# Patient Record
Sex: FEMALE | Race: WHITE | NOT HISPANIC OR LATINO | Employment: OTHER | URBAN - NONMETROPOLITAN AREA
[De-identification: names, ages, dates, MRNs, and addresses within clinical notes are randomized per-mention and may not be internally consistent; named-entity substitution may affect disease eponyms.]

---

## 2017-03-14 ENCOUNTER — TRANSFERRED RECORDS (OUTPATIENT)
Dept: HEALTH INFORMATION MANAGEMENT | Facility: HOSPITAL | Age: 82
End: 2017-03-14

## 2017-09-28 ENCOUNTER — TELEPHONE (OUTPATIENT)
Dept: FAMILY MEDICINE | Facility: OTHER | Age: 82
End: 2017-09-28

## 2017-09-28 ENCOUNTER — HOSPITAL ENCOUNTER (EMERGENCY)
Facility: HOSPITAL | Age: 82
Discharge: HOME | End: 2017-09-28
Attending: PHYSICIAN ASSISTANT | Admitting: PHYSICIAN ASSISTANT
Payer: MEDICARE

## 2017-09-28 VITALS
SYSTOLIC BLOOD PRESSURE: 118 MMHG | OXYGEN SATURATION: 98 % | HEART RATE: 80 BPM | DIASTOLIC BLOOD PRESSURE: 68 MMHG | TEMPERATURE: 98.6 F | RESPIRATION RATE: 16 BRPM

## 2017-09-28 DIAGNOSIS — S22.000A COMPRESSION FRACTURE OF THORACIC VERTEBRA, CLOSED, INITIAL ENCOUNTER (H): ICD-10-CM

## 2017-09-28 PROCEDURE — 99213 OFFICE O/P EST LOW 20 MIN: CPT

## 2017-09-28 PROCEDURE — 99213 OFFICE O/P EST LOW 20 MIN: CPT | Performed by: PHYSICIAN ASSISTANT

## 2017-09-28 PROCEDURE — 72100 X-RAY EXAM L-S SPINE 2/3 VWS: CPT | Mod: TC

## 2017-09-28 ASSESSMENT — ENCOUNTER SYMPTOMS
ARTHRALGIAS: 1
FEVER: 0
PSYCHIATRIC NEGATIVE: 1
FATIGUE: 0
BACK PAIN: 1
NEUROLOGICAL NEGATIVE: 1
CARDIOVASCULAR NEGATIVE: 1
CONSTITUTIONAL NEGATIVE: 1

## 2017-09-28 NOTE — ED NOTES
"C/o lower back pain resulting from carrying a 5 gallon water jug down the stairs today, states that she \"twisted\" wrong.   "

## 2017-09-28 NOTE — ED AVS SNAPSHOT
[facility]    [address] [address]    Phone:  [phone]                                       Bianka Whitmore   MRN: [id number]    Department:  [facility]   Date of Visit:  9/28/2017           After Visit Summary Signature Page     I have received my discharge instructions, and my questions have been answered. I have discussed any challenges I see with this plan with the nurse or doctor.    ..........................................................................................................................................  Patient/Patient Representative Signature      ..........................................................................................................................................  Patient Representative Print Name and Relationship to Patient    ..................................................               ................................................  Date                                            Time    ..........................................................................................................................................  Reviewed by Signature/Title    ...................................................              ..............................................  Date                                                            Time

## 2017-09-28 NOTE — ED PROVIDER NOTES
History     Chief Complaint   Patient presents with     Back Pain     was moving a 5 gallon water jug today around 1230 and twisted wrong, now having right lower back pain     The history is provided by the patient. No  was used.     Bianka Whitmore is a 82 year old female who has LBP since earlier today. States it started after she tried to carry a 5 gallon bucket of water, down some stairs. Denies any direct trauma or fall. No loss of leg strength or b/b control. Has h/o L3 compression fracture.     I have reviewed the Medications, Allergies, Past Medical and Surgical History, and Social History in the Epic system.    Allergies:   Allergies   Allergen Reactions     Amoxicillin      Intolerant           No current facility-administered medications on file prior to encounter.   Current Outpatient Prescriptions on File Prior to Encounter:  order for DME Equipment being ordered: ace   NONFORMULARY 1 tablespoon of chopped fresh garlic   NONFORMULARY Extra virgin olive oil 2 tablespoons  Daily   MAGNESIUM OXIDE PO Take by mouth daily    B Complex-Biotin-FA (VITAMIN B50 COMPLEX PO) Take 1 tablet by mouth daily   Ascorbic Acid (VITAMIN C PO) Take 500 mg by mouth 2 times daily   VITAMIN E COMPLEX PO Take 400 Units by mouth daily   Omega-3 Fatty Acids (FISH OIL) 1200 MG CAPS Take 1 capsule by mouth 2 times daily   Flaxseed, Linseed, (FLAX SEED OIL) 1000 MG capsule Take 1 capsule by mouth daily   Flaxseed, Linseed, (GROUND FLAX SEEDS PO) Take by mouth daily       Patient Active Problem List   Diagnosis     Diverticulitis of colon     Osteoarthritis     Osteoporosis/osteopenia increased risk     ACP (advance care planning)     Malignant basal cell neoplasm of skin     Compression fracture of thoracic vertebra (H)     Geographic tongue     Varicose vein of leg     Compression fx, lumbar spine, with routine healing, subsequent encounter     Typical atrial flutter (H)       Past Surgical History:  "  Procedure Laterality Date     APPENDECTOMY       ENDOSCOPIC STRIPPING VEIN(S)       HYSTERECTOMY       SALPINGO OOPHORECTOMY,R/L/BRIAN         Social History   Substance Use Topics     Smoking status: Former Smoker     Types: Cigarettes     Quit date: 3/26/1953     Smokeless tobacco: Never Used      Comment: Quit in 1950; no passive smoke exposure     Alcohol use No       Most Recent Immunizations   Administered Date(s) Administered     Influenza (IIV3) 10/14/2008     Pneumococcal 23 valent 10/21/2004     TD (ADULT, 7+) 05/21/1999     Zoster vaccine, live 11/19/2012       BMI: Estimated body mass index is 20.25 kg/(m^2) as calculated from the following:    Height as of 11/17/16: 1.626 m (5' 4\").    Weight as of 11/23/16: 53.5 kg (118 lb).      Review of Systems   Constitutional: Negative.  Negative for fatigue and fever.   HENT: Negative.    Cardiovascular: Negative.    Musculoskeletal: Positive for arthralgias and back pain.   Neurological: Negative.    Psychiatric/Behavioral: Negative.        Physical Exam   BP: 118/68  Pulse: 80  Temp: 98.6  F (37  C)  Resp: 16  SpO2: 98 %  Physical Exam   Constitutional: She is oriented to person, place, and time. She appears well-developed and well-nourished. No distress.   Cardiovascular: Normal rate.    Pulmonary/Chest: Effort normal.   Musculoskeletal:   Back: no e/e/e/e. Moderate TTP to left lower lumbar area. M/n/v grossly intact  BLE: +AFROM, m/n/v grossly intact   Neurological: She is alert and oriented to person, place, and time.   Skin: She is not diaphoretic.   Psychiatric: She has a normal mood and affect.   Nursing note and vitals reviewed.      ED Course     ED Course     Procedures         Preliminary result by Enrique Ortiz (09/28/17 14:57:36)     Narrative:         LUMBAR SPINE SERIES    REPORT:  There is compression fracture of T12, L3 and L4.  The L4  fracture is new as compared to July of 2016. Sacrum and sacroiliac  joints appear normal.  Lumbar disks " are normal in  height.    IMPRESSION:  NEW L4 COMPRESSION FRACTURE AS COMPARED TO JULY OF 2016.  Exam Date: Sep 28, 2017 02:32:00 PM  Author: MEMO DIAZ  This report is          Assessments & Plan (with Medical Decision Making)     I have reviewed the nursing notes.    I have reviewed the findings, diagnosis, plan and need for follow up with the patient.    Final diagnoses:   Compression fracture of thoracic vertebra, closed, initial encounter (H) - New compression fracture at L-4         Pt does not wish to have any rx for pain medication.  Patient verbally educated and given appropriate education sheets for the diagnoses and has no questions.  Take current medications as directed.   Follow up with your Primary Care provider if symptoms increase or if concerns develop, return to the ER  Magalie Cancino Certified  Physician Assistant  9/28/2017  3:20 PM  URGENT CARE CLINIC      9/28/2017   [facility]     Magalie Cacnino PA  09/28/17 1521

## 2017-09-28 NOTE — TELEPHONE ENCOUNTER
1:25 PM    Reason for Call: OVERBOOK    Patient is having the following symptoms: moving something and hurt back same place where pt had issue before for 1 hours.    The patient is requesting an appointment for today with Yael.    Was an appointment offered for this call? No  If yes : Appointment type              Date    Preferred method for responding to this message: Telephone Call  What is your phone number ? [phone]    If we cannot reach you directly, may we leave a detailed response at the number you provided? Yes    Can this message wait until your PCP/provider returns, if unavailable today?     Danielle Piedra

## 2017-09-28 NOTE — ED AVS SNAPSHOT
[facility]    [address]    [facility] [place] [address]    Phone:  [phone]                                       Bianka Whitmore   MRN: [id number]    Department:  [facility]   Date of Visit:  9/28/2017           Patient Information     Date Of Birth          1935        Your diagnoses for this visit were:     Compression fracture of thoracic vertebra, closed, initial encounter (H) New compression fracture at L-4       You were seen by Magalie Cancino PA.      Follow-up Information     Follow up with Yael Mckeon PA.    Specialty:  Family Practice    Why:  If symptoms worsen    Contact information:    [facility]  [address]  [place] [address]  [phone]          Follow up with [facility].    Specialty:  EMERGENCY MEDICINE    Why:  if further concerns develop    Contact information:    [address] [address] [phone]    Additional information:    From [facility]: Take [address]. Turn left at [address]. Turn left at the first stoplight on [address]. At the first stop sign, take a right onto [address]. Take a left into the parking lot and continue through until you reach the North enterance of the building.       From [place]: Take [address]. Take the [address] ramp towards [place]. Turn left onto [address]. Take a slight right onto [address]. Turn left at the first stoplight on [address]. At the first stop sign, take a right onto [address]. Take a left into the parking lot and continue through until you reach the North enterance of the building.       From [place]: Take [address]. Take a right at [address]. At the first stop sign, take a right onto [address]. Take a left into the parking lot and continue through until you reach the North enterance of the building.       Discharge References/Attachments      FRACTURE, VERTEBRAL COMPRESSION (ENGLISH)         Review of your medicines      Our records show that you are taking the medicines listed below. If these are incorrect, please call your family doctor or clinic.        Dose / Directions Last dose taken    Fish Oil 1200 MG Caps   Dose:  1 capsule        Take 1 capsule by mouth 2 times daily   Refills:  0        * flax seed oil 1000 MG capsule   Dose:  1 capsule        Take 1 capsule by mouth daily   Refills:  0        * GROUND FLAX SEEDS PO        Take by mouth daily   Refills:  0        MAGNESIUM OXIDE PO        Take by mouth daily   Refills:  0        * NONFORMULARY        1 tablespoon of chopped fresh garlic   Refills:  0        * NONFORMULARY        Extra virgin olive oil 2 tablespoons  Daily   Refills:  0        order for DME   Quantity:  1 Device        Equipment being ordered: ace   Refills:  1        VITAMIN B50 COMPLEX PO   Dose:  1 tablet        Take 1 tablet by mouth daily   Refills:  0        VITAMIN C PO   Dose:  500 mg        Take 500 mg by mouth 2 times daily   Refills:  0        VITAMIN E COMPLEX PO   Dose:  400 Units        Take 400 Units by mouth daily   Refills:  0        * Notice:  This list has 4 medication(s) that are the same as other medications prescribed for you. Read the directions carefully, and ask your doctor or other care provider to review them with you.            Procedures and tests performed during your visit     Lumbar spine XR, 2-3 views      Orders Needing Specimen Collection     None      Pending Results     Date and Time Order Name Status Description    9/28/2017 1410 Lumbar spine XR, 2-3 views In process             Pending Culture Results     No orders found from 9/26/2017 to 9/29/2017.            Thank you for choosing [facility]       Thank you for choosing [facility] for your care. Our goal is always to provide you with excellent care. Hearing back from our patients is one way we can continue to improve our services. Please  "take a few minutes to complete the written survey that you may receive in the mail after you visit with us. Thank you!        [employer] Information     [employer] lets you send messages to your doctor, view your test results, renew your prescriptions, schedule appointments and more. To sign up, go to [email or web address] . Click on \"Log in\" on the left side of the screen, which will take you to the Welcome page. Then click on \"Sign up Now\" on the right side of the page.     You will be asked to enter the access code listed below, as well as some personal information. Please follow the directions to create your username and password.     Your access code is: [email or web address]  Expires: 2017  2:43 PM     Your access code will  in 90 days. If you need help or a new code, please call your [place] clinic or [phone].        Care EveryWhere ID     This is your Care EveryWhere ID. This could be used by other organizations to access your [place] medical records  [id number]        Equal Access to Services     [facility]: Hadii layla Lloyd, waannamariada dalia, qaybkala mabryaljohn lay, tesha ortega . So [facility] [phone].    ATENCIÓN: Si habla español, tiene a marquez disposición servicios gratuitos de asistencia lingüística. Llame al [phone].    We comply with applicable federal civil rights laws and [place] laws. We do not discriminate on the basis of race, color, national origin, age, disability sex, sexual orientation or gender identity.            After Visit Summary       This is your record. Keep this with you and show to your community pharmacist(s) and doctor(s) at your next visit.                  "

## 2017-10-02 NOTE — PROGRESS NOTES
Lumbar Spine Series XR - IMPRESSION:  NEW L4 COMPRESSION FRACTURE AS COMPARED TO JULY OF 2016.  Advised to follow up with PCP, if symptoms worsen.  Report routed to PCP, ORTEGA Fermin.

## 2017-10-11 ENCOUNTER — OFFICE VISIT (OUTPATIENT)
Dept: FAMILY MEDICINE | Facility: OTHER | Age: 82
End: 2017-10-11
Attending: NURSE PRACTITIONER
Payer: MEDICARE

## 2017-10-11 VITALS
BODY MASS INDEX: 20.49 KG/M2 | SYSTOLIC BLOOD PRESSURE: 112 MMHG | RESPIRATION RATE: 16 BRPM | HEIGHT: 64 IN | OXYGEN SATURATION: 96 % | WEIGHT: 120 LBS | HEART RATE: 87 BPM | TEMPERATURE: 97.7 F | DIASTOLIC BLOOD PRESSURE: 72 MMHG

## 2017-10-11 DIAGNOSIS — S32.040D CLOSED COMPRESSION FRACTURE OF L4 LUMBAR VERTEBRA WITH ROUTINE HEALING, SUBSEQUENT ENCOUNTER: Primary | ICD-10-CM

## 2017-10-11 PROCEDURE — 99213 OFFICE O/P EST LOW 20 MIN: CPT | Performed by: NURSE PRACTITIONER

## 2017-10-11 PROCEDURE — 99212 OFFICE O/P EST SF 10 MIN: CPT

## 2017-10-11 ASSESSMENT — PAIN SCALES - GENERAL: PAINLEVEL: MODERATE PAIN (4)

## 2017-10-11 NOTE — NURSING NOTE
"Chief Complaint   Patient presents with     Hospital F/U       Initial /72 (BP Location: Left arm, Patient Position: Sitting, Cuff Size: Adult Regular)  Pulse 87  Temp 97.7  F (36.5  C) (Tympanic)  Resp 16  Ht 5' 4\" (1.626 m)  Wt 120 lb (54.4 kg)  SpO2 96%  BMI 20.6 kg/m2 Estimated body mass index is 20.6 kg/(m^2) as calculated from the following:    Height as of this encounter: 5' 4\" (1.626 m).    Weight as of this encounter: 120 lb (54.4 kg).  Medication Reconciliation: complete   Gianna Rhodes      "

## 2017-10-11 NOTE — PROGRESS NOTES
SUBJECTIVE:   Bianka Whitmore is a 82 year old female who presents to clinic today for the following health issues:      ED/UC Followup:    Facility:  [facility]  Date of visit: 9-  Reason for visit: Low back pain after moving water cooler water jug  Current Status: better sometimes still sore and painful.  Taking IBU and tylenol.             Problem list and histories reviewed & adjusted, as indicated.  Additional history: as documented    Patient Active Problem List   Diagnosis     Diverticulitis of colon     Osteoarthritis     Osteoporosis/osteopenia increased risk     ACP (advance care planning)     Malignant basal cell neoplasm of skin     Compression fracture of thoracic vertebra (H)     Geographic tongue     Varicose vein of leg     Compression fx, lumbar spine, with routine healing, subsequent encounter     Typical atrial flutter (H)     Past Surgical History:   Procedure Laterality Date     APPENDECTOMY       ENDOSCOPIC STRIPPING VEIN(S)       HYSTERECTOMY       SALPINGO OOPHORECTOMY,R/L/BRIAN         Social History   Substance Use Topics     Smoking status: Former Smoker     Types: Cigarettes     Quit date: 3/26/1953     Smokeless tobacco: Never Used      Comment: Quit in 1950; no passive smoke exposure     Alcohol use No     Family History   Problem Relation Age of Onset     Other - See Comments Mother 85     Old age, cause of death     CANCER Father 84     Rectal cancer, cause of death/Stomach cancer, cause of death     C.A.D. Maternal Aunt      C.A.D. Maternal Uncle      CANCER Son      thymic carcinoma         Current Outpatient Prescriptions   Medication Sig Dispense Refill     order for DME Equipment being ordered: ace 1 Device 1     NONFORMULARY 1 tablespoon of chopped fresh garlic       NONFORMULARY Extra virgin olive oil 2 tablespoons  Daily       MAGNESIUM OXIDE PO Take by mouth daily        B Complex-Biotin-FA (VITAMIN B50 COMPLEX PO) Take 1 tablet by mouth daily       Ascorbic Acid (VITAMIN C  "PO) Take 500 mg by mouth 2 times daily       VITAMIN E COMPLEX PO Take 400 Units by mouth daily       Omega-3 Fatty Acids (FISH OIL) 1200 MG CAPS Take 1 capsule by mouth 2 times daily       Flaxseed, Linseed, (FLAX SEED OIL) 1000 MG capsule Take 1 capsule by mouth daily       Flaxseed, Linseed, (GROUND FLAX SEEDS PO) Take by mouth daily       Allergies   Allergen Reactions     Amoxicillin      Intolerant           Reviewed and updated as needed this visit by clinical staffAllergies  Meds       Reviewed and updated as needed this visit by Provider         ROS:  C: NEGATIVE for fever, chills, change in weight  INTEGUMENTARY/SKIN: NEGATIVE for worrisome rashes, moles or lesions  R: NEGATIVE for significant cough or SOB  CV: NEGATIVE for chest pain, palpitations or peripheral edema  MUSCULOSKELETAL: recent L4 compression fracture     OBJECTIVE:     /72 (BP Location: Left arm, Patient Position: Sitting, Cuff Size: Adult Regular)  Pulse 87  Temp 97.7  F (36.5  C) (Tympanic)  Resp 16  Ht 5' 4\" (1.626 m)  Wt 120 lb (54.4 kg)  SpO2 96%  BMI 20.6 kg/m2  Body mass index is 20.6 kg/(m^2).   GENERAL: healthy, alert and no distress  RESP: lungs clear to auscultation - no rales, rhonchi or wheezes  CV: regular rate and rhythm, normal S1 S2, no S3 or S4, no murmur, click or rub, no peripheral edema and peripheral pulses strong  MS: no edema, peripheral pulses normal and tenderness to palpation bilateral lower back - Negative for changes in bowel and bladder  SKIN: no suspicious lesions or rashes  PSYCH: mentation appears normal, affect normal/bright    Diagnostic Test Results:  Results for orders placed or performed during the hospital encounter of 09/28/17   Lumbar spine XR, 2-3 views    Narrative    LUMBAR SPINE SERIES    REPORT:  There is compression fracture of T12, L3 and L4.  The L4  fracture is new as compared to July of 2016. Sacrum and sacroiliac  joints appear normal.  Lumbar disks are normal in  " height.    IMPRESSION:  NEW L4 COMPRESSION FRACTURE AS COMPARED TO JULY OF 2016.  Exam Date: Sep 28, 2017 02:32:00 PM  Author: MEMO DIAZ  This report is final and signed         ASSESSMENT:       PLAN:   1. Closed compression fracture of L4 lumbar vertebra with routine healing, subsequent encounter  Encouraged alternating tylenol and ibuprofen. No heavy lifting. No back x-ray done today. Discussed slow healing process and follow as needed or any concerns          See Patient Instructions    LAUREN Chawla [facility]

## 2017-10-11 NOTE — PATIENT INSTRUCTIONS
Back Fracture (Compression Fracture)  Your spine stretches from the base of your skull to your tailbone. It's composed of 33 bones (vertebrae) stacked on top of one another. These bones are strong enough to support the weight of your upper body. Certain injuries, however, can damage one or more of the vertebrae and cause them to collapse. A collapsed bone in your spine is known as a compression fracture.    Causes of compression fracture  Many compression fractures result from osteoporosis. This disease thins your bones so they can't withstand normal pressure. Trauma from a car accident or hard fall can fracture even healthy vertebrae. In rare cases, vertebrae may fracture for unknown reasons.  When to go to the Emergency Room (ER)  Call 911 if you've been in an accident or had a fall and have neck or back pain, especially when pain occurs with any of these symptoms:    Loss of control over your bowels or bladder    Numbness or weakness    High fever    Unexplained back pain in a person with cancer  What to expect in the ER  A healthcare provider will ask about your medical history and examine you. In some cases, you may have X-rays. You also may have other tests, such as computed tomography (CT) or magnetic resonance imaging (MRI). These tests can provide detailed images of your bones and spinal cord.  Treatment  Treatment will depend on the type and cause of the fracture. You will be given medicine for pain. Severe fractures or those that cause nerve problems may need surgery. Many compression fractures mend on their own.  Follow-up  As you improve, you may be given exercises to strengthen your bones. If you have osteoporosis, your healthcare provider may prescribe a medicine to treat it. Sometimes you may have pain even after the bone has healed. In that case, your healthcare provider will discuss your choices with you.  Date Last Reviewed: 9/30/2015 2000-2017 [employer]. [address] [place] [address]  Road, [place] [address]. All rights reserved. This information is not intended as a substitute for professional medical care. Always follow your healthcare professional's instructions.      Try alternating tylenol and ibuprofen throughout the day  Try some ice or heat for comfort  Do not lift heavy objects  Continue to stay active    Follow up with any questions or concerns

## 2017-10-11 NOTE — MR AVS SNAPSHOT
After Visit Summary   10/11/2017    Bianka Whitmore    MRN: [id number]           Patient Information     Date Of Birth          1935        Visit Information        Provider Department      10/11/2017 1:40 PM Theresa Sheridan APRN [facility]        Today's Diagnoses     Closed compression fracture of L4 lumbar vertebra with routine healing, subsequent encounter    -  1      Care Instructions      Back Fracture (Compression Fracture)  Your spine stretches from the base of your skull to your tailbone. It's composed of 33 bones (vertebrae) stacked on top of one another. These bones are strong enough to support the weight of your upper body. Certain injuries, however, can damage one or more of the vertebrae and cause them to collapse. A collapsed bone in your spine is known as a compression fracture.    Causes of compression fracture  Many compression fractures result from osteoporosis. This disease thins your bones so they can't withstand normal pressure. Trauma from a car accident or hard fall can fracture even healthy vertebrae. In rare cases, vertebrae may fracture for unknown reasons.  When to go to the Emergency Room (ER)  Call 911 if you've been in an accident or had a fall and have neck or back pain, especially when pain occurs with any of these symptoms:    Loss of control over your bowels or bladder    Numbness or weakness    High fever    Unexplained back pain in a person with cancer  What to expect in the ER  A healthcare provider will ask about your medical history and examine you. In some cases, you may have X-rays. You also may have other tests, such as computed tomography (CT) or magnetic resonance imaging (MRI). These tests can provide detailed images of your bones and spinal cord.  Treatment  Treatment will depend on the type and cause of the fracture. You will be given medicine for pain. Severe fractures or those that cause nerve problems may need surgery.  "Many compression fractures mend on their own.  Follow-up  As you improve, you may be given exercises to strengthen your bones. If you have osteoporosis, your healthcare provider may prescribe a medicine to treat it. Sometimes you may have pain even after the bone has healed. In that case, your healthcare provider will discuss your choices with you.  Date Last Reviewed: 9/30/2015 2000-2017 [employer]. [address] [place] [address], [place] [address]. All rights reserved. This information is not intended as a substitute for professional medical care. Always follow your healthcare professional's instructions.      Try alternating tylenol and ibuprofen throughout the day  Try some ice or heat for comfort  Do not lift heavy objects  Continue to stay active    Follow up with any questions or concerns          Follow-ups after your visit        Follow-up notes from your care team     Return if symptoms worsen or fail to improve.      Who to contact     If you have questions or need follow up information about today's clinic visit or your schedule please contact [facility] directly at [phone].  Normal or non-critical lab and imaging results will be communicated to you by [employer], letter or phone within 4 business days after the clinic has received the results. If you do not hear from us within 7 days, please contact the clinic through [employer] or phone. If you have a critical or abnormal lab result, we will notify you by phone as soon as possible.  Submit refill requests through [employer] or call your pharmacy and they will forward the refill request to us. Please allow 3 business days for your refill to be completed.          Additional Information About Your Visit        [employer] Information     [employer] lets you send messages to your doctor, view your test results, renew your prescriptions, schedule appointments and more. To sign up, go to [email or web address] . Click on \"Log in\" on the left " "side of the screen, which will take you to the Welcome page. Then click on \"Sign up Now\" on the right side of the page.     You will be asked to enter the access code listed below, as well as some personal information. Please follow the directions to create your username and password.     Your access code is: [email or web address]  Expires: 2017  2:43 PM     Your access code will  in 90 days. If you need help or a new code, please call your [place] clinic or [phone].        Care EveryWhere ID     This is your Care EveryWhere ID. This could be used by other organizations to access your [place] medical records  [id number]        Your Vitals Were     Pulse Temperature Respirations Height Pulse Oximetry BMI (Body Mass Index)    87 97.7  F (36.5  C) (Tympanic) 16 5' 4\" (1.626 m) 96% 20.6 kg/m2       Blood Pressure from Last 3 Encounters:   10/11/17 112/72   17 118/68   16 100/70    Weight from Last 3 Encounters:   10/11/17 120 lb (54.4 kg)   16 118 lb (53.5 kg)   16 120 lb (54.4 kg)              Today, you had the following     No orders found for display       Primary Care Provider Office Phone # Fax #    ORTEGA Rivas [phone]       [facility] [address] [place] [address]        Equal Access to Services     [facility] AH: Hadii aad ku hadasho Soomaali, waaxda luqadaha, qaybta kaalmada adeegyada, waxmaria esther ortega . So [facility] [phone].    ATENCIÓN: Si radhala en, tiene a marquez disposición servicios gratuitos de asistencia lingüística. Llame al [phone].    We comply with applicable federal civil rights laws and [place] laws. We do not discriminate on the basis of race, color, national origin, age, disability, sex, sexual orientation, or gender identity.            Thank you!     Thank you for choosing [facility]  for your care. Our goal is always to provide you with excellent care. Hearing " back from our patients is one way we can continue to improve our services. Please take a few minutes to complete the written survey that you may receive in the mail after your visit with us. Thank you!             Your Updated Medication List - Protect others around you: Learn how to safely use, store and throw away your medicines at www.disposemymeds.org.          This list is accurate as of: 10/11/17  2:22 PM.  Always use your most recent med list.                   Brand Name Dispense Instructions for use Diagnosis    fish Oil 1200 MG capsule      Take 1 capsule by mouth 2 times daily        * flax seed oil 1000 MG capsule      Take 1 capsule by mouth daily        * GROUND FLAX SEEDS PO      Take by mouth daily        MAGNESIUM OXIDE PO      Take by mouth daily        * NONFORMULARY      1 tablespoon of chopped fresh garlic        * NONFORMULARY      Extra virgin olive oil 2 tablespoons  Daily        order for DME     1 Device    Equipment being ordered: ace    Cellulitis and abscess of leg       VITAMIN B50 COMPLEX PO      Take 1 tablet by mouth daily        VITAMIN C PO      Take 500 mg by mouth 2 times daily        VITAMIN E COMPLEX PO      Take 400 Units by mouth daily        * Notice:  This list has 4 medication(s) that are the same as other medications prescribed for you. Read the directions carefully, and ask your doctor or other care provider to review them with you.

## 2017-10-27 ENCOUNTER — OFFICE VISIT (OUTPATIENT)
Dept: FAMILY MEDICINE | Facility: OTHER | Age: 82
End: 2017-10-27
Attending: PHYSICIAN ASSISTANT
Payer: MEDICARE

## 2017-10-27 ENCOUNTER — RADIANT APPOINTMENT (OUTPATIENT)
Dept: GENERAL RADIOLOGY | Facility: OTHER | Age: 82
End: 2017-10-27
Attending: PHYSICIAN ASSISTANT
Payer: MEDICARE

## 2017-10-27 VITALS
OXYGEN SATURATION: 97 % | TEMPERATURE: 98.3 F | WEIGHT: 120 LBS | DIASTOLIC BLOOD PRESSURE: 76 MMHG | HEART RATE: 64 BPM | BODY MASS INDEX: 20.6 KG/M2 | SYSTOLIC BLOOD PRESSURE: 110 MMHG

## 2017-10-27 DIAGNOSIS — Z12.11 SPECIAL SCREENING FOR MALIGNANT NEOPLASMS, COLON: ICD-10-CM

## 2017-10-27 DIAGNOSIS — M48.50XD PATHOLOGICAL COMPRESSION FRACTURE OF VERTEBRA WITH ROUTINE HEALING, SUBSEQUENT ENCOUNTER: ICD-10-CM

## 2017-10-27 DIAGNOSIS — M80.00XD AGE-RELATED OSTEOPOROSIS WITH CURRENT PATHOLOGICAL FRACTURE WITH ROUTINE HEALING, SUBSEQUENT ENCOUNTER: Primary | ICD-10-CM

## 2017-10-27 DIAGNOSIS — R25.2 CRAMP AND SPASM: ICD-10-CM

## 2017-10-27 LAB
ALBUMIN SERPL-MCNC: 4 G/DL (ref 3.4–5)
ALP SERPL-CCNC: 112 U/L (ref 40–150)
ALT SERPL W P-5'-P-CCNC: 36 U/L (ref 0–50)
ANION GAP SERPL CALCULATED.3IONS-SCNC: 7 MMOL/L (ref 3–14)
AST SERPL W P-5'-P-CCNC: 42 U/L (ref 0–45)
BASOPHILS # BLD AUTO: 0 10E9/L (ref 0–0.2)
BASOPHILS NFR BLD AUTO: 0.3 %
BILIRUB SERPL-MCNC: 0.9 MG/DL (ref 0.2–1.3)
BUN SERPL-MCNC: 21 MG/DL (ref 7–30)
CALCIUM SERPL-MCNC: 9.3 MG/DL (ref 8.5–10.1)
CHLORIDE SERPL-SCNC: 102 MMOL/L (ref 94–109)
CO2 SERPL-SCNC: 26 MMOL/L (ref 20–32)
CREAT SERPL-MCNC: 0.69 MG/DL (ref 0.52–1.04)
DIFFERENTIAL METHOD BLD: ABNORMAL
EOSINOPHIL # BLD AUTO: 0.1 10E9/L (ref 0–0.7)
EOSINOPHIL NFR BLD AUTO: 0.6 %
ERYTHROCYTE [DISTWIDTH] IN BLOOD BY AUTOMATED COUNT: 13.7 % (ref 10–15)
GFR SERPL CREATININE-BSD FRML MDRD: 82 ML/MIN/1.7M2
GLUCOSE SERPL-MCNC: 83 MG/DL (ref 70–99)
HCT VFR BLD AUTO: 46.2 % (ref 35–47)
HGB BLD-MCNC: 16.3 G/DL (ref 11.7–15.7)
IMM GRANULOCYTES # BLD: 0 10E9/L (ref 0–0.4)
IMM GRANULOCYTES NFR BLD: 0.3 %
LYMPHOCYTES # BLD AUTO: 2 10E9/L (ref 0.8–5.3)
LYMPHOCYTES NFR BLD AUTO: 24.5 %
MCH RBC QN AUTO: 33.7 PG (ref 26.5–33)
MCHC RBC AUTO-ENTMCNC: 35.3 G/DL (ref 31.5–36.5)
MCV RBC AUTO: 96 FL (ref 78–100)
MONOCYTES # BLD AUTO: 0.6 10E9/L (ref 0–1.3)
MONOCYTES NFR BLD AUTO: 7.7 %
NEUTROPHILS # BLD AUTO: 5.3 10E9/L (ref 1.6–8.3)
NEUTROPHILS NFR BLD AUTO: 66.6 %
NRBC # BLD AUTO: 0 10*3/UL
NRBC BLD AUTO-RTO: 0 /100
PLATELET # BLD AUTO: 177 10E9/L (ref 150–450)
POTASSIUM SERPL-SCNC: 4.3 MMOL/L (ref 3.4–5.3)
PROT SERPL-MCNC: 7.7 G/DL (ref 6.8–8.8)
RBC # BLD AUTO: 4.83 10E12/L (ref 3.8–5.2)
SODIUM SERPL-SCNC: 135 MMOL/L (ref 133–144)
TSH SERPL DL<=0.005 MIU/L-ACNC: 2.81 MU/L (ref 0.4–4)
WBC # BLD AUTO: 8 10E9/L (ref 4–11)

## 2017-10-27 PROCEDURE — 99212 OFFICE O/P EST SF 10 MIN: CPT

## 2017-10-27 PROCEDURE — 85025 COMPLETE CBC W/AUTO DIFF WBC: CPT | Mod: ZL | Performed by: PHYSICIAN ASSISTANT

## 2017-10-27 PROCEDURE — 72100 X-RAY EXAM L-S SPINE 2/3 VWS: CPT | Mod: TC

## 2017-10-27 PROCEDURE — 99214 OFFICE O/P EST MOD 30 MIN: CPT | Performed by: PHYSICIAN ASSISTANT

## 2017-10-27 PROCEDURE — 80053 COMPREHEN METABOLIC PANEL: CPT | Mod: ZL | Performed by: PHYSICIAN ASSISTANT

## 2017-10-27 PROCEDURE — 36415 COLL VENOUS BLD VENIPUNCTURE: CPT | Mod: ZL | Performed by: PHYSICIAN ASSISTANT

## 2017-10-27 PROCEDURE — 84443 ASSAY THYROID STIM HORMONE: CPT | Mod: ZL | Performed by: PHYSICIAN ASSISTANT

## 2017-10-27 ASSESSMENT — ANXIETY QUESTIONNAIRES
6. BECOMING EASILY ANNOYED OR IRRITABLE: NOT AT ALL
3. WORRYING TOO MUCH ABOUT DIFFERENT THINGS: NOT AT ALL
IF YOU CHECKED OFF ANY PROBLEMS ON THIS QUESTIONNAIRE, HOW DIFFICULT HAVE THESE PROBLEMS MADE IT FOR YOU TO DO YOUR WORK, TAKE CARE OF THINGS AT HOME, OR GET ALONG WITH OTHER PEOPLE: NOT DIFFICULT AT ALL
2. NOT BEING ABLE TO STOP OR CONTROL WORRYING: NOT AT ALL
4. TROUBLE RELAXING: NOT AT ALL
5. BEING SO RESTLESS THAT IT IS HARD TO SIT STILL: NOT AT ALL
GAD7 TOTAL SCORE: 0
7. FEELING AFRAID AS IF SOMETHING AWFUL MIGHT HAPPEN: NOT AT ALL
1. FEELING NERVOUS, ANXIOUS, OR ON EDGE: NOT AT ALL

## 2017-10-27 ASSESSMENT — PAIN SCALES - GENERAL: PAINLEVEL: NO PAIN (0)

## 2017-10-27 ASSESSMENT — PATIENT HEALTH QUESTIONNAIRE - PHQ9: SUM OF ALL RESPONSES TO PHQ QUESTIONS 1-9: 0

## 2017-10-27 NOTE — NURSING NOTE
"Chief Complaint   Patient presents with     Fracture     vertrbrae fracture followup        Initial /76 (BP Location: Left arm, Patient Position: Chair, Cuff Size: Adult Regular)  Pulse 64  Temp 98.3  F (36.8  C) (Tympanic)  Wt 120 lb (54.4 kg)  SpO2 97%  Breastfeeding? No  BMI 20.6 kg/m2 Estimated body mass index is 20.6 kg/(m^2) as calculated from the following:    Height as of 10/11/17: 5' 4\" (1.626 m).    Weight as of this encounter: 120 lb (54.4 kg).  Medication Reconciliation: complete   Amanda Figueroa CMA(AAMA)     "

## 2017-10-27 NOTE — PATIENT INSTRUCTIONS
Thank you for choosing [facility].   I have office hours 8:00 am to 4:30 pm on Monday's, Wednesday's, Thursday's and Friday's. My nurse and I are out of the office every Tuesday.    Following your visit, when your labs and diagnostic testing have returned, I will review then and you will be contacted by my nurse.  If you are on My Chart, you can also view results there.    For refills, notify your pharmacy regarding what you need and the pharmacy will generate a refill request. Do not call my nurse as she is unable to process refill request. Please plan ahead and allow 3-5 days for refill requests.    You will generally receive a reminder call the day prior to your appointment.  If you cannot attend your appointment, please cancel your appointment with as much notice as possible.  If there is a pattern of failure to present for your appointments, I cannot provide consistent, meaningful, ongoing care for you. It is very important to me that you come in for your care, so we can best assist you with your health care needs.    IMPORTANT:  Please note that it is my standard of practice to NOT participate in prescribing ongoing requested Narcotic Analgesic therapy, and/or participate in the prescribing of other controlled substances.  My nurse and I am happy to assist you with the process of referral for alternative pain management as needed, and other treatment modalities including but not limited to:  Physical Therapy, Physical Medicine and Rehab, Counseling, Chiropractic Care, Orthopedic Care, and non-narcotic medication management.     In the event that you need to be seen for emergent concerns and I am out of office,  please see one of my colleagues for acute concerns.  You may also present to  or ER.  I appreciate the opportunity to serve you and look forward to supporting your healthcare needs in the future. Please contact me with any questions or concerns that you may  have.    Sincerely,      Yael Mckeon RN, PA-C

## 2017-10-27 NOTE — MR AVS SNAPSHOT
After Visit Summary   10/27/2017    Bianka Whitmore    MRN: [id number]           Patient Information     Date Of Birth          1935        Visit Information        Provider Department      10/27/2017 9:00 AM Yael Mckeon PA [facility]        Today's Diagnoses     Age-related osteoporosis with current pathological fracture with routine healing, subsequent encounter    -  1    Pathological compression fracture of vertebra with routine healing, subsequent encounter        Cramp and spasm         Special screening for malignant neoplasms, colon          Care Instructions      Thank you for choosing [facility].   I have office hours 8:00 am to 4:30 pm on Monday's, Wednesday's, Thursday's and Friday's. My nurse and I are out of the office every Tuesday.    Following your visit, when your labs and diagnostic testing have returned, I will review then and you will be contacted by my nurse.  If you are on My Chart, you can also view results there.    For refills, notify your pharmacy regarding what you need and the pharmacy will generate a refill request. Do not call my nurse as she is unable to process refill request. Please plan ahead and allow 3-5 days for refill requests.    You will generally receive a reminder call the day prior to your appointment.  If you cannot attend your appointment, please cancel your appointment with as much notice as possible.  If there is a pattern of failure to present for your appointments, I cannot provide consistent, meaningful, ongoing care for you. It is very important to me that you come in for your care, so we can best assist you with your health care needs.    IMPORTANT:  Please note that it is my standard of practice to NOT participate in prescribing ongoing requested Narcotic Analgesic therapy, and/or participate in the prescribing of other controlled substances.  My nurse and I am happy to assist you with the process of referral  "for alternative pain management as needed, and other treatment modalities including but not limited to:  Physical Therapy, Physical Medicine and Rehab, Counseling, Chiropractic Care, Orthopedic Care, and non-narcotic medication management.     In the event that you need to be seen for emergent concerns and I am out of office,  please see one of my colleagues for acute concerns.  You may also present to UC or ER.  I appreciate the opportunity to serve you and look forward to supporting your healthcare needs in the future. Please contact me with any questions or concerns that you may have.    Sincerely,      Yael Mckeon RN, PA-C               Follow-ups after your visit        Future tests that were ordered for you today     Open Future Orders        Priority Expected Expires Ordered    Immunos occult blood Routine  10/27/2018 10/27/2017            Who to contact     If you have questions or need follow up information about today's clinic visit or your schedule please contact [facility] directly at [phone].  Normal or non-critical lab and imaging results will be communicated to you by [employer], letter or phone within 4 business days after the clinic has received the results. If you do not hear from us within 7 days, please contact the clinic through [employer] or phone. If you have a critical or abnormal lab result, we will notify you by phone as soon as possible.  Submit refill requests through [employer] or call your pharmacy and they will forward the refill request to us. Please allow 3 business days for your refill to be completed.          Additional Information About Your Visit        [employer] Information     [employer] lets you send messages to your doctor, view your test results, renew your prescriptions, schedule appointments and more. To sign up, go to [email or web address] . Click on \"Log in\" on the left side of the screen, which will take you to the Welcome page. Then click on \"Sign up Now\" " on the right side of the page.     You will be asked to enter the access code listed below, as well as some personal information. Please follow the directions to create your username and password.     Your access code is: [email or web address]  Expires: 2017  2:43 PM     Your access code will  in 90 days. If you need help or a new code, please call your [place] clinic or [phone].        Care EveryWhere ID     This is your Care EveryWhere ID. This could be used by other organizations to access your [place] medical records  [id number]        Your Vitals Were     Pulse Temperature Pulse Oximetry Breastfeeding? BMI (Body Mass Index)       64 98.3  F (36.8  C) (Tympanic) 97% No 20.6 kg/m2        Blood Pressure from Last 3 Encounters:   10/27/17 110/76   10/11/17 112/72   17 118/68    Weight from Last 3 Encounters:   10/27/17 120 lb (54.4 kg)   10/11/17 120 lb (54.4 kg)   16 118 lb (53.5 kg)              We Performed the Following     CBC with platelets and differential     Comprehensive metabolic panel     TSH        Primary Care Provider Office Phone # Fax #    ORTEGA Rivas [phone]       [facility] [address]  [facility] [address]        Equal Access to Services     SANDRA BRASHER : Hadii aad ku hadasho Soomaali, waaxda luqadaha, qaybta kaalmada adeegyada, tesha ortega . So [facility] [phone].    ATENCIÓN: Si habla español, tiene a marquez disposición servicios gratuitos de asistencia lingüística. Llcrow al [phone].    We comply with applicable federal civil rights laws and [place] laws. We do not discriminate on the basis of race, color, national origin, age, disability, sex, sexual orientation, or gender identity.            Thank you!     Thank you for choosing [facility]  for your care. Our goal is always to provide you with excellent care. Hearing back from our patients is one way we can continue to  improve our services. Please take a few minutes to complete the written survey that you may receive in the mail after your visit with us. Thank you!             Your Updated Medication List - Protect others around you: Learn how to safely use, store and throw away your medicines at www.disposemymeds.org.          This list is accurate as of: 10/27/17 10:25 AM.  Always use your most recent med list.                   Brand Name Dispense Instructions for use Diagnosis    fish Oil 1200 MG capsule      Take 1,200 mg by mouth 2 times daily        * flax seed oil 1000 MG capsule      Take 1 capsule by mouth daily        * GROUND FLAX SEEDS PO      Take by mouth daily        MAGNESIUM OXIDE PO      Take by mouth daily        * NONFORMULARY      1 tablespoon of chopped fresh garlic        * NONFORMULARY      Extra virgin olive oil 2 tablespoons  Daily        VITAMIN B50 COMPLEX PO      Take 1 tablet by mouth daily        VITAMIN C PO      Take 500 mg by mouth 2 times daily        VITAMIN E COMPLEX PO      Take 400 Units by mouth daily        * Notice:  This list has 4 medication(s) that are the same as other medications prescribed for you. Read the directions carefully, and ask your doctor or other care provider to review them with you.

## 2017-10-27 NOTE — PROGRESS NOTES
SUBJECTIVE:   Bianka Whitmore is a 82 year old female who presents to clinic today for the following health issues:        Fracture follow up       Duration: 9/28/17    Description (location/character/radiation): patient was seen on 9/28/17 at [facility] for a vertebra fracture.     Intensity:  Patient states no pain today     Accompanying signs and symptoms: muscle cramps in legs no weakness.   Gait is shuffled.     History (similar episodes/previous evaluation): pervious fractures     Precipitating or alleviating factors: X-rays     Therapies tried and outcome: Ibuprofen              Problem list and histories reviewed & adjusted, as indicated.  Additional history: as documented    Patient Active Problem List   Diagnosis     Diverticulitis of colon     Osteoarthritis     Osteoporosis/osteopenia increased risk     ACP (advance care planning)     Malignant basal cell neoplasm of skin     Compression fracture of thoracic vertebra (H)     Geographic tongue     Varicose vein of leg     Compression fx, lumbar spine, with routine healing, subsequent encounter     Typical atrial flutter (H)     Past Surgical History:   Procedure Laterality Date     APPENDECTOMY       ENDOSCOPIC STRIPPING VEIN(S)       HYSTERECTOMY       SALPINGO OOPHORECTOMY,R/L/BRIAN         Social History   Substance Use Topics     Smoking status: Former Smoker     Types: Cigarettes     Quit date: 3/26/1953     Smokeless tobacco: Never Used      Comment: Quit in 1950; no passive smoke exposure     Alcohol use No     Family History   Problem Relation Age of Onset     Other - See Comments Mother 85     Old age, cause of death     CANCER Father 84     Rectal cancer, cause of death/Stomach cancer, cause of death     C.A.D. Maternal Aunt      C.A.D. Maternal Uncle      CANCER Son      thymic carcinoma         Current Outpatient Prescriptions   Medication Sig Dispense Refill     NONFORMULARY 1 tablespoon of chopped fresh garlic       NONFORMULARY Extra virgin olive  oil 2 tablespoons  Daily       MAGNESIUM OXIDE PO Take by mouth daily        B Complex-Biotin-FA (VITAMIN B50 COMPLEX PO) Take 1 tablet by mouth daily       Ascorbic Acid (VITAMIN C PO) Take 500 mg by mouth 2 times daily       VITAMIN E COMPLEX PO Take 400 Units by mouth daily       Omega-3 Fatty Acids (FISH OIL) 1200 MG CAPS Take 1,200 mg by mouth 2 times daily        Flaxseed, Linseed, (FLAX SEED OIL) 1000 MG capsule Take 1 capsule by mouth daily       Flaxseed, Linseed, (GROUND FLAX SEEDS PO) Take by mouth daily       Allergies   Allergen Reactions     Amoxicillin      Intolerant       BP Readings from Last 3 Encounters:   10/27/17 110/76   10/11/17 112/72   09/28/17 118/68    Wt Readings from Last 3 Encounters:   10/27/17 120 lb (54.4 kg)   10/11/17 120 lb (54.4 kg)   11/23/16 118 lb (53.5 kg)                        Reviewed and updated as needed this visit by clinical staffTobacco  Allergies  Meds       Reviewed and updated as needed this visit by Provider         ROS:  Constitutional, neuro, ENT, endocrine, pulmonary, cardiac, gastrointestinal, genitourinary, musculoskeletal, integument and psychiatric systems are negative, except as otherwise noted.      OBJECTIVE:                                                    /76 (BP Location: Left arm, Patient Position: Chair, Cuff Size: Adult Regular)  Pulse 64  Temp 98.3  F (36.8  C) (Tympanic)  Wt 120 lb (54.4 kg)  SpO2 97%  Breastfeeding? No  BMI 20.6 kg/m2  Body mass index is 20.6 kg/(m^2).  GENERAL APPEARANCE: healthy, alert and no distress  EYES: Eyes grossly normal to inspection, PERRL and conjunctivae and sclerae normal  HENT: ear canals and TM's normal and nose and mouth without ulcers or lesions  NECK: no adenopathy, no asymmetry, masses, or scars and thyroid normal to palpation  RESP: lungs clear to auscultation - no rales, rhonchi or wheezes  CV: regular rates and rhythm, normal S1 S2, no S3 or S4 and no murmur, click or rub  LYMPHATICS:  normal ant/post cervical and supraclavicular nodes  MS: extremities normal- no gross deformities noted  SKIN: no suspicious lesions or rashes  NEURO: Normal strength and tone, mentation intact and speech normal  PSYCH: mentation appears normal and affect normal/bright    Diagnostic test results:  Diagnostic Test Results:  Results for orders placed or performed in visit on 10/27/17 (from the past 24 hour(s))   CBC with platelets and differential   Result Value Ref Range    WBC 8.0 4.0 - 11.0 10e9/L    RBC Count 4.83 3.8 - 5.2 10e12/L    Hemoglobin 16.3 (H) 11.7 - 15.7 g/dL    Hematocrit 46.2 35.0 - 47.0 %    MCV 96 78 - 100 fl    MCH 33.7 (H) 26.5 - 33.0 pg    MCHC 35.3 31.5 - 36.5 g/dL    RDW 13.7 10.0 - 15.0 %    Platelet Count 177 150 - 450 10e9/L    Diff Method Automated Method     % Neutrophils 66.6 %    % Lymphocytes 24.5 %    % Monocytes 7.7 %    % Eosinophils 0.6 %    % Basophils 0.3 %    % Immature Granulocytes 0.3 %    Nucleated RBCs 0 0 /100    Absolute Neutrophil 5.3 1.6 - 8.3 10e9/L    Absolute Lymphocytes 2.0 0.8 - 5.3 10e9/L    Absolute Monocytes 0.6 0.0 - 1.3 10e9/L    Absolute Eosinophils 0.1 0.0 - 0.7 10e9/L    Absolute Basophils 0.0 0.0 - 0.2 10e9/L    Abs Immature Granulocytes 0.0 0 - 0.4 10e9/L    Absolute Nucleated RBC 0.0           ASSESSMENT/PLAN:                                                    1. Age-related osteoporosis with current pathological fracture with routine healing, subsequent encounter  Fracture now is really bothering her.  Check labs due to lower back pain.   - Comprehensive metabolic panel    2. Pathological compression fracture of vertebra with routine healing, subsequent encounter  Checking MRI.  She had change in xray with further loss of height.  Pain increased.  Good calcium intake. Will get together again once this is completed.   - XR LUMBAR SPINE 2/3 VIEWS (Clinic Performed); Future  - CBC with platelets and differential  - Comprehensive metabolic panel    3. Cramp  and spasm   At night. Recommended. Magnesium or mustard.   - CBC with platelets and differential  - TSH    4. Special screening for malignant neoplasms, colon  Due for this. not wanting colonoscopy every again!    - Immunos occult blood; Future      See Patient Instructions    ORTEGA Caruso  [facility]

## 2017-10-27 NOTE — LETTER
October 27, 2017      Bianka Whitmore  [address]  [place] [address]        Dear ,    We are writing to inform you of your test results.    Your test results fall within the expected range(s) or remain unchanged from previous results.  Please continue with current treatment plan.    Resulted Orders   CBC with platelets and differential   Result Value Ref Range    WBC 8.0 4.0 - 11.0 10e9/L    RBC Count 4.83 3.8 - 5.2 10e12/L    Hemoglobin 16.3 (H) 11.7 - 15.7 g/dL    Hematocrit 46.2 35.0 - 47.0 %    MCV 96 78 - 100 fl    MCH 33.7 (H) 26.5 - 33.0 pg    MCHC 35.3 31.5 - 36.5 g/dL    RDW 13.7 10.0 - 15.0 %    Platelet Count 177 150 - 450 10e9/L    Diff Method Automated Method     % Neutrophils 66.6 %    % Lymphocytes 24.5 %    % Monocytes 7.7 %    % Eosinophils 0.6 %    % Basophils 0.3 %    % Immature Granulocytes 0.3 %    Nucleated RBCs 0 0 /100    Absolute Neutrophil 5.3 1.6 - 8.3 10e9/L    Absolute Lymphocytes 2.0 0.8 - 5.3 10e9/L    Absolute Monocytes 0.6 0.0 - 1.3 10e9/L    Absolute Eosinophils 0.1 0.0 - 0.7 10e9/L    Absolute Basophils 0.0 0.0 - 0.2 10e9/L    Abs Immature Granulocytes 0.0 0 - 0.4 10e9/L    Absolute Nucleated RBC 0.0    TSH   Result Value Ref Range    TSH 2.81 0.40 - 4.00 mU/L   Comprehensive metabolic panel   Result Value Ref Range    Sodium 135 133 - 144 mmol/L    Potassium 4.3 3.4 - 5.3 mmol/L    Chloride 102 94 - 109 mmol/L    Carbon Dioxide 26 20 - 32 mmol/L    Anion Gap 7 3 - 14 mmol/L    Glucose 83 70 - 99 mg/dL      Comment:      Non Fasting    Urea Nitrogen 21 7 - 30 mg/dL    Creatinine 0.69 0.52 - 1.04 mg/dL    GFR Estimate 82 >60 mL/min/1.7m2      Comment:      Non  GFR Calc    GFR Estimate If Black >90 >60 mL/min/1.7m2      Comment:       GFR Calc    Calcium 9.3 8.5 - 10.1 mg/dL    Bilirubin Total 0.9 0.2 - 1.3 mg/dL    Albumin 4.0 3.4 - 5.0 g/dL    Protein Total 7.7 6.8 - 8.8 g/dL    Alkaline Phosphatase 112 40 - 150 U/L    ALT 36 0 -  50 U/L    AST 42 0 - 45 U/L       If you have any questions or concerns, please call the clinic at the number listed above.       Sincerely,        ORTEGA Caruso

## 2017-10-28 ASSESSMENT — ANXIETY QUESTIONNAIRES: GAD7 TOTAL SCORE: 0

## 2017-10-29 DIAGNOSIS — Z12.11 SPECIAL SCREENING FOR MALIGNANT NEOPLASMS, COLON: ICD-10-CM

## 2017-10-29 PROCEDURE — 82274 ASSAY TEST FOR BLOOD FECAL: CPT | Mod: ZL | Performed by: PHYSICIAN ASSISTANT

## 2017-10-30 ENCOUNTER — TELEPHONE (OUTPATIENT)
Dept: FAMILY MEDICINE | Facility: OTHER | Age: 82
End: 2017-10-30

## 2017-10-30 DIAGNOSIS — S32.000D COMPRESSION FX, LUMBAR SPINE, WITH ROUTINE HEALING, SUBSEQUENT ENCOUNTER: ICD-10-CM

## 2017-10-30 DIAGNOSIS — R19.5 POSITIVE OCCULT STOOL BLOOD TEST: Primary | ICD-10-CM

## 2017-10-30 DIAGNOSIS — S32.040A COMPRESSION FRACTURE OF L4 LUMBAR VERTEBRA: ICD-10-CM

## 2017-10-30 LAB — HEMOCCULT SP1 STL QL: POSITIVE

## 2017-10-30 NOTE — TELEPHONE ENCOUNTER
Patient notified of results and need for surgery consult and MRI. Patient okay with this plan.  Munira Rodgers LPN

## 2017-11-06 ENCOUNTER — HOSPITAL ENCOUNTER (OUTPATIENT)
Dept: MRI IMAGING | Facility: HOSPITAL | Age: 82
Discharge: HOME | End: 2017-11-06
Attending: PHYSICIAN ASSISTANT | Admitting: PHYSICIAN ASSISTANT
Payer: MEDICARE

## 2017-11-06 ENCOUNTER — TELEPHONE (OUTPATIENT)
Dept: FAMILY MEDICINE | Facility: OTHER | Age: 82
End: 2017-11-06

## 2017-11-06 DIAGNOSIS — S32.000D COMPRESSION FX, LUMBAR SPINE, WITH ROUTINE HEALING, SUBSEQUENT ENCOUNTER: ICD-10-CM

## 2017-11-06 PROCEDURE — 72148 MRI LUMBAR SPINE W/O DYE: CPT | Mod: TC

## 2017-11-06 NOTE — TELEPHONE ENCOUNTER
Patient is given MRI report results. She is wanting to know why she is still so sore. Could it be from muscle issue not the FX?  Also wants to know if it is healing or not. I didn't see mention of that in report. She would like it looked at again to know if it is.  Patient is wanting a copy of report. This will be mailed to her.   Please advise as patient wants this taken care of.  Munira Rodgers LPN

## 2017-11-07 ENCOUNTER — OFFICE VISIT (OUTPATIENT)
Dept: SURGERY | Facility: OTHER | Age: 82
End: 2017-11-07
Attending: PHYSICIAN ASSISTANT
Payer: MEDICARE

## 2017-11-07 VITALS
BODY MASS INDEX: 20.49 KG/M2 | RESPIRATION RATE: 18 BRPM | HEIGHT: 64 IN | HEART RATE: 76 BPM | OXYGEN SATURATION: 96 % | WEIGHT: 120 LBS | DIASTOLIC BLOOD PRESSURE: 65 MMHG | TEMPERATURE: 96.8 F | SYSTOLIC BLOOD PRESSURE: 111 MMHG

## 2017-11-07 DIAGNOSIS — Z01.818 ENCOUNTER FOR PREOPERATIVE EXAMINATION FOR GENERAL SURGICAL PROCEDURE: Primary | ICD-10-CM

## 2017-11-07 DIAGNOSIS — R19.5 POSITIVE OCCULT STOOL BLOOD TEST: ICD-10-CM

## 2017-11-07 PROCEDURE — 99204 OFFICE O/P NEW MOD 45 MIN: CPT | Performed by: SURGERY

## 2017-11-07 PROCEDURE — 93010 ELECTROCARDIOGRAM REPORT: CPT | Performed by: INTERNAL MEDICINE

## 2017-11-07 PROCEDURE — 99212 OFFICE O/P EST SF 10 MIN: CPT | Mod: 25

## 2017-11-07 PROCEDURE — 93005 ELECTROCARDIOGRAM TRACING: CPT

## 2017-11-07 RX ORDER — SODIUM, POTASSIUM,MAG SULFATES 17.5-3.13G
2 SOLUTION, RECONSTITUTED, ORAL ORAL SEE ADMIN INSTRUCTIONS
Qty: 2 BOTTLE | Refills: 0 | Status: ON HOLD | OUTPATIENT
Start: 2017-11-07 | End: 2017-12-04

## 2017-11-07 ASSESSMENT — PAIN SCALES - GENERAL: PAINLEVEL: NO PAIN (1)

## 2017-11-07 NOTE — MR AVS SNAPSHOT
After Visit Summary   11/7/2017    Bianka Whitmore    MRN: [id number]           Patient Information     Date Of Birth          1935        Visit Information        Provider Department      11/7/2017 9:00 AM Matt Gonzalez MD [facility]        Today's Diagnoses     Positive occult stool blood test          Care Instructions    Thank you for allowing Dr. Gonzalez and our surgical team to participate in your care.  If you have a scheduling or an appointment question please contact [facility] Unit Coordinator at her direct line [phone].   ALL nursing questions or concerns can be directed to Lynne [phone]    You are scheduled for a: colonoscopy  Your procedure date is: 11/27/17    Suprep Bowel Prep-    Clear liquid diet only on Sunday, 11/26/17 the day before the examination.    Lacey prep - one bottle at 6pm Sunday, 11/26/17 the evening prior to the examination and follow with Two 16 ounce glasses water.    Lacey prep - one bottle at 0400 on Monday, 11/27/17 the day of the exam.  Follow with Two 16 ounce glasses of water.    Nothing by mouth after finishing the second 16 ounce glass of water the morning of the procedure.     You need a friend or family member available to drive you home AND stay with you for 24 hours after you leave the hospital. You will not be allowed to drive yourself. IF you need to take a taxi or the bus you MUST have a responsible person to ride with you. YOUR PROCEDURE WILL BE CANCELLED IF YOU DO NOT HAVE A RESPONSIBLE ADULT TO DRIVE YOU HOME.       You CANNOT have anything to eat or drink after midnight the night before your surgery, ncluding water and coffee. Your stomach needs to be completely empty. Do NOT chew gum, suck on hard candy, or smoke. You can brush your teeth the morning of surgery.       You need to call our Surgery Education Nurses 1-2 weeks prior to your surgery date at  [phone] or toll free [phone]. Please have you  medication and allergy lists ready.      Stop your aspirin or other NSAIDs(Ibuprofen, Motrin, Aleve, Celebrex, Naproxen, etc...) 7 days before your surgery. Hold fish oil and multivitamin 5 days prior to colonoscopy. Hold all medications the morning of procedure.       Hospital admitting will call you the day before your surgery with your arrival time. If you are scheduled on a Monday admitting will call you the Friday before.      Please call your primary care physician if you should become ill within 24 hours of scheduled surgery. (ex.vomiting, diarrhea, fever)            Follow-ups after your visit        Your next 10 appointments already scheduled     Nov 30, 2017  8:15 AM CST   (Arrive by 8:00 AM)   Office Visit with ORTEGA Rivas   [facility] ([facility] )    [address] [place] [address]   [phone]              Future tests that were ordered for you today     Open Future Orders        Priority Expected Expires Ordered    MR Lumbar Spine w/o Contrast Routine  10/30/2018 10/30/2017            Who to contact     If you have questions or need follow up information about today's clinic visit or your schedule please contact [facility] directly at [phone].  Normal or non-critical lab and imaging results will be communicated to you by MyChart, letter or phone within 4 business days after the clinic has received the results. If you do not hear from us within 7 days, please contact the clinic through [employer] or phone. If you have a critical or abnormal lab result, we will notify you by phone as soon as possible.  Submit refill requests through [employer] or call your pharmacy and they will forward the refill request to us. Please allow 3 business days for your refill to be completed.          Additional Information About Your Visit        [employer] Information     [employer] lets you send messages to your doctor, view your test results, renew your  "prescriptions, schedule appointments and more. To sign up, go to [email or web address] . Click on \"Log in\" on the left side of the screen, which will take you to the Welcome page. Then click on \"Sign up Now\" on the right side of the page.     You will be asked to enter the access code listed below, as well as some personal information. Please follow the directions to create your username and password.     Your access code is: [email or web address]  Expires: 2017  1:43 PM     Your access code will  in 90 days. If you need help or a new code, please call your [place] clinic or [phone].        Care EveryWhere ID     This is your Care EveryWhere ID. This could be used by other organizations to access your [place] medical records  [id number]        Your Vitals Were     Pulse Temperature Respirations Height Pulse Oximetry BMI (Body Mass Index)    76 96.8  F (36  C) (Tympanic) 18 5' 4\" (1.626 m) 96% 20.6 kg/m2       Blood Pressure from Last 3 Encounters:   17 111/65   10/27/17 110/76   10/11/17 112/72    Weight from Last 3 Encounters:   17 120 lb (54.4 kg)   10/27/17 120 lb (54.4 kg)   10/11/17 120 lb (54.4 kg)              Today, you had the following     No orders found for display         Today's Medication Changes          These changes are accurate as of: 17  9:20 AM.  If you have any questions, ask your nurse or doctor.               These medicines have changed or have updated prescriptions.        Dose/Directions    flax seed oil 1000 MG capsule   This may have changed:  Another medication with the same name was removed. Continue taking this medication, and follow the directions you see here.   Changed by:  Yael Mckeon PA        Dose:  1 capsule   Take 1 capsule by mouth daily   Refills:  0                Primary Care Provider Office Phone # Fax #    ORTEGA Rivas [phone]       [facility] [address]  [place] [address]      "   Equal Access to Services     [facility] Center[facility]NTHIA : Hadii aad ku hadgianlucaheraclio Johannytejas, waannamariada luqvitorha, qamitul clotildeabrahamtesha chavez. So [facility] [phone].    ATENCIÓN: Si habla español, tiene a marquez disposición servicios gratuitos de asistencia lingüística. Llame al [phone].    We comply with applicable federal civil rights laws and [place] laws. We do not discriminate on the basis of race, color, national origin, age, disability, sex, sexual orientation, or gender identity.            Thank you!     Thank you for choosing [facility]  for your care. Our goal is always to provide you with excellent care. Hearing back from our patients is one way we can continue to improve our services. Please take a few minutes to complete the written survey that you may receive in the mail after your visit with us. Thank you!             Your Updated Medication List - Protect others around you: Learn how to safely use, store and throw away your medicines at www.disposemymeds.org.          This list is accurate as of: 11/7/17  9:20 AM.  Always use your most recent med list.                   Brand Name Dispense Instructions for use Diagnosis    fish Oil 1200 MG capsule      Take 1,200 mg by mouth 2 times daily        flax seed oil 1000 MG capsule      Take 1 capsule by mouth daily        MAGNESIUM OXIDE PO      Take by mouth daily        * NONFORMULARY      1 tablespoon of chopped fresh garlic        * NONFORMULARY      Extra virgin olive oil 2 tablespoons  Daily        VITAMIN B50 COMPLEX PO      Take 1 tablet by mouth daily        VITAMIN C PO      Take 500 mg by mouth 2 times daily        VITAMIN E COMPLEX PO      Take 400 Units by mouth daily        * Notice:  This list has 2 medication(s) that are the same as other medications prescribed for you. Read the directions carefully, and ask your doctor or other care provider to review them with you.

## 2017-11-07 NOTE — PATIENT INSTRUCTIONS
Thank you for allowing Dr. Gonzalez and our surgical team to participate in your care.  If you have a scheduling or an appointment question please contact Tanya our Health Unit Coordinator at her direct line [phone].   ALL nursing questions or concerns can be directed to Lynne [phone]    You are scheduled for a: colonoscopy  Your procedure date is: 11/27/17    Suprep Bowel Prep-    Clear liquid diet only on Sunday, 11/26/17 the day before the examination.    Lacey prep - one bottle at 6pm Sunday, 11/26/17 the evening prior to the examination and follow with Two 16 ounce glasses water.    Lacey prep - one bottle at 0400 on Monday, 11/27/17 the day of the exam.  Follow with Two 16 ounce glasses of water.    Nothing by mouth after finishing the second 16 ounce glass of water the morning of the procedure.     You need a friend or family member available to drive you home AND stay with you for 24 hours after you leave the hospital. You will not be allowed to drive yourself. IF you need to take a taxi or the bus you MUST have a responsible person to ride with you. YOUR PROCEDURE WILL BE CANCELLED IF YOU DO NOT HAVE A RESPONSIBLE ADULT TO DRIVE YOU HOME.       You CANNOT have anything to eat or drink after midnight the night before your surgery, ncluding water and coffee. Your stomach needs to be completely empty. Do NOT chew gum, suck on hard candy, or smoke. You can brush your teeth the morning of surgery.       You need to call our Surgery Education Nurses 1-2 weeks prior to your surgery date at  [phone] or toll free [phone]. Please have you medication and allergy lists ready.      Stop your aspirin or other NSAIDs(Ibuprofen, Motrin, Aleve, Celebrex, Naproxen, etc...) 7 days before your surgery. Hold fish oil and multivitamin 5 days prior to colonoscopy. Hold all medications the morning of procedure.       Hospital admitting will call you the day before your surgery with your arrival time. If you are  scheduled on a Monday admitting will call you the Friday before.      Please call your primary care physician if you should become ill within 24 hours of scheduled surgery. (ex.vomiting, diarrhea, fever)

## 2017-11-07 NOTE — NURSING NOTE
"Chief Complaint   Patient presents with     Consult     positive occult stool/ mo family history of colon cancer/ no changes in bowels        Initial /65 (BP Location: Left arm, Patient Position: Chair, Cuff Size: Adult Regular)  Pulse 76  Temp 96.8  F (36  C) (Tympanic)  Resp 18  Ht 5' 4\" (1.626 m)  Wt 120 lb (54.4 kg)  SpO2 96%  BMI 20.6 kg/m2 Estimated body mass index is 20.6 kg/(m^2) as calculated from the following:    Height as of this encounter: 5' 4\" (1.626 m).    Weight as of this encounter: 120 lb (54.4 kg).  Medication Reconciliation: complete   Lynne Johnson    "

## 2017-11-07 NOTE — PROGRESS NOTES
[facility] Surgery Consultation    CC:  Positive iFOBT    HPI:  This 82 year old year old female is seen at the request of Yael Mckeon for evaluation of a positive iFOBT.  The history is obtained from the patient, and reviewing the medical record.  She is good medical historian. She states that she has not had any change in her bowel habits including constipation or diarrhea. She has not had any melena, hematochezia and no change in the caliber of her stools. She has no family history of colon cancer. She has no history of ulcerative colitis or Crohn's disease.     Past Medical History:   Diagnosis Date     Basal cell carcinoma      Cardiomegaly 01/29/2007     Compression fracture of thoracic vertebra (H) 10/30/2014     Compression fx, lumbar spine, with routine healing, subsequent encounter 6/22/2016     Hyperlipidemia      Osteoporosis      Typical atrial flutter (H) 7/25/2016     Varicose vein of leg 6/22/2016       Past Surgical History:   Procedure Laterality Date     APPENDECTOMY       ENDOSCOPIC STRIPPING VEIN(S)       HYSTERECTOMY       SALPINGO OOPHORECTOMY,R/L/BRIAN         Pt denied problems with bleeding or anesthesia    Current Outpatient Prescriptions   Medication Sig Dispense Refill     NONFORMULARY 1 tablespoon of chopped fresh garlic       NONFORMULARY Extra virgin olive oil 2 tablespoons  Daily       MAGNESIUM OXIDE PO Take by mouth daily        B Complex-Biotin-FA (VITAMIN B50 COMPLEX PO) Take 1 tablet by mouth daily       Ascorbic Acid (VITAMIN C PO) Take 500 mg by mouth 2 times daily       VITAMIN E COMPLEX PO Take 400 Units by mouth daily       Omega-3 Fatty Acids (FISH OIL) 1200 MG CAPS Take 1,200 mg by mouth 2 times daily        Flaxseed, Linseed, (FLAX SEED OIL) 1000 MG capsule Take 1 capsule by mouth daily            Allergies   Allergen Reactions     Amoxicillin      Intolerant           HABITS:    Social History   Substance Use Topics     Smoking status: Former Smoker     Types:  "Cigarettes     Quit date: 3/26/1953     Smokeless tobacco: Never Used      Comment: Quit in 1950; no passive smoke exposure     Alcohol use No     No mood altering drug use.    Family History   Problem Relation Age of Onset     Other - See Comments Mother 85     Old age, cause of death     CANCER Father 84     Rectal cancer, cause of death/Stomach cancer, cause of death     C.A.D. Maternal Aunt      C.A.D. Maternal Uncle      CANCER Son      thymic carcinoma       REVIEW OF SYSTEMS:  Ten point review of systems negative except those mentioned in the HPI.     The patient denies sleep apnea, latex allergies or MRSA    OBJECTIVE:    /65 (BP Location: Left arm, Patient Position: Chair, Cuff Size: Adult Regular)  Pulse 76  Temp 96.8  F (36  C) (Tympanic)  Resp 18  Ht 5' 4\" (1.626 m)  Wt 120 lb (54.4 kg)  SpO2 96%  BMI 20.6 kg/m2    GENERAL: Generally appears well, in no distress with appropriate affect.  HEENT:   Sclerae anicteric - No cervical, supra/infraclavicular lymphadenopathy, no thyroid masses  Respiratory:  Lungs clear to ausculation bilaterally with good air excursion  Cardiovascular:  Regular Rate and Rhythm with no murmurs gallops or rubs, normal   Abdomen: soft, non-tender, non-distended  :  deferred  Extremities:  Extremities normal. No deformities, edema, or skin discoloration.  Skin:  no suspicious lesions or rashes  Neurological: grossly intact    Psych:  Alert, oriented, affect appropriate with normal decision making ability.      IMPRESSION:  82 year old female with a positive iFOBT now requiring diagnostic colonoscopy.    PLAN:  A detailed description of the [place] Preventive Task Force development of colorectal cancer screening was had. I described the pathology of the adenoma to carcinoma progression and its genetic changes that occur. I discussed how with colorectal cancer screening by endoscopic surveillance we are able to identify potential malignancies and remove them " before they progress along the adenoma to carcinoma pathway. The risks for colon cancer progression were discussed including first degree relatives with colon cancer, inflammatory bowel disease, smoking, obesity, and diet. I then discussed how there are certain attributes which can decrease the risk of colon cancer such as a healthy diet and physical activity. The patient understood the adenoma to carcinoma sequence, the reasoning for screening at specific intervals, and risk factor modification. I then described the technical portion of the procedure.    The indications, risks, benefits and technical aspects of whole colon colonoscopy were outlined with risks including, but not limited to, perforation, bleeding and inability to visualize entire colon.  Management of each was reviewed.  The need of mechanical preparation of the colon was reviewed along with the use of monitored anesthetic care.  The patient's questions were asked and answered.  Scheduled first available date.        Matt Gonzalez MD    11/7/2017  8:51 AM    cc:  Yael Mckeon

## 2017-11-13 NOTE — H&P (VIEW-ONLY)
[facility] Surgery Consultation    CC:  Positive iFOBT    HPI:  This 82 year old year old female is seen at the request of Yael Mckeon for evaluation of a positive iFOBT.  The history is obtained from the patient, and reviewing the medical record.  She is good medical historian. She states that she has not had any change in her bowel habits including constipation or diarrhea. She has not had any melena, hematochezia and no change in the caliber of her stools. She has no family history of colon cancer. She has no history of ulcerative colitis or Crohn's disease.     Past Medical History:   Diagnosis Date     Basal cell carcinoma      Cardiomegaly 01/29/2007     Compression fracture of thoracic vertebra (H) 10/30/2014     Compression fx, lumbar spine, with routine healing, subsequent encounter 6/22/2016     Hyperlipidemia      Osteoporosis      Typical atrial flutter (H) 7/25/2016     Varicose vein of leg 6/22/2016       Past Surgical History:   Procedure Laterality Date     APPENDECTOMY       ENDOSCOPIC STRIPPING VEIN(S)       HYSTERECTOMY       SALPINGO OOPHORECTOMY,R/L/BRIAN         Pt denied problems with bleeding or anesthesia    Current Outpatient Prescriptions   Medication Sig Dispense Refill     NONFORMULARY 1 tablespoon of chopped fresh garlic       NONFORMULARY Extra virgin olive oil 2 tablespoons  Daily       MAGNESIUM OXIDE PO Take by mouth daily        B Complex-Biotin-FA (VITAMIN B50 COMPLEX PO) Take 1 tablet by mouth daily       Ascorbic Acid (VITAMIN C PO) Take 500 mg by mouth 2 times daily       VITAMIN E COMPLEX PO Take 400 Units by mouth daily       Omega-3 Fatty Acids (FISH OIL) 1200 MG CAPS Take 1,200 mg by mouth 2 times daily        Flaxseed, Linseed, (FLAX SEED OIL) 1000 MG capsule Take 1 capsule by mouth daily            Allergies   Allergen Reactions     Amoxicillin      Intolerant           HABITS:    Social History   Substance Use Topics     Smoking status: Former Smoker     Types:  "Cigarettes     Quit date: 3/26/1953     Smokeless tobacco: Never Used      Comment: Quit in 1950; no passive smoke exposure     Alcohol use No     No mood altering drug use.    Family History   Problem Relation Age of Onset     Other - See Comments Mother 85     Old age, cause of death     CANCER Father 84     Rectal cancer, cause of death/Stomach cancer, cause of death     C.A.D. Maternal Aunt      C.A.D. Maternal Uncle      CANCER Son      thymic carcinoma       REVIEW OF SYSTEMS:  Ten point review of systems negative except those mentioned in the HPI.     The patient denies sleep apnea, latex allergies or MRSA    OBJECTIVE:    /65 (BP Location: Left arm, Patient Position: Chair, Cuff Size: Adult Regular)  Pulse 76  Temp 96.8  F (36  C) (Tympanic)  Resp 18  Ht 5' 4\" (1.626 m)  Wt 120 lb (54.4 kg)  SpO2 96%  BMI 20.6 kg/m2    GENERAL: Generally appears well, in no distress with appropriate affect.  HEENT:   Sclerae anicteric - No cervical, supra/infraclavicular lymphadenopathy, no thyroid masses  Respiratory:  Lungs clear to ausculation bilaterally with good air excursion  Cardiovascular:  Regular Rate and Rhythm with no murmurs gallops or rubs, normal   Abdomen: soft, non-tender, non-distended  :  deferred  Extremities:  Extremities normal. No deformities, edema, or skin discoloration.  Skin:  no suspicious lesions or rashes  Neurological: grossly intact    Psych:  Alert, oriented, affect appropriate with normal decision making ability.      IMPRESSION:  82 year old female with a positive iFOBT now requiring diagnostic colonoscopy.    PLAN:  A detailed description of the [place] Preventive Task Force development of colorectal cancer screening was had. I described the pathology of the adenoma to carcinoma progression and its genetic changes that occur. I discussed how with colorectal cancer screening by endoscopic surveillance we are able to identify potential malignancies and remove them " before they progress along the adenoma to carcinoma pathway. The risks for colon cancer progression were discussed including first degree relatives with colon cancer, inflammatory bowel disease, smoking, obesity, and diet. I then discussed how there are certain attributes which can decrease the risk of colon cancer such as a healthy diet and physical activity. The patient understood the adenoma to carcinoma sequence, the reasoning for screening at specific intervals, and risk factor modification. I then described the technical portion of the procedure.    The indications, risks, benefits and technical aspects of whole colon colonoscopy were outlined with risks including, but not limited to, perforation, bleeding and inability to visualize entire colon.  Management of each was reviewed.  The need of mechanical preparation of the colon was reviewed along with the use of monitored anesthetic care.  The patient's questions were asked and answered.  Scheduled first available date.        Matt Gonzalez MD    11/7/2017  8:51 AM    cc:  Yael Mckeon

## 2017-11-15 ENCOUNTER — TELEPHONE (OUTPATIENT)
Dept: SURGERY | Facility: OTHER | Age: 82
End: 2017-11-15

## 2017-11-15 NOTE — TELEPHONE ENCOUNTER
Called patient to reschedule colonoscopy from 11/27/17 to 12/4/17 due to scheduling issues in the OR. Patient is agreeable to this date.

## 2017-11-24 DIAGNOSIS — Z12.11 SCREENING FOR COLORECTAL CANCER: Primary | ICD-10-CM

## 2017-11-24 DIAGNOSIS — Z12.12 SCREENING FOR COLORECTAL CANCER: Primary | ICD-10-CM

## 2017-11-25 RX ORDER — POLYETHYLENE GLYCOL 3350 17 G/17G
POWDER, FOR SOLUTION ORAL
Qty: 1 BOTTLE | Refills: 0 | Status: ON HOLD | OUTPATIENT
Start: 2017-11-25 | End: 2017-12-04

## 2017-11-25 RX ORDER — BISACODYL 5 MG/1
5 TABLET, DELAYED RELEASE ORAL SEE ADMIN INSTRUCTIONS
Qty: 8 TABLET | Refills: 0 | Status: ON HOLD | OUTPATIENT
Start: 2017-11-25 | End: 2017-12-04

## 2017-11-30 ENCOUNTER — OFFICE VISIT (OUTPATIENT)
Dept: FAMILY MEDICINE | Facility: OTHER | Age: 82
End: 2017-11-30
Attending: PHYSICIAN ASSISTANT
Payer: MEDICARE

## 2017-11-30 ENCOUNTER — RADIANT APPOINTMENT (OUTPATIENT)
Dept: GENERAL RADIOLOGY | Facility: OTHER | Age: 82
End: 2017-11-30
Attending: PHYSICIAN ASSISTANT
Payer: MEDICARE

## 2017-11-30 VITALS
WEIGHT: 121 LBS | TEMPERATURE: 96.5 F | DIASTOLIC BLOOD PRESSURE: 64 MMHG | SYSTOLIC BLOOD PRESSURE: 114 MMHG | OXYGEN SATURATION: 97 % | HEART RATE: 78 BPM | BODY MASS INDEX: 20.77 KG/M2

## 2017-11-30 DIAGNOSIS — S32.010G CLOSED COMPRESSION FRACTURE OF L1 LUMBAR VERTEBRA WITH DELAYED HEALING, SUBSEQUENT ENCOUNTER: ICD-10-CM

## 2017-11-30 DIAGNOSIS — S32.010G CLOSED COMPRESSION FRACTURE OF L1 LUMBAR VERTEBRA WITH DELAYED HEALING, SUBSEQUENT ENCOUNTER: Primary | ICD-10-CM

## 2017-11-30 DIAGNOSIS — I48.3 TYPICAL ATRIAL FLUTTER (H): ICD-10-CM

## 2017-11-30 PROCEDURE — 99213 OFFICE O/P EST LOW 20 MIN: CPT | Performed by: PHYSICIAN ASSISTANT

## 2017-11-30 PROCEDURE — 72100 X-RAY EXAM L-S SPINE 2/3 VWS: CPT | Mod: TC

## 2017-11-30 PROCEDURE — 99212 OFFICE O/P EST SF 10 MIN: CPT

## 2017-11-30 ASSESSMENT — ANXIETY QUESTIONNAIRES
7. FEELING AFRAID AS IF SOMETHING AWFUL MIGHT HAPPEN: NOT AT ALL
3. WORRYING TOO MUCH ABOUT DIFFERENT THINGS: NOT AT ALL
6. BECOMING EASILY ANNOYED OR IRRITABLE: NOT AT ALL
2. NOT BEING ABLE TO STOP OR CONTROL WORRYING: NOT AT ALL
5. BEING SO RESTLESS THAT IT IS HARD TO SIT STILL: NOT AT ALL
1. FEELING NERVOUS, ANXIOUS, OR ON EDGE: NOT AT ALL
4. TROUBLE RELAXING: NOT AT ALL
GAD7 TOTAL SCORE: 0
IF YOU CHECKED OFF ANY PROBLEMS ON THIS QUESTIONNAIRE, HOW DIFFICULT HAVE THESE PROBLEMS MADE IT FOR YOU TO DO YOUR WORK, TAKE CARE OF THINGS AT HOME, OR GET ALONG WITH OTHER PEOPLE: NOT DIFFICULT AT ALL

## 2017-11-30 ASSESSMENT — PATIENT HEALTH QUESTIONNAIRE - PHQ9: SUM OF ALL RESPONSES TO PHQ QUESTIONS 1-9: 0

## 2017-11-30 ASSESSMENT — PAIN SCALES - GENERAL: PAINLEVEL: MILD PAIN (2)

## 2017-11-30 NOTE — NURSING NOTE
"Chief Complaint   Patient presents with     Musculoskeletal Problem     back pain follow up        Initial /64 (BP Location: Left arm, Patient Position: Chair, Cuff Size: Adult Regular)  Pulse 78  Temp 96.5  F (35.8  C) (Tympanic)  Wt 121 lb (54.9 kg)  SpO2 97%  Breastfeeding? No  BMI 20.77 kg/m2 Estimated body mass index is 20.77 kg/(m^2) as calculated from the following:    Height as of 11/7/17: 5' 4\" (1.626 m).    Weight as of this encounter: 121 lb (54.9 kg).  Medication Reconciliation: complete   Amanda Figueroa CMA(AAMA)   "

## 2017-11-30 NOTE — PROGRESS NOTES
SUBJECTIVE:   Bianka Whitmore is a 82 year old female who presents to clinic today for the following health issues:        Musculoskeletal problem/pain      Duration: 1 month follow up back pain/lumbar fracture     Description  Location: lower back     Intensity:  2/10    Accompanying signs and symptoms: tenderness     History  Previous similar problem: YES    Previous evaluation:  x-ray,mri     Precipitating or alleviating factors:  Trauma or overuse: YES  Aggravating factors include: sitting, lifting, exercise and overuse    Therapies tried and outcome: rest/inactivity and acetaminophen            Problem list and histories reviewed & adjusted, as indicated.  Additional history: as documented    Patient Active Problem List   Diagnosis     Diverticulitis of colon     Osteoarthritis     Osteoporosis/osteopenia increased risk     ACP (advance care planning)     Malignant basal cell neoplasm of skin     Compression fracture of thoracic vertebra (H)     Geographic tongue     Varicose vein of leg     Compression fx, lumbar spine, with routine healing, subsequent encounter     Typical atrial flutter (H)     Past Surgical History:   Procedure Laterality Date     APPENDECTOMY       ENDOSCOPIC STRIPPING VEIN(S)       HYSTERECTOMY       SALPINGO OOPHORECTOMY,R/L/BRIAN         Social History   Substance Use Topics     Smoking status: Former Smoker     Types: Cigarettes     Quit date: 3/26/1953     Smokeless tobacco: Never Used      Comment: Quit in 1950; no passive smoke exposure     Alcohol use No     Family History   Problem Relation Age of Onset     Other - See Comments Mother 85     Old age, cause of death     CANCER Father 84     Rectal cancer, cause of death/Stomach cancer, cause of death     C.A.D. Maternal Aunt      C.A.D. Maternal Uncle      CANCER Son      thymic carcinoma         Current Outpatient Prescriptions   Medication Sig Dispense Refill     bisacodyl (DULCOLAX) 5 MG EC tablet Take 1 tablet (5 mg) by mouth  See Admin Instructions Take 4 tablets at 3pm and 4 tablets at 9pm 12/3/17 8 tablet 0     MIRALAX powder Mix 1 bottle with 64 ounces of Gatorade at 6pm 1 Bottle 0     Na Sulfate-K Sulfate-Mg Sulf (SUPREP BOWEL PREP) solution Take 354 mLs (2 Bottles) by mouth See Admin Instructions 2 Bottle 0     NONFORMULARY 1 tablespoon of chopped fresh garlic       NONFORMULARY Extra virgin olive oil 2 tablespoons  Daily       MAGNESIUM OXIDE PO Take by mouth daily        B Complex-Biotin-FA (VITAMIN B50 COMPLEX PO) Take 1 tablet by mouth daily       Ascorbic Acid (VITAMIN C PO) Take 500 mg by mouth 2 times daily       VITAMIN E COMPLEX PO Take 400 Units by mouth daily       Omega-3 Fatty Acids (FISH OIL) 1200 MG CAPS Take 1,200 mg by mouth 2 times daily        Flaxseed, Linseed, (FLAX SEED OIL) 1000 MG capsule Take 1 capsule by mouth daily       Allergies   Allergen Reactions     Amoxicillin      Intolerant       BP Readings from Last 3 Encounters:   11/30/17 114/64   11/07/17 111/65   10/27/17 110/76    Wt Readings from Last 3 Encounters:   11/30/17 121 lb (54.9 kg)   11/07/17 120 lb (54.4 kg)   10/27/17 120 lb (54.4 kg)                        Reviewed and updated as needed this visit by clinical staffAllergies       Reviewed and updated as needed this visit by Provider         ROS:  Constitutional, neuro, ENT, endocrine, pulmonary, cardiac, gastrointestinal, genitourinary, musculoskeletal, integument and psychiatric systems are negative, except as otherwise noted.      OBJECTIVE:                                                    /64 (BP Location: Left arm, Patient Position: Chair, Cuff Size: Adult Regular)  Pulse 78  Temp 96.5  F (35.8  C) (Tympanic)  Wt 121 lb (54.9 kg)  SpO2 97%  Breastfeeding? No  BMI 20.77 kg/m2  Body mass index is 20.77 kg/(m^2).  GENERAL APPEARANCE: healthy, alert and no distress  EYES: Eyes grossly normal to inspection, PERRL and conjunctivae and sclerae normal  HENT: ear canals and TM's  normal and nose and mouth without ulcers or lesions  NECK: no adenopathy, no asymmetry, masses, or scars and thyroid normal to palpation  RESP: lungs clear to auscultation - no rales, rhonchi or wheezes  CV: irregular rates and rhythm, normal S1 S2, no S3 or S4 and Note  2/6  murmur,   MS: has pain in mid lower back. More on left than right.   SKIN:left lower leg has stasis dermatitis.  Some varicose veins.   NEURO: Normal strength and tone, mentation intact and speech normal  PSYCH: mentation appears normal and affect normal/bright    Diagnostic test results:  Diagnostic Test Results:  No results found for this or any previous visit (from the past 24 hour(s)).       ASSESSMENT/PLAN:                                                    1. Closed compression fracture of L1 lumbar vertebra with delayed healing, subsequent encounter  Falling worsened her compression. We are checking for stability.    - XR LUMBAR SPINE 2/3 VIEWS (Clinic Performed); Future      2. Typical atrial flutter (H)  She will not take anything in way of beta blocker or anticoagulation for her A flutter.  She is liking more natural things like garlic. She had + hemoccult. Will be having colon screening, concern always there. Holding all her supplements for now.     See Patient Instructions    Yael Mckeon PA-C  [facility]

## 2017-11-30 NOTE — MR AVS SNAPSHOT
After Visit Summary   11/30/2017    Bianka Whitmore    MRN: [id number]           Patient Information     Date Of Birth          1935        Visit Information        Provider Department      11/30/2017 8:15 AM Yael Mckeon PA [facility]        Today's Diagnoses     Closed compression fracture of L1 lumbar vertebra with delayed healing, subsequent encounter    -  1    Typical atrial flutter (H)          Care Instructions      Thank you for choosing [facility].   I have office hours 8:00 am to 4:30 pm on Monday's, Wednesday's, Thursday's and Friday's. My nurse and I are out of the office every Tuesday.    Following your visit, when your labs and diagnostic testing have returned, I will review then and you will be contacted by my nurse.  If you are on My Chart, you can also view results there.    For refills, notify your pharmacy regarding what you need and the pharmacy will generate a refill request. Do not call my nurse as she is unable to process refill request. Please plan ahead and allow 3-5 days for refill requests.    You will generally receive a reminder call the day prior to your appointment.  If you cannot attend your appointment, please cancel your appointment with as much notice as possible.  If there is a pattern of failure to present for your appointments, I cannot provide consistent, meaningful, ongoing care for you. It is very important to me that you come in for your care, so we can best assist you with your health care needs.    IMPORTANT:  Please note that it is my standard of practice to NOT participate in prescribing ongoing requested Narcotic Analgesic therapy, and/or participate in the prescribing of other controlled substances.  My nurse and I am happy to assist you with the process of referral for alternative pain management as needed, and other treatment modalities including but not limited to:  Physical Therapy, Physical Medicine and Rehab,  "Counseling, Chiropractic Care, Orthopedic Care, and non-narcotic medication management.     In the event that you need to be seen for emergent concerns and I am out of office,  please see one of my colleagues for acute concerns.  You may also present to UC or ER.  I appreciate the opportunity to serve you and look forward to supporting your healthcare needs in the future. Please contact me with any questions or concerns that you may have.    Sincerely,      Yael Mckeon RN, PA-C               Follow-ups after your visit        Your next 10 appointments already scheduled     Dec 04, 2017   Procedure with Matt Gonzalez MD   [facility] ([facility] )    [address] [place] [id number] [phone]              Future tests that were ordered for you today     Open Future Orders        Priority Expected Expires Ordered    XR LUMBAR SPINE 2/3 VIEWS (Clinic Performed) Routine 11/30/2017 11/30/2018 11/30/2017            Who to contact     If you have questions or need follow up information about today's clinic visit or your schedule please contact [facility] directly at [phone].  Normal or non-critical lab and imaging results will be communicated to you by [facility] Bidamonhart, letter or phone within 4 business days after the clinic has received the results. If you do not hear from us within 7 days, please contact the clinic through [facility] or phone. If you have a critical or abnormal lab result, we will notify you by phone as soon as possible.  Submit refill requests through [facility] or call your pharmacy and they will forward the refill request to us. Please allow 3 business days for your refill to be completed.          Additional Information About Your Visit        [facility] Information     [facility] lets you send messages to your doctor, view your test results, renew your prescriptions, schedule appointments and more. To sign up, go to [email or web address] . Click on \"Log " "in\" on the left side of the screen, which will take you to the Welcome page. Then click on \"Sign up Now\" on the right side of the page.     You will be asked to enter the access code listed below, as well as some personal information. Please follow the directions to create your username and password.     Your access code is: [email or web address]  Expires: 2017  1:43 PM     Your access code will  in 90 days. If you need help or a new code, please call your [place] clinic or [phone].        Care EveryWhere ID     This is your Care EveryWhere ID. This could be used by other organizations to access your [place] medical records  [id number]        Your Vitals Were     Pulse Temperature Pulse Oximetry Breastfeeding? BMI (Body Mass Index)       78 96.5  F (35.8  C) (Tympanic) 97% No 20.77 kg/m2        Blood Pressure from Last 3 Encounters:   17 114/64   17 111/65   10/27/17 110/76    Weight from Last 3 Encounters:   17 121 lb (54.9 kg)   17 120 lb (54.4 kg)   10/27/17 120 lb (54.4 kg)               Primary Care Provider Office Phone # Fax #    ORTEGA Rivas [phone]       [facility] [address] [facility] [address]  [facility] [address]        Equal Access to Services     [facility] AH: Hadii aad ku hadasho Soomaali, waaxda luqadaha, qaybta kaalmada adeegyada, waxay viviana ortega . So [facility] [phone].    ATENCIÓN: Si habla español, tiene a marquez disposición servicios gratuitos de asistencia lingüística. Erikaame al [phone].    We comply with applicable federal civil rights laws and [place] laws. We do not discriminate on the basis of race, color, national origin, age, disability, sex, sexual orientation, or gender identity.            Thank you!     [facility]. Our goal is always to provide you with excellent care. Hearing back from our patients is one way we can continue to improve our " services. Please take a few minutes to complete the written survey that you may receive in the mail after your visit with us. Thank you!             Your Updated Medication List - Protect others around you: Learn how to safely use, store and throw away your medicines at www.disposemymeds.org.          This list is accurate as of: 11/30/17  8:58 AM.  Always use your most recent med list.                   Brand Name Dispense Instructions for use Diagnosis    bisacodyl 5 MG EC tablet    DULCOLAX    8 tablet    Take 1 tablet (5 mg) by mouth See Admin Instructions Take 4 tablets at 3pm and 4 tablets at 9pm 12/3/17    Screening for colorectal cancer       fish Oil 1200 MG capsule      Take 1,200 mg by mouth 2 times daily        flax seed oil 1000 MG capsule      Take 1 capsule by mouth daily        MAGNESIUM OXIDE PO      Take by mouth daily        MIRALAX powder   Generic drug:  polyethylene glycol     1 Bottle    Mix 1 bottle with 64 ounces of Gatorade at 6pm    Screening for colorectal cancer       Na Sulfate-K Sulfate-Mg Sulf solution    SUPREP BOWEL PREP    2 Bottle    Take 354 mLs (2 Bottles) by mouth See Admin Instructions    Positive occult stool blood test       * NONFORMULARY      1 tablespoon of chopped fresh garlic        * NONFORMULARY      Extra virgin olive oil 2 tablespoons  Daily        VITAMIN B50 COMPLEX PO      Take 1 tablet by mouth daily        VITAMIN C PO      Take 500 mg by mouth 2 times daily        VITAMIN E COMPLEX PO      Take 400 Units by mouth daily        * Notice:  This list has 2 medication(s) that are the same as other medications prescribed for you. Read the directions carefully, and ask your doctor or other care provider to review them with you.

## 2017-12-01 ASSESSMENT — ANXIETY QUESTIONNAIRES: GAD7 TOTAL SCORE: 0

## 2017-12-04 ENCOUNTER — ANESTHESIA EVENT (OUTPATIENT)
Dept: SURGERY | Facility: HOSPITAL | Age: 82
End: 2017-12-04
Payer: MEDICARE

## 2017-12-04 ENCOUNTER — SURGERY (OUTPATIENT)
Age: 82
End: 2017-12-04

## 2017-12-04 ENCOUNTER — HOSPITAL ENCOUNTER (OUTPATIENT)
Facility: HOSPITAL | Age: 82
Discharge: HOME | End: 2017-12-04
Attending: SURGERY | Admitting: SURGERY
Payer: MEDICARE

## 2017-12-04 ENCOUNTER — ANESTHESIA (OUTPATIENT)
Dept: SURGERY | Facility: HOSPITAL | Age: 82
End: 2017-12-04
Payer: MEDICARE

## 2017-12-04 VITALS
RESPIRATION RATE: 18 BRPM | WEIGHT: 131 LBS | HEART RATE: 80 BPM | BODY MASS INDEX: 21.83 KG/M2 | DIASTOLIC BLOOD PRESSURE: 90 MMHG | SYSTOLIC BLOOD PRESSURE: 134 MMHG | HEIGHT: 65 IN | TEMPERATURE: 96.7 F | OXYGEN SATURATION: 98 %

## 2017-12-04 PROCEDURE — 37000008 ZZH ANESTHESIA TECHNICAL FEE, 1ST 30 MIN: Performed by: SURGERY

## 2017-12-04 PROCEDURE — 25000128 H RX IP 250 OP 636: Performed by: ANESTHESIOLOGY

## 2017-12-04 PROCEDURE — 45385 COLONOSCOPY W/LESION REMOVAL: CPT | Performed by: ANESTHESIOLOGY

## 2017-12-04 PROCEDURE — 88305 TISSUE EXAM BY PATHOLOGIST: CPT | Mod: TC | Performed by: SURGERY

## 2017-12-04 PROCEDURE — 45381 COLONOSCOPY SUBMUCOUS NJX: CPT | Performed by: SURGERY

## 2017-12-04 PROCEDURE — 36000050 ZZH SURGERY LEVEL 2 1ST 30 MIN: Performed by: SURGERY

## 2017-12-04 PROCEDURE — 71000027 ZZH RECOVERY PHASE 2 EACH 15 MINS: Performed by: SURGERY

## 2017-12-04 PROCEDURE — 40000306 ZZH STATISTIC PRE PROC ASSESS II: Performed by: SURGERY

## 2017-12-04 PROCEDURE — 27210794 ZZH OR GENERAL SUPPLY STERILE: Performed by: SURGERY

## 2017-12-04 PROCEDURE — 36000052 ZZH SURGERY LEVEL 2 EA 15 ADDTL MIN: Performed by: SURGERY

## 2017-12-04 PROCEDURE — 25000125 ZZHC RX 250: Performed by: SURGERY

## 2017-12-04 PROCEDURE — 45385 COLONOSCOPY W/LESION REMOVAL: CPT | Performed by: NURSE ANESTHETIST, CERTIFIED REGISTERED

## 2017-12-04 PROCEDURE — 25000128 H RX IP 250 OP 636: Performed by: NURSE ANESTHETIST, CERTIFIED REGISTERED

## 2017-12-04 PROCEDURE — 45385 COLONOSCOPY W/LESION REMOVAL: CPT | Performed by: SURGERY

## 2017-12-04 PROCEDURE — 99100 ANES PT EXTEME AGE<1 YR&>70: CPT | Performed by: NURSE ANESTHETIST, CERTIFIED REGISTERED

## 2017-12-04 PROCEDURE — 37000009 ZZH ANESTHESIA TECHNICAL FEE, EACH ADDTL 15 MIN: Performed by: SURGERY

## 2017-12-04 RX ORDER — LIDOCAINE 40 MG/G
CREAM TOPICAL
Status: DISCONTINUED | OUTPATIENT
Start: 2017-12-04 | End: 2017-12-04 | Stop reason: HOSPADM

## 2017-12-04 RX ORDER — SODIUM CHLORIDE, SODIUM LACTATE, POTASSIUM CHLORIDE, CALCIUM CHLORIDE 600; 310; 30; 20 MG/100ML; MG/100ML; MG/100ML; MG/100ML
INJECTION, SOLUTION INTRAVENOUS CONTINUOUS
Status: DISCONTINUED | OUTPATIENT
Start: 2017-12-04 | End: 2017-12-04 | Stop reason: HOSPADM

## 2017-12-04 RX ORDER — HYDRALAZINE HYDROCHLORIDE 20 MG/ML
2.5-5 INJECTION INTRAMUSCULAR; INTRAVENOUS EVERY 10 MIN PRN
Status: DISCONTINUED | OUTPATIENT
Start: 2017-12-04 | End: 2017-12-04 | Stop reason: HOSPADM

## 2017-12-04 RX ORDER — NALOXONE HYDROCHLORIDE 0.4 MG/ML
.1-.4 INJECTION, SOLUTION INTRAMUSCULAR; INTRAVENOUS; SUBCUTANEOUS
Status: DISCONTINUED | OUTPATIENT
Start: 2017-12-04 | End: 2017-12-04 | Stop reason: HOSPADM

## 2017-12-04 RX ORDER — ALBUTEROL SULFATE 0.83 MG/ML
2.5 SOLUTION RESPIRATORY (INHALATION) EVERY 4 HOURS PRN
Status: DISCONTINUED | OUTPATIENT
Start: 2017-12-04 | End: 2017-12-04 | Stop reason: HOSPADM

## 2017-12-04 RX ORDER — PROMETHAZINE HYDROCHLORIDE 25 MG/ML
12.5 INJECTION, SOLUTION INTRAMUSCULAR; INTRAVENOUS
Status: DISCONTINUED | OUTPATIENT
Start: 2017-12-04 | End: 2017-12-04 | Stop reason: HOSPADM

## 2017-12-04 RX ORDER — MEPERIDINE HYDROCHLORIDE 25 MG/ML
12.5 INJECTION INTRAMUSCULAR; INTRAVENOUS; SUBCUTANEOUS
Status: DISCONTINUED | OUTPATIENT
Start: 2017-12-04 | End: 2017-12-04 | Stop reason: HOSPADM

## 2017-12-04 RX ORDER — FENTANYL CITRATE 50 UG/ML
25-50 INJECTION, SOLUTION INTRAMUSCULAR; INTRAVENOUS
Status: DISCONTINUED | OUTPATIENT
Start: 2017-12-04 | End: 2017-12-04 | Stop reason: HOSPADM

## 2017-12-04 RX ORDER — PROPOFOL 10 MG/ML
INJECTION, EMULSION INTRAVENOUS PRN
Status: DISCONTINUED | OUTPATIENT
Start: 2017-12-04 | End: 2017-12-04

## 2017-12-04 RX ORDER — DEXAMETHASONE SODIUM PHOSPHATE 4 MG/ML
4 INJECTION, SOLUTION INTRA-ARTICULAR; INTRALESIONAL; INTRAMUSCULAR; INTRAVENOUS; SOFT TISSUE EVERY 10 MIN PRN
Status: DISCONTINUED | OUTPATIENT
Start: 2017-12-04 | End: 2017-12-04 | Stop reason: HOSPADM

## 2017-12-04 RX ORDER — ONDANSETRON 4 MG/1
4 TABLET, ORALLY DISINTEGRATING ORAL EVERY 30 MIN PRN
Status: DISCONTINUED | OUTPATIENT
Start: 2017-12-04 | End: 2017-12-04 | Stop reason: HOSPADM

## 2017-12-04 RX ORDER — ONDANSETRON 2 MG/ML
4 INJECTION INTRAMUSCULAR; INTRAVENOUS EVERY 30 MIN PRN
Status: DISCONTINUED | OUTPATIENT
Start: 2017-12-04 | End: 2017-12-04 | Stop reason: HOSPADM

## 2017-12-04 RX ADMIN — PROPOFOL 10 MG: 10 INJECTION, EMULSION INTRAVENOUS at 08:53

## 2017-12-04 RX ADMIN — PROPOFOL 10 MG: 10 INJECTION, EMULSION INTRAVENOUS at 08:55

## 2017-12-04 RX ADMIN — PROPOFOL 10 MG: 10 INJECTION, EMULSION INTRAVENOUS at 08:47

## 2017-12-04 RX ADMIN — SODIUM CHLORIDE, POTASSIUM CHLORIDE, SODIUM LACTATE AND CALCIUM CHLORIDE: 600; 310; 30; 20 INJECTION, SOLUTION INTRAVENOUS at 07:15

## 2017-12-04 RX ADMIN — PROPOFOL 10 MG: 10 INJECTION, EMULSION INTRAVENOUS at 09:00

## 2017-12-04 RX ADMIN — PROPOFOL 10 MG: 10 INJECTION, EMULSION INTRAVENOUS at 08:33

## 2017-12-04 RX ADMIN — PROPOFOL 10 MG: 10 INJECTION, EMULSION INTRAVENOUS at 08:49

## 2017-12-04 RX ADMIN — Medication 3 ML: at 09:02

## 2017-12-04 RX ADMIN — PROPOFOL 20 MG: 10 INJECTION, EMULSION INTRAVENOUS at 08:27

## 2017-12-04 RX ADMIN — PROPOFOL 10 MG: 10 INJECTION, EMULSION INTRAVENOUS at 08:39

## 2017-12-04 RX ADMIN — PROPOFOL 10 MG: 10 INJECTION, EMULSION INTRAVENOUS at 08:41

## 2017-12-04 RX ADMIN — PROPOFOL 10 MG: 10 INJECTION, EMULSION INTRAVENOUS at 08:37

## 2017-12-04 RX ADMIN — PROPOFOL 10 MG: 10 INJECTION, EMULSION INTRAVENOUS at 08:51

## 2017-12-04 RX ADMIN — PROPOFOL 10 MG: 10 INJECTION, EMULSION INTRAVENOUS at 08:31

## 2017-12-04 RX ADMIN — PROPOFOL 10 MG: 10 INJECTION, EMULSION INTRAVENOUS at 08:35

## 2017-12-04 RX ADMIN — PROPOFOL 20 MG: 10 INJECTION, EMULSION INTRAVENOUS at 08:29

## 2017-12-04 RX ADMIN — PROPOFOL 10 MG: 10 INJECTION, EMULSION INTRAVENOUS at 08:45

## 2017-12-04 RX ADMIN — PROPOFOL 10 MG: 10 INJECTION, EMULSION INTRAVENOUS at 08:43

## 2017-12-04 ASSESSMENT — LIFESTYLE VARIABLES: TOBACCO_USE: 1

## 2017-12-04 ASSESSMENT — ENCOUNTER SYMPTOMS: DYSRHYTHMIAS: 1

## 2017-12-04 NOTE — ANESTHESIA PREPROCEDURE EVALUATION
Anesthesia Evaluation     . Pt has had prior anesthetic.     No history of anesthetic complications          ROS/MED HX    ENT/Pulmonary:     (+)tobacco use, Past use quit 1953 packs/day  , . .    Neurologic:  - neg neurologic ROS     Cardiovascular:     (+) Dyslipidemia, hypertension-range: Refuses Treatment, ---. : . . . :. dysrhythmias a-flutter, Irregular Heartbeat/Palpitations, . Previous cardiac testing Echodate:7/25/2016results:1.  Normal left ventricular size and systolic function. Ejection fraction at 70%.  2.  Slight bi-atrial enlargement.  3.  Normal right ventricle size and function.  4.  Slight mitral regurgitation.  5.  Aortic sclerosis without stenosis, but slight aortic  insufficiency.  6.  Slight tricuspid regurgitation.  Peak systolic pressure of the right ventricle is estimated at 21 plus the right atrium.  7.  Dilated hepatic veins noted.  8.  The patient appears to be in atrial fibrillation/atrial flutter.date: results:ECG reviewed date:11/7/2017 results:Atrial Flutter w/ Variable AV Block, Rightward Axis, Low Voltage QRS, ?Anterior Infarct Age Undetermined date: results:         (-) taking anticoagulants/antiplatelets (Refuses Anticoagulation)   METS/Exercise Tolerance:     Hematologic:         Musculoskeletal:   (+) arthritis, , , other musculoskeletal- Thoracic and Lumbar Compression Fxs      GI/Hepatic:     (+) bowel prep, Other GI/Hepatic Diverticulosis      Renal/Genitourinary:  - ROS Renal section negative       Endo:  - neg endo ROS       Psychiatric:  - neg psychiatric ROS       Infectious Disease:  - neg infectious disease ROS       Malignancy:   (+) Malignancy History of Skin          Other:    - neg other ROS                 Physical Exam      Airway   Mallampati: II  TM distance: >3 FB  Neck ROM: full    Dental   Comment: bridgework    Cardiovascular   Rhythm and rate: irregular and normal      Pulmonary    breath sounds clear to auscultation                    Anesthesia  Plan      History & Physical Review  History and physical reviewed and following examination; no interval change.    ASA Status:  3 .    NPO Status:  > 8 hours    Plan for MAC with Intravenous and Propofol induction. Maintenance will be TIVA.  Reason for MAC:  Chronic cardiopulmonary disease (G9) and Other - see comments  PONV prophylaxis:  Ondansetron (or other 5HT-3)  Surgeon requests deep sedation. Patient is an ASA 3 and has advanced age >70. Will provide MAC.      Postoperative Care  Postoperative pain management:  IV analgesics.      Consents  Anesthetic plan, risks, benefits and alternatives discussed with:  Patient..                          .

## 2017-12-04 NOTE — OP NOTE
Bianka Whitmore MRN# [id number]   YOB: 1935 Age: 82 year old      Date of Admission:  12/4/2017    Primary care provider: Yael Mckeon    PREOPERATIVE DIAGNOSIS:  Screening colonoscopy.         POSTOPERATIVE DIAGNOSIS:  Sessile 1 cm polyp at the hepatic flexure       PROCEDURE:  Whole colon colonoscopy.         INDICATIONS:  This 82 year old female presents for diagnostic colonoscopy for positive iFOBT.      OPERATIVE FINDINGS:  There was a 1 cm sessile polyp at the hepatic flexure with no evidence of recent or current bleeding. This was removed with hot snare biopsy and then the area was tattooed.       DESCRIPTION OF PROCEDURE:  With the patient in the supine position on the transport cart, IV sedation was administered by the nurse anesthetist.  Her correct identity and planned procedure were confirmed during the requisite timeout pause and he was rolled to the left lateral position.  The anus was digitally dilated and the fiberoptic colonoscope was introduced and negotiated through the length of the colon to the cecal base.  The cecum was intubated and its landmarks clearly identified.  A circumferential examination of the mucosa on introduction of the colonoscope and on its slow withdrawal identified a sessile 1 cm polyp located near the hepatic flexure. Cold forcep biopsy was performed and then the polyp was removed in totality with hot snare. There was no bleeding form the polypectomy site after removal of the polyp. The area was then tattooed with 3 cc of ink. The scop was then withdrawn the rest of the length of the colon with the absence of further polyps, neoplasia, inflammation and/or stricture.  There were moderate diverticuli noted.  Air was aspirated and the colonoscope was withdrawn; the patient was returned to day surgery in good condition, without suggestion of complication.   The post surgical debriefing was held and acknowledged at completion.          Matt Gonzalez MD       12/4/2017 9:11 AM

## 2017-12-04 NOTE — OR NURSING
DIscharge instructions discussed with Pt and . Eating, drinking, and tolerating well. Pt has many questions. Answered questions, and sent home printed instructions with Pt. Dressed independently. Escorted to hospital exit via ambulatory.

## 2017-12-04 NOTE — DISCHARGE INSTRUCTIONS
INSTRUCTIONS AFTER COLONOSCOPY    WHEN YOU ARE BACK HOME:    Plan to rest for an hour or two after you get home.    You may have some cramping or pressure until you pass gas.    You may resume your regular medications.    Eat a small, light meal at first, and then gradually return to normal meal sizes.  If you had a polyp removed:    Slight bleeding may occur.  You may have a slight blood stain on the toilet paper after a bowel movement.    To lessen the chance of bleeding, avoid heavy exercise for ONE WEEK.  This includes heavy lifting, vigorous sport activities, and heavy physical labor.  You may resume your normal sexual activity.      Avoid aspirin or aspirin products if instructed by your doctor.    WHAT TO WATCH FOR:  Problems rarely occur after the exam; however, it is important for you to watch for early signs of possible problems.  If you have     Unusual pain in your abdomen    Nausea and vomiting that persists    Excessive bleeding    Black or bloody bowel movements    Fever or temperature above 100.6 F  Please call your doctor ([facility] [phone]) or go to the nearest hospital emergency room.    Post-Anesthesia Patient Instructions    IMMEDIATELY FOLLOWING SURGERY:  Do not drive or operate machinery for the first twenty four hours after surgery.  Do not make any important decisions for twenty four hours after surgery or while taking narcotic pain medications or sedatives.  If you develop intractable nausea and vomiting or a severe headache please notify your doctor immediately.    FOLLOW-UP:  Please make an appointment with your surgeon as instructed. You do not need to follow up with anesthesia unless specifically instructed to do so.    WOUND CARE INSTRUCTIONS (if applicable):  Keep a dry clean dressing on the anesthesia/puncture wound site if there is drainage.  Once the wound has quit draining you may leave it open to air.  Generally you should leave the bandage intact for twenty four  hours unless there is drainage.  If the epidural site drains for more than 36-48 hours please call the anesthesia department.    QUESTIONS?:  Please feel free to call your physician or the hospital  if you have any questions, and they will be happy to assist you.

## 2017-12-04 NOTE — ANESTHESIA CARE TRANSFER NOTE
Patient: Bianka Whitmore    Procedure(s):  COLONOSCOPY WITH POLYPECTOMY - Wound Class: II-Clean Contaminated    Diagnosis: BLEEDING/POSITIVE OCCULT STOOL BLOOD TEST  Diagnosis Additional Information: No value filed.    Anesthesia Type:   MAC     Note:  Airway :Nasal Cannula  Patient transferred to:Phase II  Handoff Report: Identifed the Patient, Identified the Reponsible Provider, Reviewed the pertinent medical history, Discussed the surgical course, Reviewed Intra-OP anesthesia mangement and issues during anesthesia, Set expectations for post-procedure period and Allowed opportunity for questions and acknowledgement of understanding      Vitals: (Last set prior to Anesthesia Care Transfer)    CRNA VITALS  12/4/2017 0842 - 12/4/2017 0916      12/4/2017             Resp Rate (observed): (!)  1    Resp Rate (set): 8                Electronically Signed By: LAUREN uLna CRNA  December 4, 2017  9:16 AM

## 2017-12-04 NOTE — IP AVS SNAPSHOT
MRN:[id number]                      After Visit Summary   12/4/2017    Bianka Whitmore    MRN: [id number]           Thank you!     Thank you for choosing [facility] for your care. Our goal is always to provide you with excellent care. Hearing back from our patients is one way we can continue to improve our services. Please take a few minutes to complete the written survey that you may receive in the mail after you visit with us. Thank you!        Patient Information     Date Of Birth          1935        About your hospital stay     You were admitted on:  December 4, 2017 You last received care in the:  [facility] Preop/Phase II    You were discharged on:  December 4, 2017       Who to Call     For medical emergencies, please call 911.  For non-urgent questions about your medical care, please call your primary care provider or clinic, [phone]  For questions related to your surgery, please call your surgery clinic        Attending Provider     Provider Matt John MD Surgery       Primary Care Provider Office Phone # Fax #    ORTEGA Rivas [phone]      Further instructions from your care team           INSTRUCTIONS AFTER COLONOSCOPY    WHEN YOU ARE BACK HOME:    Plan to rest for an hour or two after you get home.    You may have some cramping or pressure until you pass gas.    You may resume your regular medications.    Eat a small, light meal at first, and then gradually return to normal meal sizes.  If you had a polyp removed:    Slight bleeding may occur.  You may have a slight blood stain on the toilet paper after a bowel movement.    To lessen the chance of bleeding, avoid heavy exercise for ONE WEEK.  This includes heavy lifting, vigorous sport activities, and heavy physical labor.  You may resume your normal sexual activity.      Avoid aspirin or aspirin products if instructed by your doctor.    WHAT TO WATCH FOR:  Problems rarely occur after  "the exam; however, it is important for you to watch for early signs of possible problems.  If you have     Unusual pain in your abdomen    Nausea and vomiting that persists    Excessive bleeding    Black or bloody bowel movements    Fever or temperature above 100.6 F  Please call your doctor ([facility] [phone]) or go to the nearest hospital emergency room.    Post-Anesthesia Patient Instructions    IMMEDIATELY FOLLOWING SURGERY:  Do not drive or operate machinery for the first twenty four hours after surgery.  Do not make any important decisions for twenty four hours after surgery or while taking narcotic pain medications or sedatives.  If you develop intractable nausea and vomiting or a severe headache please notify your doctor immediately.    FOLLOW-UP:  Please make an appointment with your surgeon as instructed. You do not need to follow up with anesthesia unless specifically instructed to do so.    WOUND CARE INSTRUCTIONS (if applicable):  Keep a dry clean dressing on the anesthesia/puncture wound site if there is drainage.  Once the wound has quit draining you may leave it open to air.  Generally you should leave the bandage intact for twenty four hours unless there is drainage.  If the epidural site drains for more than 36-48 hours please call the anesthesia department.    QUESTIONS?:  Please feel free to call your physician or the hospital  if you have any questions, and they will be happy to assist you.       Pending Results     No orders found from 12/2/2017 to 12/5/2017.            Admission Information     Date & Time Provider Department Dept. Phone    12/4/2017 Matt Gonzalez MD [facility] Preop/Phase [phone]      Your Vitals Were     Blood Pressure Pulse Temperature Respirations Height Weight    130/80 (Cuff Size: Adult Regular) 80 96.7  F (35.9  C) (Oral) 18 1.651 m (5' 5\") 59.4 kg (131 lb)    Pulse Oximetry BMI (Body Mass Index)                98% 21.8 kg/m2          MyChart " "Information     [email or web address] lets you send messages to your doctor, view your test results, renew your prescriptions, schedule appointments and more. To sign up, go to [email or web address] . Click on \"Log in\" on the left side of the screen, which will take you to the Welcome page. Then click on \"Sign up Now\" on the right side of the page.     You will be asked to enter the access code listed below, as well as some personal information. Please follow the directions to create your username and password.     Your access code is: [email or web address]  Expires: 2017  1:43 PM     Your access code will  in 90 days. If you need help or a new code, please call your [facility] clinic or [phone].        Care EveryWhere ID     This is your Care EveryWhere ID. This could be used by other organizations to access your [facility] medical records  [id number]        Equal Access to Services     SANDRA BRASHER AH: Ridge cavazoso Sotejas, waaxda lukimmyadaha, qaybta kaalmada adepauletteyarosibel, tesha ortega . So [facility] [phone].    ATENCIÓN: Si habla español, tiene a marquez disposición servicios gratuitos de asistencia lingüística. Llame al [phone].    We comply with applicable federal civil rights laws and [place] laws. We do not discriminate on the basis of race, color, national origin, age, disability, sex, sexual orientation, or gender identity.               Review of your medicines      CONTINUE these medicines which have NOT CHANGED        Dose / Directions    fish Oil 1200 MG capsule        Dose:  1200 mg   Take 1,200 mg by mouth 2 times daily   Refills:  0       flax seed oil 1000 MG capsule        Dose:  1 capsule   Take 1 capsule by mouth daily   Refills:  0       MAGNESIUM OXIDE PO        Take by mouth daily   Refills:  0       * NONFORMULARY        1 tablespoon of chopped fresh garlic   Refills:  0       * NONFORMULARY        Extra virgin olive oil 2 tablespoons  Daily   Refills:  0       " VITAMIN B50 COMPLEX PO        Dose:  1 tablet   Take 1 tablet by mouth daily   Refills:  0       VITAMIN C PO        Dose:  500 mg   Take 500 mg by mouth 2 times daily   Refills:  0       VITAMIN E COMPLEX PO        Dose:  400 Units   Take 400 Units by mouth daily   Refills:  0       * Notice:  This list has 2 medication(s) that are the same as other medications prescribed for you. Read the directions carefully, and ask your doctor or other care provider to review them with you.      STOP taking     MIRALAX powder   Generic drug:  polyethylene glycol                    Protect others around you: Learn how to safely use, store and throw away your medicines at www.disposemymeds.org.             Medication List: This is a list of all your medications and when to take them. Check marks below indicate your daily home schedule. Keep this list as a reference.      Medications           Morning Afternoon Evening Bedtime As Needed    fish Oil 1200 MG capsule   Take 1,200 mg by mouth 2 times daily                                flax seed oil 1000 MG capsule   Take 1 capsule by mouth daily                                MAGNESIUM OXIDE PO   Take by mouth daily                                * NONFORMULARY   1 tablespoon of chopped fresh garlic                                * NONFORMULARY   Extra virgin olive oil 2 tablespoons  Daily                                VITAMIN B50 COMPLEX PO   Take 1 tablet by mouth daily                                VITAMIN C PO   Take 500 mg by mouth 2 times daily                                VITAMIN E COMPLEX PO   Take 400 Units by mouth daily                                * Notice:  This list has 2 medication(s) that are the same as other medications prescribed for you. Read the directions carefully, and ask your doctor or other care provider to review them with you.

## 2017-12-04 NOTE — IP AVS SNAPSHOT
[facility] Preop/Phase II    [address] [address]    Phone:  [phone]                                       After Visit Summary   12/4/2017    Bianka Whitmore    MRN: [id number]           After Visit Summary Signature Page     I have received my discharge instructions, and my questions have been answered. I have discussed any challenges I see with this plan with the nurse or doctor.    ..........................................................................................................................................  Patient/Patient Representative Signature      ..........................................................................................................................................  Patient Representative Print Name and Relationship to Patient    ..................................................               ................................................  Date                                            Time    ..........................................................................................................................................  Reviewed by Signature/Title    ...................................................              ..............................................  Date                                                            Time

## 2017-12-05 LAB — COPATH REPORT: NORMAL

## 2017-12-05 NOTE — ANESTHESIA POSTPROCEDURE EVALUATION
Patient: Bianka Whitmore    Procedure(s):  COLONOSCOPY WITH POLYPECTOMY AND SCLEROTHERAPY - Wound Class: II-Clean Contaminated    Diagnosis:BLEEDING/POSITIVE OCCULT STOOL BLOOD TEST  Diagnosis Additional Information: No value filed.    Anesthesia Type:  MAC    Note:  Anesthesia Post Evaluation    Patient location during evaluation: Phase 2 and Bedside  Patient participation: Able to fully participate in evaluation  Level of consciousness: awake and alert  Pain management: adequate  Airway patency: patent  Cardiovascular status: acceptable  Respiratory status: acceptable  Hydration status: stable  PONV: none     Anesthetic complications: None          Last vitals:  Vitals:    12/04/17 0930 12/04/17 0935 12/04/17 0940   BP: (!) 130/104 128/86 134/90   Pulse:      Resp: 16 18 18   Temp:      SpO2: 99%  98%         Electronically Signed By: Gamal Franco MD  December 5, 2017  6:27 AM

## 2018-02-05 ENCOUNTER — APPOINTMENT (OUTPATIENT)
Dept: GENERAL RADIOLOGY | Facility: HOSPITAL | Age: 83
End: 2018-02-05
Attending: INTERNAL MEDICINE
Payer: MEDICARE

## 2018-02-05 ENCOUNTER — HOSPITAL ENCOUNTER (EMERGENCY)
Facility: HOSPITAL | Age: 83
Discharge: HOME | End: 2018-02-05
Attending: INTERNAL MEDICINE | Admitting: INTERNAL MEDICINE
Payer: MEDICARE

## 2018-02-05 VITALS
RESPIRATION RATE: 18 BRPM | SYSTOLIC BLOOD PRESSURE: 132 MMHG | DIASTOLIC BLOOD PRESSURE: 82 MMHG | TEMPERATURE: 97.7 F | HEART RATE: 65 BPM

## 2018-02-05 DIAGNOSIS — M62.830 BACK MUSCLE SPASM: ICD-10-CM

## 2018-02-05 PROCEDURE — 72100 X-RAY EXAM L-S SPINE 2/3 VWS: CPT | Mod: TC

## 2018-02-05 PROCEDURE — 99283 EMERGENCY DEPT VISIT LOW MDM: CPT

## 2018-02-05 PROCEDURE — 25000132 ZZH RX MED GY IP 250 OP 250 PS 637: Mod: GY | Performed by: INTERNAL MEDICINE

## 2018-02-05 PROCEDURE — A9270 NON-COVERED ITEM OR SERVICE: HCPCS | Mod: GY | Performed by: INTERNAL MEDICINE

## 2018-02-05 PROCEDURE — 99284 EMERGENCY DEPT VISIT MOD MDM: CPT | Performed by: INTERNAL MEDICINE

## 2018-02-05 RX ORDER — CYCLOBENZAPRINE HCL 10 MG
10 TABLET ORAL 2 TIMES DAILY PRN
Qty: 10 TABLET | Refills: 1 | Status: SHIPPED | OUTPATIENT
Start: 2018-02-05 | End: 2018-02-10

## 2018-02-05 RX ORDER — CYCLOBENZAPRINE HCL 10 MG
10 TABLET ORAL ONCE
Status: COMPLETED | OUTPATIENT
Start: 2018-02-05 | End: 2018-02-05

## 2018-02-05 RX ADMIN — CYCLOBENZAPRINE HYDROCHLORIDE 10 MG: 10 TABLET, FILM COATED ORAL at 07:35

## 2018-02-05 NOTE — ED AVS SNAPSHOT
[facility]    [address] [address]    Phone:  [phone]                                       Bianka Whitmore   MRN: [id number]    Department:  [facility]   Date of Visit:  2/5/2018           After Visit Summary Signature Page     I have received my discharge instructions, and my questions have been answered. I have discussed any challenges I see with this plan with the nurse or doctor.    ..........................................................................................................................................  Patient/Patient Representative Signature      ..........................................................................................................................................  Patient Representative Print Name and Relationship to Patient    ..................................................               ................................................  Date                                            Time    ..........................................................................................................................................  Reviewed by Signature/Title    ...................................................              ..............................................  Date                                                            Time

## 2018-02-05 NOTE — ED NOTES
Face to face report given with opportunity to observe patient.    Report given to Hellen Simmons   2/5/2018  7:08 AM

## 2018-02-05 NOTE — ED NOTES
Patient to ED with [facility]. Patient reported that she has had 2 back fx in the past year.  On Thursday pt reported she was stretching and hurt her lower back .  Reports pain is constant since then.  Patient reports taking Ibuprofen which helps very little and tylenol which has been helping.  Patient has not followed up with her PCP on this.  Denies any numbness or tingling down legs.

## 2018-02-05 NOTE — DISCHARGE INSTRUCTIONS
Possible Causes of Low Back or Leg Pain    The symptoms in your back or leg may be due to pressure on a nerve. This pressure may be caused by a damaged disk or by abnormal bone growth. Either way, you may feel pain, burning, tingling, or numbness. If you have pressure on a nerve that connects to the sciatic nerve, pain may shoot down your leg.    Pressure from the disk  Constant wear and tear can weaken a disk over time and cause back pain. The disk can then be damaged by a sudden movement or injury. If its soft center begins to bulge, the disk may press on a nerve. Or the outside of the disk may tear, and the soft center may squeeze through and pinch a nerve.    Pressure from bone  As a disk wears out, the vertebrae right above and below the disk begin to touch. This can put pressure on a nerve. Often, abnormal bone (called bone spurs) grows where the vertebrae rub against each other. This can cause the foramen or the spinal canal to narrow (called stenosis) and press against a nerve.  Date Last Reviewed: 10/4/2015    5622-9002 [employer]. [address] [place] [address]. All rights reserved. This information is not intended as a substitute for professional medical care. Always follow your healthcare professional's instructions.          Relieving Back Pain  Back pain is a common problem. You can strain back muscles by lifting too much weight or just by moving the wrong way. Back strain can be uncomfortable, even painful. And it can take weeks or months to improve. To help yourself feel better and prevent future back strains, try these tips.  Important Note: Do not give aspirin to children or teens without first discussing it with your healthcare provider.      ? Ice    Ice reduces muscle pain and swelling. It helps most during the first 24 to 48 hours after an injury.    Wrap an ice pack or a bag of frozen peas in a thin towel. (Never place ice directly on your skin.)    Place the ice  where your back hurts the most.    Don t ice for more than 20 minutes at a time.    You can use ice several times a day.  ? Medicines  Over-the-counter pain relievers can include acetaminophen and anti-inflammatory medicines, which includes aspirin or ibuprofen. They can help ease discomfort. Some also reduce swelling.    Tell your healthcare provider about any medicines you are already taking.    Take medicines only as directed.  ? Heat  After the first 48 hours, heat can relax sore muscles and improve blood flow.    Try a warm bath or shower. Or use a heating pad set on low. To prevent a burn, keep a cloth between you and the heating pad.    Don t use a heating pad for more than 15 minutes at a time. Never sleep on a heating pad.  Date Last Reviewed: 9/1/2015 2000-2017 [employer]. [address] [place] [address], [place] [address]. All rights reserved. This information is not intended as a substitute for professional medical care. Always follow your healthcare professional's instructions.

## 2018-02-05 NOTE — ED AVS SNAPSHOT
[facility]    [address] [place] [address]    [place] [address]    Phone:  [phone]                                       Bianka Whitmore   MRN: [id number]    Department:  [facility]   Date of Visit:  2/5/2018           Patient Information     Date Of Birth          1935        Your diagnoses for this visit were:     Back muscle spasm        You were seen by Markus Rios MD.      Follow-up Information     Schedule an appointment as soon as possible for a visit with Yael Mckeon PA.    Specialty:  Family Practice    Contact information:    [facility]  [address]  [place] [address]  [phone]          Discharge Instructions         Possible Causes of Low Back or Leg Pain    The symptoms in your back or leg may be due to pressure on a nerve. This pressure may be caused by a damaged disk or by abnormal bone growth. Either way, you may feel pain, burning, tingling, or numbness. If you have pressure on a nerve that connects to the sciatic nerve, pain may shoot down your leg.    Pressure from the disk  Constant wear and tear can weaken a disk over time and cause back pain. The disk can then be damaged by a sudden movement or injury. If its soft center begins to bulge, the disk may press on a nerve. Or the outside of the disk may tear, and the soft center may squeeze through and pinch a nerve.    Pressure from bone  As a disk wears out, the vertebrae right above and below the disk begin to touch. This can put pressure on a nerve. Often, abnormal bone (called bone spurs) grows where the vertebrae rub against each other. This can cause the foramen or the spinal canal to narrow (called stenosis) and press against a nerve.  Date Last Reviewed: 10/4/2015    4479-0254 [employer]. [address] [place] [address], [place] [address]. All rights reserved. This information is not intended as a substitute for professional medical care. Always follow your healthcare  professional's instructions.          Relieving Back Pain  Back pain is a common problem. You can strain back muscles by lifting too much weight or just by moving the wrong way. Back strain can be uncomfortable, even painful. And it can take weeks or months to improve. To help yourself feel better and prevent future back strains, try these tips.  Important Note: Do not give aspirin to children or teens without first discussing it with your healthcare provider.      ? Ice    Ice reduces muscle pain and swelling. It helps most during the first 24 to 48 hours after an injury.    Wrap an ice pack or a bag of frozen peas in a thin towel. (Never place ice directly on your skin.)    Place the ice where your back hurts the most.    Don t ice for more than 20 minutes at a time.    You can use ice several times a day.  ? Medicines  Over-the-counter pain relievers can include acetaminophen and anti-inflammatory medicines, which includes aspirin or ibuprofen. They can help ease discomfort. Some also reduce swelling.    Tell your healthcare provider about any medicines you are already taking.    Take medicines only as directed.  ? Heat  After the first 48 hours, heat can relax sore muscles and improve blood flow.    Try a warm bath or shower. Or use a heating pad set on low. To prevent a burn, keep a cloth between you and the heating pad.    Don t use a heating pad for more than 15 minutes at a time. Never sleep on a heating pad.  Date Last Reviewed: 9/1/2015 2000-2017 [employer]. [address] [place] [address]. All rights reserved. This information is not intended as a substitute for professional medical care. Always follow your healthcare professional's instructions.             Review of your medicines      START taking        Dose / Directions Last dose taken    cyclobenzaprine 10 MG tablet   Commonly known as:  FLEXERIL   Dose:  10 mg   Quantity:  10 tablet        Take 1 tablet (10 mg) by mouth  2 times daily as needed for muscle spasms   Refills:  1          Our records show that you are taking the medicines listed below. If these are incorrect, please call your family doctor or clinic.        Dose / Directions Last dose taken    fish Oil 1200 MG capsule   Dose:  1200 mg        Take 1,200 mg by mouth 2 times daily   Refills:  0        flax seed oil 1000 MG capsule   Dose:  1 capsule        Take 1 capsule by mouth daily   Refills:  0        IBUPROFEN PO   Dose:  400 mg        Take 400 mg by mouth   Refills:  0        MAGNESIUM OXIDE PO        Take by mouth daily   Refills:  0        * NONFORMULARY        1 tablespoon of chopped fresh garlic   Refills:  0        * NONFORMULARY        Extra virgin olive oil 2 tablespoons  Daily   Refills:  0        VITAMIN B50 COMPLEX PO   Dose:  1 tablet        Take 1 tablet by mouth daily   Refills:  0        VITAMIN C PO   Dose:  500 mg        Take 500 mg by mouth 2 times daily   Refills:  0        VITAMIN E COMPLEX PO   Dose:  400 Units        Take 400 Units by mouth daily   Refills:  0        * Notice:  This list has 2 medication(s) that are the same as other medications prescribed for you. Read the directions carefully, and ask your doctor or other care provider to review them with you.            Prescriptions were sent or printed at these locations (1 Prescription)                   [address] [address]    Telephone:  [phone]   Fax:  [phone]   Hours:                  Printed at Department/Unit printer (1 of 1)         cyclobenzaprine (FLEXERIL) 10 MG tablet                Procedures and tests performed during your visit     Lumbar spine XR, 2-3 views      Orders Needing Specimen Collection     None      Pending Results     Date and Time Order Name Status Description    2/5/2018 0731 Lumbar spine XR, 2-3 views In process             Pending Culture Results     No orders found from 2/3/2018 to  "2018.            Thank you for choosing [facility]       Thank you for choosing [facility] for your care. Our goal is always to provide you with excellent care. Hearing back from our patients is one way we can continue to improve our services. Please take a few minutes to complete the written survey that you may receive in the mail after you visit with us. Thank you!        [employer] Information     [employer] lets you send messages to your doctor, view your test results, renew your prescriptions, schedule appointments and more. To sign up, go to [email or web address] . Click on \"Log in\" on the left side of the screen, which will take you to the Welcome page. Then click on \"Sign up Now\" on the right side of the page.     You will be asked to enter the access code listed below, as well as some personal information. Please follow the directions to create your username and password.     Your access code is: [email or web address]  Expires: 2018  7:59 AM     Your access code will  in 90 days. If you need help or a new code, please call your [facility] clinic or [phone].        Care EveryWhere ID     This is your Care EveryWhere ID. This could be used by other organizations to access your [facility] medical records  [id number]        Equal Access to Services     SANDRA BRASHER : Ridge Lloyd, waaxda luqadaha, qaybta kaalmada adepauletteyada, tesha hatch. So [facility] [phone].    ATENCIÓN: Si habla español, tiene a marquez disposición servicios gratuitos de asistencia lingüística. Llame al [phone].    We comply with applicable federal civil rights laws and [place] laws. We do not discriminate on the basis of race, color, national origin, age, disability, sex, sexual orientation, or gender identity.            After Visit Summary       This is your record. Keep this with you and show to your community pharmacist(s) and doctor(s) at your next visit.                  "

## 2018-02-05 NOTE — ED NOTES
D/c instructions reviewed with patient and her spouse. Rx in hand and will fill at pharmacy of choice.  Encouraged to return with new or worsening symptoms. No questions or concerns.

## 2018-02-06 ASSESSMENT — ENCOUNTER SYMPTOMS
FLANK PAIN: 0
CHEST TIGHTNESS: 0
HEADACHES: 0
HEMATURIA: 0
ARTHRALGIAS: 0
NUMBNESS: 0
FEVER: 0
COUGH: 0
ABDOMINAL PAIN: 0
COLOR CHANGE: 0
DIZZINESS: 0
NECK STIFFNESS: 0
SHORTNESS OF BREATH: 0
CHILLS: 0
BACK PAIN: 1
DIAPHORESIS: 0
VOMITING: 0
MYALGIAS: 0
WOUND: 0
ABDOMINAL DISTENTION: 0
NECK PAIN: 0
WHEEZING: 0
DYSURIA: 0
PALPITATIONS: 0
BLOOD IN STOOL: 0
LIGHT-HEADEDNESS: 0
VOICE CHANGE: 0
NAUSEA: 0
ANAL BLEEDING: 0
CONFUSION: 0

## 2018-02-06 NOTE — ED PROVIDER NOTES
History     Chief Complaint   Patient presents with     Back Pain     past few days     Patient is a 82 year old female presenting with back pain. The history is provided by the patient.   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 yes         Diverticulitis of colon 03/26/2013     Priority: Medium     Osteoarthritis 03/26/2013     Priority: Medium     Osteoporosis/osteopenia increased risk 03/26/2013     Priority: Medium     ACP (advance care planning) 12/06/2012     Priority: Medium     Advance Care Planning 6/22/2016: ACP Review of Chart / Resources Provided:  Reviewed chart for advance care plan.  Bianka Whitmore has no plan or code status on file however states presence of ACP document. Copy requested. Confirmed code status reflects current choices pending receipt of document/advance care plan review.  Confirmed/documented legally designated decision makers.  Added by Munira Rodgers                Past Medical History:    Past Medical History:   Diagnosis Date     Basal cell carcinoma      Cardiomegaly 01/29/2007     Compression fracture of thoracic vertebra (H)  10/30/2014     Compression fx, lumbar spine, with routine healing, subsequent encounter 6/22/2016     Hyperlipidemia      Osteoporosis      Typical atrial flutter (H) 7/25/2016     Varicose vein of leg 6/22/2016       Past Surgical History:    Past Surgical History:   Procedure Laterality Date     APPENDECTOMY       COLONOSCOPY N/A 12/4/2017    Procedure: COLONOSCOPY;  COLONOSCOPY WITH POLYPECTOMY AND SCLEROTHERAPY;  Surgeon: Matt Gonzalez MD;  Location: [facility]     ENDOSCOPIC STRIPPING VEIN(S)       HYSTERECTOMY       SALPINGO OOPHORECTOMY,R/L/BRIAN         Family History:    Family History   Problem Relation Age of Onset     Other - See Comments Mother 85     Old age, cause of death     CANCER Father 84     Rectal cancer, cause of death/Stomach cancer, cause of death     C.A.D. Maternal Aunt      C.A.D. Maternal Uncle      CANCER Son      thymic carcinoma       Social History:  Marital Status:   [2]  Social History   Substance Use Topics     Smoking status: Former Smoker     Types: Cigarettes     Quit date: 3/26/1953     Smokeless tobacco: Never Used      Comment: Quit in 1950; no passive smoke exposure     Alcohol use No        Medications:      IBUPROFEN PO   cyclobenzaprine (FLEXERIL) 10 MG tablet   NONFORMULARY   NONFORMULARY   MAGNESIUM OXIDE PO   B Complex-Biotin-FA (VITAMIN B50 COMPLEX PO)   Ascorbic Acid (VITAMIN C PO)   VITAMIN E COMPLEX PO   Omega-3 Fatty Acids (FISH OIL) 1200 MG CAPS   Flaxseed, Linseed, (FLAX SEED OIL) 1000 MG capsule         Review of Systems   Constitutional: Negative for chills, diaphoresis and fever.   HENT: Negative for voice change.    Eyes: Negative for visual disturbance.   Respiratory: Negative for cough, chest tightness, shortness of breath and wheezing.    Cardiovascular: Negative for chest pain, palpitations and leg swelling.   Gastrointestinal: Negative for abdominal distention, abdominal pain, anal bleeding, blood in stool, nausea and vomiting.   Genitourinary:  Negative for decreased urine volume, dysuria, flank pain and hematuria.   Musculoskeletal: Positive for back pain. Negative for arthralgias, gait problem, myalgias, neck pain and neck stiffness.   Skin: Negative for color change, pallor, rash and wound.   Neurological: Negative for dizziness, syncope, light-headedness, numbness and headaches.   Psychiatric/Behavioral: Negative for confusion and suicidal ideas.       Physical Exam   BP: 132/82  Pulse: 65  Heart Rate: 54  Temp: 97.7  F (36.5  C)  Resp: 18      Physical Exam   Constitutional: She is oriented to person, place, and time. She appears well-developed and well-nourished.   HENT:   Head: Normocephalic and atraumatic.   Mouth/Throat: No oropharyngeal exudate.   Eyes: Conjunctivae are normal. Pupils are equal, round, and reactive to light.   Neck: Normal range of motion. Neck supple. No JVD present. No tracheal deviation present. No thyromegaly present.   Cardiovascular: Normal rate, regular rhythm, normal heart sounds and intact distal pulses.  Exam reveals no gallop and no friction rub.    No murmur heard.  Pulmonary/Chest: Effort normal and breath sounds normal. No stridor. No respiratory distress. She has no wheezes. She has no rales. She exhibits no tenderness.   Abdominal: Soft. Bowel sounds are normal. She exhibits no distension and no mass. There is no tenderness. There is no rebound and no guarding.   Musculoskeletal: She exhibits no edema or tenderness.        Lumbar back: She exhibits decreased range of motion, pain and spasm. She exhibits no bony tenderness, no swelling, no edema and no deformity.   Lymphadenopathy:     She has no cervical adenopathy.   Neurological: She is alert and oriented to person, place, and time.   Skin: Skin is warm and dry. No rash noted. No erythema. No pallor.   Psychiatric: Her behavior is normal.   Nursing note and vitals reviewed.      ED Course     ED Course     Procedures                 Labs Ordered and Resulted  from Time of ED Arrival Up to the Time of Departure from the ED - No data to display    Assessments & Plan (with Medical Decision Making)   Acute on chronic lower back pain  Xray: multiple old compression fx  Muscle relaxant prescribed  Fu with PCP  I have reviewed the nursing notes.    I have reviewed the findings, diagnosis, plan and need for follow up with the patient.      Discharge Medication List as of 2/5/2018  8:02 AM      START taking these medications    Details   cyclobenzaprine (FLEXERIL) 10 MG tablet Take 1 tablet (10 mg) by mouth 2 times daily as needed for muscle spasms, Disp-10 tablet, R-1, Local Print             Final diagnoses:   Back muscle spasm       2/5/2018   [facility]     Markus Rios MD  02/06/18 [id number]

## 2018-02-08 ENCOUNTER — TELEPHONE (OUTPATIENT)
Dept: FAMILY MEDICINE | Facility: OTHER | Age: 83
End: 2018-02-08

## 2018-02-08 DIAGNOSIS — M48.50XS PATHOLOGICAL COMPRESSION FRACTURE OF VERTEBRA, SEQUELA: Primary | ICD-10-CM

## 2018-02-08 RX ORDER — DIAZEPAM 5 MG
5 TABLET ORAL EVERY 12 HOURS PRN
Qty: 20 TABLET | Refills: 0 | Status: SHIPPED | OUTPATIENT
Start: 2018-02-08 | End: 2018-03-06

## 2018-02-08 NOTE — TELEPHONE ENCOUNTER
Called pt to check in; gave stable imaging results from ED visit.  Pt asks if there is anything else that she can take for her muscle spasms; the Flexeril makes her thirsty- she does not take it.  She cannot take Ibu.  Has been managing on Tylenol.  She is still uncomfortable; in much pain.  The spasms are a result of her stretching her arms above her head and to her sides; trying to exercise/not strenuous, just to tone.    Will call pt back with CWallis advice.  Shellie Mcgarry

## 2018-02-08 NOTE — TELEPHONE ENCOUNTER
Called pt back.  Pt doesn't really want to take another pill.  She is leery of trying the valium; states that she is just sore.  She has been using heat and Tylenol and is content to self treat.  Will call if she thinks she needs more.  Shellie Mcgarry

## 2018-02-09 ENCOUNTER — TELEPHONE (OUTPATIENT)
Dept: FAMILY MEDICINE | Facility: OTHER | Age: 83
End: 2018-02-09

## 2018-02-09 NOTE — TELEPHONE ENCOUNTER
Called pt; informed her on the salon pas.  She is going to try alternating Ibu and Tyl.  Her  will go and  salon pas for her to try, also.  Will report back on Monday.  Shellie Mcgarry

## 2018-02-09 NOTE — TELEPHONE ENCOUNTER
1:39 PM    Reason for Call: Phone Call    Description: Pt called and stated she would like to discuss her back pain. Please call her back at [phone]    Was an appointment offered for this call? No, pt wanted to speak with nurse  If yes : Appointment type              Date    Preferred method for responding to this message: Telephone Call  What is your phone number ?    If we cannot reach you directly, may we leave a detailed response at the number you provided? Yes    Can this message wait until your PCP/provider returns, if available today? Not applicable    Candice Magana

## 2018-02-09 NOTE — TELEPHONE ENCOUNTER
Returned pt phone call.  She really does not want to take valium for her pain.  And Flexeril caused thirst, constipation, dizziness.  Is there anything else that Yael could recommend?  Pt reports that her spasms are further and further apart; pain and very sore now.  [facility].  Shellie Mcgarry

## 2018-03-06 ENCOUNTER — OFFICE VISIT (OUTPATIENT)
Dept: OTOLARYNGOLOGY | Facility: OTHER | Age: 83
End: 2018-03-06
Attending: NURSE PRACTITIONER
Payer: MEDICARE

## 2018-03-06 VITALS
WEIGHT: 120 LBS | SYSTOLIC BLOOD PRESSURE: 124 MMHG | DIASTOLIC BLOOD PRESSURE: 78 MMHG | OXYGEN SATURATION: 97 % | HEART RATE: 70 BPM | BODY MASS INDEX: 19.99 KG/M2 | TEMPERATURE: 96 F | HEIGHT: 65 IN

## 2018-03-06 DIAGNOSIS — H61.23 BILATERAL IMPACTED CERUMEN: Primary | ICD-10-CM

## 2018-03-06 PROCEDURE — 92504 EAR MICROSCOPY EXAMINATION: CPT | Performed by: NURSE PRACTITIONER

## 2018-03-06 PROCEDURE — 69210 REMOVE IMPACTED EAR WAX UNI: CPT | Performed by: NURSE PRACTITIONER

## 2018-03-06 PROCEDURE — G0463 HOSPITAL OUTPT CLINIC VISIT: HCPCS | Mod: 25

## 2018-03-06 PROCEDURE — 99213 OFFICE O/P EST LOW 20 MIN: CPT | Mod: 25 | Performed by: NURSE PRACTITIONER

## 2018-03-06 ASSESSMENT — PAIN SCALES - GENERAL: PAINLEVEL: NO PAIN (0)

## 2018-03-06 NOTE — NURSING NOTE
"Chief Complaint   Patient presents with     Cerumen Impaction     ear cleaning        Initial /78 (BP Location: Right arm, Patient Position: Chair, Cuff Size: Adult Regular)  Pulse 70  Temp 96  F (35.6  C) (Tympanic)  Ht 5' 5\" (1.651 m)  Wt 120 lb (54.4 kg)  SpO2 97%  BMI 19.97 kg/m2 Estimated body mass index is 19.97 kg/(m^2) as calculated from the following:    Height as of this encounter: 5' 5\" (1.651 m).    Weight as of this encounter: 120 lb (54.4 kg).  Medication Reconciliation: complete       Dafne Currie LPN      "

## 2018-03-06 NOTE — MR AVS SNAPSHOT
After Visit Summary   3/6/2018    Bianka Whitmore    MRN: [id number]           Patient Information     Date Of Birth          1935        Visit Information        Provider Department      3/6/2018 9:45 AM Christina Suárez APRN CNP [facility]        Care Instructions    Ears cleaned today. Ears look good.    Do not use Q-tips.    Return to ENT as needed.    Recommend annual audiograms.      Thank you for allowing Christina Suárez CNP and our ENT team to participate in your care.  If your medications are too expensive, please give the nurse a call.  We can possibly change this medication.  If you have a scheduling or an appointment question please contact [facility] Unit Coordinator at their direct line [phone].   ALL nursing questions or concerns can be directed to your ENT nurse at: [phone]- alex            Follow-ups after your visit        Follow-up notes from your care team     Return if symptoms worsen or fail to improve.      Who to contact     If you have questions or need follow up information about today's clinic visit or your schedule please contact [facility] directly at [phone].  Normal or non-critical lab and imaging results will be communicated to you by Kitehart, letter or phone within 4 business days after the clinic has received the results. If you do not hear from us within 7 days, please contact the clinic through Kitehart or phone. If you have a critical or abnormal lab result, we will notify you by phone as soon as possible.  Submit refill requests through Fipeo or call your pharmacy and they will forward the refill request to us. Please allow 3 business days for your refill to be completed.          Additional Information About Your Visit        Kitehart Information     Fipeo lets you send messages to your doctor, view your test results, renew your prescriptions, schedule appointments and more. To sign up, go to  [email or web address] . Click on \"Log in\" on the left side of the screen, which will take you to the Welcome page. Then click on \"Sign up Now\" on the right side of the page.     You will be asked to enter the access code listed below, as well as some personal information. Please follow the directions to create your username and password.     Your access code is: [email or web address]  Expires: 2018  7:59 AM     Your access code will  in 90 days. If you need help or a new code, please call your [place] clinic or [phone].        Care EveryWhere ID     This is your Care EveryWhere ID. This could be used by other organizations to access your [place] medical records  [id number]        Your Vitals Were     Pulse Temperature Height Pulse Oximetry BMI (Body Mass Index)       70 96  F (35.6  C) (Tympanic) 5' 5\" (1.651 m) 97% 19.97 kg/m2        Blood Pressure from Last 3 Encounters:   18 124/78   18 132/82   17 134/90    Weight from Last 3 Encounters:   18 120 lb (54.4 kg)   17 131 lb (59.4 kg)   17 121 lb (54.9 kg)              Today, you had the following     No orders found for display         Today's Medication Changes          These changes are accurate as of 3/6/18  9:51 AM.  If you have any questions, ask your nurse or doctor.               Stop taking these medicines if you haven't already. Please contact your care team if you have questions.     diazepam 5 MG tablet   Commonly known as:  VALIUM   Stopped by:  Christina Suárez APRN CNP                    Primary Care Provider Office Phone # Fax #    ORTEGA Rivas [phone]       [facility] [address] [address]        Equal Access to Services     [facility] : [place], wathalia luliat, qaybtesha payan . So [facility] [place] [phone].    ATENCIÓN: Si habla español, tiene a marquez disposición servicios " brooklynn de asistencia lingüística. Hiren jacinto [phone].    We comply with applicable federal civil rights laws and [place] laws. We do not discriminate on the basis of race, color, national origin, age, disability, sex, sexual orientation, or gender identity.            Thank you!     Thank you for choosing [facility]  [facility] your [facility]. Our goal is always to provide you with excellent care. Hearing back from our patients is one way we can continue to improve our services. Please take a few minutes to complete the written survey that you may receive in the mail after your visit with us. Thank you!             Your Updated Medication List - Protect others around you: Learn how to safely use, store and throw away your medicines at www.disposemymeds.org.          This list is accurate as of 3/6/18  9:51 AM.  Always use your most recent med list.                   Brand Name Dispense Instructions for use Diagnosis    fish Oil 1200 MG capsule      Take 1,200 mg by mouth 2 times daily        flax seed oil 1000 MG capsule      Take 1 capsule by mouth daily        IBUPROFEN PO      Take 400 mg by mouth        MAGNESIUM OXIDE PO      Take by mouth daily        * NONFORMULARY      1 tablespoon of chopped fresh garlic        * NONFORMULARY      Extra virgin olive oil 2 tablespoons  Daily        VITAMIN B50 COMPLEX PO      Take 1 tablet by mouth daily        VITAMIN C PO      Take 500 mg by mouth 2 times daily        VITAMIN E COMPLEX PO      Take 400 Units by mouth daily        * Notice:  This list has 2 medication(s) that are the same as other medications prescribed for you. Read the directions carefully, and ask your doctor or other care provider to review them with you.

## 2018-03-06 NOTE — PATIENT INSTRUCTIONS
Ears cleaned today. Ears look good.    Do not use Q-tips.    Return to ENT as needed.    Recommend annual audiograms.      Thank you for allowing Christina Suárez CNP and our ENT team to participate in your care.  If your medications are too expensive, please give the nurse a call.  We can possibly change this medication.  If you have a scheduling or an appointment question please contact [facility] Unit Coordinator at their direct line [phone].   ALL nursing questions or concerns can be directed to your ENT nurse at: [phone]- Zhou

## 2018-03-06 NOTE — PROGRESS NOTES
Otolaryngology Note    Patient: Bianka Whitmore  : 1935         Chief Complaint:     Patient presents with:  Cerumen Impaction: ear cleaning          History of Present Illness:     Bianka Whitmore is a 82 year old female seen today for ear cleaning. 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    For the last month, has felt her ears have been more plugged with muffled hearing, L>R.     She has no concerns with her hearing otherwise when there is no cerumen in them. No fluctuating hearing loss.  No otalgia, otorrhea or aura fullness.  Vertigo: no  Tinnitus: no  There is no facial weakness, facial numbness or dysphagia.  No COM, otologic surgeries or trauma.    Audiogram: None recently         Medications:     Current Outpatient Rx   Medication Sig Dispense Refill     IBUPROFEN PO Take 400 mg by mouth       NONFORMULARY 1 tablespoon of chopped fresh garlic       NONFORMULARY Extra virgin olive oil 2 tablespoons  Daily       MAGNESIUM OXIDE PO Take by mouth daily        B Complex-Biotin-FA (VITAMIN B50 COMPLEX PO) Take 1 tablet by mouth daily       Ascorbic Acid (VITAMIN C PO) Take 500 mg by mouth 2 times daily       VITAMIN E COMPLEX PO Take 400 Units by mouth daily       Omega-3 Fatty Acids (FISH OIL) 1200 MG CAPS Take 1,200 mg by mouth 2 times daily        Flaxseed, Linseed, (FLAX SEED OIL) 1000 MG capsule Take 1 capsule by mouth daily              Allergies:     Allergies: Amoxicillin          Past Medical History:     Past Medical History:   Diagnosis Date     Basal cell carcinoma      Cardiomegaly 2007     Compression fracture of thoracic vertebra (H) 10/30/2014     Compression fx, lumbar spine, with routine healing, subsequent encounter 2016     Hyperlipidemia      Osteoporosis      Typical atrial flutter (H) 2016     Varicose vein of leg 2016            Past Surgical History:     Past Surgical  "History:   Procedure Laterality Date     APPENDECTOMY       COLONOSCOPY N/A 12/4/2017    Procedure: COLONOSCOPY;  COLONOSCOPY WITH POLYPECTOMY AND SCLEROTHERAPY;  Surgeon: Matt Gonzalez MD;  Location: [facility]     ENDOSCOPIC STRIPPING VEIN(S)       HYSTERECTOMY       SALPINGO OOPHORECTOMY,R/L/BRIAN         ENT family history reviewed         Social History:     Social History   Substance Use Topics     Smoking status: Former Smoker     Types: Cigarettes     Quit date: 3/26/1953     Smokeless tobacco: Never Used      Comment: Quit in 1950; no passive smoke exposure     Alcohol use No            Review of Systems:     ROS: See HPI         Physical Exam:     /78 (BP Location: Right arm, Patient Position: Chair, Cuff Size: Adult Regular)  Pulse 70  Temp 96  F (35.6  C) (Tympanic)  Ht 5' 5\" (1.651 m)  Wt 120 lb (54.4 kg)  SpO2 97%  BMI 19.97 kg/m2  General - The patient is well nourished and well developed, and appears to have good nutritional status.  Alert and oriented to person and place, answers questions and cooperates with examination appropriately.   Head and Face - Normocephalic and atraumatic, with no gross asymmetry noted.  The facial nerve is intact, with strong symmetric movements.  Voice and Breathing - The patient was breathing comfortably without the use of accessory muscles. There was no wheezing, stridor. The patients voice was clear and strong, and had appropriate pitch and quality.  Ears - External ear normal. Ears examined under microscope. Bilateral EAC's with cerumen, debrided with #3 and #7 suction, cupped forceps and cerumen loop. Bilateral TM's intact without effusion, retraction, perforation or worrisome mass.   Eyes - Extraocular movements intact, and the pupils were reactive to light. Sclera were not icteric or injected, conjunctiva were pink and moist.  Mouth - Examination of the oral cavity showed pink, healthy oral mucosa. Dentition in good condition. No lesions or ulcerations " noted. The tongue was mobile and midline.   Throat - The walls of the oropharynx were smooth, pink, moist, symmetric, and had no lesions or ulcerations. The tonsillar pillars and soft palate were symmetric. The uvula was midline on elevation.    Neck - Normal midline excursion of the laryngotracheal complex during swallowing.  Full range of motion on passive movement.  Palpation of the occipital, submental, submandibular, internal jugular chain, and supraclavicular nodes did not demonstrate any abnormal lymph nodes or masses. Palpation of the thyroid was soft and smooth, with no nodules or goiter appreciated. The trachea was mobile and midline.  Nose - External contour is symmetric, no gross deflection or scars.  Nasal mucosa is pink and moist with no abnormal mucus.  The septum and turbinates were evaluated: normal. No polyps, masses, or purulence noted on examination.         Assessment and Plan:       ICD-10-CM    1. Bilateral impacted cerumen H61.23      The ears were cleaned today. Aural hygiene for the ear canals was discussed.  Avoidance of Q-tips was highly encouraged. The patient was told to avoid flushing the ear canal as there is a risk of perforating the ear drum.  Recommend annual audiograms, sooner with any acute changes in hearing. The patient may return here as needed.    Christina Suárez NP  ENT  [facility], [place]  [phone]

## 2018-03-06 NOTE — LETTER
3/6/2018         RE: Bianka Whitmore  [address]  [place] [address]        Dear Colleague,    Thank you for referring your patient, Bianka Whitmore, to the [facility]. Please see a copy of my visit note below.    Otolaryngology Note    Patient: Bianka Whitmore  : 1935         Chief Complaint:     Patient presents with:  Cerumen Impaction: ear cleaning          History of Present Illness:     Bianka Whitmore is a 82 year old female seen today for ear cleaning. 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    For the last month, has felt her ears have been more plugged with muffled hearing, L>R.     She has no concerns with her hearing otherwise when there is no cerumen in them. No fluctuating hearing loss.  No otalgia, otorrhea or aura fullness.  Vertigo: no  Tinnitus: no  There is no facial weakness, facial numbness or dysphagia.  No COM, otologic surgeries or trauma.    Audiogram: None recently         Medications:     Current Outpatient Rx   Medication Sig Dispense Refill     IBUPROFEN PO Take 400 mg by mouth       NONFORMULARY 1 tablespoon of chopped fresh garlic       NONFORMULARY Extra virgin olive oil 2 tablespoons  Daily       MAGNESIUM OXIDE PO Take by mouth daily        B Complex-Biotin-FA (VITAMIN B50 COMPLEX PO) Take 1 tablet by mouth daily       Ascorbic Acid (VITAMIN C PO) Take 500 mg by mouth 2 times daily       VITAMIN E COMPLEX PO Take 400 Units by mouth daily       Omega-3 Fatty Acids (FISH OIL) 1200 MG CAPS Take 1,200 mg by mouth 2 times daily        Flaxseed, Linseed, (FLAX SEED OIL) 1000 MG capsule Take 1 capsule by mouth daily              Allergies:     Allergies: Amoxicillin          Past Medical History:     Past Medical History:   Diagnosis Date     Basal cell carcinoma      Cardiomegaly 2007     Compression fracture of thoracic vertebra (H) 10/30/2014     Compression fx,  "lumbar spine, with routine healing, subsequent encounter 6/22/2016     Hyperlipidemia      Osteoporosis      Typical atrial flutter (H) 7/25/2016     Varicose vein of leg 6/22/2016            Past Surgical History:     Past Surgical History:   Procedure Laterality Date     APPENDECTOMY       COLONOSCOPY N/A 12/4/2017    Procedure: COLONOSCOPY;  COLONOSCOPY WITH POLYPECTOMY AND SCLEROTHERAPY;  Surgeon: Matt Gonzalez MD;  Location: [facility]     ENDOSCOPIC STRIPPING VEIN(S)       HYSTERECTOMY       SALPINGO OOPHORECTOMY,R/L/BRIAN         ENT family history reviewed         Social History:     Social History   Substance Use Topics     Smoking status: Former Smoker     Types: Cigarettes     Quit date: 3/26/1953     Smokeless tobacco: Never Used      Comment: Quit in 1950; no passive smoke exposure     Alcohol use No            Review of Systems:     ROS: See HPI         Physical Exam:     /78 (BP Location: Right arm, Patient Position: Chair, Cuff Size: Adult Regular)  Pulse 70  Temp 96  F (35.6  C) (Tympanic)  Ht 5' 5\" (1.651 m)  Wt 120 lb (54.4 kg)  SpO2 97%  BMI 19.97 kg/m2  General - The patient is well nourished and well developed, and appears to have good nutritional status.  Alert and oriented to person and place, answers questions and cooperates with examination appropriately.   Head and Face - Normocephalic and atraumatic, with no gross asymmetry noted.  The facial nerve is intact, with strong symmetric movements.  Voice and Breathing - The patient was breathing comfortably without the use of accessory muscles. There was no wheezing, stridor. The patients voice was clear and strong, and had appropriate pitch and quality.  Ears - External ear normal. Ears examined under microscope. Bilateral EAC's with cerumen, debrided with #3 and #7 suction, cupped forceps and cerumen loop. Bilateral TM's intact without effusion, retraction, perforation or worrisome mass.   Eyes - Extraocular movements intact, and " the pupils were reactive to light. Sclera were not icteric or injected, conjunctiva were pink and moist.  Mouth - Examination of the oral cavity showed pink, healthy oral mucosa. Dentition in good condition. No lesions or ulcerations noted. The tongue was mobile and midline.   Throat - The walls of the oropharynx were smooth, pink, moist, symmetric, and had no lesions or ulcerations. The tonsillar pillars and soft palate were symmetric. The uvula was midline on elevation.    Neck - Normal midline excursion of the laryngotracheal complex during swallowing.  Full range of motion on passive movement.  Palpation of the occipital, submental, submandibular, internal jugular chain, and supraclavicular nodes did not demonstrate any abnormal lymph nodes or masses. Palpation of the thyroid was soft and smooth, with no nodules or goiter appreciated. The trachea was mobile and midline.  Nose - External contour is symmetric, no gross deflection or scars.  Nasal mucosa is pink and moist with no abnormal mucus.  The septum and turbinates were evaluated: normal. No polyps, masses, or purulence noted on examination.         Assessment and Plan:       ICD-10-CM    1. Bilateral impacted cerumen H61.23      The ears were cleaned today. Aural hygiene for the ear canals was discussed.  Avoidance of Q-tips was highly encouraged. The patient was told to avoid flushing the ear canal as there is a risk of perforating the ear drum.  Recommend annual audiograms, sooner with any acute changes in hearing. The patient may return here as needed.    Christina Suárez NP  ENT  [facility], [place]  [phone]      Again, thank you for allowing me to participate in the care of your patient.        Sincerely,        Christina Suárez, LAUREN CNP

## 2018-03-21 ENCOUNTER — HOSPITAL ENCOUNTER (EMERGENCY)
Facility: HOSPITAL | Age: 83
Discharge: HOME | End: 2018-03-21
Attending: NURSE PRACTITIONER | Admitting: NURSE PRACTITIONER
Payer: MEDICARE

## 2018-03-21 VITALS
DIASTOLIC BLOOD PRESSURE: 90 MMHG | SYSTOLIC BLOOD PRESSURE: 137 MMHG | HEART RATE: 83 BPM | RESPIRATION RATE: 16 BRPM | OXYGEN SATURATION: 96 % | TEMPERATURE: 96.4 F

## 2018-03-21 DIAGNOSIS — R42 VERTIGO: ICD-10-CM

## 2018-03-21 LAB
ALBUMIN UR-MCNC: NEGATIVE MG/DL
APPEARANCE UR: CLEAR
BACTERIA #/AREA URNS HPF: ABNORMAL /HPF
BASOPHILS # BLD AUTO: 0 10E9/L (ref 0–0.2)
BASOPHILS NFR BLD AUTO: 0.5 %
BILIRUB UR QL STRIP: NEGATIVE
COLOR UR AUTO: YELLOW
DIFFERENTIAL METHOD BLD: ABNORMAL
EOSINOPHIL # BLD AUTO: 0.1 10E9/L (ref 0–0.7)
EOSINOPHIL NFR BLD AUTO: 1 %
ERYTHROCYTE [DISTWIDTH] IN BLOOD BY AUTOMATED COUNT: 13.2 % (ref 10–15)
GLUCOSE UR STRIP-MCNC: NEGATIVE MG/DL
HCT VFR BLD AUTO: 46.3 % (ref 35–47)
HGB BLD-MCNC: 16.2 G/DL (ref 11.7–15.7)
HGB UR QL STRIP: NEGATIVE
IMM GRANULOCYTES # BLD: 0 10E9/L (ref 0–0.4)
IMM GRANULOCYTES NFR BLD: 0.2 %
KETONES UR STRIP-MCNC: 5 MG/DL
LEUKOCYTE ESTERASE UR QL STRIP: NEGATIVE
LYMPHOCYTES # BLD AUTO: 1.1 10E9/L (ref 0.8–5.3)
LYMPHOCYTES NFR BLD AUTO: 17.8 %
MCH RBC QN AUTO: 34 PG (ref 26.5–33)
MCHC RBC AUTO-ENTMCNC: 35 G/DL (ref 31.5–36.5)
MCV RBC AUTO: 97 FL (ref 78–100)
MONOCYTES # BLD AUTO: 0.3 10E9/L (ref 0–1.3)
MONOCYTES NFR BLD AUTO: 5.6 %
MUCOUS THREADS #/AREA URNS LPF: PRESENT /LPF
NEUTROPHILS # BLD AUTO: 4.6 10E9/L (ref 1.6–8.3)
NEUTROPHILS NFR BLD AUTO: 74.9 %
NITRATE UR QL: NEGATIVE
NRBC # BLD AUTO: 0 10*3/UL
NRBC BLD AUTO-RTO: 0 /100
PH UR STRIP: 5.5 PH (ref 4.7–8)
PLATELET # BLD AUTO: 166 10E9/L (ref 150–450)
RBC # BLD AUTO: 4.77 10E12/L (ref 3.8–5.2)
RBC #/AREA URNS AUTO: 3 /HPF (ref 0–2)
SOURCE: ABNORMAL
SP GR UR STRIP: 1.02 (ref 1–1.03)
UROBILINOGEN UR STRIP-MCNC: NORMAL MG/DL (ref 0–2)
WBC # BLD AUTO: 6.1 10E9/L (ref 4–11)
WBC #/AREA URNS AUTO: 1 /HPF (ref 0–5)

## 2018-03-21 PROCEDURE — 81001 URINALYSIS AUTO W/SCOPE: CPT | Performed by: NURSE PRACTITIONER

## 2018-03-21 PROCEDURE — 36415 COLL VENOUS BLD VENIPUNCTURE: CPT | Performed by: NURSE PRACTITIONER

## 2018-03-21 PROCEDURE — G0463 HOSPITAL OUTPT CLINIC VISIT: HCPCS

## 2018-03-21 PROCEDURE — 85025 COMPLETE CBC W/AUTO DIFF WBC: CPT | Performed by: NURSE PRACTITIONER

## 2018-03-21 PROCEDURE — 99213 OFFICE O/P EST LOW 20 MIN: CPT | Performed by: NURSE PRACTITIONER

## 2018-03-21 ASSESSMENT — ENCOUNTER SYMPTOMS
ACTIVITY CHANGE: 0
COUGH: 0
FREQUENCY: 0
NAUSEA: 0
DIARRHEA: 0
VOMITING: 0
FEVER: 0
MYALGIAS: 0
PSYCHIATRIC NEGATIVE: 1
DYSURIA: 0
DIZZINESS: 1
SORE THROAT: 0
RHINORRHEA: 1
CARDIOVASCULAR NEGATIVE: 1
FATIGUE: 0
SINUS PRESSURE: 0
SINUS PAIN: 0
HEADACHES: 0
APPETITE CHANGE: 0
ARTHRALGIAS: 0

## 2018-03-21 NOTE — ED AVS SNAPSHOT
[facility]    [address] [address]    Phone:  [phone]                                       Bianka Whitmore   MRN: [id number]    Department:  [facility]   Date of Visit:  3/21/2018           After Visit Summary Signature Page     I have received my discharge instructions, and my questions have been answered. I have discussed any challenges I see with this plan with the nurse or doctor.    ..........................................................................................................................................  Patient/Patient Representative Signature      ..........................................................................................................................................  Patient Representative Print Name and Relationship to Patient    ..................................................               ................................................  Date                                            Time    ..........................................................................................................................................  Reviewed by Signature/Title    ...................................................              ..............................................  Date                                                            Time

## 2018-03-21 NOTE — ED NOTES
Pt states her left ear has not been normal since she had it cleaned about a month ago. Pt declined offer of w/c. Accompanied pt to admitting.

## 2018-03-21 NOTE — ED AVS SNAPSHOT
[facility]    [address] [place] [address]    [place] [address]    Phone:  [phone]                                       Bianka Whitmore   MRN: [id number]    Department:  [facility]   Date of Visit:  3/21/2018           Patient Information     Date Of Birth          1935        Your diagnoses for this visit were:     Vertigo        You were seen by Guicho Alaniz NP.      Follow-up Information     Follow up with Yael Mckeon PA.    Specialty:  Family Practice    Why:  As needed    Contact information:    [facility]  [address]  [place] [address]  [phone]          Discharge Instructions       1. Drink at least 6-8 glasses water a day  2. Use simply saline 3 times a day for 3 days  Can get Flonase Over the counter if no relief Use 2 times a day for 2-3 days  3.  Return if more symptoms that are worrisome.  Offered a CT of head.      Discharge References/Attachments     DIZZINESS, UNCERTAIN CAUSE (ENGLISH)         Review of your medicines      Our records show that you are taking the medicines listed below. If these are incorrect, please call your family doctor or clinic.        Dose / Directions Last dose taken    fish Oil 1200 MG capsule   Dose:  1200 mg        Take 1,200 mg by mouth 2 times daily   Refills:  0        flax seed oil 1000 MG capsule   Dose:  1 capsule        Take 1 capsule by mouth daily   Refills:  0        MAGNESIUM OXIDE PO        Take by mouth daily   Refills:  0        * NONFORMULARY        1 tablespoon of chopped fresh garlic   Refills:  0        * NONFORMULARY        Extra virgin olive oil 2 tablespoons  Daily   Refills:  0        VITAMIN B50 COMPLEX PO   Dose:  1 tablet        Take 1 tablet by mouth daily   Refills:  0        VITAMIN C PO   Dose:  500 mg        Take 500 mg by mouth 2 times daily   Refills:  0        VITAMIN E COMPLEX PO   Dose:  400 Units        Take 400 Units by mouth daily   Refills:  0        * Notice:  This  "list has 2 medication(s) that are the same as other medications prescribed for you. Read the directions carefully, and ask your doctor or other care provider to review them with you.            Procedures and tests performed during your visit     CBC with platelets differential    UA with Microscopic reflex to Culture      Orders Needing Specimen Collection     None      Pending Results     No orders found from 3/19/2018 to 3/22/2018.            Pending Culture Results     No orders found from 3/19/2018 to 3/22/2018.            Thank you for choosing [facility]       Thank you for choosing [facility] for your care. Our goal is always to provide you with excellent care. Hearing back from our patients is one way we can continue to improve our services. Please take a few minutes to complete the written survey that you may receive in the mail after you visit with us. Thank you!        [email or web address] Information     [employer] lets you send messages to your doctor, view your test results, renew your prescriptions, schedule appointments and more. To sign up, go to [email or web address] . Click on \"Log in\" on the left side of the screen, which will take you to the Welcome page. Then click on \"Sign up Now\" on the right side of the page.     You will be asked to enter the access code listed below, as well as some personal information. Please follow the directions to create your username and password.     Your access code is: [email or web address]  Expires: 2018  8:59 AM     Your access code will  in 90 days. If you need help or a new code, please call your [facility] clinic or [phone].        Care EveryWhere ID     This is your Care EveryWhere ID. This could be used by other organizations to access your [facility] medical records  [id number]        Equal Access to Services     SANDRA BRASHER : deven Padgett qaybta kaalmada adeegyada, waxay idiin hayaan adeeg kharash la'aan ah. So [facility] " [phone].    ATENCIÓN: Si habla español, tiene a marquez disposición servicios gratuitos de asistencia lingüística. Llame al [phone].    We comply with applicable federal civil rights laws and [place] laws. We do not discriminate on the basis of race, color, national origin, age, disability, sex, sexual orientation, or gender identity.            After Visit Summary       This is your record. Keep this with you and show to your community pharmacist(s) and doctor(s) at your next visit.

## 2018-03-21 NOTE — ED PROVIDER NOTES
History     Chief Complaint   Patient presents with     Dizziness     0800 pt noted onset of dizziness. Negative FAST     HPI  Bianka Whitmore is a 82 year old female who woke up today -no problems and then developed dizziness-room spinning later on in morning.  Feels her left ear is plugged.  No fever, HA, Nosore throat,  Nose is always runny,  Hx atrial flutter-is not on antithrombin per choice, no ASA,   and on no medicine.  No problems chewing or swallowing. No weakness in arms or legs, balance Ok.  No nausea, vomiting , Diarrhea.      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 yes         Diverticulitis of colon 03/26/2013     Priority: Medium     Osteoarthritis 03/26/2013     Priority: Medium     Osteoporosis/osteopenia increased risk 03/26/2013     Priority: Medium     ACP (advance care planning) 12/06/2012     Priority: Medium     Advance Care Planning 6/22/2016: ACP Review of Chart / Resources Provided:  Reviewed chart for advance care plan.  Bianka Whitmore has no plan or code status on file however states presence of ACP document. Copy requested. Confirmed code status reflects current choices pending receipt of document/advance care plan review.  Confirmed/documented legally designated decision makers.  Added by Munira Rodgers                Past Medical History:    Past Medical History:   Diagnosis Date     Basal cell carcinoma      Cardiomegaly 01/29/2007     Compression fracture of  thoracic vertebra (H) 10/30/2014     Compression fx, lumbar spine, with routine healing, subsequent encounter 6/22/2016     Hyperlipidemia      Osteoporosis      Typical atrial flutter (H) 7/25/2016     Varicose vein of leg 6/22/2016       Past Surgical History:    Past Surgical History:   Procedure Laterality Date     APPENDECTOMY       COLONOSCOPY N/A 12/4/2017    Procedure: COLONOSCOPY;  COLONOSCOPY WITH POLYPECTOMY AND SCLEROTHERAPY;  Surgeon: Matt Gonzalez MD;  Location: [facility]     ENDOSCOPIC STRIPPING VEIN(S)       HYSTERECTOMY       SALPINGO OOPHORECTOMY,R/L/BRIAN         Family History:    Family History   Problem Relation Age of Onset     Other - See Comments Mother 85     Old age, cause of death     CANCER Father 84     Rectal cancer, cause of death/Stomach cancer, cause of death     C.A.D. Maternal Aunt      C.A.D. Maternal Uncle      CANCER Son      thymic carcinoma       Social History:  Marital Status:   [2]  Social History   Substance Use Topics     Smoking status: Former Smoker     Types: Cigarettes     Quit date: 3/26/1953     Smokeless tobacco: Never Used      Comment: Quit in 1950; no passive smoke exposure     Alcohol use No        Medications:      NONFORMULARY   NONFORMULARY   MAGNESIUM OXIDE PO   B Complex-Biotin-FA (VITAMIN B50 COMPLEX PO)   Ascorbic Acid (VITAMIN C PO)   VITAMIN E COMPLEX PO   Omega-3 Fatty Acids (FISH OIL) 1200 MG CAPS   Flaxseed, Linseed, (FLAX SEED OIL) 1000 MG capsule         Review of Systems   Constitutional: Negative for activity change, appetite change, fatigue and fever.   HENT: Positive for rhinorrhea. Negative for congestion, postnasal drip, sinus pain, sinus pressure, sore throat and tinnitus.         Left ear feels plugged.     Respiratory: Negative for cough.    Cardiovascular: Negative.    Gastrointestinal: Negative for diarrhea, nausea and vomiting.   Genitourinary: Negative.  Negative for dysuria, frequency and urgency.   Musculoskeletal: Negative  for arthralgias and myalgias.   Neurological: Positive for dizziness. Negative for headaches.   Psychiatric/Behavioral: Negative.        Physical Exam   BP: 137/90  Pulse: 83  Temp: 96.4  F (35.8  C)  Resp: 16  SpO2: 96 %      Physical Exam   Constitutional: She is oriented to person, place, and time.   HENT:   Nose: Nose normal.   Mouth/Throat: Oropharynx is clear and moist.   Bilateral TM's are opaque- no redness or swelling. Or discharge.     Eyes: Conjunctivae and EOM are normal. Pupils are equal, round, and reactive to light.   Neck: Normal range of motion. Neck supple. No thyromegaly present.   Cardiovascular: Normal rate and normal heart sounds.    Slightly irregular heart.     Pulmonary/Chest: Effort normal and breath sounds normal.   Abdominal: Soft. Bowel sounds are normal. There is no tenderness.   Musculoskeletal: Normal range of motion.   Lymphadenopathy:     She has no cervical adenopathy.   Neurological: She is alert and oriented to person, place, and time. She has normal reflexes.   Skin: Skin is warm and dry.   Psychiatric: She has a normal mood and affect.       ED Course     ED Course     Procedures         Offered CT of head.  Patient declined.    Results for orders placed or performed during the hospital encounter of 03/21/18   UA with Microscopic reflex to Culture   Result Value Ref Range    Color Urine Yellow     Appearance Urine Clear     Glucose Urine Negative NEG^Negative mg/dL    Bilirubin Urine Negative NEG^Negative    Ketones Urine 5 (A) NEG^Negative mg/dL    Specific Gravity Urine 1.017 1.003 - 1.035    Blood Urine Negative NEG^Negative    pH Urine 5.5 4.7 - 8.0 pH    Protein Albumin Urine Negative NEG^Negative mg/dL    Urobilinogen mg/dL Normal 0.0 - 2.0 mg/dL    Nitrite Urine Negative NEG^Negative    Leukocyte Esterase Urine Negative NEG^Negative    Source Midstream Urine     WBC Urine 1 0 - 5 /HPF    RBC Urine 3 (H) 0 - 2 /HPF    Bacteria Urine None (A) NEG^Negative /HPF    Mucous  Urine Present (A) NEG^Negative /LPF   CBC with platelets differential   Result Value Ref Range    WBC 6.1 4.0 - 11.0 10e9/L    RBC Count 4.77 3.8 - 5.2 10e12/L    Hemoglobin 16.2 (H) 11.7 - 15.7 g/dL    Hematocrit 46.3 35.0 - 47.0 %    MCV 97 78 - 100 fl    MCH 34.0 (H) 26.5 - 33.0 pg    MCHC 35.0 31.5 - 36.5 g/dL    RDW 13.2 10.0 - 15.0 %    Platelet Count 166 150 - 450 10e9/L    Diff Method Automated Method     % Neutrophils 74.9 %    % Lymphocytes 17.8 %    % Monocytes 5.6 %    % Eosinophils 1.0 %    % Basophils 0.5 %    % Immature Granulocytes 0.2 %    Nucleated RBCs 0 0 /100    Absolute Neutrophil 4.6 1.6 - 8.3 10e9/L    Absolute Lymphocytes 1.1 0.8 - 5.3 10e9/L    Absolute Monocytes 0.3 0.0 - 1.3 10e9/L    Absolute Eosinophils 0.1 0.0 - 0.7 10e9/L    Absolute Basophils 0.0 0.0 - 0.2 10e9/L    Abs Immature Granulocytes 0.0 0 - 0.4 10e9/L    Absolute Nucleated RBC 0.0                 No results found for this or any previous visit (from the past 24 hour(s)).    Medications - No data to display    Assessments & Plan (with Medical Decision Making)     I have reviewed the nursing notes.    I have reviewed the findings, diagnosis, plan and need for follow up with the patient.Explained sinuses-ears do not look infected at this time.        New Prescriptions    No medications on file       Final diagnoses:   Dizziness     ASSESSMENT / PLAN:  (R42) Vertigo  Comment:  Plan:1. Drink plenty fluids -6-8 glasses water a day  2.  Use simply Saline 3 times a day for 2-3 days  3.   Return if any concern.        3/21/2018   [facility]     Guicho Alaniz NP  03/21/18 7507            Guicho Alaniz NP  03/21/18 7044

## 2018-03-21 NOTE — DISCHARGE INSTRUCTIONS
1. Drink at least 6-8 glasses water a day  2. Use simply saline 3 times a day for 3 days  Can get Flonase Over the counter if no relief Use 2 times a day for 2-3 days  3.  Return if more symptoms that are worrisome.  Offered a CT of head.

## 2018-03-21 NOTE — ED NOTES
Hx of plugged ears and had them washed out about one month ago. Left ear is plugged and has a humming in it. Negative FAST exam. Sx onset at 0800 today.

## 2018-04-13 ENCOUNTER — OFFICE VISIT (OUTPATIENT)
Dept: FAMILY MEDICINE | Facility: OTHER | Age: 83
End: 2018-04-13
Attending: PHYSICIAN ASSISTANT
Payer: MEDICARE

## 2018-04-13 VITALS
HEART RATE: 86 BPM | RESPIRATION RATE: 20 BRPM | OXYGEN SATURATION: 96 % | DIASTOLIC BLOOD PRESSURE: 68 MMHG | BODY MASS INDEX: 21.71 KG/M2 | HEIGHT: 62 IN | WEIGHT: 118 LBS | TEMPERATURE: 97.5 F | SYSTOLIC BLOOD PRESSURE: 116 MMHG

## 2018-04-13 DIAGNOSIS — H93.13 TINNITUS, BILATERAL: ICD-10-CM

## 2018-04-13 DIAGNOSIS — Z87.19: ICD-10-CM

## 2018-04-13 DIAGNOSIS — L57.0 AK (ACTINIC KERATOSIS): Primary | ICD-10-CM

## 2018-04-13 PROCEDURE — G0463 HOSPITAL OUTPT CLINIC VISIT: HCPCS | Mod: 25

## 2018-04-13 PROCEDURE — 99213 OFFICE O/P EST LOW 20 MIN: CPT | Mod: 25 | Performed by: PHYSICIAN ASSISTANT

## 2018-04-13 PROCEDURE — 17003 DESTRUCT PREMALG LES 2-14: CPT | Performed by: PHYSICIAN ASSISTANT

## 2018-04-13 PROCEDURE — G0463 HOSPITAL OUTPT CLINIC VISIT: HCPCS

## 2018-04-13 PROCEDURE — 17000 DESTRUCT PREMALG LESION: CPT | Performed by: PHYSICIAN ASSISTANT

## 2018-04-13 PROCEDURE — 36415 COLL VENOUS BLD VENIPUNCTURE: CPT | Mod: ZL | Performed by: PHYSICIAN ASSISTANT

## 2018-04-13 PROCEDURE — 82607 VITAMIN B-12: CPT | Mod: ZL | Performed by: PHYSICIAN ASSISTANT

## 2018-04-13 ASSESSMENT — ANXIETY QUESTIONNAIRES
1. FEELING NERVOUS, ANXIOUS, OR ON EDGE: NOT AT ALL
GAD7 TOTAL SCORE: 0
2. NOT BEING ABLE TO STOP OR CONTROL WORRYING: NOT AT ALL
7. FEELING AFRAID AS IF SOMETHING AWFUL MIGHT HAPPEN: NOT AT ALL
3. WORRYING TOO MUCH ABOUT DIFFERENT THINGS: NOT AT ALL
6. BECOMING EASILY ANNOYED OR IRRITABLE: NOT AT ALL
5. BEING SO RESTLESS THAT IT IS HARD TO SIT STILL: NOT AT ALL

## 2018-04-13 ASSESSMENT — PAIN SCALES - GENERAL: PAINLEVEL: NO PAIN (0)

## 2018-04-13 ASSESSMENT — PATIENT HEALTH QUESTIONNAIRE - PHQ9: 5. POOR APPETITE OR OVEREATING: NOT AT ALL

## 2018-04-13 NOTE — PATIENT INSTRUCTIONS
Thank you for choosing [facility].   I have office hours 8:00 am to 4:30 pm on Monday's, Wednesday's, Thursday's and Friday's. My nurse and I are out of the office every Tuesday.    Following your visit, when your labs and diagnostic testing have returned, I will review then and you will be contacted by my nurse.  If you are on My Chart, you can also view results there.    For refills, notify your pharmacy regarding what you need and the pharmacy will generate a refill request. Do not call my nurse as she is unable to process refill request. Please plan ahead and allow 3-5 days for refill requests.    You will generally receive a reminder call the day prior to your appointment.  If you cannot attend your appointment, please cancel your appointment with as much notice as possible.  If there is a pattern of failure to present for your appointments, I cannot provide consistent, meaningful, ongoing care for you. It is very important to me that you come in for your care, so we can best assist you with your health care needs.    IMPORTANT:  Please note that it is my standard of practice to NOT participate in prescribing ongoing requested Narcotic Analgesic therapy, and/or participate in the prescribing of other controlled substances.  My nurse and I am happy to assist you with the process of referral for alternative pain management as needed, and other treatment modalities including but not limited to:  Physical Therapy, Physical Medicine and Rehab, Counseling, Chiropractic Care, Orthopedic Care, and non-narcotic medication management.     In the event that you need to be seen for emergent concerns and I am out of office,  please see one of my colleagues for acute concerns.  You may also present to  or ER.  I appreciate the opportunity to serve you and look forward to supporting your healthcare needs in the future. Please contact me with any questions or concerns that you may  have.    Sincerely,      Yael Mckeon RN, PA-C        normal

## 2018-04-13 NOTE — PROGRESS NOTES
SUBJECTIVE:   Bianka Whitmore is a 82 year old female who presents to clinic today for the following health issues:      Ear plugged      Duration: 3-4 months    Description (location/character/radiation): left worse then right, feel plugged, like there is air blowing in them    Intensity:  mild    Accompanying signs and symptoms: none    History (similar episodes/previous evaluation): ear wax was removed 1-2 months ago    Precipitating or alleviating factors: None    Therapies tried and outcome: ears cleaned, didn't help       Skin issue      Duration: couple months    Description (location/character/radiation): spot on forehead wants looked at    Intensity:  mild    Accompanying signs and symptoms: denies any symptoms    History (similar episodes/previous evaluation): None    Precipitating or alleviating factors: None    Therapies tried and outcome: None       Problem list and histories reviewed & adjusted, as indicated.  Additional history: as documented    Patient Active Problem List   Diagnosis     Diverticulitis of colon     Osteoarthritis     Osteoporosis/osteopenia increased risk     ACP (advance care planning)     Malignant basal cell neoplasm of skin     Compression fracture of thoracic vertebra (H)     Geographic tongue     Varicose vein of leg     Compression fx, lumbar spine, with routine healing, subsequent encounter     Typical atrial flutter (H)     Past Surgical History:   Procedure Laterality Date     APPENDECTOMY       COLONOSCOPY N/A 12/4/2017    Procedure: COLONOSCOPY;  COLONOSCOPY WITH POLYPECTOMY AND SCLEROTHERAPY;  Surgeon: Matt Gonzalez MD;  Location: [facility]     ENDOSCOPIC STRIPPING VEIN(S)       HYSTERECTOMY       SALPINGO OOPHORECTOMY,R/L/BRIAN         Social History   Substance Use Topics     Smoking status: Former Smoker     Types: Cigarettes     Start date: 1/1/1950     Quit date: 3/26/1953     Smokeless tobacco: Never Used      Comment: Quit in 1950; no passive smoke exposure      Alcohol use No     Family History   Problem Relation Age of Onset     Other - See Comments Mother 85     Old age, cause of death     CANCER Father 84     Rectal cancer, cause of death/Stomach cancer, cause of death     MICHAEL Maternal Aunt      CLIVE. Maternal Uncle      CANCER Son      thymic carcinoma         Current Outpatient Prescriptions   Medication Sig Dispense Refill     NONFORMULARY 1 tablespoon of chopped fresh garlic       NONFORMULARY Extra virgin olive oil 2 tablespoons  Daily       MAGNESIUM OXIDE PO Take by mouth daily        B Complex-Biotin-FA (VITAMIN B50 COMPLEX PO) Take 1 tablet by mouth daily       Ascorbic Acid (VITAMIN C PO) Take 500 mg by mouth 2 times daily       VITAMIN E COMPLEX PO Take 400 Units by mouth daily       Omega-3 Fatty Acids (FISH OIL) 1200 MG CAPS Take 1,200 mg by mouth 2 times daily        Flaxseed, Linseed, (FLAX SEED OIL) 1000 MG capsule Take 1 capsule by mouth daily       Allergies   Allergen Reactions     Amoxicillin      Intolerant       Recent Labs   Lab Test  10/27/17   0957  11/10/16   0900  08/03/16   0736  07/25/16   0831  05/20/16   0953   LDL   --    --   104*   --    --    HDL   --    --   74   --    --    TRIG   --    --   63   --    --    ALT  36  47   --    --   50   CR  0.69  0.63   --    --   0.80   GFRESTIMATED  82  >90  Non  GFR Calc     --    --   69   GFRESTBLACK  >90  >90  African American GFR Calc     --    --   83   POTASSIUM  4.3  4.9   --    --   4.7   TSH  2.81   --    --   2.50   --       BP Readings from Last 3 Encounters:   04/13/18 116/68   03/21/18 137/90   03/06/18 124/78    Wt Readings from Last 3 Encounters:   04/13/18 118 lb (53.5 kg)   03/06/18 120 lb (54.4 kg)   12/04/17 131 lb (59.4 kg)                    Reviewed and updated as needed this visit by clinical staff       Reviewed and updated as needed this visit by Provider         ROS:  Constitutional, HEENT, cardiovascular, pulmonary, gi and gu systems are negative,  "except as otherwise noted.    OBJECTIVE:                                                    /68 (BP Location: Left arm, Patient Position: Sitting, Cuff Size: Adult Regular)  Pulse 86  Temp 97.5  F (36.4  C) (Tympanic)  Resp 20  Ht 5' 1.5\" (1.562 m)  Wt 118 lb (53.5 kg)  SpO2 96%  BMI 21.93 kg/m2  Body mass index is 21.93 kg/(m^2).  GENERAL APPEARANCE: healthy, alert and no distress  EYES: Eyes grossly normal to inspection, PERRL and conjunctivae and sclerae normal  HENT: ear canals and TM's normal and nose and mouth without ulcers or lesions  NECK: no adenopathy, no asymmetry, masses, or scars and thyroid normal to palpation  RESP: lungs clear to auscultation - no rales, rhonchi or wheezes  CV: regular rates and rhythm, normal S1 S2, no S3 or S4 and no murmur, click or rub  LYMPHATICS: no cervical adenopathy  ABDOMEN: soft, nontender, without hepatosplenomegaly or masses and bowel sounds normal  MS: extremities normal- no gross deformities noted  SKIN: 9 lesions from the right to left temple with scaling and non healing are frozen x3 per protocol.  Also has 5 macular lesions that are enlarging and irregular in its borders.  We also froze these as some were in the hair line.   NEURO: Normal strength and tone, mentation intact and speech normal  PSYCH: mentation appears normal and affect normal/bright    Diagnostic test results:  Diagnostic Test Results:  No results found for this or any previous visit (from the past 24 hour(s)).  Results for orders placed or performed during the hospital encounter of 03/21/18   UA with Microscopic reflex to Culture   Result Value Ref Range    Color Urine Yellow     Appearance Urine Clear     Glucose Urine Negative NEG^Negative mg/dL    Bilirubin Urine Negative NEG^Negative    Ketones Urine 5 (A) NEG^Negative mg/dL    Specific Gravity Urine 1.017 1.003 - 1.035    Blood Urine Negative NEG^Negative    pH Urine 5.5 4.7 - 8.0 pH    Protein Albumin Urine Negative NEG^Negative " mg/dL    Urobilinogen mg/dL Normal 0.0 - 2.0 mg/dL    Nitrite Urine Negative NEG^Negative    Leukocyte Esterase Urine Negative NEG^Negative    Source Midstream Urine     WBC Urine 1 0 - 5 /HPF    RBC Urine 3 (H) 0 - 2 /HPF    Bacteria Urine None (A) NEG^Negative /HPF    Mucous Urine Present (A) NEG^Negative /LPF   CBC with platelets differential   Result Value Ref Range    WBC 6.1 4.0 - 11.0 10e9/L    RBC Count 4.77 3.8 - 5.2 10e12/L    Hemoglobin 16.2 (H) 11.7 - 15.7 g/dL    Hematocrit 46.3 35.0 - 47.0 %    MCV 97 78 - 100 fl    MCH 34.0 (H) 26.5 - 33.0 pg    MCHC 35.0 31.5 - 36.5 g/dL    RDW 13.2 10.0 - 15.0 %    Platelet Count 166 150 - 450 10e9/L    Diff Method Automated Method     % Neutrophils 74.9 %    % Lymphocytes 17.8 %    % Monocytes 5.6 %    % Eosinophils 1.0 %    % Basophils 0.5 %    % Immature Granulocytes 0.2 %    Nucleated RBCs 0 0 /100    Absolute Neutrophil 4.6 1.6 - 8.3 10e9/L    Absolute Lymphocytes 1.1 0.8 - 5.3 10e9/L    Absolute Monocytes 0.3 0.0 - 1.3 10e9/L    Absolute Eosinophils 0.1 0.0 - 0.7 10e9/L    Absolute Basophils 0.0 0.0 - 0.2 10e9/L    Abs Immature Granulocytes 0.0 0 - 0.4 10e9/L    Absolute Nucleated RBC 0.0         ASSESSMENT/PLAN:                                                    1. AK (actinic keratosis)  See us back as discussed.   - DESTRUCT PREMALIGNANT LESION, 2-14  - DESTRUCT PREMALIGNANT LESION, FIRST    2. Tinnitus, bilateral  Discussion on causes.  Hearing aides she doesn't want. She doesn't have trouble sleeping.  meds are rules out.  She is offered referral to hearing testing and declines.     3. History of glossitis  Check her levels.   - Vitamin B12      See Patient Instructions    ORTEGA Caruso  [facility]

## 2018-04-13 NOTE — NURSING NOTE
"Chief Complaint   Patient presents with     Skin Spots     Ear Problem       Initial /68 (BP Location: Left arm, Patient Position: Sitting, Cuff Size: Adult Regular)  Pulse 86  Temp 97.5  F (36.4  C) (Tympanic)  Resp 20  Ht 5' 1.5\" (1.562 m)  Wt 118 lb (53.5 kg)  SpO2 96%  BMI 21.93 kg/m2 Estimated body mass index is 21.93 kg/(m^2) as calculated from the following:    Height as of this encounter: 5' 1.5\" (1.562 m).    Weight as of this encounter: 118 lb (53.5 kg).  Medication Reconciliation: complete   Munira Rodgers LPN    "

## 2018-04-13 NOTE — MR AVS SNAPSHOT
After Visit Summary   4/13/2018    Bianka Whitmore    MRN: [id number]           Patient Information     Date Of Birth          1935        Visit Information        Provider Department      4/13/2018 1:45 PM Yael Mckeon PA [facility]        Today's Diagnoses     AK (actinic keratosis)    -  1    Tinnitus, bilateral        History of glossitis          Care Instructions      Thank you for choosing [facility].   I have office hours 8:00 am to 4:30 pm on Monday's, Wednesday's, Thursday's and Friday's. My nurse and I are out of the office every Tuesday.    Following your visit, when your labs and diagnostic testing have returned, I will review then and you will be contacted by my nurse.  If you are on My Chart, you can also view results there.    For refills, notify your pharmacy regarding what you need and the pharmacy will generate a refill request. Do not call my nurse as she is unable to process refill request. Please plan ahead and allow 3-5 days for refill requests.    You will generally receive a reminder call the day prior to your appointment.  If you cannot attend your appointment, please cancel your appointment with as much notice as possible.  If there is a pattern of failure to present for your appointments, I cannot provide consistent, meaningful, ongoing care for you. It is very important to me that you come in for your care, so we can best assist you with your health care needs.    IMPORTANT:  Please note that it is my standard of practice to NOT participate in prescribing ongoing requested Narcotic Analgesic therapy, and/or participate in the prescribing of other controlled substances.  My nurse and I am happy to assist you with the process of referral for alternative pain management as needed, and other treatment modalities including but not limited to:  Physical Therapy, Physical Medicine and Rehab, Counseling, Chiropractic Care, Orthopedic Care, and  "non-narcotic medication management.     In the event that you need to be seen for emergent concerns and I am out of office,  please see one of my colleagues for acute concerns.  You may also present to UC or ER.  I appreciate the opportunity to serve you and look forward to supporting your healthcare needs in the future. Please contact me with any questions or concerns that you may have.    Sincerely,      Yael Mckeon RN, PA-C               Follow-ups after your visit        Who to contact     If you have questions or need follow up information about today's clinic visit or your schedule please contact [facility] directly at [phone].  Normal or non-critical lab and imaging results will be communicated to you by MyChart, letter or phone within 4 business days after the clinic has received the results. If you do not hear from us within 7 days, please contact the clinic through MyChart or phone. If you have a critical or abnormal lab result, we will notify you by phone as soon as possible.  Submit refill requests through [employer] or call your pharmacy and they will forward the refill request to us. Please allow 3 business days for your refill to be completed.          Additional Information About Your Visit        Care EveryWhere ID     This is your Care EveryWhere ID. This could be used by other organizations to access your [facility] medical records  [id number]        Your Vitals Were     Pulse Temperature Respirations Height Pulse Oximetry BMI (Body Mass Index)    86 97.5  F (36.4  C) (Tympanic) 20 5' 1.5\" (1.562 m) 96% 21.93 kg/m2       Blood Pressure from Last 3 Encounters:   04/13/18 116/68   03/21/18 137/90   03/06/18 124/78    Weight from Last 3 Encounters:   04/13/18 118 lb (53.5 kg)   03/06/18 120 lb (54.4 kg)   12/04/17 131 lb (59.4 kg)              We Performed the Following     DESTRUCT PREMALIGNANT LESION, 2-14     DESTRUCT PREMALIGNANT LESION, FIRST     Vitamin B12        Primary " Care Provider Office Phone # Fax #    ORTEGA Rivas [phone]       [facility] [address]  [place] [address]        Equal Access to Services     SANDRA BRASHER : Hadii layla ku hadasho Soomaali, waaxda luqadaha, qaybta kaalmada adeegyada, waxmaria esther sánchezin caseyn brittonpaulette leone jordan hatch. So [facility] [phone].    ATENCIÓN: Si habla español, tiene a marquez disposición servicios gratuitos de asistencia lingüística. Llame al [phone].    We comply with applicable federal civil rights laws and [place] laws. We do not discriminate on the basis of race, color, national origin, age, disability, sex, sexual orientation, or gender identity.            Thank you!     Thank you for choosing [facility]  for your care. Our goal is always to provide you with excellent care. Hearing back from our patients is one way we can continue to improve our services. Please take a few minutes to complete the written survey that you may receive in the mail after your visit with us. Thank you!             Your Updated Medication List - Protect others around you: Learn how to safely use, store and throw away your medicines at www.disposemymeds.org.          This list is accurate as of 4/13/18  2:25 PM.  Always use your most recent med list.                   Brand Name Dispense Instructions for use Diagnosis    fish Oil 1200 MG capsule      Take 1,200 mg by mouth 2 times daily        flax seed oil 1000 MG capsule      Take 1 capsule by mouth daily        MAGNESIUM OXIDE PO      Take by mouth daily        * NONFORMULARY      1 tablespoon of chopped fresh garlic        * NONFORMULARY      Extra virgin olive oil 2 tablespoons  Daily        VITAMIN B50 COMPLEX PO      Take 1 tablet by mouth daily        VITAMIN C PO      Take 500 mg by mouth 2 times daily        VITAMIN E COMPLEX PO      Take 400 Units by mouth daily        * Notice:  This list has 2 medication(s) that are the same as other  medications prescribed for you. Read the directions carefully, and ask your doctor or other care provider to review them with you.

## 2018-04-14 ASSESSMENT — ANXIETY QUESTIONNAIRES: GAD7 TOTAL SCORE: 0

## 2018-04-14 ASSESSMENT — PATIENT HEALTH QUESTIONNAIRE - PHQ9: SUM OF ALL RESPONSES TO PHQ QUESTIONS 1-9: 0

## 2018-04-16 LAB — VIT B12 SERPL-MCNC: 1602 PG/ML (ref 193–986)

## 2018-04-23 ENCOUNTER — TELEPHONE (OUTPATIENT)
Dept: FAMILY MEDICINE | Facility: OTHER | Age: 83
End: 2018-04-23

## 2018-04-23 NOTE — TELEPHONE ENCOUNTER
Patient contacted and notified of results.  Amanda Figueroa CMA([facility])

## 2018-04-23 NOTE — TELEPHONE ENCOUNTER
1:23 PM    Reason for Call: Phone Call    Description: Pt stated she had a missed call from clinic. Wondering if it was regarding her results? Please call her back at [phone]    Was an appointment offered for this call? No  If yes : Appointment type              Date    Preferred method for responding to this message: Telephone Call  What is your phone number ?    If we cannot reach you directly, may we leave a detailed response at the number you provided? No, pt does not have voicemail    Can this message wait until your PCP/provider returns, if available today? No, PCP out and pt had call today    Candice Magana

## 2018-08-16 ENCOUNTER — OFFICE VISIT (OUTPATIENT)
Dept: PODIATRY | Facility: OTHER | Age: 83
End: 2018-08-16
Attending: PODIATRIST
Payer: MEDICARE

## 2018-08-16 ENCOUNTER — HOSPITAL ENCOUNTER (OUTPATIENT)
Dept: GENERAL RADIOLOGY | Facility: HOSPITAL | Age: 83
End: 2018-08-16
Attending: PODIATRIST
Payer: MEDICARE

## 2018-08-16 ENCOUNTER — HOSPITAL ENCOUNTER (OUTPATIENT)
Dept: GENERAL RADIOLOGY | Facility: HOSPITAL | Age: 83
Discharge: HOME | End: 2018-08-16
Attending: PODIATRIST | Admitting: PODIATRIST
Payer: MEDICARE

## 2018-08-16 VITALS
WEIGHT: 118 LBS | HEIGHT: 64 IN | DIASTOLIC BLOOD PRESSURE: 81 MMHG | SYSTOLIC BLOOD PRESSURE: 122 MMHG | TEMPERATURE: 97.3 F | HEART RATE: 90 BPM | BODY MASS INDEX: 20.14 KG/M2

## 2018-08-16 DIAGNOSIS — M79.671 FOOT PAIN, RIGHT: ICD-10-CM

## 2018-08-16 DIAGNOSIS — M77.41 METATARSALGIA OF RIGHT FOOT: ICD-10-CM

## 2018-08-16 DIAGNOSIS — M79.671 FOOT PAIN, RIGHT: Primary | ICD-10-CM

## 2018-08-16 PROCEDURE — G0463 HOSPITAL OUTPT CLINIC VISIT: HCPCS

## 2018-08-16 PROCEDURE — 99203 OFFICE O/P NEW LOW 30 MIN: CPT | Performed by: PODIATRIST

## 2018-08-16 PROCEDURE — 73630 X-RAY EXAM OF FOOT: CPT | Mod: TC,RT

## 2018-08-16 PROCEDURE — G0463 HOSPITAL OUTPT CLINIC VISIT: HCPCS | Mod: 25

## 2018-08-16 PROCEDURE — 73610 X-RAY EXAM OF ANKLE: CPT | Mod: TC,RT

## 2018-08-16 ASSESSMENT — PAIN SCALES - GENERAL: PAINLEVEL: NO PAIN (0)

## 2018-08-16 NOTE — PROGRESS NOTES
"Chief complaint: Patient presents with:  Right Ankle - Pain      History of Present Illness: This 82 year old female being seen for evaluation of a recent onset RIGHT foot pain.  Pain started about three weeks ago and started randomly when she awoke and started walking. Pain is described as a sharp pain localized to the anterior/medial ankle of her RIGHT foot although she sometimes experienced pain across the entire dorsal midfoot. Her pain today is only located in her ankle. She walks daily around her neighborhood for exercise and says there has been construction in the neighborhood, so she has been walking on uneven surfaces. When the pain first started, her ankle and dorsal foot were swollen and red. The pain has been progressively improving and the swelling subsided so she wondered if she should even have the pain evaluated today. She denies any history of trauma. Relates to wearing \"very old [employer] tennis shoes\" while walking in the morning. No further pedal complaints at this time.      Patient Active Problem List   Diagnosis     Diverticulitis of colon     Osteoarthritis     Osteoporosis/osteopenia increased risk     ACP (advance care planning)     Malignant basal cell neoplasm of skin     Compression fracture of thoracic vertebra (H)     Geographic tongue     Varicose vein of leg     Compression fx, lumbar spine, with routine healing, subsequent encounter     Typical atrial flutter (H)       Past Surgical History:   Procedure Laterality Date     APPENDECTOMY       COLONOSCOPY N/A 12/4/2017    Procedure: COLONOSCOPY;  COLONOSCOPY WITH POLYPECTOMY AND SCLEROTHERAPY;  Surgeon: Matt Gonzalez MD;  Location: [facility]     ENDOSCOPIC STRIPPING VEIN(S)       HYSTERECTOMY       SALPINGO OOPHORECTOMY,R/L/BRIAN         Current Outpatient Prescriptions   Medication     Ascorbic Acid (VITAMIN C PO)     B Complex-Biotin-FA (VITAMIN B50 COMPLEX PO)     Flaxseed, Linseed, (FLAX SEED OIL) 1000 MG capsule     MAGNESIUM OXIDE " PO     NONFORMULARY     NONFORMULARY     Omega-3 Fatty Acids (FISH OIL) 1200 MG CAPS     VITAMIN E COMPLEX PO     No current facility-administered medications for this visit.           Allergies   Allergen Reactions     Amoxicillin      Intolerant         EXAM  Constitutional: healthy, alert and no distress    Psychiatric: mentation appears normal and affect normal/bright    VASCULAR:  -Dorsalis pedis pulse +2/4 b/l  -Posterior tibial pulse +2/4 b/l  -Hair growth ABSENT to b/l feet  -Multiple varicosities and telangectasias to b/l dorsal, lateral and medial foot  NEURO:  -Light touch sensation intact to b/l plantar forefoot  DERM:  -Skin temperature WNL b/l  -Skin thin, shiny and atrophic b/l to feet  MSK:  -Pain on palpation to RIGHT anterior ankle immediately medial to the tibialis anterior  -No crepitus in ankle with passive ankle ROM, RIGHT  -Mild pain on palpation to the tibialis anterior, RIGHT  -No pain on palpation to dorsal metatarsals of RIGHT foot  -Muscle srength +5/5 for dorsiflexion, plantarflexion, ABDUction and ADDuction b/l with no creptius on ROM and no pain against resistance in any direction b/l    RADIOGRAPHS RIGHT FOOT 08/16/18:  PROCEDURE: XR FOOT RT G/E 3 VW 8/16/2018 9:59 AM     HISTORY: ; Foot pain, right     COMPARISONS: None.     TECHNIQUE: 3 views.     FINDINGS: No acute fracture or dislocation is seen.     There is moderate degenerative change in the first metatarsal  phalangeal joint. There is mild degenerative change in several  interphalangeal joints.          IMPRESSION: Degenerative change, most severe in the first metatarsal  phalangeal joint.    RADIOGRAPHS LEFT ANKLE 08/16/18:  PROCEDURE: XR ANKLE RT G/E 3 VW 8/16/2018 10:00 AM     HISTORY: ; Foot pain, right     COMPARISONS: None.     TECHNIQUE: 3 views.     FINDINGS: No acute fracture or dislocation is seen. Ankle mortise  appears congruent and there is no focal bone  lesion.    ========================================================================-    Assessment:    (M79.671) Foot pain, right  (primary encounter diagnosis)    (M77.41) Metatarsalgia of right foot      PLAN:  -Patient examined and evaluated. Treatment options discussed with no educational barriers noted.  -Radiographs RIGHT foot and ankle ordered today--patient was called with the results. (See results above--no stress fracture or abnormality related to patient's pain at the appointment).  - Educated patient on the importance of stability shoe gear. The shoe must have a toe that bends at the toes, have a solid heel contour and no flexibility to the midfoot. Demonstrated this on patient's current tennis shoe. Explained that good foot support starts with a good tennis shoe. Patient agreed her walking shoes were very old and in need of replacement.  -Patient's pain and swelling have subsided since the initial onset. If the edema or pain resumes, she was instructed to wear an ankle    compression sleeve for support while walking (she states she has one at home). Patient wears compression socks on a regular basis already. She should also ice after her walks if she is still having pain although she relates great improvement to her foot/ankle pain now.    RTC PRN if pain worsens.  Will consider physical therapy if needed    Chari Joyner DPM

## 2018-08-16 NOTE — MR AVS SNAPSHOT
"              After Visit Summary   8/16/2018    Bianka Whitmore    MRN: [id number]           Patient Information     Date Of Birth          1935        Visit Information        Provider Department      8/16/2018 9:00 AM Chari Joyner DPM [facility]        Today's Diagnoses     Foot pain, right    -  1    Metatarsalgia of right foot           Follow-ups after your visit        Future tests that were ordered for you today     Open Future Orders        Priority Expected Expires Ordered    XR Foot Right G/E 3 Views Routine 8/16/2018 8/16/2019 8/16/2018    XR Ankle Right G/E 3 Views Routine 8/16/2018 8/16/2019 8/16/2018            Who to contact     If you have questions or need follow up information about today's clinic visit or your schedule please contact [facility] directly at [phone].  Normal or non-critical lab and imaging results will be communicated to you by MyChart, letter or phone within 4 business days after the clinic has received the results. If you do not hear from us within 7 days, please contact the clinic through MyChart or phone. If you have a critical or abnormal lab result, we will notify you by phone as soon as possible.  Submit refill requests through Stock[employer]ift or call your pharmacy and they will forward the refill request to us. Please allow 3 business days for your refill to be completed.          Additional Information About Your Visit        MyChart Information     Stockdrift lets you send messages to your doctor, view your test results, renew your prescriptions, schedule appointments and more. To sign up, go to [email or web address] . Click on \"Log in\" on the left side of the screen, which will take you to the Welcome page. Then click on \"Sign up Now\" on the right side of the page.     You will be asked to enter the access code listed below, as well as some personal information. Please follow the directions to create your username and password.   " "  Your access code is: [email or web address]  Expires: 11/15/2018  4:39 PM     Your access code will  in 90 days. If you need help or a new code, please call your [facility] clinic or [phone].        Care EveryWhere ID     This is your Care EveryWhere ID. This could be used by other organizations to access your [facility] medical records  [id number]        Your Vitals Were     Pulse Temperature Height BMI (Body Mass Index)          90 97.3  F (36.3  C) (Tympanic) 5' 4\" (1.626 m) 20.25 kg/m2         Blood Pressure from Last 3 Encounters:   18 122/81   18 116/68   18 137/90    Weight from Last 3 Encounters:   18 118 lb (53.5 kg)   18 118 lb (53.5 kg)   18 120 lb (54.4 kg)               Primary Care Provider Office Phone # Fax #    Chari Lorenzo Joyner [phone] [address] [facility] [address]        Equal Access to Services     [facility]: Hadii aad ku hadasho Soomaali, waaxda luqadaha, qaybta kaalmada adeegyada, tesha ortega . So [facility] [phone].    ATENCIÓN: Si habla español, tiene a marquez disposición servicios gratuitos de asistencia lingüística. Llame al [phone].    We comply with applicable federal civil rights laws and [place] laws. We do not discriminate on the basis of race, color, national origin, age, disability, sex, sexual orientation, or gender identity.            Thank you!     Thank you for choosing [facility]  for your care. Our goal is always to provide you with excellent care. Hearing back from our patients is one way we can continue to improve our services. Please take a few minutes to complete the written survey that you may receive in the mail after your visit with us. Thank you!             Your Updated Medication List - Protect others around you: Learn how to safely use, store and throw away your medicines at www.[employer] Gamesemymeds.org.          This list is accurate as of 18 " 11:59 PM.  Always use your most recent med list.                   Brand Name Dispense Instructions for use Diagnosis    fish Oil 1200 MG capsule      Take 1,200 mg by mouth 2 times daily        flax seed oil 1000 MG capsule      Take 1 capsule by mouth daily        MAGNESIUM OXIDE PO      Take by mouth daily        * NONFORMULARY      1 tablespoon of chopped fresh garlic        * NONFORMULARY      Extra virgin olive oil 2 tablespoons  Daily        VITAMIN B50 COMPLEX PO      Take 1 tablet by mouth daily        VITAMIN C PO      Take 500 mg by mouth 2 times daily        VITAMIN E COMPLEX PO      Take 400 Units by mouth daily        * Notice:  This list has 2 medication(s) that are the same as other medications prescribed for you. Read the directions carefully, and ask your doctor or other care provider to review them with you.

## 2018-08-22 ENCOUNTER — TELEPHONE (OUTPATIENT)
Dept: FAMILY MEDICINE | Facility: OTHER | Age: 83
End: 2018-08-22

## 2018-08-22 NOTE — TELEPHONE ENCOUNTER
10:00 AM    Reason for Call: OVERBOOK    Patient is having the following symptoms: Preop appointment  Before surgery -08/28/18 minutes.    The patient is requesting an appointment for Overbook request with Yael Mckeon.    Was an appointment offered for this call? yes  If yes : Appointment type              Date    Preferred method for responding to this message: Telephone Call  What is your phone number ?   [phone]  If we cannot reach you directly, may we leave a detailed response at the number you provided? Yes    Can this message wait until your PCP/provider returns, if unavailable today?     Sisi Avila

## 2018-08-23 NOTE — PROGRESS NOTES
[address]  [facility] [address]  [phone]  Dept: [phone]    PRE-OP EVALUATION:  Today's date: 2018    Bianka Whitmore (: 1935) presents for pre-operative evaluation assessment as requested by Dr. Mcqueen.  She requires evaluation and anesthesia risk assessment prior to undergoing surgery/procedure for treatment of cataract .    Proposed Surgery/ Procedure: left eye cataract surgery  Date of Surgery/ Procedure: 18 and 18  Time of Surgery/ Procedure: 740  Hospital/Surgical Facility: [facility]  Fax number: [phone]  Primary Physician: Yael Mckeon  Type of Anesthesia Anticipated: to be determined    Patient has a Health Care Directive or Living Will:  YES     1. NO - Do you have a history of heart attack, stroke, stent, bypass or surgery on an artery in the head, neck, heart or legs?  2. NO - Do you ever have any pain or discomfort in your chest?  3. NO - Do you have a history of  Heart Failure?  4. NO - Are you troubled by shortness of breath when: walking on the level, up a slight hill or at night?  5. NO - Do you currently have a cold, bronchitis or other respiratory infection?  6. NO - Do you have a cough, shortness of breath or wheezing?  7. NO - Do you sometimes get pains in the calves of your legs when you walk?  8. NO - Do you or anyone in your family have previous history of blood clots?  9. NO - Do you or does anyone in your family have a serious bleeding problem such as prolonged bleeding following surgeries or cuts?  10. NO - Have you ever had problems with anemia or been told to take iron pills?  11. NO - Have you had any abnormal blood loss such as black, tarry or bloody stools, or abnormal vaginal bleeding?  12. NO - Have you ever had a blood transfusion?  13. NO - Have you or any of your relatives ever had problems with anesthesia?  14. NO - Do you have sleep apnea, excessive snoring or daytime drowsiness?  15. NO  - Do you have any prosthetic heart valves?  16. NO - Do you have prosthetic joints?  17. NO - Is there any chance that you may be pregnant?      HPI:     HPI related to upcoming procedure: Patient has a 2 year history of decreasing vision      See problem list for active medical problems.  Problems all longstanding and stable, except as noted/documented.  See ROS for pertinent symptoms related to these conditions.                                                                                                                                                          .    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 yes         Diverticulitis of colon 03/26/2013     Priority: Medium     Osteoarthritis 03/26/2013     Priority: Medium     Osteoporosis/osteopenia increased risk 03/26/2013     Priority: Medium     ACP (advance care planning) 12/06/2012     Priority: Medium     Advance Care Planning 6/22/2016: ACP Review of Chart / Resources Provided:  Reviewed chart for advance care plan.  Bianka SAGAR Whitmore has no plan or code status on file however states presence of ACP document. Copy requested. Confirmed code status reflects current choices pending receipt of document/advance care plan review.  Confirmed/documented legally designated decision makers.  Added by Munira Rodgers              Past Medical History:   Diagnosis Date     Basal cell carcinoma      Cardiomegaly 01/29/2007     Compression fracture of  thoracic vertebra (H) 10/30/2014     Compression fx, lumbar spine, with routine healing, subsequent encounter 6/22/2016     Hyperlipidemia      Osteoporosis      Typical atrial flutter (H) 7/25/2016     Varicose vein of leg 6/22/2016     Past Surgical History:   Procedure Laterality Date     APPENDECTOMY       COLONOSCOPY N/A 12/4/2017    Procedure: COLONOSCOPY;  COLONOSCOPY WITH POLYPECTOMY AND SCLEROTHERAPY;  Surgeon: Matt Gonzalez MD;  Location: [facility]     ENDOSCOPIC STRIPPING VEIN(S)       HYSTERECTOMY       SALPINGO OOPHORECTOMY,R/L/BRIAN       Current Outpatient Prescriptions   Medication Sig Dispense Refill     Ascorbic Acid (VITAMIN C PO) Take 500 mg by mouth 2 times daily       B Complex-Biotin-FA (VITAMIN B50 COMPLEX PO) Take 1 tablet by mouth daily       Flaxseed, Linseed, (FLAX SEED OIL) 1000 MG capsule Take 1 capsule by mouth daily       MAGNESIUM OXIDE PO Take by mouth daily        NONFORMULARY 1 tablespoon of chopped fresh garlic       NONFORMULARY Extra virgin olive oil 2 tablespoons  Daily       Omega-3 Fatty Acids (FISH OIL) 1200 MG CAPS Take 1,200 mg by mouth 2 times daily        VITAMIN E COMPLEX PO Take 400 Units by mouth daily       OTC products: None, except as noted above    Allergies   Allergen Reactions     Amoxicillin      Intolerant        Latex Allergy: NO    Social History   Substance Use Topics     Smoking status: Former Smoker     Types: Cigarettes     Start date: 1/1/1950     Quit date: 3/26/1953     Smokeless tobacco: Never Used      Comment: Quit in 1950; no passive smoke exposure     Alcohol use No     History   Drug Use No       REVIEW OF SYSTEMS:   CONSTITUTIONAL: NEGATIVE for fever, chills, change in weight  INTEGUMENTARY/SKIN: NEGATIVE for worrisome rashes, moles or lesions  EYES: NEGATIVE for vision changes or irritation  ENT/MOUTH: NEGATIVE for ear, mouth and throat problems  RESP: NEGATIVE for significant cough or SOB  CV: NEGATIVE for chest pain, palpitations or  peripheral edema  GI: NEGATIVE for nausea, abdominal pain, heartburn, or change in bowel habits  : NEGATIVE for frequency, dysuria, or hematuria  MUSCULOSKELETAL: NEGATIVE for significant arthralgias or myalgia  NEURO: NEGATIVE for weakness, dizziness or paresthesias  ENDOCRINE: NEGATIVE for temperature intolerance, skin/hair changes  HEME: NEGATIVE for bleeding problems  PSYCHIATRIC: NEGATIVE for changes in mood or affect    EXAM:   There were no vitals taken for this visit.    GENERAL APPEARANCE: healthy, alert and no distress     EYES: EOMI, PERRL     HENT: ear canals and TM's normal and nose and mouth without ulcers or lesions     NECK: no adenopathy, no asymmetry, masses, or scars and thyroid normal to palpation     RESP: lungs clear to auscultation - no rales, rhonchi or wheezes     CV: regular rates and rhythm, normal S1 S2, no S3 or S4 and no murmur, click or rub     ABDOMEN:  soft, nontender, no HSM or masses and bowel sounds normal     MS: extremities normal- no gross deformities noted, no evidence of inflammation in joints, FROM in all extremities.     SKIN: no suspicious lesions or rashes     NEURO: Normal strength and tone, sensory exam grossly normal, mentation intact and speech normal     PSYCH: mentation appears normal. and affect normal/bright     LYMPHATICS: No cervical adenopathy    DIAGNOSTICS:   CBC, BMP today    Recent Labs   Lab Test  03/21/18   1117  10/27/17   0957   11/10/16   0900   HGB  16.2*  16.3*   < >  16.0*   PLT  166  177   < >  168   INR   --    --    --   1.12   NA   --   135   --   137   POTASSIUM   --   4.3   --   4.9   CR   --   0.69   --   0.63    < > = values in this interval not displayed.        IMPRESSION:   (Z01.818) Preop general physical exam  (primary encounter diagnosis)      (I48.3) Typical atrial flutter (H) - stable  Comment: Present for at least 2 years when she was 1st seen for irregular heartbeat. She had echocardiogram 2016, mild changes with EF 70%. She  refused cardiology consult or medication. She denies any symptoms of flutter in past 1 year.                RECOMMENDATIONS:   Patient had a normal exam today. Pending normal labs, she is cleared for surgery 8-28-18 and 9-13-18, [facility], Dr. Mcqueen.         Signed Electronically by: ORTEGA Castillo    Copy of this evaluation report is provided to requesting physician.    [facility] Preop Guidelines    Revised Cardiac Risk Index

## 2018-08-24 ENCOUNTER — OFFICE VISIT (OUTPATIENT)
Dept: FAMILY MEDICINE | Facility: OTHER | Age: 83
End: 2018-08-24
Attending: PHYSICIAN ASSISTANT
Payer: MEDICARE

## 2018-08-24 VITALS
DIASTOLIC BLOOD PRESSURE: 72 MMHG | HEIGHT: 64 IN | TEMPERATURE: 97.6 F | BODY MASS INDEX: 20.32 KG/M2 | OXYGEN SATURATION: 97 % | SYSTOLIC BLOOD PRESSURE: 106 MMHG | HEART RATE: 78 BPM | WEIGHT: 119 LBS

## 2018-08-24 DIAGNOSIS — I48.3 TYPICAL ATRIAL FLUTTER (H): ICD-10-CM

## 2018-08-24 DIAGNOSIS — Z01.818 PREOP GENERAL PHYSICAL EXAM: Primary | ICD-10-CM

## 2018-08-24 LAB
ERYTHROCYTE [DISTWIDTH] IN BLOOD BY AUTOMATED COUNT: 13.6 % (ref 10–15)
HCT VFR BLD AUTO: 44.3 % (ref 35–47)
HGB BLD-MCNC: 15.2 G/DL (ref 11.7–15.7)
MCH RBC QN AUTO: 33.4 PG (ref 26.5–33)
MCHC RBC AUTO-ENTMCNC: 34.3 G/DL (ref 31.5–36.5)
MCV RBC AUTO: 97 FL (ref 78–100)
PLATELET # BLD AUTO: 163 10E9/L (ref 150–450)
RBC # BLD AUTO: 4.55 10E12/L (ref 3.8–5.2)
WBC # BLD AUTO: 6.8 10E9/L (ref 4–11)

## 2018-08-24 PROCEDURE — 99214 OFFICE O/P EST MOD 30 MIN: CPT | Performed by: PHYSICIAN ASSISTANT

## 2018-08-24 PROCEDURE — G0463 HOSPITAL OUTPT CLINIC VISIT: HCPCS

## 2018-08-24 PROCEDURE — 80048 BASIC METABOLIC PNL TOTAL CA: CPT | Mod: ZL | Performed by: PHYSICIAN ASSISTANT

## 2018-08-24 PROCEDURE — 36415 COLL VENOUS BLD VENIPUNCTURE: CPT | Mod: ZL | Performed by: PHYSICIAN ASSISTANT

## 2018-08-24 PROCEDURE — 85027 COMPLETE CBC AUTOMATED: CPT | Mod: ZL | Performed by: PHYSICIAN ASSISTANT

## 2018-08-24 ASSESSMENT — ANXIETY QUESTIONNAIRES
1. FEELING NERVOUS, ANXIOUS, OR ON EDGE: NOT AT ALL
2. NOT BEING ABLE TO STOP OR CONTROL WORRYING: NOT AT ALL
6. BECOMING EASILY ANNOYED OR IRRITABLE: NOT AT ALL
3. WORRYING TOO MUCH ABOUT DIFFERENT THINGS: NOT AT ALL
5. BEING SO RESTLESS THAT IT IS HARD TO SIT STILL: NOT AT ALL
7. FEELING AFRAID AS IF SOMETHING AWFUL MIGHT HAPPEN: NOT AT ALL
GAD7 TOTAL SCORE: 0

## 2018-08-24 ASSESSMENT — PAIN SCALES - GENERAL: PAINLEVEL: NO PAIN (0)

## 2018-08-24 ASSESSMENT — PATIENT HEALTH QUESTIONNAIRE - PHQ9: 5. POOR APPETITE OR OVEREATING: NOT AT ALL

## 2018-08-24 NOTE — MR AVS SNAPSHOT
After Visit Summary   8/24/2018    Bianka Whitmore    MRN: [id number]           Patient Information     Date Of Birth          1935        Visit Information        Provider Department      8/24/2018 3:30 PM Roxy Israel PA [facility]        Today's Diagnoses     Preop general physical exam    -  1    Typical atrial flutter (H)          Care Instructions      Before Your Surgery      Call your surgeon if there is any change in your health. This includes signs of a cold or flu (such as a sore throat, runny nose, cough, rash or fever).    Do not smoke, drink alcohol or take over the counter medicine (unless your surgeon or primary care doctor tells you to) for the 24 hours before and after surgery.    If you take prescribed drugs: Follow your doctor s orders about which medicines to take and which to stop until after surgery.    Eating and drinking prior to surgery: follow the instructions from your surgeon    Take a shower or bath the night before surgery. Use the soap your surgeon gave you to gently clean your skin. If you do not have soap from your surgeon, use your regular soap. Do not shave or scrub the surgery site.  Wear clean pajamas and have clean sheets on your bed.           Follow-ups after your visit        Who to contact     If you have questions or need follow up information about today's clinic visit or your schedule please contact [facility] directly at [phone].  Normal or non-critical lab and imaging results will be communicated to you by MyChart, letter or phone within 4 business days after the clinic has received the results. If you do not hear from us within 7 days, please contact the clinic through Advent[employer]art or phone. If you have a critical or abnormal lab result, we will notify you by phone as soon as possible.  Submit refill requests through Bloom[employer] or call your pharmacy and they will forward the refill request to us. Please allow 3  "business days for your refill to be completed.          Additional Information About Your Visit        MyChart Information     [employer] lets you send messages to your doctor, view your test results, renew your prescriptions, schedule appointments and more. To sign up, go to [email or web address] . Click on \"Log in\" on the left side of the screen, which will take you to the Welcome page. Then click on \"Sign up Now\" on the right side of the page.     You will be asked to enter the access code listed below, as well as some personal information. Please follow the directions to create your username and password.     Your access code is: [email or web address]  Expires: 11/15/2018  4:39 PM     Your access code will  in 90 days. If you need help or a new code, please call your [facility] clinic or [phone].        Care EveryWhere ID     This is your Care EveryWhere ID. This could be used by other organizations to access your [facility] medical records  [id number]        Your Vitals Were     Pulse Temperature Height Pulse Oximetry BMI (Body Mass Index)       78 97.6  F (36.4  C) 5' 4\" (1.626 m) 97% 20.43 kg/m2        Blood Pressure from Last 3 Encounters:   18 106/72   18 122/81   18 116/68    Weight from Last 3 Encounters:   18 119 lb (54 kg)   18 118 lb (53.5 kg)   18 118 lb (53.5 kg)              We Performed the Following     Basic metabolic panel     CBC with platelets        Primary Care Provider Office Phone # Fax #    ORTEGA Rivas [phone]       [address] [place] [address]        Equal Access to Services     CHRISTY BRASHER : Ridge Lloyd, deven gómez, nini lay, tesha hatch. [facility] [phone].    ATENCIÓN: Si habla español, tiene a marquez disposición servicios gratuitos de asistencia lingüística. Llame al [phone].    We comply with applicable federal civil rights laws and " [place] laws. We do not discriminate on the basis of race, color, national origin, age, disability, sex, sexual orientation, or gender identity.            Thank you!     Thank you for choosing [facility]  for your care. Our goal is always to provide you with excellent care. Hearing back from our patients is one way we can continue to improve our services. Please take a few minutes to complete the written survey that you may receive in the mail after your visit with us. Thank you!             Your Updated Medication List - Protect others around you: Learn how to safely use, store and throw away your medicines at www.disposemymeds.org.          This list is accurate as of 8/24/18  4:27 PM.  Always use your most recent med list.                   Brand Name Dispense Instructions for use Diagnosis    fish Oil 1200 MG capsule      Take 1,200 mg by mouth 2 times daily        flax seed oil 1000 MG capsule      Take 1 capsule by mouth daily        MAGNESIUM OXIDE PO      Take by mouth daily        * NONFORMULARY      1 tablespoon of chopped fresh garlic        * NONFORMULARY      Extra virgin olive oil 2 tablespoons  Daily        VITAMIN B50 COMPLEX PO      Take 1 tablet by mouth daily        VITAMIN C PO      Take 500 mg by mouth 2 times daily        VITAMIN E COMPLEX PO      Take 400 Units by mouth daily        * Notice:  This list has 2 medication(s) that are the same as other medications prescribed for you. Read the directions carefully, and ask your doctor or other care provider to review them with you.

## 2018-08-24 NOTE — NURSING NOTE
"Chief Complaint   Patient presents with     Pre-Op Exam       Initial /72  Pulse 78  Temp 97.6  F (36.4  C)  Ht 5' 4\" (1.626 m)  Wt 119 lb (54 kg)  SpO2 97%  BMI 20.43 kg/m2 Estimated body mass index is 20.43 kg/(m^2) as calculated from the following:    Height as of this encounter: 5' 4\" (1.626 m).    Weight as of this encounter: 119 lb (54 kg).  Medication Reconciliation: complete    Traci Mayes LPN  "

## 2018-08-25 ASSESSMENT — PATIENT HEALTH QUESTIONNAIRE - PHQ9: SUM OF ALL RESPONSES TO PHQ QUESTIONS 1-9: 0

## 2018-08-25 ASSESSMENT — ANXIETY QUESTIONNAIRES: GAD7 TOTAL SCORE: 0

## 2018-08-27 ENCOUNTER — TELEPHONE (OUTPATIENT)
Dept: FAMILY MEDICINE | Facility: OTHER | Age: 83
End: 2018-08-27

## 2018-08-27 LAB
ANION GAP SERPL CALCULATED.3IONS-SCNC: 7 MMOL/L (ref 3–14)
BUN SERPL-MCNC: 23 MG/DL (ref 7–30)
CALCIUM SERPL-MCNC: 8.8 MG/DL (ref 8.5–10.1)
CHLORIDE SERPL-SCNC: 104 MMOL/L (ref 94–109)
CO2 SERPL-SCNC: 26 MMOL/L (ref 20–32)
CREAT SERPL-MCNC: 0.72 MG/DL (ref 0.52–1.04)
GFR SERPL CREATININE-BSD FRML MDRD: 77 ML/MIN/1.7M2
GLUCOSE SERPL-MCNC: 65 MG/DL (ref 70–99)
POTASSIUM SERPL-SCNC: 4.5 MMOL/L (ref 3.4–5.3)
SODIUM SERPL-SCNC: 137 MMOL/L (ref 133–144)

## 2018-08-27 NOTE — TELEPHONE ENCOUNTER
Faxed preop Surgery 8/28/18 & 9/13/18 [facility]  Dr [facility] fx# [phone] fxd demo,med list,H & P 8/24/18 ORTEGA Weeks,labs  Dx: procedure / treatment of cataract  Miranda Riley

## 2018-08-28 ENCOUNTER — APPOINTMENT (OUTPATIENT)
Dept: ANESTHESIOLOGY | Facility: HOSPITAL | Age: 83
End: 2018-08-28
Attending: OPHTHALMOLOGY
Payer: MEDICARE

## 2018-08-28 PROCEDURE — 00142 ANES PX ON EYE LENS SURGERY: CPT | Mod: QZ | Performed by: NURSE ANESTHETIST, CERTIFIED REGISTERED

## 2018-08-28 PROCEDURE — 99100 ANES PT EXTEME AGE<1 YR&>70: CPT | Performed by: NURSE ANESTHETIST, CERTIFIED REGISTERED

## 2018-09-13 ENCOUNTER — APPOINTMENT (OUTPATIENT)
Dept: ANESTHESIOLOGY | Facility: HOSPITAL | Age: 83
End: 2018-09-13
Attending: OPHTHALMOLOGY
Payer: MEDICARE

## 2018-09-13 PROCEDURE — 99100 ANES PT EXTEME AGE<1 YR&>70: CPT | Performed by: NURSE ANESTHETIST, CERTIFIED REGISTERED

## 2018-09-13 PROCEDURE — 00142 ANES PX ON EYE LENS SURGERY: CPT | Mod: QZ | Performed by: NURSE ANESTHETIST, CERTIFIED REGISTERED

## 2019-06-07 NOTE — PATIENT INSTRUCTIONS
Preventive Health Recommendations    See your health care provider every year to    Review health changes.     Discuss preventive care.      Review your medicines if your doctor has prescribed any.      You no longer need a yearly Pap test unless you've had an abnormal Pap test in the past 10 years. If you have vaginal symptoms, such as bleeding or discharge, be sure to talk with your provider about a Pap test.      Every 1 to 2 years, have a mammogram.  If you are over 69, talk with your health care provider about whether or not you want to continue having screening mammograms.      Every 10 years, have a colonoscopy. Or, have a yearly FIT test (stool test). These exams will check for colon cancer.       Have a cholesterol test every 5 years, or more often if your doctor advises it.       Have a diabetes test (fasting glucose) every three years. If you are at risk for diabetes, you should have this test more often.       At age 65, have a bone density scan (DEXA) to check for osteoporosis (brittle bone disease).    Shots:    Get a flu shot each year.    Get a tetanus shot every 10 years.    Talk to your doctor about your pneumonia vaccines. There are now two you should receive - Pneumovax (PPSV 23) and Prevnar (PCV 13).    Talk to your pharmacist about the shingles vaccine.    Talk to your doctor about the hepatitis B vaccine.    Nutrition:     Eat at least 5 servings of fruits and vegetables each day.      Eat whole-grain bread, whole-wheat pasta and brown rice instead of white grains and rice.      Get adequate about Calcium and Vitamin D.     Lifestyle    Exercise at least 150 minutes a week (30 minutes a day, 5 days a week). This will help you control your weight and prevent disease.      Limit alcohol to one drink per day.      No smoking.       Wear sunscreen to prevent skin cancer.       See your dentist twice a year for an exam and cleaning.      See your eye doctor every 1 to 2 years to screen for  conditions such as glaucoma, macular degeneration, cataracts, etc.    Personalized Prevention Plan  You are due for the preventive services outlined below.  Your care team is available to assist you in scheduling these services.  If you have already completed any of these items, please share that information with your care team to update in your medical record.    Health Maintenance Due   Topic Date Due     Anxiety Assessment  1935     Depression Assessment  1935     Annual Wellness Visit  09/02/2000     Diptheria Tetanus Pertussis (DTAP/TDAP/TD) Vaccine (2 - Td) 05/21/2009     Zoster (Shingles) Vaccine (2 of 3) 01/14/2013     Osteoporosis Screening  08/08/2018

## 2019-06-07 NOTE — PROGRESS NOTES
"SUBJECTIVE:   Bianka Whitmore is a 83 year old female who presents for Preventive Visit.  Are you in the first 12 months of your Medicare Part B coverage?  No    Physical Health:    In general, how would you rate your overall physical health? excellent    Outside of work, how many days during the week do you exercise? 4-5 days/week    Outside of work, approximately how many minutes a day do you exercise?45-60 minutes    If you drink alcohol do you typically have >3 drinks per day or >7 drinks per week? No    Do you usually eat at least 4 servings of fruit and vegetables a day, include whole grains & fiber and avoid regularly eating high fat or \"junk\" foods? NO    Do you have any problems taking medications regularly?  No    Do you have any side effects from medications? not applicable    Needs assistance for the following daily activities: no assistance needed    Which of the following safety concerns are present in your home?  none identified     Hearing impairment: No    In the past 6 months, have you been bothered by leaking of urine? no    Mental Health:    In general, how would you rate your overall mental or emotional health? good  PHQ-2 Score:      Do you feel safe in your environment? Yes    Do you have a Health Care Directive? Yes: Patient states has Advance Directive and will bring in a copy to clinic.    Additional concerns to address?      Fall risk:  Fallen 2 or more times in the past year?: No  Any fall with injury in the past year?: No    Cognitive Screenin) Repeat 3 items (Leader, Season, Table)    2) Clock draw: NORMAL  3) 3 item recall: Recalls 3 objects  Results: 3 items recalled: COGNITIVE IMPAIRMENT LESS LIKELY    Mini-CogTM Copyright KYLAH Cervantes. Licensed by the author for use in [facility]; reprinted with permission (jeff@.[facility]). All rights reserved.      Do you have sleep apnea, excessive snoring or daytime drowsiness?: no        Vascular Disease Follow-up      Are you " having any of the following symptoms? (Select all that apply) Pain in calves when walking 1-2 blocks and No complaints of shortness of breath, chest pain or pressure.  No increased sweating or nausea with activity.  No left-sided neck or arm pain.  No complaints of pain in calves when walking 1-2 blocks.    How often do you take nitroglycerin? Never    Do you take an aspirin every day? No      Reviewed and updated as needed this visit by clinical staff  Tobacco  Allergies  Meds  Med Hx  Surg Hx  Fam Hx  Soc Hx        Reviewed and updated as needed this visit by Provider        Social History     Tobacco Use     Smoking status: Former Smoker     Types: Cigarettes     Start date: 1950     Last attempt to quit: 3/26/1953     Years since quittin.2     Smokeless tobacco: Never Used     Tobacco comment: Quit in ; no passive smoke exposure   Substance Use Topics     Alcohol use: No                           Current providers sharing in care for this patient include:   Patient Care Team:  Yael Mckeon PA as PCP - General (Family Practice)  Roxy Israel PA as Assigned PCP    The following health maintenance items are reviewed in Epic and correct as of today:  Health Maintenance   Topic Date Due     PAULY ASSESSMENT  1935     PHQ-9  1935     MEDICARE ANNUAL WELLNESS VISIT  2000     DTAP/TDAP/TD IMMUNIZATION (2 - Td) 2009     ZOSTER IMMUNIZATION (2 of 3) 2013     INFLUENZA VACCINE (Season Ended) 2019     ADVANCED DIRECTIVE PLANNING  2023     DEXA  Completed     IPV IMMUNIZATION  Aged Out     MENINGITIS IMMUNIZATION  Aged Out     Lab work is in process  Labs reviewed in EPIC  BP Readings from Last 3 Encounters:   19 122/90   18 106/72   18 122/81    Wt Readings from Last 3 Encounters:   19 54.4 kg (120 lb)   18 54 kg (119 lb)   18 53.5 kg (118 lb)                  Patient Active Problem List   Diagnosis     Diverticulitis  of colon     Osteoarthritis     Osteoporosis/osteopenia increased risk     ACP (advance care planning)     Malignant basal cell neoplasm of skin     Compression fracture of thoracic vertebra (H)     Geographic tongue     Varicose vein of leg     Compression fx, lumbar spine, with routine healing, subsequent encounter     Typical atrial flutter (H)     Past Surgical History:   Procedure Laterality Date     APPENDECTOMY       COLONOSCOPY N/A 2017    Procedure: COLONOSCOPY;  COLONOSCOPY WITH POLYPECTOMY AND SCLEROTHERAPY;  Surgeon: Matt Gonzalez MD;  Location: [facility]     ENDOSCOPIC STRIPPING VEIN(S)       HYSTERECTOMY       SALPINGO OOPHORECTOMY,R/L/BRIAN         Social History     Tobacco Use     Smoking status: Former Smoker     Types: Cigarettes     Start date: 1950     Last attempt to quit: 3/26/1953     Years since quittin.2     Smokeless tobacco: Never Used     Tobacco comment: Quit in ; no passive smoke exposure   Substance Use Topics     Alcohol use: No     Family History   Problem Relation Age of Onset     Other - See Comments Mother 85        Old age, cause of death     Cancer Father 84        Rectal cancer, cause of death/Stomach cancer, cause of death     C.A.D. Maternal Aunt      C.A.D. Maternal Uncle      Cancer Son         thymic carcinoma         Current Outpatient Medications   Medication Sig Dispense Refill     Ascorbic Acid (VITAMIN C PO) Take 500 mg by mouth 2 times daily       B Complex-Biotin-FA (VITAMIN B50 COMPLEX PO) Take 1 tablet by mouth daily       Flaxseed, Linseed, (FLAX SEED OIL) 1000 MG capsule Take 1 capsule by mouth daily       MAGNESIUM OXIDE PO Take by mouth daily        NONFORMULARY 1 tablespoon of chopped fresh garlic       NONFORMULARY Extra virgin olive oil 2 tablespoons  Daily       Omega-3 Fatty Acids (FISH OIL) 1200 MG CAPS Take 1,200 mg by mouth 2 times daily        VITAMIN E COMPLEX PO Take 400 Units by mouth daily       Allergies   Allergen Reactions  "    Amoxicillin      Intolerant       Recent Labs   Lab Test 08/24/18  1554 10/27/17  0957 11/10/16  0900 08/03/16  0736 07/25/16  0831 05/20/16  0953   LDL  --   --   --  104*  --   --    HDL  --   --   --  74  --   --    TRIG  --   --   --  63  --   --    ALT  --  36 47  --   --  50   CR 0.72 0.69 0.63  --   --  0.80   GFRESTIMATED 77 82 >90  Non  GFR Calc    --   --  69   GFRESTBLACK >90 >90 >90  African American GFR Calc    --   --  83   POTASSIUM 4.5 4.3 4.9  --   --  4.7   TSH  --  2.81  --   --  2.50  --       Pneumonia Vaccine:Adults age 65+ who received Pneumovax (PPSV23) at 65 years or older: Should be given PCV13 > 1 year after their most recent PPSV23  Mammogram Screening: Patient over age 75, has elected to stop mammography screening.  Last 3 Pap and HPV Results:      ROS:  CONSTITUTIONAL: NEGATIVE for fever, chills, change in weight  INTEGUMENTARY/SKIN: NEGATIVE for worrisome rashes, moles or lesions  EYES: NEGATIVE for vision changes or irritation  ENT/MOUTH: NEGATIVE for ear, mouth and throat problems  RESP: NEGATIVE for significant cough or SOB  BREAST: NEGATIVE for masses, tenderness or discharge  CV: NEGATIVE for chest pain, palpitations or peripheral edema  GI: NEGATIVE for nausea, abdominal pain, heartburn, or change in bowel habits  : NEGATIVE for frequency, dysuria, or hematuria  MUSCULOSKELETAL: NEGATIVE for significant arthralgias or myalgia  NEURO: NEGATIVE for weakness, dizziness or paresthesias  ENDOCRINE: NEGATIVE for temperature intolerance, skin/hair changes  HEME: NEGATIVE for bleeding problems  PSYCHIATRIC: NEGATIVE for changes in mood or affect    OBJECTIVE:   /90 (BP Location: Right arm, Patient Position: Sitting, Cuff Size: Child)   Pulse 72   Temp 97.1  F (36.2  C) (Tympanic)   Resp 20   Ht 1.613 m (5' 3.5\")   Wt 54.4 kg (120 lb)   SpO2 91%   BMI 20.92 kg/m   Estimated body mass index is 20.92 kg/m  as calculated from the following:    Height as of " "this encounter: 1.613 m (5' 3.5\").    Weight as of this encounter: 54.4 kg (120 lb).  EXAM:   GENERAL: healthy, alert and no distress  EYES: Eyes grossly normal to inspection, PERRL and conjunctivae and sclerae normal  HENT: ear canals and TM's normal, nose and mouth without ulcers or lesions  NECK: no adenopathy, no asymmetry, masses, or scars and thyroid normal to palpation  RESP: lungs clear to auscultation - no rales, rhonchi or wheezes  BREAST: normal without masses, tenderness or nipple discharge and no palpable axillary masses or adenopathy  CV: regular rate and rhythm, normal S1 S2, no S3 or S4, no murmur, click or rub, no peripheral edema and peripheral pulses strong  ABDOMEN: soft, nontender, no hepatosplenomegaly, no masses and bowel sounds normal  MS: no gross musculoskeletal defects noted, no edema  SKIN: no suspicious lesions or rashes  NEURO: Normal strength and tone, mentation intact and speech normal  PSYCH: mentation appears normal, affect normal/bright    Diagnostic Test Results:  Labs reviewed in Epic  Results for orders placed or performed in visit on 08/24/18   CBC with platelets   Result Value Ref Range    WBC 6.8 4.0 - 11.0 10e9/L    RBC Count 4.55 3.8 - 5.2 10e12/L    Hemoglobin 15.2 11.7 - 15.7 g/dL    Hematocrit 44.3 35.0 - 47.0 %    MCV 97 78 - 100 fl    MCH 33.4 (H) 26.5 - 33.0 pg    MCHC 34.3 31.5 - 36.5 g/dL    RDW 13.6 10.0 - 15.0 %    Platelet Count 163 150 - 450 10e9/L   Basic metabolic panel   Result Value Ref Range    Sodium 137 133 - 144 mmol/L    Potassium 4.5 3.4 - 5.3 mmol/L    Chloride 104 94 - 109 mmol/L    Carbon Dioxide 26 20 - 32 mmol/L    Anion Gap 7 3 - 14 mmol/L    Glucose 65 (L) 70 - 99 mg/dL    Urea Nitrogen 23 7 - 30 mg/dL    Creatinine 0.72 0.52 - 1.04 mg/dL    GFR Estimate 77 >60 mL/min/1.7m2    GFR Estimate If Black >90 >60 mL/min/1.7m2    Calcium 8.8 8.5 - 10.1 mg/dL       ASSESSMENT / PLAN:   1. Medicare annual wellness visit, subsequent  She is doing well. " "Weight stable. No new fractures in her spine. Good calcium and Vit  D. Looked at all her immunizations. Willing to consider. Energy is fading with age. But keeps active. Mood is very good despite losing her son in the last year.  Continues to hold off on blood thinners. Has A flutter.      2. Typical atrial flutter (H)  Not on anticoagulation. Just doesn't feel she wants that. Is on large amounts of garlic and that is known to thin blood. Aware that stroke is a risk for not taking her anticoagulation.     3. Asymptomatic postmenopausal estrogen deficiency  Needing follow up exam. Spinal fractures over the last year.   - DX Hip/Pelvis/Spine; Future    4. Osteoporotic compression fracture of spine, with routine healing, subsequent encounter  As above.  Good calcium and vit D.  Good weight bearing exercise.   - DX Hip/Pelvis/Spine; Future    End of Life Planning:  Patient currently has an advanced directive: No.  I have verified the patient's ablity to prepare an advanced directive/make health care decisions.  Literature was provided to assist patient in preparing an advanced directive.    COUNSELING:  Reviewed preventive health counseling, as reflected in patient instructions       Regular exercise       Healthy diet/nutrition       Vision screening       Hearing screening       Dental care       Advanced Planning     Estimated body mass index is 20.92 kg/m  as calculated from the following:    Height as of this encounter: 1.613 m (5' 3.5\").    Weight as of this encounter: 54.4 kg (120 lb).         reports that she quit smoking about 66 years ago. Her smoking use included cigarettes. She started smoking about 69 years ago. She has never used smokeless tobacco.      Appropriate preventive services were discussed with this patient, including applicable screening as appropriate for cardiovascular disease, diabetes, osteopenia/osteoporosis, and glaucoma.  As appropriate for age/gender, discussed screening for colorectal " cancer, prostate cancer, breast cancer, and cervical cancer. Checklist reviewing preventive services available has been given to the patient.    Reviewed patients plan of care and provided an AVS. The Intermediate Care Plan ( asthma action plan, low back pain action plan, and migraine action plan) for Bianka meets the Care Plan requirement. This Care Plan has been established and reviewed with the Patient.    Counseling Resources:  ATP IV Guidelines  Pooled Cohorts Equation Calculator  Breast Cancer Risk Calculator  FRAX Risk Assessment  ICSI Preventive Guidelines  Dietary Guidelines for Americans, 2010  USDA's MyPlate  ASA Prophylaxis  Lung CA Screening    ORTEGA Caruso  [facility] - [place]

## 2019-06-13 ENCOUNTER — OFFICE VISIT (OUTPATIENT)
Dept: FAMILY MEDICINE | Facility: OTHER | Age: 84
End: 2019-06-13
Attending: PHYSICIAN ASSISTANT
Payer: MEDICARE

## 2019-06-13 VITALS
RESPIRATION RATE: 20 BRPM | DIASTOLIC BLOOD PRESSURE: 90 MMHG | TEMPERATURE: 97.1 F | HEIGHT: 64 IN | SYSTOLIC BLOOD PRESSURE: 122 MMHG | OXYGEN SATURATION: 91 % | WEIGHT: 120 LBS | BODY MASS INDEX: 20.49 KG/M2 | HEART RATE: 72 BPM

## 2019-06-13 DIAGNOSIS — I48.3 TYPICAL ATRIAL FLUTTER (H): ICD-10-CM

## 2019-06-13 DIAGNOSIS — M80.88XD OSTEOPOROTIC COMPRESSION FRACTURE OF SPINE, WITH ROUTINE HEALING, SUBSEQUENT ENCOUNTER: ICD-10-CM

## 2019-06-13 DIAGNOSIS — Z00.00 MEDICARE ANNUAL WELLNESS VISIT, SUBSEQUENT: Primary | ICD-10-CM

## 2019-06-13 DIAGNOSIS — Z78.0 ASYMPTOMATIC POSTMENOPAUSAL ESTROGEN DEFICIENCY: ICD-10-CM

## 2019-06-13 DIAGNOSIS — Z23 NEED FOR VACCINATION: ICD-10-CM

## 2019-06-13 PROCEDURE — G0439 PPPS, SUBSEQ VISIT: HCPCS | Performed by: PHYSICIAN ASSISTANT

## 2019-06-13 PROCEDURE — G0009 ADMIN PNEUMOCOCCAL VACCINE: HCPCS | Performed by: PHYSICIAN ASSISTANT

## 2019-06-13 PROCEDURE — 90670 PCV13 VACCINE IM: CPT

## 2019-06-13 PROCEDURE — G0463 HOSPITAL OUTPT CLINIC VISIT: HCPCS

## 2019-06-13 ASSESSMENT — ANXIETY QUESTIONNAIRES
2. NOT BEING ABLE TO STOP OR CONTROL WORRYING: NOT AT ALL
7. FEELING AFRAID AS IF SOMETHING AWFUL MIGHT HAPPEN: NOT AT ALL
1. FEELING NERVOUS, ANXIOUS, OR ON EDGE: NOT AT ALL
GAD7 TOTAL SCORE: 0
6. BECOMING EASILY ANNOYED OR IRRITABLE: NOT AT ALL
3. WORRYING TOO MUCH ABOUT DIFFERENT THINGS: NOT AT ALL
IF YOU CHECKED OFF ANY PROBLEMS ON THIS QUESTIONNAIRE, HOW DIFFICULT HAVE THESE PROBLEMS MADE IT FOR YOU TO DO YOUR WORK, TAKE CARE OF THINGS AT HOME, OR GET ALONG WITH OTHER PEOPLE: NOT DIFFICULT AT ALL
4. TROUBLE RELAXING: NOT AT ALL
5. BEING SO RESTLESS THAT IT IS HARD TO SIT STILL: NOT AT ALL

## 2019-06-13 ASSESSMENT — PAIN SCALES - GENERAL: PAINLEVEL: NO PAIN (0)

## 2019-06-13 ASSESSMENT — MIFFLIN-ST. JEOR: SCORE: 976.38

## 2019-06-13 ASSESSMENT — PATIENT HEALTH QUESTIONNAIRE - PHQ9: SUM OF ALL RESPONSES TO PHQ QUESTIONS 1-9: 0

## 2019-06-14 ASSESSMENT — ANXIETY QUESTIONNAIRES: GAD7 TOTAL SCORE: 0

## 2019-06-27 ENCOUNTER — HOSPITAL ENCOUNTER (OUTPATIENT)
Dept: BONE DENSITY | Facility: HOSPITAL | Age: 84
Discharge: HOME | End: 2019-06-27
Attending: PHYSICIAN ASSISTANT | Admitting: PHYSICIAN ASSISTANT
Payer: MEDICARE

## 2019-06-27 DIAGNOSIS — Z78.0 ASYMPTOMATIC POSTMENOPAUSAL ESTROGEN DEFICIENCY: ICD-10-CM

## 2019-06-27 DIAGNOSIS — M80.88XD OSTEOPOROTIC COMPRESSION FRACTURE OF SPINE, WITH ROUTINE HEALING, SUBSEQUENT ENCOUNTER: ICD-10-CM

## 2019-06-27 PROCEDURE — 77080 DXA BONE DENSITY AXIAL: CPT | Mod: TC

## 2019-08-09 ENCOUNTER — APPOINTMENT (OUTPATIENT)
Dept: GENERAL RADIOLOGY | Facility: HOSPITAL | Age: 84
End: 2019-08-09
Attending: PHYSICIAN ASSISTANT
Payer: MEDICARE

## 2019-08-09 ENCOUNTER — HOSPITAL ENCOUNTER (EMERGENCY)
Facility: HOSPITAL | Age: 84
Discharge: HOME | End: 2019-08-09
Attending: PHYSICIAN ASSISTANT | Admitting: PHYSICIAN ASSISTANT
Payer: MEDICARE

## 2019-08-09 VITALS
SYSTOLIC BLOOD PRESSURE: 148 MMHG | DIASTOLIC BLOOD PRESSURE: 114 MMHG | RESPIRATION RATE: 18 BRPM | OXYGEN SATURATION: 98 % | BODY MASS INDEX: 20.92 KG/M2 | TEMPERATURE: 97.9 F | WEIGHT: 120 LBS

## 2019-08-09 DIAGNOSIS — S52.531A CLOSED COLLES' FRACTURE OF RIGHT RADIUS, INITIAL ENCOUNTER: Primary | ICD-10-CM

## 2019-08-09 DIAGNOSIS — S52.609A FRACTURE OF DISTAL ULNA: ICD-10-CM

## 2019-08-09 PROCEDURE — 99284 EMERGENCY DEPT VISIT MOD MDM: CPT | Mod: 25

## 2019-08-09 PROCEDURE — 40000986 XR WRIST PORTABLE RIGHT 2 VIEWS: Mod: TC,RT

## 2019-08-09 PROCEDURE — 25605 CLTX DST RDL FX/EPHYS SEP W/: CPT | Mod: 54 | Performed by: PHYSICIAN ASSISTANT

## 2019-08-09 PROCEDURE — 25605 CLTX DST RDL FX/EPHYS SEP W/: CPT | Mod: RT

## 2019-08-09 PROCEDURE — 25000128 H RX IP 250 OP 636: Performed by: PHYSICIAN ASSISTANT

## 2019-08-09 PROCEDURE — 73100 X-RAY EXAM OF WRIST: CPT | Mod: TC,RT

## 2019-08-09 RX ORDER — FENTANYL CITRATE 50 UG/ML
50 INJECTION, SOLUTION INTRAMUSCULAR; INTRAVENOUS ONCE
Status: COMPLETED | OUTPATIENT
Start: 2019-08-09 | End: 2019-08-09

## 2019-08-09 RX ADMIN — FENTANYL CITRATE 50 MCG: 50 INJECTION, SOLUTION INTRAMUSCULAR; INTRAVENOUS at 20:03

## 2019-08-09 ASSESSMENT — ENCOUNTER SYMPTOMS: BRUISES/BLEEDS EASILY: 0

## 2019-08-09 NOTE — ED AVS SNAPSHOT
[facility]  [address] [address]  Phone:  [phone]                                    Bianka Whitmore   MRN: [id number]    Department:  [facility]   Date of Visit:  8/9/2019           After Visit Summary Signature Page    I have received my discharge instructions, and my questions have been answered. I have discussed any challenges I see with this plan with the nurse or doctor.    ..........................................................................................................................................  Patient/Patient Representative Signature      ..........................................................................................................................................  Patient Representative Print Name and Relationship to Patient    ..................................................               ................................................  Date                                   Time    ..........................................................................................................................................  Reviewed by Signature/Title    ...................................................              ..............................................  Date                                               Time          22EPIC Rev 08/18

## 2019-08-10 NOTE — ED PROVIDER NOTES
History     Chief Complaint   Patient presents with     Arm Injury     The history is provided by the patient.     Bianka Whitmore is a 83 year old female who presented to the emergency department ambulatory along with  for evaluation of right wrist pain.  Patient reports tripping in her garage and catching herself with a fall on outstretched hand of the right arm.  Complains only of right wrist pain.  Believe she may have hit her head but has no evidence of head injury and takes no blood thinners.  Denies any neck pain.    Allergies:  Allergies   Allergen Reactions     Amoxicillin      Intolerant         Problem List:    Patient Active Problem List    Diagnosis Date Noted     Typical atrial flutter (H) 07/25/2016     Priority: Medium     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 yes         Diverticulitis of colon 03/26/2013     Priority: Medium     Osteoarthritis 03/26/2013     Priority: Medium     Osteoporosis/osteopenia increased risk 03/26/2013     Priority: Medium     ACP (advance care planning) 12/06/2012     Priority: Medium     Advance Care Planning 6/22/2016: ACP Review of Chart / Resources Provided:  Reviewed chart for advance care plan.  Bianka Whitmore has no plan or code status on file however states presence of ACP document. Copy requested. Confirmed code status reflects current choices pending receipt of document/advance care plan review.  Confirmed/documented legally designated decision makers.  Added by Munira Rodgers                Past Medical History:    Past Medical History:   Diagnosis Date     Basal  cell carcinoma      Cardiomegaly 2007     Compression fracture of thoracic vertebra (H) 10/30/2014     Compression fx, lumbar spine, with routine healing, subsequent encounter 2016     Hyperlipidemia      Osteoporosis      Typical atrial flutter (H) 2016     Varicose vein of leg 2016       Past Surgical History:    Past Surgical History:   Procedure Laterality Date     APPENDECTOMY       COLONOSCOPY N/A 2017    Procedure: COLONOSCOPY;  COLONOSCOPY WITH POLYPECTOMY AND SCLEROTHERAPY;  Surgeon: Matt Gonzalez MD;  Location: [facility]     ENDOSCOPIC STRIPPING VEIN(S)       HYSTERECTOMY       SALPINGO OOPHORECTOMY,R/L/BRIAN         Family History:    Family History   Problem Relation Age of Onset     Other - See Comments Mother 85        Old age, cause of death     Cancer Father 84        Rectal cancer, cause of death/Stomach cancer, cause of death     C.A.D. Maternal Aunt      C.A.D. Maternal Uncle      Cancer Son         thymic carcinoma       Social History:  Marital Status:   [2]  Social History     Tobacco Use     Smoking status: Former Smoker     Types: Cigarettes     Start date: 1950     Last attempt to quit: 3/26/1953     Years since quittin.4     Smokeless tobacco: Never Used     Tobacco comment: Quit in ; no passive smoke exposure   Substance Use Topics     Alcohol use: No     Drug use: No        Medications:      Ascorbic Acid (VITAMIN C PO)   B Complex-Biotin-FA (VITAMIN B50 COMPLEX PO)   Flaxseed, Linseed, (FLAX SEED OIL) 1000 MG capsule   MAGNESIUM OXIDE PO   NONFORMULARY   NONFORMULARY   Omega-3 Fatty Acids (FISH OIL) 1200 MG CAPS   VITAMIN E COMPLEX PO         Review of Systems   Musculoskeletal:        Right wrist pain   Hematological: Does not bruise/bleed easily.       Physical Exam   BP: 127/86  Heart Rate: 63  Temp: 97.8  F (36.6  C)  Resp: 16  Weight: 54.4 kg (120 lb)  SpO2: 97 %      Physical Exam   Constitutional: She is oriented to person, place, and  time. She appears well-developed and well-nourished.   Pleasant and talkative   Cardiovascular: Normal rate and regular rhythm.   Musculoskeletal: She exhibits edema, tenderness and deformity.   Obvious deformity to the distal right radius.  Normal digital range of motion.  Capillary refill less than 2 seconds.  Okay sign is intact.  Radial and ulnar pulses are normal.   Neurological: She is alert and oriented to person, place, and time.   Skin: Skin is warm and dry. Capillary refill takes less than 2 seconds.   Psychiatric: She has a normal mood and affect.   Nursing note and vitals reviewed.      ED Course        Procedures          X-ray of the wrist shows mildly displaced ulnar styloid fracture along with Colles fracture of the distal radius.    Repeat x-ray after closed reduction shows minimal improvement of the distal radius fracture.      Critical Care time:  none               Results for orders placed or performed during the hospital encounter of 08/09/19 (from the past 24 hour(s))   XR Wrist Right 2 Views    Narrative    PROCEDURE: XR WRIST RIGHT 2 VIEW 8/9/2019 8:10 PM    HISTORY: fall    COMPARISONS: None.    TECHNIQUE: 2 views.    FINDINGS: There is a transverse, fracture through the distal radius  with dorsal displacement of the distal radius relative to the proximal  shaft. There is dorsal tilting the distal radial articular surface.  Decker volar angulation at the fracture site measures 36 degrees. Some  impaction is seen as well.    There is also a fracture.         Impression    IMPRESSION: Impacted angulated fracture of distal radius.    RAINA LUNA MD   XR Wrist Port Right 2 Views    Narrative    PROCEDURE: XR WRIST PORTABLE RIGHT 2 VIEWS 8/9/2019 9:47 PM    HISTORY: post reduction    COMPARISONS: Earlier study of the same date.    TECHNIQUE: 2 views.    FINDINGS: There is a splint in place. There has been slight  improvement in alignment at the fracture of the distal radius. There  is still  some apex dorsal angulation with dorsal placement of the  distal fracture fragment. There is an ulnar styloid fracture.         Impression    IMPRESSION: Mild improvement in angulation fracture of the distal  radius. There is still some dorsal tilting the distal radial articular  surface with dorsal displacement of the distal fracture fragment.    RAINA LUNA MD       Medications   HYDROcodone-acetaminophen (NORCO) 5-325 MG 6 tab ED starter pack 1 tablet (has no administration in time range)   fentaNYL (PF) (SUBLIMAZE) injection 50 mcg (50 mcg Intravenous Given 8/9/19 2003)       Assessments & Plan (with Medical Decision Making)   After verbal consent, the fracture line was identified under ultrasound and the area was cleaned with chlorhexidine.  Hematoma block was performed with 10 mL of 1% lidocaine obtaining an excellent anesthetic result.  Closed reduction was attempted with moderate improvement of the deformity with difficulty keeping the fracture completely stable.  Moved several times during splinting.  She was placed in a sugar tong splint with commercial fiberglass and an Ace wrap.  Orthopedic consultation with Dr. Ayon from [facility] who will call the patient in the morning.  CMS improved after reduction. Pain control remained excellent. See discharge instructions.     This document was prepared using a combination of typing and voice generated software.  While every attempt was made for accuracy, spelling and grammatical errors may exist.    I have reviewed the nursing notes.    I have reviewed the findings, diagnosis, plan and need for follow up with the patient.       New Prescriptions    No medications on file       Final diagnoses:   Closed Colles' fracture of right radius, initial encounter   Fracture of distal ulna       8/9/2019   [facility]     Mercedes Alonso PA-C  08/09/19 1784

## 2019-08-10 NOTE — ED NOTES
Patient presents to emergency room with c/o fall and right wrist pain. Pt tripped and fell hitting her head on the side of the garage and put her hands out in front of her to catch herself from the fall. Deformity noted to right wrist. CMS intact. Awake and alert. Denies neck pain. Denies head pain. No blood thinners. Right arm elevated up on a pillow.

## 2019-08-10 NOTE — ED NOTES
"Patient given verbal and written discharge instructions. Patient verbalized understanding of discharge instructions. Rx take home norco 5-325mg 6 tabs 1 tablet every 6 hours. Pt states \"I'm only going to take 1/2 tablet at time.\" awake and alert. CMS intact to right hand. Able to wiggle fingers. Denies numbness or tingling. BP elevated. Pt states the BP is elevated d/t pain.  driving pt home.   "

## 2019-08-10 NOTE — ED TRIAGE NOTES
Pt states she was carrying a bucket of water and she fell fore louie and put hand out to stop herself. Right wrist in different position. Fingers on left hand look cyanotic like blueish, 3 second cap refill and equal to left hand.. Pt denies any back pain but states hit the back of here head on the wall of the garage.

## 2019-08-10 NOTE — DISCHARGE INSTRUCTIONS
Sling and splint should be used until follow-up.      Dr. Ayon from orthopedics in [place] knows about your case and will review the x-rays in the morning. If he has concerns he will call and have you come to [place] for surgery.     Please return here for ANY numbness, difficulty moving your fingers, or other concerns.

## 2019-08-11 ENCOUNTER — HOSPITAL ENCOUNTER (EMERGENCY)
Facility: HOSPITAL | Age: 84
Discharge: HOME | End: 2019-08-11
Attending: INTERNAL MEDICINE | Admitting: INTERNAL MEDICINE
Payer: MEDICARE

## 2019-08-11 VITALS
SYSTOLIC BLOOD PRESSURE: 129 MMHG | TEMPERATURE: 97.9 F | OXYGEN SATURATION: 97 % | WEIGHT: 120 LBS | HEIGHT: 64 IN | DIASTOLIC BLOOD PRESSURE: 83 MMHG | RESPIRATION RATE: 20 BRPM | HEART RATE: 90 BPM | BODY MASS INDEX: 20.49 KG/M2

## 2019-08-11 DIAGNOSIS — S62.102D CLOSED FRACTURE OF LEFT WRIST WITH ROUTINE HEALING, SUBSEQUENT ENCOUNTER: ICD-10-CM

## 2019-08-11 PROCEDURE — 25000132 ZZH RX MED GY IP 250 OP 250 PS 637: Mod: GY

## 2019-08-11 PROCEDURE — 99283 EMERGENCY DEPT VISIT LOW MDM: CPT

## 2019-08-11 PROCEDURE — 99283 EMERGENCY DEPT VISIT LOW MDM: CPT | Mod: Z6 | Performed by: INTERNAL MEDICINE

## 2019-08-11 RX ORDER — NAPROXEN 500 MG/1
250 TABLET ORAL 2 TIMES DAILY WITH MEALS
Qty: 2 TABLET | Refills: 0 | Status: SHIPPED | OUTPATIENT
Start: 2019-08-11 | End: 2019-08-15

## 2019-08-11 RX ORDER — HYDROMORPHONE HYDROCHLORIDE 1 MG/ML
0.5 INJECTION, SOLUTION INTRAMUSCULAR; INTRAVENOUS; SUBCUTANEOUS ONCE
Status: DISCONTINUED | OUTPATIENT
Start: 2019-08-11 | End: 2019-08-11

## 2019-08-11 RX ORDER — NAPROXEN 250 MG/1
250 TABLET ORAL ONCE
Status: COMPLETED | OUTPATIENT
Start: 2019-08-11 | End: 2019-08-11

## 2019-08-11 RX ORDER — HYDROCODONE BITARTRATE AND ACETAMINOPHEN 5; 325 MG/1; MG/1
1 TABLET ORAL EVERY 6 HOURS PRN
COMMUNITY
End: 2019-08-12

## 2019-08-11 RX ORDER — NAPROXEN 500 MG/1
TABLET ORAL
Status: DISCONTINUED
Start: 2019-08-11 | End: 2019-08-11 | Stop reason: WASHOUT

## 2019-08-11 RX ORDER — NAPROXEN 250 MG/1
TABLET ORAL
Status: COMPLETED
Start: 2019-08-11 | End: 2019-08-11

## 2019-08-11 RX ADMIN — NAPROXEN 250 MG: 250 TABLET ORAL at 04:59

## 2019-08-11 ASSESSMENT — MIFFLIN-ST. JEOR: SCORE: 984.32

## 2019-08-11 NOTE — ED NOTES
Discharge teaching done, AVS reviewed with pt, questions answered. Pt verbalized understanding and is agreeable with discharge plan. Pt discharged to home with her .

## 2019-08-11 NOTE — ED AVS SNAPSHOT
[facility]  [address] [address]  Phone:  [phone]                                    Bianka Whitmore   MRN: [id number]    Department:  [facility]   Date of Visit:  8/11/2019           After Visit Summary Signature Page    I have received my discharge instructions, and my questions have been answered. I have discussed any challenges I see with this plan with the nurse or doctor.    ..........................................................................................................................................  Patient/Patient Representative Signature      ..........................................................................................................................................  Patient Representative Print Name and Relationship to Patient    ..................................................               ................................................  Date                                   Time    ..........................................................................................................................................  Reviewed by Signature/Title    ...................................................              ..............................................  Date                                               Time          22EPIC Rev 08/18

## 2019-08-11 NOTE — ED NOTES
Pt presents with c/o increased pain and swelling of her Rt wrist which was treated here 2 days ago for Fx s/p fall. Noted Rt fingers, hand and wrist are swollen, pt with slight numbness of Rt hand and fingers, brisk capillary refill. Splint remains in place. Pt states the hydrocodone prescribed cause her GI upset. Pt states she took 400 mg ibuprofen at 2100 with only mild relief in the pain.

## 2019-08-12 ENCOUNTER — HOSPITAL ENCOUNTER (EMERGENCY)
Facility: HOSPITAL | Age: 84
Discharge: HOME | End: 2019-08-12
Attending: INTERNAL MEDICINE | Admitting: INTERNAL MEDICINE
Payer: MEDICARE

## 2019-08-12 ENCOUNTER — TELEPHONE (OUTPATIENT)
Dept: FAMILY MEDICINE | Facility: OTHER | Age: 84
End: 2019-08-12

## 2019-08-12 VITALS — DIASTOLIC BLOOD PRESSURE: 85 MMHG | RESPIRATION RATE: 16 BRPM | SYSTOLIC BLOOD PRESSURE: 117 MMHG | TEMPERATURE: 98.7 F

## 2019-08-12 DIAGNOSIS — S62.101D CLOSED FRACTURE OF RIGHT WRIST WITH ROUTINE HEALING, SUBSEQUENT ENCOUNTER: ICD-10-CM

## 2019-08-12 PROCEDURE — 29125 APPL SHORT ARM SPLINT STATIC: CPT | Mod: RT | Performed by: INTERNAL MEDICINE

## 2019-08-12 PROCEDURE — 29125 APPL SHORT ARM SPLINT STATIC: CPT | Mod: RT

## 2019-08-12 PROCEDURE — 99282 EMERGENCY DEPT VISIT SF MDM: CPT

## 2019-08-12 NOTE — TELEPHONE ENCOUNTER
Reason seen in ER/UC- R wrist fracture    Date seen- 8/9, 8/11, and 8/12    Medication prescribed?- naproxen  Medication picked up/started?- Yes    Symptoms improved or worsened?- same    F/U recommended?- in one day with OA    Questions?- none      Pt reports that she has an appt with Dr Yeboah tomorrow. Reports that her pain is adequately controlled with PO naproxen.

## 2019-08-12 NOTE — ED AVS SNAPSHOT
[facility]  [address] [address]  Phone:  [phone]                                    Bianka Whitmore   MRN: [id number]    Department:  [facility]   Date of Visit:  8/12/2019           After Visit Summary Signature Page    I have received my discharge instructions, and my questions have been answered. I have discussed any challenges I see with this plan with the nurse or doctor.    ..........................................................................................................................................  Patient/Patient Representative Signature      ..........................................................................................................................................  Patient Representative Print Name and Relationship to Patient    ..................................................               ................................................  Date                                   Time    ..........................................................................................................................................  Reviewed by Signature/Title    ...................................................              ..............................................  Date                                               Time          22EPIC Rev 08/18

## 2019-08-12 NOTE — ED NOTES
Pt discharged at this time with spouse, discharge instructions reviewed. Capillary refill improved to R hand, <3 seconds at this time. Splint and sling in place. CD of images sent with pt. Denies any concerns or questions at this time, will return with any worsening symptoms.

## 2019-08-12 NOTE — ED NOTES
Pt to room 1 of the ED with . Pt has a right wrist splint on from a fall and fracture on Friday and her right hand is now bruised, painful and swollen. Splint is too tight against top of hand and constricting blood flow. Cap refill <5 sec. Pt able to move hand and fingers. Unable to assess radial pulse d/t splint application. Call light in reach.

## 2019-08-12 NOTE — ED NOTES
Face to face report given with opportunity to observe patient.    Report given to disha/reyes Sher   8/12/2019  7:11 AM

## 2019-08-13 ENCOUNTER — TRANSFERRED RECORDS (OUTPATIENT)
Dept: HEALTH INFORMATION MANAGEMENT | Facility: CLINIC | Age: 84
End: 2019-08-13

## 2019-08-15 ENCOUNTER — OFFICE VISIT (OUTPATIENT)
Dept: FAMILY MEDICINE | Facility: OTHER | Age: 84
End: 2019-08-15
Attending: PHYSICIAN ASSISTANT
Payer: MEDICARE

## 2019-08-15 VITALS
WEIGHT: 124 LBS | HEART RATE: 85 BPM | HEIGHT: 61 IN | RESPIRATION RATE: 16 BRPM | SYSTOLIC BLOOD PRESSURE: 110 MMHG | DIASTOLIC BLOOD PRESSURE: 68 MMHG | OXYGEN SATURATION: 96 % | TEMPERATURE: 98.4 F | BODY MASS INDEX: 23.41 KG/M2

## 2019-08-15 DIAGNOSIS — Z01.818 PREOP GENERAL PHYSICAL EXAM: Primary | ICD-10-CM

## 2019-08-15 LAB
ANION GAP SERPL CALCULATED.3IONS-SCNC: 8 MMOL/L (ref 3–14)
BASOPHILS # BLD AUTO: 0 10E9/L (ref 0–0.2)
BASOPHILS NFR BLD AUTO: 0.3 %
BUN SERPL-MCNC: 27 MG/DL (ref 7–30)
CALCIUM SERPL-MCNC: 9.1 MG/DL (ref 8.5–10.1)
CHLORIDE SERPL-SCNC: 103 MMOL/L (ref 94–109)
CO2 SERPL-SCNC: 24 MMOL/L (ref 20–32)
CREAT SERPL-MCNC: 0.62 MG/DL (ref 0.52–1.04)
DIFFERENTIAL METHOD BLD: ABNORMAL
EOSINOPHIL # BLD AUTO: 0.1 10E9/L (ref 0–0.7)
EOSINOPHIL NFR BLD AUTO: 1.4 %
ERYTHROCYTE [DISTWIDTH] IN BLOOD BY AUTOMATED COUNT: 13.3 % (ref 10–15)
GFR SERPL CREATININE-BSD FRML MDRD: 83 ML/MIN/{1.73_M2}
GLUCOSE SERPL-MCNC: 79 MG/DL (ref 70–99)
HCT VFR BLD AUTO: 44.4 % (ref 35–47)
HGB BLD-MCNC: 15.6 G/DL (ref 11.7–15.7)
IMM GRANULOCYTES # BLD: 0 10E9/L (ref 0–0.4)
IMM GRANULOCYTES NFR BLD: 0.2 %
LYMPHOCYTES # BLD AUTO: 2.3 10E9/L (ref 0.8–5.3)
LYMPHOCYTES NFR BLD AUTO: 34.8 %
MCH RBC QN AUTO: 33.5 PG (ref 26.5–33)
MCHC RBC AUTO-ENTMCNC: 35.1 G/DL (ref 31.5–36.5)
MCV RBC AUTO: 96 FL (ref 78–100)
MONOCYTES # BLD AUTO: 0.7 10E9/L (ref 0–1.3)
MONOCYTES NFR BLD AUTO: 9.9 %
NEUTROPHILS # BLD AUTO: 3.5 10E9/L (ref 1.6–8.3)
NEUTROPHILS NFR BLD AUTO: 53.4 %
NRBC # BLD AUTO: 0 10*3/UL
NRBC BLD AUTO-RTO: 0 /100
PLATELET # BLD AUTO: 187 10E9/L (ref 150–450)
POTASSIUM SERPL-SCNC: 4.5 MMOL/L (ref 3.4–5.3)
RBC # BLD AUTO: 4.65 10E12/L (ref 3.8–5.2)
SODIUM SERPL-SCNC: 135 MMOL/L (ref 133–144)
WBC # BLD AUTO: 6.6 10E9/L (ref 4–11)

## 2019-08-15 PROCEDURE — 85025 COMPLETE CBC W/AUTO DIFF WBC: CPT | Mod: ZL | Performed by: PHYSICIAN ASSISTANT

## 2019-08-15 PROCEDURE — 99214 OFFICE O/P EST MOD 30 MIN: CPT | Performed by: PHYSICIAN ASSISTANT

## 2019-08-15 PROCEDURE — G0463 HOSPITAL OUTPT CLINIC VISIT: HCPCS

## 2019-08-15 PROCEDURE — 93010 ELECTROCARDIOGRAM REPORT: CPT | Performed by: INTERNAL MEDICINE

## 2019-08-15 PROCEDURE — G0463 HOSPITAL OUTPT CLINIC VISIT: HCPCS | Mod: 25

## 2019-08-15 PROCEDURE — 80048 BASIC METABOLIC PNL TOTAL CA: CPT | Mod: ZL | Performed by: PHYSICIAN ASSISTANT

## 2019-08-15 PROCEDURE — 93005 ELECTROCARDIOGRAM TRACING: CPT

## 2019-08-15 PROCEDURE — 36415 COLL VENOUS BLD VENIPUNCTURE: CPT | Mod: ZL | Performed by: PHYSICIAN ASSISTANT

## 2019-08-15 ASSESSMENT — MIFFLIN-ST. JEOR: SCORE: 954.84

## 2019-08-15 ASSESSMENT — PAIN SCALES - GENERAL: PAINLEVEL: NO PAIN (1)

## 2019-08-15 NOTE — NURSING NOTE
"Chief Complaint   Patient presents with     Pre-Op Exam     8/16/19 Dr. Yeboah. Right wrist surgery       Initial /68 (BP Location: Left arm, Patient Position: Sitting, Cuff Size: Adult Regular)   Pulse 85   Temp 98.4  F (36.9  C) (Tympanic)   Resp 16   Ht 1.549 m (5' 1\")   Wt 56.2 kg (124 lb)   SpO2 96%   BMI 23.43 kg/m   Estimated body mass index is 23.43 kg/m  as calculated from the following:    Height as of this encounter: 1.549 m (5' 1\").    Weight as of this encounter: 56.2 kg (124 lb).  Medication Reconciliation: complete   Munira Rodgers LPN    "

## 2019-08-15 NOTE — PROGRESS NOTES
[facility] [place]  [address]  [place] [address]  [phone]  Dept: [phone]    PRE-OP EVALUATION:  Today's date: 8/15/2019    Bianka Whitmore (: 1935) presents for pre-operative evaluation assessment as requested by Dr. Yeboah.  She requires evaluation and anesthesia risk assessment prior to undergoing surgery/procedure for treatment of broken right wrist .    Proposed Surgery/ Procedure: Right Wrist  Date of Surgery/ Procedure: 19  Time of Surgery/ Procedure: D  Hospital/Surgical Facility: [facility]    Primary Physician: Yael Mckeon  Type of Anesthesia Anticipated: to be determined    Patient has a Health Care Directive or Living Will:  YES at home, will bring a copy    1. NO - Do you have a history of heart attack, stroke, stent, bypass or surgery on an artery in the head, neck, heart or legs?  2. NO - Do you ever have any pain or discomfort in your chest?  3. NO - Do you have a history of  Heart Failure?  4. NO - Are you troubled by shortness of breath when: walking on the level, up a slight hill or at night?  5. NO - Do you currently have a cold, bronchitis or other respiratory infection?  6. NO - Do you have a cough, shortness of breath or wheezing?  7. NO - Do you sometimes get pains in the calves of your legs when you walk?  8. NO - Do you or anyone in your family have previous history of blood clots?  9. NO - Do you or does anyone in your family have a serious bleeding problem such as prolonged bleeding following surgeries or cuts?  10. NO - Have you ever had problems with anemia or been told to take iron pills?  11. NO - Have you had any abnormal blood loss such as black, tarry or bloody stools, or abnormal vaginal bleeding?  12. NO - Have you ever had a blood transfusion?  13. NO - Have you or any of your relatives ever had problems with anesthesia?  14. NO - Do you have sleep apnea, excessive snoring or daytime drowsiness?  15. NO - Do you have  any prosthetic heart valves?  16. NO - Do you have prosthetic joints?  17. NO - Is there any chance that you may be pregnant?      HPI:     HPI related to upcoming procedure: fall on 8/9/19 and broke her right wrist and has a displacement of her  distal radius to the proximal shaft. Has over 36 degree of angulation.  Was placed in a splint and set for surgery for internal fixation of this fracture.       A-FIB - Patient has a longstanding history of chronic A-fib currently on rhythm control. Current treatment regimen includes Aspirin for stroke prevention and denies significant symptoms of lightheadedness, palpitations or dyspnea.       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 yes         Diverticulitis of colon 03/26/2013     Priority: Medium     Osteoarthritis 03/26/2013     Priority: Medium     Osteoporosis/osteopenia increased risk 03/26/2013     Priority: Medium     ACP (advance care planning) 12/06/2012     Priority: Medium     Advance Care Planning 6/22/2016: ACP Review of Chart / Resources Provided:  Reviewed chart for advance care plan.  Bianka KEITH Ashleyrc has no plan or code status on file however states presence of ACP document. Copy requested. Confirmed code status reflects current choices pending receipt of document/advance care plan review.  Confirmed/documented legally designated decision makers.  Added by Munira Rodgers              Past Medical History:   Diagnosis Date     Basal  cell carcinoma      Cardiomegaly 2007     Compression fracture of thoracic vertebra (H) 10/30/2014     Compression fx, lumbar spine, with routine healing, subsequent encounter 2016     Hyperlipidemia      Osteoporosis      Typical atrial flutter (H) 2016     Varicose vein of leg 2016     Past Surgical History:   Procedure Laterality Date     APPENDECTOMY       COLONOSCOPY N/A 2017    Procedure: COLONOSCOPY;  COLONOSCOPY WITH POLYPECTOMY AND SCLEROTHERAPY;  Surgeon: Matt Gonzalez MD;  Location: [facility]     ENDOSCOPIC STRIPPING VEIN(S)       HYSTERECTOMY       SALPINGO OOPHORECTOMY,R/L/BRIAN       Current Outpatient Medications   Medication Sig Dispense Refill     Ascorbic Acid (VITAMIN C PO) Take 500 mg by mouth 2 times daily       B Complex-Biotin-FA (VITAMIN B50 COMPLEX PO) Take 1 tablet by mouth daily       Flaxseed, Linseed, (FLAX SEED OIL) 1000 MG capsule Take 1 capsule by mouth daily       MAGNESIUM OXIDE PO Take by mouth daily        NONFORMULARY 1 tablespoon of chopped fresh garlic       NONFORMULARY Extra virgin olive oil 2 tablespoons  Daily       Omega-3 Fatty Acids (FISH OIL) 1200 MG CAPS Take 1,200 mg by mouth 2 times daily        VITAMIN E COMPLEX PO Take 400 Units by mouth daily       OTC products: None, except as noted above    Allergies   Allergen Reactions     Amoxicillin      Intolerant       Codeine      Hydrocodone Nausea and Vomiting      Latex Allergy: NO    Social History     Tobacco Use     Smoking status: Former Smoker     Packs/day: 0.50     Years: 3.00     Pack years: 1.50     Types: Cigarettes     Start date: 1950     Last attempt to quit: 3/26/1953     Years since quittin.4     Smokeless tobacco: Never Used     Tobacco comment: Quit in ; no passive smoke exposure   Substance Use Topics     Alcohol use: No     History   Drug Use No       REVIEW OF SYSTEMS:   CONSTITUTIONAL: NEGATIVE for fever, chills, change in weight  INTEGUMENTARY/SKIN:  "extensive bruising from her fracture.   EYES: NEGATIVE for vision changes or irritation  ENT/MOUTH: NEGATIVE for ear, mouth and throat problems  RESP: NEGATIVE for significant cough or SOB  BREAST: NEGATIVE for masses, tenderness or discharge  CV: NEGATIVE for chest pain, palpitations or peripheral edema  GI: NEGATIVE for nausea, abdominal pain, heartburn, or change in bowel habits  : NEGATIVE for frequency, dysuria, or hematuria  MUSCULOSKELETAL:see hpi  NEURO: unable to move fingers much due to immobilization but does feel light touch.   ENDOCRINE: NEGATIVE for temperature intolerance, skin/hair changes  HEME: NEGATIVE for bleeding problems  PSYCHIATRIC: NEGATIVE for changes in mood or affect    EXAM:   /68 (BP Location: Left arm, Patient Position: Sitting, Cuff Size: Adult Regular)   Pulse 85   Temp 98.4  F (36.9  C) (Tympanic)   Resp 16   Ht 1.549 m (5' 1\")   Wt 56.2 kg (124 lb)   SpO2 96%   BMI 23.43 kg/m    GENERAL APPEARANCE: healthy, alert and no distress  HENT: ear canals and TM's normal and nose and mouth without ulcers or lesions  RESP: lungs clear to auscultation - no rales, rhonchi or wheezes  CV: regular rate and rhythm, normal S1 S2, no S3 or S4 and no murmur, click or rub   ABDOMEN: soft, nontender, no HSM or masses and bowel sounds normal  NEURO: Normal strength and tone, sensory exam grossly normal, mentation intact and speech normal    DIAGNOSTICS:     EKG:  Aflutter, rate is controlled.  unchanged from previous tracings  Labs Resulted Today:   Results for orders placed or performed during the hospital encounter of 08/09/19   XR Wrist Right 2 Views    Narrative    PROCEDURE: XR WRIST RIGHT 2 VIEW 8/9/2019 8:10 PM    HISTORY: fall    COMPARISONS: None.    TECHNIQUE: 2 views.    FINDINGS: There is a transverse, fracture through the distal radius  with dorsal displacement of the distal radius relative to the proximal  shaft. There is dorsal tilting the distal radial articular " surface.  [place] volar angulation at the fracture site measures 36 degrees. Some  impaction is seen as well.    There is also a fracture.         Impression    IMPRESSION: Impacted angulated fracture of distal radius.    RAINA LUNA MD   XR Wrist Port Right 2 Views    Narrative    PROCEDURE: XR WRIST PORTABLE RIGHT 2 VIEWS 8/9/2019 9:47 PM    HISTORY: post reduction    COMPARISONS: Earlier study of the same date.    TECHNIQUE: 2 views.    FINDINGS: There is a splint in place. There has been slight  improvement in alignment at the fracture of the distal radius. There  is still some apex dorsal angulation with dorsal placement of the  distal fracture fragment. There is an ulnar styloid fracture.         Impression    IMPRESSION: Mild improvement in angulation fracture of the distal  radius. There is still some dorsal tilting the distal radial articular  surface with dorsal displacement of the distal fracture fragment.    RAINA LUNA MD       Recent Labs   Lab Test 08/24/18  1554 03/21/18  1117 10/27/17  0957  11/10/16  0900   HGB 15.2 16.2* 16.3*   < > 16.0*    166 177   < > 168   INR  --   --   --   --  1.12     --  135  --  137   POTASSIUM 4.5  --  4.3  --  4.9   CR 0.72  --  0.69  --  0.63    < > = values in this interval not displayed.      Lab Results   Component Value Date    WBC 6.6 08/15/2019     Lab Results   Component Value Date    RBC 4.65 08/15/2019     Lab Results   Component Value Date    HGB 15.6 08/15/2019     Lab Results   Component Value Date    HCT 44.4 08/15/2019     No components found for: MCT  Lab Results   Component Value Date    MCV 96 08/15/2019     Lab Results   Component Value Date    MCH 33.5 08/15/2019     Lab Results   Component Value Date    MCHC 35.1 08/15/2019     Lab Results   Component Value Date    RDW 13.3 08/15/2019     Lab Results   Component Value Date     08/15/2019     Last Comprehensive Metabolic Panel:  Sodium   Date Value Ref Range Status    08/15/2019 135 133 - 144 mmol/L Final     Potassium   Date Value Ref Range Status   08/15/2019 4.5 3.4 - 5.3 mmol/L Final     Chloride   Date Value Ref Range Status   08/15/2019 103 94 - 109 mmol/L Final     Carbon Dioxide   Date Value Ref Range Status   08/15/2019 24 20 - 32 mmol/L Final     Anion Gap   Date Value Ref Range Status   08/15/2019 8 3 - 14 mmol/L Final     Glucose   Date Value Ref Range Status   08/15/2019 79 70 - 99 mg/dL Final     Urea Nitrogen   Date Value Ref Range Status   08/15/2019 27 7 - 30 mg/dL Final     Creatinine   Date Value Ref Range Status   08/15/2019 0.62 0.52 - 1.04 mg/dL Final     GFR Estimate   Date Value Ref Range Status   08/15/2019 83 >60 mL/min/[1.73_m2] Final     Comment:     Non  GFR Calc  Starting 12/18/2018, serum creatinine based estimated GFR (eGFR) will be   calculated using the Chronic Kidney Disease Epidemiology Collaboration   (CKD-EPI) equation.       Calcium   Date Value Ref Range Status   08/15/2019 9.1 8.5 - 10.1 mg/dL Final       IMPRESSION:   Reason for surgery/procedure: internal fixation of wrist right wrist fracture.   Diagnosis/reason for consult: medical clearance.     The proposed surgical procedure is considered LOW risk.    REVISED CARDIAC RISK INDEX  The patient has the following serious cardiovascular risks for perioperative complications such as (MI, PE, VFib and 3  AV Block):  No serious cardiac risks  INTERPRETATION: 1 risks: Class II (low risk - 0.9% complication rate)    The patient has the following additional risks for perioperative complications:  No identified additional risks  Non anticoagulated in A fib.       ICD-10-CM    1. Preop general physical exam Z01.818 EKG 12-lead complete w/read - (Clinic Performed)     CBC with platelets differential     Basic metabolic panel   hx of long standing A fibulation /Flutter.  Was recommended to be anticoagulated but has not been. Taking Garlic as a supplement for blood thinning.  Has  been off this for a long time.  Have explained use of Eloquis is recommended for A fib, has seen cardiology but is refusing to take post op.     RECOMMENDATIONS:     A  Fib rate controlled refuses any anticoagulation post op.     --Patient is on no chronic medications    APPROVAL GIVEN to proceed with proposed procedure, without further diagnostic evaluation       Signed Electronically by: ORTEGA Caruso    Copy of this evaluation report is provided to requesting physician.    [place] Preop Guidelines    Revised Cardiac Risk Index

## 2019-08-15 NOTE — PATIENT INSTRUCTIONS
Before Your Surgery      Call your surgeon if there is any change in your health. This includes signs of a cold or flu (such as a sore throat, runny nose, cough, rash or fever).    Do not smoke, drink alcohol or take over the counter medicine (unless your surgeon or primary care doctor tells you to) for the 24 hours before and after surgery.    If you take prescribed drugs: Follow your doctor s orders about which medicines to take and which to stop until after surgery.    Eating and drinking prior to surgery: follow the instructions from your surgeon    Take a shower or bath the night before surgery. Use the soap your surgeon gave you to gently clean your skin. If you do not have soap from your surgeon, use your regular soap. Do not shave or scrub the surgery site.  Wear clean pajamas and have clean sheets on your bed.     Lab Results   Component Value Date    WBC 6.6 08/15/2019     Lab Results   Component Value Date    RBC 4.65 08/15/2019     Lab Results   Component Value Date    HGB 15.6 08/15/2019     Lab Results   Component Value Date    HCT 44.4 08/15/2019     No components found for: MCT  Lab Results   Component Value Date    MCV 96 08/15/2019     Lab Results   Component Value Date    MCH 33.5 08/15/2019     Lab Results   Component Value Date    MCHC 35.1 08/15/2019     Lab Results   Component Value Date    RDW 13.3 08/15/2019     Lab Results   Component Value Date     08/15/2019     Last Comprehensive Metabolic Panel:  Sodium   Date Value Ref Range Status   08/15/2019 135 133 - 144 mmol/L Final     Potassium   Date Value Ref Range Status   08/15/2019 4.5 3.4 - 5.3 mmol/L Final     Chloride   Date Value Ref Range Status   08/15/2019 103 94 - 109 mmol/L Final     Carbon Dioxide   Date Value Ref Range Status   08/15/2019 24 20 - 32 mmol/L Final     Anion Gap   Date Value Ref Range Status   08/15/2019 8 3 - 14 mmol/L Final     Glucose   Date Value Ref Range Status   08/15/2019 79 70 - 99 mg/dL Final      Urea Nitrogen   Date Value Ref Range Status   08/15/2019 27 7 - 30 mg/dL Final     Creatinine   Date Value Ref Range Status   08/15/2019 0.62 0.52 - 1.04 mg/dL Final     GFR Estimate   Date Value Ref Range Status   08/15/2019 83 >60 mL/min/[1.73_m2] Final     Comment:     Non  GFR Calc  Starting 12/18/2018, serum creatinine based estimated GFR (eGFR) will be   calculated using the Chronic Kidney Disease Epidemiology Collaboration   (CKD-EPI) equation.       Calcium   Date Value Ref Range Status   08/15/2019 9.1 8.5 - 10.1 mg/dL Final

## 2019-08-16 ASSESSMENT — ENCOUNTER SYMPTOMS
SHORTNESS OF BREATH: 0
FEVER: 0
NECK STIFFNESS: 0
CONFUSION: 0
ABDOMINAL PAIN: 0
DIFFICULTY URINATING: 0
ARTHRALGIAS: 0
HEADACHES: 0
COLOR CHANGE: 0
EYE REDNESS: 0

## 2019-08-16 NOTE — ED PROVIDER NOTES
History     Chief Complaint   Patient presents with     Wrist Pain     The history is provided by the patient.   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 yes         Diverticulitis of colon 03/26/2013     Priority: Medium     Osteoarthritis 03/26/2013     Priority: Medium     Osteoporosis/osteopenia increased risk 03/26/2013     Priority: Medium     ACP (advance care planning) 12/06/2012     Priority: Medium     Advance Care Planning 6/22/2016: ACP Review of Chart / Resources Provided:  Reviewed chart for advance care plan.  Bianka Whitmore has no plan or code status on file however states presence of ACP document. Copy requested. Confirmed code status reflects current choices pending receipt of document/advance care plan review.  Confirmed/documented legally designated decision makers.  Added by Munira Rodgers                Past Medical History:    Past Medical History:   Diagnosis Date     Basal cell carcinoma      Cardiomegaly 01/29/2007     Compression fracture of thoracic vertebra (H) 10/30/2014      Compression fx, lumbar spine, with routine healing, subsequent encounter 2016     Hyperlipidemia      Osteoporosis      Typical atrial flutter (H) 2016     Varicose vein of leg 2016       Past Surgical History:    Past Surgical History:   Procedure Laterality Date     APPENDECTOMY       COLONOSCOPY N/A 2017    Procedure: COLONOSCOPY;  COLONOSCOPY WITH POLYPECTOMY AND SCLEROTHERAPY;  Surgeon: Matt Gonzalez MD;  Location: [facility]     ENDOSCOPIC STRIPPING VEIN(S)       HYSTERECTOMY       SALPINGO OOPHORECTOMY,R/L/BRIAN         Family History:    Family History   Problem Relation Age of Onset     Other - See Comments Mother 85        Old age, cause of death     Cancer Father 84        Rectal cancer, cause of death/Stomach cancer, cause of death     C.A.D. Maternal Aunt      C.A.D. Maternal Uncle      Cancer Son         thymic carcinoma       Social History:  Marital Status:   [2]  Social History     Tobacco Use     Smoking status: Former Smoker     Packs/day: 0.50     Years: 3.00     Pack years: 1.50     Types: Cigarettes     Start date: 1950     Last attempt to quit: 3/26/1953     Years since quittin.4     Smokeless tobacco: Never Used     Tobacco comment: Quit in ; no passive smoke exposure   Substance Use Topics     Alcohol use: No     Drug use: No        Medications:      Ascorbic Acid (VITAMIN C PO)   B Complex-Biotin-FA (VITAMIN B50 COMPLEX PO)   Flaxseed, Linseed, (FLAX SEED OIL) 1000 MG capsule   MAGNESIUM OXIDE PO   NONFORMULARY   NONFORMULARY   Omega-3 Fatty Acids (FISH OIL) 1200 MG CAPS   VITAMIN E COMPLEX PO         Review of Systems   Constitutional: Negative for fever.   HENT: Negative for congestion.    Eyes: Negative for redness.   Respiratory: Negative for shortness of breath.    Cardiovascular: Negative for chest pain.   Gastrointestinal: Negative for abdominal pain.   Genitourinary: Negative for difficulty urinating.   Musculoskeletal: Negative for  "arthralgias and neck stiffness.   Skin: Negative for color change.   Neurological: Negative for headaches.   Psychiatric/Behavioral: Negative for confusion.   All other systems reviewed and are negative.      Physical Exam   BP: 129/83  Pulse: 90  Temp: 97.9  F (36.6  C)  Resp: 20  Height: 162.6 cm (5' 4\")  Weight: 54.4 kg (120 lb)  SpO2: 97 %      Physical Exam   Constitutional: No distress.   HENT:   Head: Atraumatic.   Mouth/Throat: Oropharynx is clear and moist. No oropharyngeal exudate.   Eyes: Pupils are equal, round, and reactive to light. No scleral icterus.   Cardiovascular: Normal heart sounds and intact distal pulses.   Pulmonary/Chest: Breath sounds normal. No respiratory distress.   Abdominal: Soft. Bowel sounds are normal. There is no tenderness.   Musculoskeletal: She exhibits no edema.        Right wrist: She exhibits tenderness, bony tenderness and swelling.        Right hand: She exhibits tenderness and swelling. She exhibits normal range of motion and normal capillary refill.   Skin: Skin is warm. No rash noted. She is not diaphoretic.       ED Course        Procedures                   No results found for this or any previous visit (from the past 24 hour(s)).    Medications   naproxen (NAPROSYN) tablet 250 mg (250 mg Oral Given 8/11/19 0459)       Assessments & Plan (with Medical Decision Making)   Came for pain and swelling after placing splint 2 days ago for wrist fx  I loosen ace wrap around splint, pain improved  Pt want a different pain killer  ortho called her yesterday for possible surgery in [place] but she refused  And [facility] , Dr Yeboah  Naproxen low dose started just for 2 days till he visit ortho  I have reviewed the nursing notes.    I have reviewed the findings, diagnosis, plan and need for follow up with the patient.      Discharge Medication List as of 8/11/2019  5:04 AM      START taking these medications    Details   naproxen (NAPROSYN) 500 MG " tablet Take 0.5 tablets (250 mg) by mouth 2 times daily (with meals) for 2 days, Disp-2 tablet, R-0, Local Print             Final diagnoses:   Closed fracture of left wrist with routine healing, subsequent encounter       8/11/2019   [facility]     Markus Rios MD  08/16/19 1100

## 2019-08-17 ASSESSMENT — ENCOUNTER SYMPTOMS
SHORTNESS OF BREATH: 0
FEVER: 0
ABDOMINAL PAIN: 0

## 2019-08-17 NOTE — ED PROVIDER NOTES
History     Chief Complaint   Patient presents with     Wrist Injury     concerned about right hand swelling and discoloration. Seen Friday for right wrist injury and placed in a splint     The history is provided by the patient.     Bianka Whitmore is a 83 year old female who came for swelling and bluish discoloration of R hand , splint was placed on Friday due to wrist fx.     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 yes         Diverticulitis of colon 03/26/2013     Priority: Medium     Osteoarthritis 03/26/2013     Priority: Medium     Osteoporosis/osteopenia increased risk 03/26/2013     Priority: Medium     ACP (advance care planning) 12/06/2012     Priority: Medium     Advance Care Planning 6/22/2016: ACP Review of Chart / Resources Provided:  Reviewed chart for advance care plan.  Bianka Whitmore has no plan or code status on file however states presence of ACP document. Copy requested. Confirmed code status reflects current choices pending receipt of document/advance care plan review.  Confirmed/documented legally designated decision makers.  Added by Munira Rodgers                Past Medical History:    Past Medical History:   Diagnosis Date     Basal cell carcinoma      Cardiomegaly 01/29/2007     Compression fracture of thoracic vertebra (H) 10/30/2014      Compression fx, lumbar spine, with routine healing, subsequent encounter 2016     Hyperlipidemia      Osteoporosis      Typical atrial flutter (H) 2016     Varicose vein of leg 2016       Past Surgical History:    Past Surgical History:   Procedure Laterality Date     APPENDECTOMY       COLONOSCOPY N/A 2017    Procedure: COLONOSCOPY;  COLONOSCOPY WITH POLYPECTOMY AND SCLEROTHERAPY;  Surgeon: Matt Gonzalez MD;  Location: [facility]     ENDOSCOPIC STRIPPING VEIN(S)       HYSTERECTOMY       SALPINGO OOPHORECTOMY,R/L/BRIAN         Family History:    Family History   Problem Relation Age of Onset     Other - See Comments Mother 85        Old age, cause of death     Cancer Father 84        Rectal cancer, cause of death/Stomach cancer, cause of death     C.A.D. Maternal Aunt      C.A.D. Maternal Uncle      Cancer Son         thymic carcinoma       Social History:  Marital Status:   [2]  Social History     Tobacco Use     Smoking status: Former Smoker     Packs/day: 0.50     Years: 3.00     Pack years: 1.50     Types: Cigarettes     Start date: 1950     Last attempt to quit: 3/26/1953     Years since quittin.4     Smokeless tobacco: Never Used     Tobacco comment: Quit in ; no passive smoke exposure   Substance Use Topics     Alcohol use: No     Drug use: No        Medications:      Ascorbic Acid (VITAMIN C PO)   B Complex-Biotin-FA (VITAMIN B50 COMPLEX PO)   Flaxseed, Linseed, (FLAX SEED OIL) 1000 MG capsule   MAGNESIUM OXIDE PO   NONFORMULARY   NONFORMULARY   Omega-3 Fatty Acids (FISH OIL) 1200 MG CAPS   VITAMIN E COMPLEX PO         Review of Systems   Constitutional: Negative for fever.   Respiratory: Negative for shortness of breath.    Cardiovascular: Negative for chest pain.   Gastrointestinal: Negative for abdominal pain.   All other systems reviewed and are negative.      Physical Exam   BP: 117/85  Heart Rate: 91  Temp: 98.7  F (37.1  C)  Resp: 16      Physical Exam    Constitutional: No distress.   HENT:   Head: Atraumatic.   Mouth/Throat: Oropharynx is clear and moist. No oropharyngeal exudate.   Eyes: Pupils are equal, round, and reactive to light. No scleral icterus.   Cardiovascular: Normal heart sounds and intact distal pulses.   Pulmonary/Chest: Breath sounds normal. No respiratory distress.   Abdominal: Soft. Bowel sounds are normal. There is no tenderness.   Musculoskeletal: She exhibits no edema.        Right hand: She exhibits tenderness and swelling. She exhibits normal capillary refill.   blusih discoloration of R hand, swelling   Skin: Skin is warm. No rash noted. She is not diaphoretic.       ED Course        [facility]    ED Orthopedic Injury Treatment - Fracture  Date/Time: 8/17/2019 4:16 PM  Performed by: Markus Rios MD  Authorized by: Markus Rios MD     ED EVALUATION:      Difficult Airway?: No  UNIVERSAL PROTOCOL   Site Marked: No  Prior Images Obtained and Reviewed:  Yes  Required items: Required blood products, implants, devices and special equipment available    Patient identity confirmed:  Verbally with patient  NA - No sedation, light sedation, or local anesthesia  Confirmation Checklist:  Correct equipment/implants were available  Time out: Immediately prior to the procedure a time out was called      INJURY      Injury location:  Forearm    Forearm fracture type: radial head      PRE PROCEDURE ASSESSMENT      Neurological function: normal      Distal perfusion: normal      Range of motion: reduced      SEDATION    Patient Sedated: No      ANESTHESIA (see MAR for exact dosages)      Anesthesia method:  None    PROCEDURE DETAILS:     Manipulation performed: no      Skin traction used: no      Skeletal traction used: no      Immobilization:  Splint    Splint type:  Sugar tong    POST PROCEDURE ASSESSMENT      Neurological function: normal      Distal perfusion: normal      Range of motion: normal      PROCEDURE   Patient Tolerance:  Patient  tolerated the procedure well with no immediate complications    Time of Sedation in Minutes by Physician:  0                     No results found for this or any previous visit (from the past 24 hour(s)).    Medications - No data to display    Assessments & Plan (with Medical Decision Making)   Reduced venous return due to tight splint  ,probably 2 post fracture swelling  Splint replaced with new one with ample padding, pt reported feeling much better in new splint , reduced swelling of R hand observed immediately   Follow-up with ortho   I have reviewed the nursing notes.    I have reviewed the findings, diagnosis, plan and need for follow up with the patient.      Discharge Medication List as of 8/12/2019  7:29 AM          Final diagnoses:   Closed fracture of right wrist with routine healing, subsequent encounter       8/12/2019   [facility]     Markus Rios MD  08/17/19 2660

## 2019-08-27 ENCOUNTER — TRANSFERRED RECORDS (OUTPATIENT)
Dept: HEALTH INFORMATION MANAGEMENT | Facility: CLINIC | Age: 84
End: 2019-08-27

## 2020-01-01 NOTE — NURSING NOTE
"Chief Complaint   Patient presents with     Annual Visit       Initial /90 (BP Location: Right arm, Patient Position: Sitting, Cuff Size: Child)   Pulse 72   Temp 97.1  F (36.2  C) (Tympanic)   Resp 20   Ht 1.613 m (5' 3.5\")   Wt 54.4 kg (120 lb)   SpO2 91%   BMI 20.92 kg/m   Estimated body mass index is 20.92 kg/m  as calculated from the following:    Height as of this encounter: 1.613 m (5' 3.5\").    Weight as of this encounter: 54.4 kg (120 lb).  Medication Reconciliation: complete      " (2) more than 100 beats/min

## 2020-02-04 ENCOUNTER — OFFICE VISIT (OUTPATIENT)
Dept: OTOLARYNGOLOGY | Facility: OTHER | Age: 85
End: 2020-02-04
Attending: NURSE PRACTITIONER
Payer: MEDICARE

## 2020-02-04 VITALS
OXYGEN SATURATION: 98 % | WEIGHT: 118 LBS | TEMPERATURE: 97.6 F | HEIGHT: 64 IN | SYSTOLIC BLOOD PRESSURE: 124 MMHG | RESPIRATION RATE: 18 BRPM | BODY MASS INDEX: 20.14 KG/M2 | HEART RATE: 77 BPM | DIASTOLIC BLOOD PRESSURE: 78 MMHG

## 2020-02-04 DIAGNOSIS — H61.21 CERUMINOSIS, RIGHT: ICD-10-CM

## 2020-02-04 DIAGNOSIS — H61.22 IMPACTED CERUMEN OF LEFT EAR: Primary | ICD-10-CM

## 2020-02-04 PROCEDURE — 69210 REMOVE IMPACTED EAR WAX UNI: CPT | Performed by: NURSE PRACTITIONER

## 2020-02-04 PROCEDURE — G0463 HOSPITAL OUTPT CLINIC VISIT: HCPCS

## 2020-02-04 PROCEDURE — 99212 OFFICE O/P EST SF 10 MIN: CPT | Mod: 25 | Performed by: NURSE PRACTITIONER

## 2020-02-04 PROCEDURE — 92504 EAR MICROSCOPY EXAMINATION: CPT | Performed by: NURSE PRACTITIONER

## 2020-02-04 ASSESSMENT — MIFFLIN-ST. JEOR: SCORE: 970.24

## 2020-02-04 ASSESSMENT — PAIN SCALES - GENERAL: PAINLEVEL: NO PAIN (0)

## 2020-02-04 NOTE — NURSING NOTE
"Chief Complaint   Patient presents with     Cerumen Impaction     Patient here for ear cleaning       Initial There were no vitals taken for this visit. Estimated body mass index is 23.43 kg/m  as calculated from the following:    Height as of 8/15/19: 1.549 m (5' 1\").    Weight as of 8/15/19: 56.2 kg (124 lb).  Medication Reconciliation: complete  Suly Suárez LPN    "

## 2020-02-04 NOTE — PROGRESS NOTES
Otolaryngology Note         Chief Complaint:     Patient presents with:  Cerumen Impaction: Patient here for ear cleaning           History of Present Illness:     Bianka Whitmore is a 84 year old female who presents today for ear cleaning.  She was last seen in ENT 3/26/18 by Christina Suárez NP for routine ear cleaning.      Bianka last had her ears cleaned 1.5 years ago ago.   + concerns with hearing in left ear due to plugged with wax   + breezy sound = occasional tinnitus  No vertigo, facial numbness or weakness.      No otalgia, otorrhea.    No hx of COM or otologic surgeries.          Medications:     Current Outpatient Rx   Medication Sig Dispense Refill     Ascorbic Acid (VITAMIN C PO) Take 500 mg by mouth 2 times daily       B Complex-Biotin-FA (VITAMIN B50 COMPLEX PO) Take 1 tablet by mouth daily       Flaxseed, Linseed, (FLAX SEED OIL) 1000 MG capsule Take 1 capsule by mouth daily       MAGNESIUM OXIDE PO Take by mouth daily        NONFORMULARY 1 tablespoon of chopped fresh garlic       NONFORMULARY Extra virgin olive oil 2 tablespoons  Daily       Omega-3 Fatty Acids (FISH OIL) 1200 MG CAPS Take 1,200 mg by mouth 2 times daily        VITAMIN E COMPLEX PO Take 400 Units by mouth daily              Allergies:     Allergies: Amoxicillin; Codeine; and Hydrocodone          Past Medical History:     Past Medical History:   Diagnosis Date     Basal cell carcinoma      Cardiomegaly 01/29/2007     Compression fracture of thoracic vertebra (H) 10/30/2014     Compression fx, lumbar spine, with routine healing, subsequent encounter 6/22/2016     Hyperlipidemia      Osteoporosis      Typical atrial flutter (H) 7/25/2016     Varicose vein of leg 6/22/2016            Past Surgical History:     Past Surgical History:   Procedure Laterality Date     APPENDECTOMY       COLONOSCOPY N/A 12/4/2017    Procedure: COLONOSCOPY;  COLONOSCOPY WITH POLYPECTOMY AND SCLEROTHERAPY;  Surgeon: Matt Gonzalez MD;  Location: [facility]  "    ENDOSCOPIC STRIPPING VEIN(S)       HYSTERECTOMY       SALPINGO OOPHORECTOMY,R/L/BRIAN         ENT family history reviewed         Social History:     Social History     Tobacco Use     Smoking status: Former Smoker     Packs/day: 0.50     Years: 3.00     Pack years: 1.50     Types: Cigarettes     Start date: 1950     Last attempt to quit: 3/26/1953     Years since quittin.9     Smokeless tobacco: Never Used     Tobacco comment: Quit in ; no passive smoke exposure   Substance Use Topics     Alcohol use: No     Drug use: No            Review of Systems:     ROS: See HPI         Physical Exam:     /78 (BP Location: Right arm)   Pulse 77   Temp 97.6  F (36.4  C) (Tympanic)   Resp 18   Ht 1.626 m (5' 4\")   Wt 53.5 kg (118 lb)   SpO2 98%   BMI 20.25 kg/m      General - The patient is well nourished and well developed, and appears to have good nutritional status.  Alert and oriented to person and place, answers questions and cooperates with examination appropriately.   Head and Face - Normocephalic and atraumatic, with no gross asymmetry noted.  The facial nerve is intact, with strong symmetric movements.  Voice and Breathing - The patient was breathing comfortably without the use of accessory muscles. There was no wheezing, stridor. The patients voice was clear and strong, and had appropriate pitch and quality.  Ears - The ears were examined with binocular microscopy and with otoscope.  External ears normal. Right canal with very scant ceruminosis.  Left canal cerumen impacted.  The right ear was cleaned with cerumen loop.   Right tympanic membrane is intact without effusion, retraction or mass. The left ear was cleaned with #7, #5, #3 suction and cerumen loop, instilled 1/2 water, 1/2 peroxide into left ear and let sit for 5 minutes to soften the remaining wax on the TM.  There was a scant amount of wax remaining on the TM.  The left tympanic membrane is intact without effusion, retraction or " mass.  Eyes - Extraocular movements intact, sclera were not icteric or injected, conjunctiva were pink and moist.  Mouth - Examination of the oral cavity showed pink, healthy oral mucosa. Dentition in good condition. No lesions or ulcerations noted. The tongue was mobile and midline.   Throat - The walls of the oropharynx were smooth, pink, moist, symmetric, and had no lesions or ulcerations.  The tonsillar pillars and soft palate were symmetric. The uvula was midline on elevation.    Neck - Full range of motion on passive movement.  Palpation of the occipital, submental, submandibular, internal jugular chain, and supraclavicular nodes did not demonstrate any abnormal lymph nodes or masses.  Palpation of the thyroid was soft and smooth, with no nodules or goiter appreciated.  The trachea was mobile and midline.  Nose - External contour is symmetric, no gross deflection or scars.  Nasal mucosa is pink and moist with no abnormal mucus.  The septum and turbinates were evaluated with nasal speculum, no polyps, masses, or purulence noted on examination.         Assessment and Plan:       ICD-10-CM    1. Impacted cerumen of left ear H61.22    2. Ceruminosis, right H61.21      Use wax softening drops for 3 days now, and 3 days prior to your next ear cleaning appointment  Follow up in 6 months for ear cleaning    Ashley KEITA  [facility]

## 2020-02-04 NOTE — LETTER
2/4/2020         RE: Bianka Whitmore  [address]  [facility] [address]        Dear Colleague,    Thank you for referring your patient, Bianka Whitmore, to the [facility]. Please see a copy of my visit note below.    Otolaryngology Note         Chief Complaint:     Patient presents with:  Cerumen Impaction: Patient here for ear cleaning           History of Present Illness:     Bianka Whitmore is a 84 year old female who presents today for ear cleaning.  She was last seen in ENT 3/26/18 by Christina Suárez NP for routine ear cleaning.      Bianka last had her ears cleaned 1.5 years ago ago.   + concerns with hearing in left ear due to plugged with wax   + breezy sound = occasional tinnitus  No vertigo, facial numbness or weakness.      No otalgia, otorrhea.    No hx of COM or otologic surgeries.          Medications:     Current Outpatient Rx   Medication Sig Dispense Refill     Ascorbic Acid (VITAMIN C PO) Take 500 mg by mouth 2 times daily       B Complex-Biotin-FA (VITAMIN B50 COMPLEX PO) Take 1 tablet by mouth daily       Flaxseed, Linseed, (FLAX SEED OIL) 1000 MG capsule Take 1 capsule by mouth daily       MAGNESIUM OXIDE PO Take by mouth daily        NONFORMULARY 1 tablespoon of chopped fresh garlic       NONFORMULARY Extra virgin olive oil 2 tablespoons  Daily       Omega-3 Fatty Acids (FISH OIL) 1200 MG CAPS Take 1,200 mg by mouth 2 times daily        VITAMIN E COMPLEX PO Take 400 Units by mouth daily              Allergies:     Allergies: Amoxicillin; Codeine; and Hydrocodone          Past Medical History:     Past Medical History:   Diagnosis Date     Basal cell carcinoma      Cardiomegaly 01/29/2007     Compression fracture of thoracic vertebra (H) 10/30/2014     Compression fx, lumbar spine, with routine healing, subsequent encounter 6/22/2016     Hyperlipidemia      Osteoporosis      Typical atrial flutter (H) 7/25/2016     Varicose vein of leg 6/22/2016            Past  "Surgical History:     Past Surgical History:   Procedure Laterality Date     APPENDECTOMY       COLONOSCOPY N/A 2017    Procedure: COLONOSCOPY;  COLONOSCOPY WITH POLYPECTOMY AND SCLEROTHERAPY;  Surgeon: Matt Gonzalez MD;  Location: [facility]     ENDOSCOPIC STRIPPING VEIN(S)       HYSTERECTOMY       SALPINGO OOPHORECTOMY,R/L/BRIAN         ENT family history reviewed         Social History:     Social History     Tobacco Use     Smoking status: Former Smoker     Packs/day: 0.50     Years: 3.00     Pack years: 1.50     Types: Cigarettes     Start date: 1950     Last attempt to quit: 3/26/1953     Years since quittin.9     Smokeless tobacco: Never Used     Tobacco comment: Quit in ; no passive smoke exposure   Substance Use Topics     Alcohol use: No     Drug use: No            Review of Systems:     ROS: See HPI         Physical Exam:     /78 (BP Location: Right arm)   Pulse 77   Temp 97.6  F (36.4  C) (Tympanic)   Resp 18   Ht 1.626 m (5' 4\")   Wt 53.5 kg (118 lb)   SpO2 98%   BMI 20.25 kg/m       General - The patient is well nourished and well developed, and appears to have good nutritional status.  Alert and oriented to person and place, answers questions and cooperates with examination appropriately.   Head and Face - Normocephalic and atraumatic, with no gross asymmetry noted.  The facial nerve is intact, with strong symmetric movements.  Voice and Breathing - The patient was breathing comfortably without the use of accessory muscles. There was no wheezing, stridor. The patients voice was clear and strong, and had appropriate pitch and quality.  Ears - The ears were examined with binocular microscopy and with otoscope.  External ears normal. Right canal with very scant ceruminosis.  Left canal cerumen impacted.  The right ear was cleaned with cerumen loop.   Right tympanic membrane is intact without effusion, retraction or mass. The left ear was cleaned with #7, #5, #3 suction and " cerumen loop, instilled 1/2 water, 1/2 peroxide into left ear and let sit for 5 minutes to soften the remaining wax on the TM.  There was a scant amount of wax remaining on the TM.  The left tympanic membrane is intact without effusion, retraction or mass.  Eyes - Extraocular movements intact, sclera were not icteric or injected, conjunctiva were pink and moist.  Mouth - Examination of the oral cavity showed pink, healthy oral mucosa. Dentition in good condition. No lesions or ulcerations noted. The tongue was mobile and midline.   Throat - The walls of the oropharynx were smooth, pink, moist, symmetric, and had no lesions or ulcerations.  The tonsillar pillars and soft palate were symmetric. The uvula was midline on elevation.    Neck - Full range of motion on passive movement.  Palpation of the occipital, submental, submandibular, internal jugular chain, and supraclavicular nodes did not demonstrate any abnormal lymph nodes or masses.  Palpation of the thyroid was soft and smooth, with no nodules or goiter appreciated.  The trachea was mobile and midline.  Nose - External contour is symmetric, no gross deflection or scars.  Nasal mucosa is pink and moist with no abnormal mucus.  The septum and turbinates were evaluated with nasal speculum, no polyps, masses, or purulence noted on examination.         Assessment and Plan:       ICD-10-CM    1. Impacted cerumen of left ear H61.22    2. Ceruminosis, right H61.21      Use wax softening drops for 3 days now, and 3 days prior to your next ear cleaning appointment  Follow up in 6 months for ear cleaning    Ashley KEITA  [facility]      Again, thank you for allowing me to participate in the care of your patient.        Sincerely,        Ashley Stephen NP

## 2020-08-04 ENCOUNTER — OFFICE VISIT (OUTPATIENT)
Dept: OTOLARYNGOLOGY | Facility: OTHER | Age: 85
End: 2020-08-04
Attending: NURSE PRACTITIONER
Payer: MEDICARE

## 2020-08-04 VITALS
TEMPERATURE: 96.9 F | RESPIRATION RATE: 16 BRPM | BODY MASS INDEX: 20.14 KG/M2 | DIASTOLIC BLOOD PRESSURE: 78 MMHG | HEIGHT: 64 IN | OXYGEN SATURATION: 94 % | WEIGHT: 118 LBS | SYSTOLIC BLOOD PRESSURE: 116 MMHG | HEART RATE: 85 BPM

## 2020-08-04 DIAGNOSIS — H61.21 CERUMINOSIS, RIGHT: ICD-10-CM

## 2020-08-04 DIAGNOSIS — H61.22 IMPACTED CERUMEN OF LEFT EAR: Primary | ICD-10-CM

## 2020-08-04 PROCEDURE — 92504 EAR MICROSCOPY EXAMINATION: CPT | Performed by: NURSE PRACTITIONER

## 2020-08-04 PROCEDURE — G0463 HOSPITAL OUTPT CLINIC VISIT: HCPCS

## 2020-08-04 PROCEDURE — 99212 OFFICE O/P EST SF 10 MIN: CPT | Mod: 25 | Performed by: NURSE PRACTITIONER

## 2020-08-04 PROCEDURE — 69210 REMOVE IMPACTED EAR WAX UNI: CPT | Performed by: NURSE PRACTITIONER

## 2020-08-04 ASSESSMENT — MIFFLIN-ST. JEOR: SCORE: 970.24

## 2020-08-04 ASSESSMENT — PAIN SCALES - GENERAL: PAINLEVEL: NO PAIN (0)

## 2020-08-04 NOTE — PROGRESS NOTES
Otolaryngology Note         Chief Complaint:     Patient presents with:  Cerumen Impaction: ear cleaning           History of Present Illness:     Bianka Whitmore is a 84 year old female who presents today for ear cleaning.    She last had ears cleaned 6 months ago.   She concerns with hearing.  No tinnitus, vertigo, facial numbness or weakness.      No otalgia, otorrhea.    No hx of COM or otologic surgeries.          Medications:     Current Outpatient Rx   Medication Sig Dispense Refill     Ascorbic Acid (VITAMIN C PO) Take 500 mg by mouth 2 times daily       B Complex-Biotin-FA (VITAMIN B50 COMPLEX PO) Take 1 tablet by mouth daily       Flaxseed, Linseed, (FLAX SEED OIL) 1000 MG capsule Take 1 capsule by mouth daily       MAGNESIUM OXIDE PO Take by mouth daily        NONFORMULARY 1 tablespoon of chopped fresh garlic       NONFORMULARY Extra virgin olive oil 2 tablespoons  Daily       Omega-3 Fatty Acids (FISH OIL) 1200 MG CAPS Take 1,200 mg by mouth 2 times daily        VITAMIN E COMPLEX PO Take 400 Units by mouth daily              Allergies:     Allergies: Amoxicillin; Codeine; and Hydrocodone          Past Medical History:     Past Medical History:   Diagnosis Date     Basal cell carcinoma      Cardiomegaly 01/29/2007     Compression fracture of thoracic vertebra (H) 10/30/2014     Compression fx, lumbar spine, with routine healing, subsequent encounter 6/22/2016     Hyperlipidemia      Osteoporosis      Typical atrial flutter (H) 7/25/2016     Varicose vein of leg 6/22/2016            Past Surgical History:     Past Surgical History:   Procedure Laterality Date     APPENDECTOMY       COLONOSCOPY N/A 12/4/2017    Procedure: COLONOSCOPY;  COLONOSCOPY WITH POLYPECTOMY AND SCLEROTHERAPY;  Surgeon: Matt Gonzalez MD;  Location: [facility]     ENDOSCOPIC STRIPPING VEIN(S)       HYSTERECTOMY       SALPINGO OOPHORECTOMY,R/L/BRIAN         ENT family history reviewed         Social History:     Social History  "    Tobacco Use     Smoking status: Former Smoker     Packs/day: 0.50     Years: 3.00     Pack years: 1.50     Types: Cigarettes     Start date: 1950     Last attempt to quit: 3/26/1953     Years since quittin.4     Smokeless tobacco: Never Used     Tobacco comment: Quit in ; no passive smoke exposure   Substance Use Topics     Alcohol use: No     Drug use: No            Review of Systems:     ROS: See HPI         Physical Exam:     /78   Pulse 85   Temp 96.9  F (36.1  C) (Tympanic)   Resp 16   Ht 1.626 m (5' 4\")   Wt 53.5 kg (118 lb)   SpO2 94%   BMI 20.25 kg/m      General - The patient is well nourished and well developed, and appears to have good nutritional status.  Alert and oriented to person and place, answers questions and cooperates with examination appropriately.   Head and Face - Normocephalic and atraumatic, with no gross asymmetry noted.  The facial nerve is intact, with strong symmetric movements.  Voice and Breathing - The patient was breathing comfortably without the use of accessory muscles. There was no wheezing, stridor. The patients voice was clear and strong, and had appropriate pitch and quality.  Ears - The ears were examined with binocular microscopy and with otoscope.  External ears normal. Right canal has minimal ceruminosis  Left canal cerumen impacted.  The right ear was cleaned with cupped forceps.   Right tympanic membrane is intact without effusion, retraction or mass. The left ear was cleaned with #7 sucker, cupped forceps.  Left tympanic membrane is intact without effusion, retraction or mass.  Eyes - Extraocular movements intact, sclera were not icteric or injected, conjunctiva were pink and moist.  Mouth - Examination of the oral cavity showed pink, healthy oral mucosa. Dentition in good condition. No lesions or ulcerations noted. The tongue was mobile and midline.   Throat - The walls of the oropharynx were smooth, pink, moist, symmetric, and had no " lesions or ulcerations.  The tonsillar pillars and soft palate were symmetric. The uvula was midline on elevation.    Neck - Full range of motion on passive movement.  Palpation of the occipital, submental, submandibular, internal jugular chain, and supraclavicular nodes did not demonstrate any abnormal lymph nodes or masses.  Palpation of the thyroid was soft and smooth, with no nodules or goiter appreciated.  The trachea was mobile and midline.  Nose - External contour is symmetric, no gross deflection or scars.  Nasal mucosa is pink and moist with no abnormal mucus.  The septum and turbinates were evaluated with nasal speculum, no polyps, masses, or purulence noted on examination.         Assessment and Plan:     Ears were cleaned, tolerated well  Follow up in 6-8 months for repeat ear cleaning    Avoid flushing the ear canal if there is a possibility of a hole or perforation in the tympanic membrane.   If there are any unresolved concerns regarding hearing loss an audiogram is recommended.      Ashley Stephen NP-IVY  [facility]

## 2020-08-04 NOTE — PATIENT INSTRUCTIONS
Both ears are clean and look good  Follow up in 6-8 months for repeat ear cleaning      Thank you for allowing Ashley KEITA and our ENT team to participate in your care.  If your medications are too expensive, please call my nurse at the number listed below.  We can possibly change this medication.    If you have a scheduling or an appointment question please contact our Health Unit Coordinator at their direct line [phone].   ALL nursing questions or concerns can be directed to your ENT nurses at: [phone] or [phone].

## 2020-08-04 NOTE — NURSING NOTE
"Chief Complaint   Patient presents with     Cerumen Impaction     ear cleaning       Initial /78   Pulse 85   Temp 96.9  F (36.1  C) (Tympanic)   Resp 16   Ht 1.626 m (5' 4\")   Wt 53.5 kg (118 lb)   SpO2 94%   BMI 20.25 kg/m   Estimated body mass index is 20.25 kg/m  as calculated from the following:    Height as of this encounter: 1.626 m (5' 4\").    Weight as of this encounter: 53.5 kg (118 lb).  Medication Reconciliation: complete  Suly Suárez LPN  "

## 2020-09-16 ENCOUNTER — HOSPITAL ENCOUNTER (EMERGENCY)
Facility: HOSPITAL | Age: 85
Discharge: HOME | End: 2020-09-16
Attending: EMERGENCY MEDICINE | Admitting: EMERGENCY MEDICINE
Payer: MEDICARE

## 2020-09-16 VITALS
SYSTOLIC BLOOD PRESSURE: 126 MMHG | TEMPERATURE: 98.4 F | BODY MASS INDEX: 20.14 KG/M2 | HEIGHT: 64 IN | WEIGHT: 118 LBS | DIASTOLIC BLOOD PRESSURE: 87 MMHG | OXYGEN SATURATION: 98 % | RESPIRATION RATE: 16 BRPM | HEART RATE: 88 BPM

## 2020-09-16 DIAGNOSIS — I83.892 BLEEDING FROM VARICOSE VEINS OF LEFT LOWER EXTREMITY: ICD-10-CM

## 2020-09-16 PROCEDURE — 99282 EMERGENCY DEPT VISIT SF MDM: CPT

## 2020-09-16 PROCEDURE — 27210995 ZZH RX 272: Performed by: EMERGENCY MEDICINE

## 2020-09-16 PROCEDURE — 99282 EMERGENCY DEPT VISIT SF MDM: CPT | Mod: Z6 | Performed by: EMERGENCY MEDICINE

## 2020-09-16 RX ADMIN — Medication 1 EACH: at 05:46

## 2020-09-16 ASSESSMENT — MIFFLIN-ST. JEOR: SCORE: 965.24

## 2020-09-16 ASSESSMENT — ENCOUNTER SYMPTOMS
FEVER: 0
SHORTNESS OF BREATH: 0
COUGH: 0
CHILLS: 0

## 2020-09-16 NOTE — ED AVS SNAPSHOT
[facility]  [address] [place] [address]  Phone:  [phone]                                    Bianka Whitmore   MRN: [id number]    Department:  [facility]   Date of Visit:  9/16/2020           After Visit Summary Signature Page    I have received my discharge instructions, and my questions have been answered. I have discussed any challenges I see with this plan with the nurse or doctor.    ..........................................................................................................................................  Patient/Patient Representative Signature      ..........................................................................................................................................  Patient Representative Print Name and Relationship to Patient    ..................................................               ................................................  Date                                   Time    ..........................................................................................................................................  Reviewed by Signature/Title    ...................................................              ..............................................  Date                                               Time          22EPIC Rev 08/18

## 2020-09-16 NOTE — ED NOTES
Pt presents with c/o bleeding from a varicose vein on her Lt lower leg. Pt states there had been a small scab over the area that got knocked off this morning while she was getting dressed. Pt states this has happened in the past and was cauterized. Pt with pressure dressing covering this area and bleeding is controlled.

## 2020-09-16 NOTE — ED PROVIDER NOTES
History     Chief Complaint   Patient presents with     Bleeding/Bruising     vein bleeding on Lt leg, bleeding controlled at this time.     HPI  Bianka Whitmore is a 85 year old female who is here with bleeding to a varicose vein in her left shin.  Occurred while striking it against something this morning.  Per , states the kitchen look like a murder scene due to all the blood.  History of similar in the past, had to have her varicose vein cauterized.  Does not take any blood thinners.  No other trauma.  Has no syncope, presyncope chest pain or difficulty breathing.    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 yes         Diverticulitis of colon 03/26/2013     Priority: Medium     Osteoarthritis 03/26/2013     Priority: Medium     Osteoporosis/osteopenia increased risk 03/26/2013     Priority: Medium     ACP (advance care planning) 12/06/2012     Priority: Medium     Advance Care Planning 6/22/2016: ACP Review of Chart / Resources Provided:  Reviewed chart for advance care plan.  Bianka Whitmore has no plan or code status on file however states presence of ACP document. Copy requested. Confirmed code status reflects current choices pending receipt of document/advance care plan review.  Confirmed/documented legally designated decision makers.  Added by Munira PRESSLEY  Vishal                Past Medical History:    Past Medical History:   Diagnosis Date     Basal cell carcinoma      Cardiomegaly 2007     Compression fracture of thoracic vertebra (H) 10/30/2014     Compression fx, lumbar spine, with routine healing, subsequent encounter 2016     Hyperlipidemia      Osteoporosis      Typical atrial flutter (H) 2016     Varicose vein of leg 2016       Past Surgical History:    Past Surgical History:   Procedure Laterality Date     APPENDECTOMY       COLONOSCOPY N/A 2017    Procedure: COLONOSCOPY;  COLONOSCOPY WITH POLYPECTOMY AND SCLEROTHERAPY;  Surgeon: Matt Gonzalez MD;  Location: [facility]     ENDOSCOPIC STRIPPING VEIN(S)       HYSTERECTOMY       SALPINGO OOPHORECTOMY,R/L/BRIAN         Family History:    Family History   Problem Relation Age of Onset     Other - See Comments Mother 85        Old age, cause of death     Cancer Father 84        Rectal cancer, cause of death/Stomach cancer, cause of death     C.A.D. Maternal Aunt      C.A.D. Maternal Uncle      Cancer Son         thymic carcinoma       Social History:  Marital Status:   [2]  Social History     Tobacco Use     Smoking status: Former Smoker     Packs/day: 0.50     Years: 3.00     Pack years: 1.50     Types: Cigarettes     Start date: 1950     Last attempt to quit: 3/26/1953     Years since quittin.5     Smokeless tobacco: Never Used     Tobacco comment: Quit in ; no passive smoke exposure   Substance Use Topics     Alcohol use: No     Drug use: No        Medications:    Ascorbic Acid (VITAMIN C PO)  B Complex-Biotin-FA (VITAMIN B50 COMPLEX PO)  Flaxseed, Linseed, (FLAX SEED OIL) 1000 MG capsule  MAGNESIUM OXIDE PO  NONFORMULARY  NONFORMULARY  Omega-3 Fatty Acids (FISH OIL) 1200 MG CAPS  VITAMIN E COMPLEX PO          Review of Systems   Constitutional: Negative for chills and fever.   Respiratory: Negative for cough and shortness of breath.    All other systems reviewed  "and are negative.      Physical Exam   BP: 126/87  Pulse: 88  Temp: 98.4  F (36.9  C)  Resp: 16  Height: 162.6 cm (5' 4\")  Weight: 53.5 kg (118 lb)  SpO2: 98 %      Physical Exam  Vitals signs and nursing note reviewed.   Constitutional:       Appearance: Normal appearance.   HENT:      Head: Normocephalic and atraumatic.      Mouth/Throat:      Mouth: Mucous membranes are moist.   Eyes:      Conjunctiva/sclera: Conjunctivae normal.      Pupils: Pupils are equal, round, and reactive to light.   Pulmonary:      Effort: No respiratory distress.      Breath sounds: No stridor.   Musculoskeletal:         General: Signs of injury present. No swelling or tenderness.   Skin:     General: Skin is warm.      Capillary Refill: Capillary refill takes less than 2 seconds.      Comments: Small skin tear over varicose vein to left anterior shin, 2-5mm in length   Neurological:      General: No focal deficit present.      Mental Status: She is alert and oriented to person, place, and time.   Psychiatric:         Mood and Affect: Mood normal.         Behavior: Behavior normal.         ED Course        [facility]    Wound Care    Date/Time: 9/16/2020 6:29 AM  Performed by: Osvaldo Rich MD  Authorized by: Osvaldo Rich MD       ANESTHESIA (see MAR for exact dosages):     Anesthesia method:  Local infiltration    Local anesthetic:  Lidocaine 1% WITH epi    PROCEDURE DETAILS     Indications: open wounds      Wound exploration location: extremity      Wound age (days):  <1    Debridement level: skin, full thickness      Wound surface area (sq cm):  1    SKIN LAYER CLOSED WITH     Wound care performed: cauterized varicose vein.    DRESSING      Dressing applied:  Vaseline gauze    Wrapped with:  Bulky dressing                   Critical Care time:  none               No results found for this or any previous visit (from the past 24 hour(s)).    Medications   oxidized cellulose (SURGICEL NU-KNIT) 3X4 inch pad (has no " administration in time range)   oxidized cellulose (SURGICEL NU-KNIT) 3X4 inch pad (1 each Topical Given 9/16/20 5427)       Assessments & Plan (with Medical Decision Making)     I have reviewed the nursing notes.    I have reviewed the findings, diagnosis, plan and need for follow up with the patient.   85-year-old female here with bleeding varicose vein, ultimately anesthetized and improved bleeding with light out and epi, attempted to do figure-of-eight suture but entry of needle to skin cause more bleeding, apply direct pressure again and then cauterized varicose vein with complete resolution of bleeding.  Put Vaseline gauze over it and bulky dressing.    New Prescriptions    No medications on file       Final diagnoses:   Bleeding from varicose veins of left lower extremity       9/16/2020   [facility]     Osvaldo Rich MD  09/16/20 1346

## 2020-09-16 NOTE — ED NOTES
Dr Rich at the bedside and attempted to suture the bleeding varicose vein. This was ultimately cauterized with success, no further bleeding noted. Per Dr Rich's instruction, Bacitracin applied to wound, this was covered by a band aide and a pressure dressing over this which was secured in place with an ace wrap. Pt tolerated very well.

## 2020-09-18 NOTE — PROGRESS NOTES
Subjective     Bianka Whitmore is a 85 year old female who presents to clinic today for the following health issues:    HPI         Concern - Varicose Veins  Onset: 09/16/2020 with visit to ER  Description: bruised, veins close to the skin in left lower leg  Intensity: severe  Progression of Symptoms:  same  Accompanying Signs & Symptoms: Went to ER on Thursday, d/t hitting leg on something which caused bleeding  Previous history of similar problem: Had veins cauterized in the same leg  Precipitating factors:        Worsened by: NA  Alleviating factors:        Improved by: NA  Therapies tried and outcome:  none     - history of varicose vein surgery x2, resulting in more varicose veins coming to the surface    Review of Systems   Constitutional: Negative for chills and fever.   HENT: Negative for congestion.    Respiratory: Negative for shortness of breath.    Cardiovascular: Negative for chest pain and palpitations.   Gastrointestinal: Negative for abdominal pain.   Skin: Positive for rash.          Objective    /86 (BP Location: Left arm, Patient Position: Chair, Cuff Size: Adult Regular)   Pulse 85   Temp 97.8  F (36.6  C) (Tympanic)   Resp 18   Wt 54.3 kg (119 lb 12.8 oz)   SpO2 94%   BMI 20.56 kg/m    Body mass index is 20.56 kg/m .  Physical Exam  Constitutional:       General: She is not in acute distress.     Appearance: She is not ill-appearing.   Cardiovascular:      Rate and Rhythm: Normal rate. Rhythm regularly irregular.      Heart sounds: No murmur.      Comments: Atrial flutter  Pulmonary:      Effort: Pulmonary effort is normal. No respiratory distress.      Breath sounds: No wheezing or rales.   Skin:     Comments: Left leg: varicose veins on distal leg with small, well healed ulceration on anterior leg. No s/s of active bleeding or infection. 1+ edema    Right leg: mild varicose veins on distal leg. No signs of bleeding or infection. 1+ edema   Neurological:      Mental Status: She is  alert.        No labs or imaging        Assessment & Plan     Varicose veins of left lower extremity, unspecified whether complicated  Bianka desires definitive treatment of her varicose veins d/t concern for bleeding and she is an active person.   - GENERAL SURG ADULT REFERRAL     See Patient Instructions    Return if symptoms worsen or fail to improve.    Shellie Vargas MD  [facility]

## 2020-09-21 ENCOUNTER — OFFICE VISIT (OUTPATIENT)
Dept: FAMILY MEDICINE | Facility: OTHER | Age: 85
End: 2020-09-21
Attending: FAMILY MEDICINE
Payer: MEDICARE

## 2020-09-21 VITALS
RESPIRATION RATE: 18 BRPM | SYSTOLIC BLOOD PRESSURE: 124 MMHG | DIASTOLIC BLOOD PRESSURE: 86 MMHG | WEIGHT: 119.8 LBS | BODY MASS INDEX: 20.56 KG/M2 | OXYGEN SATURATION: 94 % | TEMPERATURE: 97.8 F | HEART RATE: 85 BPM

## 2020-09-21 DIAGNOSIS — I83.92 VARICOSE VEINS OF LEFT LOWER EXTREMITY, UNSPECIFIED WHETHER COMPLICATED: Primary | ICD-10-CM

## 2020-09-21 PROCEDURE — G0463 HOSPITAL OUTPT CLINIC VISIT: HCPCS

## 2020-09-21 PROCEDURE — 99213 OFFICE O/P EST LOW 20 MIN: CPT | Performed by: FAMILY MEDICINE

## 2020-09-21 ASSESSMENT — ENCOUNTER SYMPTOMS
FEVER: 0
CHILLS: 0
PALPITATIONS: 0
SHORTNESS OF BREATH: 0
ABDOMINAL PAIN: 0

## 2020-09-21 ASSESSMENT — PAIN SCALES - GENERAL: PAINLEVEL: NO PAIN (0)

## 2020-09-21 NOTE — NURSING NOTE
"Chief Complaint   Patient presents with     Varicose Vein       Initial /86 (BP Location: Left arm, Patient Position: Chair, Cuff Size: Adult Regular)   Pulse 85   Temp 97.8  F (36.6  C) (Tympanic)   Resp 18   Wt 54.3 kg (119 lb 12.8 oz)   SpO2 94%   BMI 20.56 kg/m   Estimated body mass index is 20.56 kg/m  as calculated from the following:    Height as of 9/16/20: 1.626 m (5' 4\").    Weight as of this encounter: 54.3 kg (119 lb 12.8 oz).  Medication Reconciliation: complete  Usha Lucas LPN  "

## 2020-10-15 NOTE — PROGRESS NOTES
"Subjective     Bianka Whitmore is a 85 year old female who presents to clinic today for the following health issues:    HPI         Skin Lesion  Onset/Duration: September 2020  Description  Location: vericose vein left lower leg  Color: scabbed over  Border description: states its vericose veins  Character: formes a scab  Itching: no  Bleeding:  YES- she had pulled up her stocking and she states it 1x it bleed like fire hose  Intensity:  mild  Progression of Symptoms:  same  Accompanying signs and symptoms:   Bleeding: no  Scaling: no  Excessive sun exposure/tanning: no  Sunscreen used: no  History:           Any previous history of skin cancer: no  Any family history of melanoma: no  Previous episodes of similar lesion: YES- states has had vericose  Veins in the past same thing occurred last time she had this issue  Precipitating or alleviating factors: none  Therapies tried and outcome: just lotion      Review of Systems   Constitutional, HEENT, cardiovascular, pulmonary, gi and gu systems are negative, except as otherwise noted.      Objective    /84 (BP Location: Left arm, Patient Position: Sitting, Cuff Size: Adult Regular)   Pulse 81   Temp 97.3  F (36.3  C) (Tympanic)   Ht 1.626 m (5' 4\")   Wt 54.8 kg (120 lb 12.8 oz)   SpO2 94%   BMI 20.74 kg/m    Body mass index is 20.74 kg/m .  Physical Exam   GENERAL: healthy, alert and no distress  EYES: Eyes grossly normal to inspection, PERRL and conjunctivae and sclerae normal  HENT: ear canals and TM's normal, nose and mouth without ulcers or lesions  NECK: no adenopathy, no asymmetry, masses, or scars and thyroid normal to palpation  RESP: lungs clear to auscultation - no rales, rhonchi or wheezes  CV: Tachycardia  rates and irregular rhythm, normal S1 S2, no S3 or S4, no murmur, click or rub and has clusters of superficial varicosities and some deep varicosities.  Discoloration on her lower legs but has nice warm feet.   ABDOMEN: soft, nontender, no " hepatosplenomegaly, no masses and bowel sounds normal  MS: no gross musculoskeletal defects noted, no edema  SKIN: as above. No edema wearing compression hose.     No results found for this or any previous visit (from the past 24 hour(s)).        Assessment & Plan     Typical atrial flutter (H)  She is not taking anticoagulation on Garlic and also on olive oil. Not taking any asa.   - CBC with platelets differential  - UA reflex to Microscopic and Culture - [place]  - Basic metabolic panel    Varicose veins of left lower extremity, unspecified whether complicated  bleeding clusters. Wearing support hose. See surgery about what can be done. No one in [place] is addressing varicose veins, See if Dr. La will do this or not.  - CBC with platelets differential  - GENERAL SURG ADULT REFERRAL    Tachycardia  New for her. Rates up a little bit , thinks just nervous with Covid. She is feeling well, no dyspnea. No signs of failure.   - TSH with free T4 reflex        See Patient Instructions    No follow-ups on file.    ORTEGA Caruso  [facility] - [place]

## 2020-10-19 ENCOUNTER — OFFICE VISIT (OUTPATIENT)
Dept: FAMILY MEDICINE | Facility: OTHER | Age: 85
End: 2020-10-19
Attending: PHYSICIAN ASSISTANT
Payer: MEDICARE

## 2020-10-19 VITALS
BODY MASS INDEX: 20.62 KG/M2 | DIASTOLIC BLOOD PRESSURE: 84 MMHG | SYSTOLIC BLOOD PRESSURE: 122 MMHG | HEIGHT: 64 IN | TEMPERATURE: 97.3 F | OXYGEN SATURATION: 94 % | HEART RATE: 81 BPM | WEIGHT: 120.8 LBS

## 2020-10-19 DIAGNOSIS — R00.0 TACHYCARDIA: ICD-10-CM

## 2020-10-19 DIAGNOSIS — I48.3 TYPICAL ATRIAL FLUTTER (H): Primary | ICD-10-CM

## 2020-10-19 DIAGNOSIS — I83.92 VARICOSE VEINS OF LEFT LOWER EXTREMITY, UNSPECIFIED WHETHER COMPLICATED: ICD-10-CM

## 2020-10-19 LAB
ALBUMIN UR-MCNC: NEGATIVE MG/DL
ANION GAP SERPL CALCULATED.3IONS-SCNC: 5 MMOL/L (ref 3–14)
APPEARANCE UR: CLEAR
BASOPHILS # BLD AUTO: 0 10E9/L (ref 0–0.2)
BASOPHILS NFR BLD AUTO: 0.4 %
BILIRUB UR QL STRIP: NEGATIVE
BUN SERPL-MCNC: 24 MG/DL (ref 7–30)
CALCIUM SERPL-MCNC: 9 MG/DL (ref 8.5–10.1)
CHLORIDE SERPL-SCNC: 104 MMOL/L (ref 94–109)
CO2 SERPL-SCNC: 27 MMOL/L (ref 20–32)
COLOR UR AUTO: YELLOW
CREAT SERPL-MCNC: 0.73 MG/DL (ref 0.52–1.04)
DIFFERENTIAL METHOD BLD: ABNORMAL
EOSINOPHIL # BLD AUTO: 0.1 10E9/L (ref 0–0.7)
EOSINOPHIL NFR BLD AUTO: 0.8 %
ERYTHROCYTE [DISTWIDTH] IN BLOOD BY AUTOMATED COUNT: 13.3 % (ref 10–15)
GFR SERPL CREATININE-BSD FRML MDRD: 75 ML/MIN/{1.73_M2}
GLUCOSE SERPL-MCNC: 91 MG/DL (ref 70–99)
GLUCOSE UR STRIP-MCNC: NEGATIVE MG/DL
HCT VFR BLD AUTO: 46.9 % (ref 35–47)
HGB BLD-MCNC: 16 G/DL (ref 11.7–15.7)
HGB UR QL STRIP: NEGATIVE
IMM GRANULOCYTES # BLD: 0 10E9/L (ref 0–0.4)
IMM GRANULOCYTES NFR BLD: 0.3 %
KETONES UR STRIP-MCNC: NEGATIVE MG/DL
LEUKOCYTE ESTERASE UR QL STRIP: NEGATIVE
LYMPHOCYTES # BLD AUTO: 1.9 10E9/L (ref 0.8–5.3)
LYMPHOCYTES NFR BLD AUTO: 26.3 %
MCH RBC QN AUTO: 33.3 PG (ref 26.5–33)
MCHC RBC AUTO-ENTMCNC: 34.1 G/DL (ref 31.5–36.5)
MCV RBC AUTO: 98 FL (ref 78–100)
MONOCYTES # BLD AUTO: 0.5 10E9/L (ref 0–1.3)
MONOCYTES NFR BLD AUTO: 7.6 %
NEUTROPHILS # BLD AUTO: 4.6 10E9/L (ref 1.6–8.3)
NEUTROPHILS NFR BLD AUTO: 64.6 %
NITRATE UR QL: NEGATIVE
NRBC # BLD AUTO: 0 10*3/UL
NRBC BLD AUTO-RTO: 0 /100
PH UR STRIP: 5.5 PH (ref 4.7–8)
PLATELET # BLD AUTO: 167 10E9/L (ref 150–450)
POTASSIUM SERPL-SCNC: 5.1 MMOL/L (ref 3.4–5.3)
RBC # BLD AUTO: 4.81 10E12/L (ref 3.8–5.2)
SODIUM SERPL-SCNC: 136 MMOL/L (ref 133–144)
SOURCE: NORMAL
SP GR UR STRIP: 1.02 (ref 1–1.03)
TSH SERPL DL<=0.005 MIU/L-ACNC: 3.97 MU/L (ref 0.4–4)
UROBILINOGEN UR STRIP-MCNC: NORMAL MG/DL (ref 0–2)
WBC # BLD AUTO: 7.1 10E9/L (ref 4–11)

## 2020-10-19 PROCEDURE — G0463 HOSPITAL OUTPT CLINIC VISIT: HCPCS

## 2020-10-19 PROCEDURE — 99214 OFFICE O/P EST MOD 30 MIN: CPT | Performed by: PHYSICIAN ASSISTANT

## 2020-10-19 PROCEDURE — 80048 BASIC METABOLIC PNL TOTAL CA: CPT | Mod: ZL | Performed by: PHYSICIAN ASSISTANT

## 2020-10-19 PROCEDURE — 84443 ASSAY THYROID STIM HORMONE: CPT | Mod: ZL | Performed by: PHYSICIAN ASSISTANT

## 2020-10-19 PROCEDURE — 36415 COLL VENOUS BLD VENIPUNCTURE: CPT | Mod: ZL | Performed by: PHYSICIAN ASSISTANT

## 2020-10-19 PROCEDURE — 85025 COMPLETE CBC W/AUTO DIFF WBC: CPT | Mod: ZL | Performed by: PHYSICIAN ASSISTANT

## 2020-10-19 PROCEDURE — 81003 URINALYSIS AUTO W/O SCOPE: CPT | Mod: ZL | Performed by: PHYSICIAN ASSISTANT

## 2020-10-19 ASSESSMENT — ANXIETY QUESTIONNAIRES
7. FEELING AFRAID AS IF SOMETHING AWFUL MIGHT HAPPEN: NOT AT ALL
1. FEELING NERVOUS, ANXIOUS, OR ON EDGE: NOT AT ALL
4. TROUBLE RELAXING: NOT AT ALL
3. WORRYING TOO MUCH ABOUT DIFFERENT THINGS: NOT AT ALL
6. BECOMING EASILY ANNOYED OR IRRITABLE: NOT AT ALL
5. BEING SO RESTLESS THAT IT IS HARD TO SIT STILL: NOT AT ALL
2. NOT BEING ABLE TO STOP OR CONTROL WORRYING: NOT AT ALL
GAD7 TOTAL SCORE: 0

## 2020-10-19 ASSESSMENT — PATIENT HEALTH QUESTIONNAIRE - PHQ9: SUM OF ALL RESPONSES TO PHQ QUESTIONS 1-9: 0

## 2020-10-19 ASSESSMENT — MIFFLIN-ST. JEOR: SCORE: 977.95

## 2020-10-19 ASSESSMENT — PAIN SCALES - GENERAL: PAINLEVEL: NO PAIN (0)

## 2020-10-19 NOTE — NURSING NOTE
"Chief Complaint   Patient presents with     vericose vein left leg       Initial /84 (BP Location: Left arm, Patient Position: Sitting, Cuff Size: Adult Regular)   Pulse 81   Temp 97.3  F (36.3  C) (Tympanic)   Ht 1.626 m (5' 4\")   Wt 54.8 kg (120 lb 12.8 oz)   SpO2 94%   BMI 20.74 kg/m   Estimated body mass index is 20.74 kg/m  as calculated from the following:    Height as of this encounter: 1.626 m (5' 4\").    Weight as of this encounter: 54.8 kg (120 lb 12.8 oz).  Medication Reconciliation: complete  Breanne Willson LPN  "

## 2020-10-20 ASSESSMENT — ANXIETY QUESTIONNAIRES: GAD7 TOTAL SCORE: 0

## 2021-03-29 NOTE — PATIENT INSTRUCTIONS
Thank you for allowing Ashley Stephen NP and our ENT team to participate in your care.  If your medications are too expensive, please give the nurse a call.  We can possibly change this medication.  If you have a scheduling or an appointment question please contact our Health Unit Coordinator at their direct line [phone].   ALL nursing questions or concerns can be directed to your ENT nurse at: [phone] Ana M Tubbs    Try to avoid pressure on the right ear, do your best not to sleep on it  Wash the area 2 times per day and apply bacitracin twice daily for 1 week, then use aquaphor twice daily thereafter  Follow up with Dr Onofre for re-evaluation of the right ear.

## 2021-03-31 ENCOUNTER — OFFICE VISIT (OUTPATIENT)
Dept: OTOLARYNGOLOGY | Facility: OTHER | Age: 86
End: 2021-03-31
Attending: NURSE PRACTITIONER
Payer: MEDICARE

## 2021-03-31 VITALS
BODY MASS INDEX: 20.49 KG/M2 | DIASTOLIC BLOOD PRESSURE: 76 MMHG | TEMPERATURE: 97.4 F | WEIGHT: 120 LBS | HEIGHT: 64 IN | HEART RATE: 80 BPM | SYSTOLIC BLOOD PRESSURE: 128 MMHG | OXYGEN SATURATION: 94 %

## 2021-03-31 DIAGNOSIS — H61.91 LESION OF RIGHT EXTERNAL EAR: ICD-10-CM

## 2021-03-31 DIAGNOSIS — H61.22 IMPACTED CERUMEN OF LEFT EAR: Primary | ICD-10-CM

## 2021-03-31 DIAGNOSIS — H61.21 CERUMINOSIS, RIGHT: ICD-10-CM

## 2021-03-31 PROCEDURE — 99212 OFFICE O/P EST SF 10 MIN: CPT | Mod: 25 | Performed by: NURSE PRACTITIONER

## 2021-03-31 PROCEDURE — G0463 HOSPITAL OUTPT CLINIC VISIT: HCPCS

## 2021-03-31 PROCEDURE — 92504 EAR MICROSCOPY EXAMINATION: CPT | Performed by: NURSE PRACTITIONER

## 2021-03-31 ASSESSMENT — PAIN SCALES - GENERAL: PAINLEVEL: NO PAIN (0)

## 2021-03-31 ASSESSMENT — MIFFLIN-ST. JEOR: SCORE: 974.32

## 2021-03-31 NOTE — PROGRESS NOTES
Otolaryngology Note         Chief Complaint:     Patient presents with:  Cerumen Impaction: Ear Cleaning           History of Present Illness:     Bianka Whitmore is a 84 year old female who presents today for ear cleaning.    She last had ears cleaned about 8 months ago.   She concerns with hearing.  She has constant tinnitus, non-pulsatile  She has some mild vertigo when she bends in a certain direction for longer periods of time.  The symptoms last a few seconds happens occassionally.    No facial numbness or weakness.      No otalgia, otorrhea.    No hx of COM or otologic surgeries.     Right ear pinna lesion noted.  She reports it has been irritated off and on since she had biopsy completed in September 2013.  It it tender to touch and has recurrent scabbing and irritation.  She reports she sleeps on her right side.  She is resistant to having another biopsy, but is willing to follow up with Dr Onofre for evaluation.  I advised her a repeat biopsy may be indicated.  She has a history of basal cell carcinoma on her left arm.   History of frequent sun exposure as a child, [place]  No known family history of carcinoma.      Previous biopsy right ear lesion completed 9/17/13    FINAL DIAGNOSIS:   A. Skin, right ear:   1.  Possible benign hemangioma.   2.  Actinic keratosis.          Medications:     Current Outpatient Rx   Medication Sig Dispense Refill     Ascorbic Acid (VITAMIN C PO) Take 500 mg by mouth 2 times daily       B Complex-Biotin-FA (VITAMIN B50 COMPLEX PO) Take 1 tablet by mouth daily       Flaxseed, Linseed, (FLAX SEED OIL) 1000 MG capsule Take 1 capsule by mouth daily       MAGNESIUM OXIDE PO Take by mouth daily        NONFORMULARY 1 tablespoon of chopped fresh garlic       NONFORMULARY Extra virgin olive oil 2 tablespoons  Daily       Omega-3 Fatty Acids (FISH OIL) 1200 MG CAPS Take 1,200 mg by mouth 2 times daily        VITAMIN E COMPLEX PO Take 400 Units by mouth daily              " Allergies:     Allergies: Amoxicillin, Codeine, and Hydrocodone          Past Medical History:     Past Medical History:   Diagnosis Date     Basal cell carcinoma      Cardiomegaly 2007     Compression fracture of thoracic vertebra (H) 10/30/2014     Compression fx, lumbar spine, with routine healing, subsequent encounter 2016     Hyperlipidemia      Osteoporosis      Typical atrial flutter (H) 2016     Varicose vein of leg 2016            Past Surgical History:     Past Surgical History:   Procedure Laterality Date     APPENDECTOMY       COLONOSCOPY N/A 2017    Procedure: COLONOSCOPY;  COLONOSCOPY WITH POLYPECTOMY AND SCLEROTHERAPY;  Surgeon: Matt Gonzalez MD;  Location: [facility] OR     ENDOSCOPIC STRIPPING VEIN(S)       HYSTERECTOMY       SALPINGO OOPHORECTOMY,R/L/BRIAN         ENT family history reviewed         Social History:     Social History     Tobacco Use     Smoking status: Former Smoker     Packs/day: 0.50     Years: 3.00     Pack years: 1.50     Types: Cigarettes     Start date: 1950     Quit date: 3/26/1953     Years since quittin.0     Smokeless tobacco: Never Used     Tobacco comment: Quit in ; no passive smoke exposure   Substance Use Topics     Alcohol use: No     Drug use: No            Review of Systems:     ROS: See HPI         Physical Exam:     /76 (Cuff Size: Adult Regular)   Pulse 80   Temp 97.4  F (36.3  C) (Tympanic)   Ht 1.626 m (5' 4\")   Wt 54.4 kg (120 lb)   SpO2 94%   BMI 20.60 kg/m      General - The patient is well nourished and well developed, and appears to have good nutritional status.  Alert and oriented to person and place, answers questions and cooperates with examination appropriately.   Head and Face - Normocephalic and atraumatic, with no gross asymmetry noted.  The facial nerve is intact, with strong symmetric movements.  Voice and Breathing - The patient was breathing comfortably without the use of accessory muscles. There " was no wheezing, stridor. The patients voice was clear and strong, and had appropriate pitch and quality.  Ears - The ears were examined with binocular microscopy and with otoscope.  Right pinna with minimally raised, mildly erythematous, lesion that is tender to palpation.  It has a central area with healing scab.  NO active drainage.  It is normothermic.  Left external ear normal. Right canal has minimal ceruminosis  Left canal cerumen impacted.  The right ear was cleaned with cupped forceps.   Right tympanic membrane is intact without effusion, retraction or mass. The left ear was cleaned with #7 sucker, cupped forceps.  Left tympanic membrane is intact without effusion, retraction or mass.  Eyes - Extraocular movements intact, sclera were not icteric or injected, conjunctiva were pink and moist.  Mouth - Examination of the oral cavity showed pink, healthy oral mucosa. Dentition in good condition. No lesions or ulcerations noted. The tongue was mobile and midline.   Throat - The walls of the oropharynx were smooth, pink, moist, symmetric, and had no lesions or ulcerations.  The tonsillar pillars and soft palate were symmetric. The uvula was midline on elevation.    Neck - Full range of motion on passive movement.  Palpation of the occipital, submental, submandibular, internal jugular chain, and supraclavicular nodes did not demonstrate any abnormal lymph nodes or masses.  Palpation of the thyroid was soft and smooth, with no nodules or goiter appreciated.  The trachea was mobile and midline.  Nose - External contour is symmetric, no gross deflection or scars.  Nasal mucosa is pink and moist with no abnormal mucus.  The septum and turbinates were evaluated with nasal speculum, no polyps, masses, or purulence noted on examination.         Assessment and Plan:       ICD-10-CM    1. Impacted cerumen of left ear  H61.22    2. Ceruminosis, right  H61.21    3. Lesion of right external ear  H61.91        Try to avoid  pressure on the right ear, do your best not to sleep on it  Wash the area 2 times per day and apply bacitracin twice daily for 1 week, then use aquaphor twice daily thereafter  Follow up with Dr Onofre for re-evaluation of the right ear.      Ears were cleaned, tolerated well  Follow up in 6-8 months for repeat ear cleaning    Avoid flushing the ear canal if there is a possibility of a hole or perforation in the tympanic membrane.   If there are any unresolved concerns regarding hearing loss an audiogram is recommended.      Ashley Stephen NP-C  [facility]

## 2021-03-31 NOTE — NURSING NOTE
"Chief Complaint   Patient presents with     Cerumen Impaction     Ear Cleaning       Initial /76 (Cuff Size: Adult Regular)   Pulse 80   Temp 97.4  F (36.3  C) (Tympanic)   Ht 1.626 m (5' 4\")   Wt 54.4 kg (120 lb)   SpO2 94%   BMI 20.60 kg/m   Estimated body mass index is 20.6 kg/m  as calculated from the following:    Height as of this encounter: 1.626 m (5' 4\").    Weight as of this encounter: 54.4 kg (120 lb).  Medication Reconciliation: complete  Ivana Marcum LPN    "

## 2021-04-03 ENCOUNTER — HOSPITAL ENCOUNTER (EMERGENCY)
Facility: HOSPITAL | Age: 86
Discharge: HOME | End: 2021-04-03
Attending: INTERNAL MEDICINE | Admitting: INTERNAL MEDICINE
Payer: MEDICARE

## 2021-04-03 ENCOUNTER — APPOINTMENT (OUTPATIENT)
Dept: CT IMAGING | Facility: HOSPITAL | Age: 86
End: 2021-04-03
Attending: INTERNAL MEDICINE
Payer: MEDICARE

## 2021-04-03 VITALS
TEMPERATURE: 96.5 F | HEART RATE: 79 BPM | DIASTOLIC BLOOD PRESSURE: 96 MMHG | SYSTOLIC BLOOD PRESSURE: 161 MMHG | RESPIRATION RATE: 16 BRPM | OXYGEN SATURATION: 98 %

## 2021-04-03 DIAGNOSIS — S32.020A COMPRESSION FRACTURE OF L2 VERTEBRA, INITIAL ENCOUNTER (H): ICD-10-CM

## 2021-04-03 DIAGNOSIS — M54.50 ACUTE MIDLINE LOW BACK PAIN, UNSPECIFIED WHETHER SCIATICA PRESENT: ICD-10-CM

## 2021-04-03 PROCEDURE — 99284 EMERGENCY DEPT VISIT MOD MDM: CPT | Mod: 25

## 2021-04-03 PROCEDURE — 72131 CT LUMBAR SPINE W/O DYE: CPT | Mod: ME

## 2021-04-03 PROCEDURE — 99284 EMERGENCY DEPT VISIT MOD MDM: CPT | Performed by: INTERNAL MEDICINE

## 2021-04-03 ASSESSMENT — ENCOUNTER SYMPTOMS
BACK PAIN: 1
DIFFICULTY URINATING: 0
ARTHRALGIAS: 0
FEVER: 0
CONFUSION: 0
PARESTHESIAS: 0
WEAKNESS: 0
ABDOMINAL PAIN: 0
NUMBNESS: 0
COLOR CHANGE: 0
SHORTNESS OF BREATH: 0
TINGLING: 0
NECK STIFFNESS: 0
HEADACHES: 0
EYE REDNESS: 0
LEG PAIN: 0

## 2021-04-03 NOTE — ED TRIAGE NOTES
Low back pain after reaching to adjust the curtains at home, started Wed. Denies pain into the the legs, denies numbness or tingling in the legs.

## 2021-04-03 NOTE — ED NOTES
Provider aware of elevated BP, proceed with discontinue. Pt will monitor BP at home and follow up with primary provider on Monday

## 2021-04-03 NOTE — ED PROVIDER NOTES
History   No chief complaint on file.    The history is provided by the patient.   Back Pain  Location:  Lumbar spine  Quality:  Aching  Radiates to:  Does not radiate  Pain severity:  Moderate  Onset quality:  Gradual  Duration:  4 days  Timing:  Constant  Progression:  Worsening  Chronicity:  New  Context: twisting    Relieved by:  Nothing  Associated symptoms: no abdominal pain, no chest pain, no fever, no headaches, no leg pain, no numbness, no paresthesias, no tingling and no weakness          Allergies:  Allergies   Allergen Reactions     Amoxicillin      Intolerant       Codeine      Hydrocodone Nausea and Vomiting       Problem List:    Patient Active Problem List    Diagnosis Date Noted     Typical atrial flutter (H) 07/25/2016     Priority: Medium     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 yes         Diverticulitis of colon 03/26/2013     Priority: Medium     Osteoarthritis 03/26/2013     Priority: Medium     Osteoporosis/osteopenia increased risk 03/26/2013     Priority: Medium     ACP (advance care planning) 12/06/2012     Priority: Medium     Advance Care Planning 6/22/2016: ACP Review of Chart / Resources Provided:  Reviewed chart for advance care plan.  Bianka SAGAR Whitmore has no plan or code status on file however states presence of ACP document. Copy requested. Confirmed code status reflects current choices pending receipt of document/advance care plan review.  Confirmed/documented legally designated decision makers.  Added by Munira Rodgers                Past Medical History:    Past Medical History:   Diagnosis  Date     Basal cell carcinoma      Cardiomegaly 2007     Compression fracture of thoracic vertebra (H) 10/30/2014     Compression fx, lumbar spine, with routine healing, subsequent encounter 2016     Hyperlipidemia      Osteoporosis      Typical atrial flutter (H) 2016     Varicose vein of leg 2016       Past Surgical History:    Past Surgical History:   Procedure Laterality Date     APPENDECTOMY       COLONOSCOPY N/A 2017    Procedure: COLONOSCOPY;  COLONOSCOPY WITH POLYPECTOMY AND SCLEROTHERAPY;  Surgeon: Matt Gonzalez MD;  Location: [facility]     ENDOSCOPIC STRIPPING VEIN(S)       HYSTERECTOMY       SALPINGO OOPHORECTOMY,R/L/BRIAN         Family History:    Family History   Problem Relation Age of Onset     Other - See Comments Mother 85        Old age, cause of death     Cancer Father 84        Rectal cancer, cause of death/Stomach cancer, cause of death     C.A.D. Maternal Aunt      C.A.D. Maternal Uncle      Cancer Son         thymic carcinoma       Social History:  Marital Status:   [2]  Social History     Tobacco Use     Smoking status: Former Smoker     Packs/day: 0.50     Years: 3.00     Pack years: 1.50     Types: Cigarettes     Start date: 1950     Quit date: 3/26/1953     Years since quittin.0     Smokeless tobacco: Never Used     Tobacco comment: Quit in ; no passive smoke exposure   Substance Use Topics     Alcohol use: No     Drug use: No        Medications:    Ascorbic Acid (VITAMIN C PO)  B Complex-Biotin-FA (VITAMIN B50 COMPLEX PO)  Flaxseed, Linseed, (FLAX SEED OIL) 1000 MG capsule  MAGNESIUM OXIDE PO  NONFORMULARY  NONFORMULARY  Omega-3 Fatty Acids (FISH OIL) 1200 MG CAPS  VITAMIN E COMPLEX PO          Review of Systems   Constitutional: Negative for fever.   HENT: Negative for congestion.    Eyes: Negative for redness.   Respiratory: Negative for shortness of breath.    Cardiovascular: Negative for chest pain.   Gastrointestinal: Negative for  abdominal pain.   Genitourinary: Negative for difficulty urinating.   Musculoskeletal: Positive for back pain. Negative for arthralgias and neck stiffness.   Skin: Negative for color change.   Neurological: Negative for tingling, weakness, numbness, headaches and paresthesias.   Psychiatric/Behavioral: Negative for confusion.   All other systems reviewed and are negative.      Physical Exam   BP: (!) 151/101  Pulse: 79  Temp: 96.5  F (35.8  C)  Resp: 16  SpO2: 95 %      Physical Exam  Constitutional:       General: She is not in acute distress.     Appearance: She is not diaphoretic.   HENT:      Head: Atraumatic.      Mouth/Throat:      Pharynx: No oropharyngeal exudate.   Eyes:      General: No scleral icterus.     Pupils: Pupils are equal, round, and reactive to light.   Cardiovascular:      Rate and Rhythm: Regular rhythm.      Heart sounds: Normal heart sounds.   Pulmonary:      Effort: No respiratory distress.      Breath sounds: Normal breath sounds.   Chest:      Chest wall: No tenderness.   Abdominal:      General: Bowel sounds are normal.      Palpations: Abdomen is soft.      Tenderness: There is no abdominal tenderness.   Musculoskeletal:      Cervical back: She exhibits no tenderness.      Thoracic back: She exhibits no tenderness.      Lumbar back: She exhibits tenderness, pain and spasm.   Skin:     General: Skin is warm.      Findings: No rash.   Neurological:      Mental Status: She is oriented to person, place, and time.         ED Course        Procedures                 No results found for this or any previous visit (from the past 24 hour(s)).    Medications - No data to display    Assessments & Plan (with Medical Decision Making)   Lower back pain after pulling curtain  CT lumbar ordered to rule out compression fx  Refused pain killer at this time  S/o to Dr Ferguson at the change of shift.   I have reviewed the nursing notes.    I have reviewed the findings, diagnosis, plan and need for follow  up with the patient.      New Prescriptions    No medications on file       Final diagnoses:   Acute midline low back pain, unspecified whether sciatica present       4/3/2021   [facility]     Markus Rios MD  04/03/21 [id number]

## 2021-04-03 NOTE — ED PROVIDER NOTES
Patient signed out to me pending CT lumbar spine and plan for discharge home    Has positional back pain    CT lumbar spine showed acute compression fracture of L2. Has chronic compression fractures as well. DIscussed findings with patient. Is ambulatory, recommended follow-up with PCP. She will continue to use over the counter pain medications and try lidocaine patches. Feels that she is ambulating safely at home       Deborah Ferguson MD  04/03/21 [id number]

## 2021-04-07 ENCOUNTER — TELEPHONE (OUTPATIENT)
Dept: FAMILY MEDICINE | Facility: OTHER | Age: 86
End: 2021-04-07

## 2021-04-07 NOTE — TELEPHONE ENCOUNTER
Emergency Department and Urgent Care Follow-up      Reason for ER/UC visit: compression fracture of the spine  o Date seen: 04/03/21      New or Worsening symptoms:  no       Prescription Received/Picked up from Pharmacy?: no   o Medications started? N/a  o Any questions or issues regarding your prescription?: n/a      Follow-up Results or Labs that are pending: everything completed during visit.      Questions or concerns?: no       ER Recommends Follow-up by: ASAP      RN Recommendations: be re-seen in ED for new or worsening symptoms  o Appointment scheduled: 04/08/21    If you start feeling worse, or have any further questions, please feel free to contact Nurse Triage at [phone].  If needing immediate medical attention at any time please call 911/Go to the ER.

## 2021-04-08 ENCOUNTER — OFFICE VISIT (OUTPATIENT)
Dept: FAMILY MEDICINE | Facility: OTHER | Age: 86
End: 2021-04-08
Attending: PHYSICIAN ASSISTANT
Payer: MEDICARE

## 2021-04-08 VITALS
TEMPERATURE: 98 F | WEIGHT: 120 LBS | SYSTOLIC BLOOD PRESSURE: 136 MMHG | HEART RATE: 84 BPM | BODY MASS INDEX: 20.6 KG/M2 | OXYGEN SATURATION: 98 % | DIASTOLIC BLOOD PRESSURE: 88 MMHG | RESPIRATION RATE: 16 BRPM

## 2021-04-08 DIAGNOSIS — E55.9 VITAMIN D DEFICIENCY, UNSPECIFIED: ICD-10-CM

## 2021-04-08 DIAGNOSIS — M48.50XD PATHOLOGICAL COMPRESSION FRACTURE OF VERTEBRA WITH ROUTINE HEALING, SUBSEQUENT ENCOUNTER: Primary | ICD-10-CM

## 2021-04-08 DIAGNOSIS — I48.3 TYPICAL ATRIAL FLUTTER (H): ICD-10-CM

## 2021-04-08 LAB
ALBUMIN SERPL-MCNC: 3.7 G/DL (ref 3.4–5)
ALP SERPL-CCNC: 89 U/L (ref 40–150)
ALT SERPL W P-5'-P-CCNC: 27 U/L (ref 0–50)
ANION GAP SERPL CALCULATED.3IONS-SCNC: 7 MMOL/L (ref 3–14)
AST SERPL W P-5'-P-CCNC: 23 U/L (ref 0–45)
BILIRUB SERPL-MCNC: 0.7 MG/DL (ref 0.2–1.3)
BUN SERPL-MCNC: 32 MG/DL (ref 7–30)
CALCIUM SERPL-MCNC: 9.4 MG/DL (ref 8.5–10.1)
CHLORIDE SERPL-SCNC: 101 MMOL/L (ref 94–109)
CO2 SERPL-SCNC: 24 MMOL/L (ref 20–32)
CREAT SERPL-MCNC: 0.73 MG/DL (ref 0.52–1.04)
GFR SERPL CREATININE-BSD FRML MDRD: 75 ML/MIN/{1.73_M2}
GLUCOSE SERPL-MCNC: 88 MG/DL (ref 70–99)
POTASSIUM SERPL-SCNC: 4.7 MMOL/L (ref 3.4–5.3)
PROT SERPL-MCNC: 7.4 G/DL (ref 6.8–8.8)
SODIUM SERPL-SCNC: 132 MMOL/L (ref 133–144)

## 2021-04-08 PROCEDURE — 36415 COLL VENOUS BLD VENIPUNCTURE: CPT | Mod: ZL | Performed by: PHYSICIAN ASSISTANT

## 2021-04-08 PROCEDURE — 80053 COMPREHEN METABOLIC PANEL: CPT | Mod: ZL | Performed by: PHYSICIAN ASSISTANT

## 2021-04-08 PROCEDURE — G0463 HOSPITAL OUTPT CLINIC VISIT: HCPCS

## 2021-04-08 PROCEDURE — 99214 OFFICE O/P EST MOD 30 MIN: CPT | Performed by: PHYSICIAN ASSISTANT

## 2021-04-08 PROCEDURE — 82306 VITAMIN D 25 HYDROXY: CPT | Mod: ZL | Performed by: PHYSICIAN ASSISTANT

## 2021-04-08 ASSESSMENT — PAIN SCALES - GENERAL: PAINLEVEL: MODERATE PAIN (5)

## 2021-04-08 NOTE — NURSING NOTE
"Chief Complaint   Patient presents with     Hospital F/U       Initial /88   Pulse 84   Temp 98  F (36.7  C)   Resp 16   Wt 54.4 kg (120 lb)   SpO2 98%   BMI 20.60 kg/m   Estimated body mass index is 20.6 kg/m  as calculated from the following:    Height as of 3/31/21: 1.626 m (5' 4\").    Weight as of this encounter: 54.4 kg (120 lb).  Medication Reconciliation: zora Solis  "

## 2021-04-08 NOTE — PROGRESS NOTES
Fei Carlton is a 85 year old who presents for the following health issues  accompanied by her spouse:    [facility]       Hospital Follow-up Visit:    Hospital/Nursing Home/IP Rehab Facility: [facility]  Date of Admission: 4/3/2021  Date of Discharge: 4/3/2021  Reason(s) for Admission: Back pain - Lumbar Fx      Was your hospitalization related to COVID-19? No   Problems taking medications regularly:  None  Medication changes since discharge: None  Problems adhering to non-medication therapy:  None    Summary of hospitalization:  [facility] discharge summary reviewed  Diagnostic Tests/Treatments reviewed.  Follow up needed: none  Other Healthcare Providers Involved in Patient s Care:         None  Update since discharge: stable. Post Discharge Medication Reconciliation: discharge medications reconciled, continue medications without change.  Plan of care communicated with patient              Review of Systems   CONSTITUTIONAL: NEGATIVE for fever, chills, change in weight  INTEGUMENTARY/SKIN: NEGATIVE for worrisome rashes, moles or lesions  ENT/MOUTH: NEGATIVE for ear, mouth and throat problems  RESP: NEGATIVE for significant cough or SOB  CV: NEGATIVE for chest pain, palpitations or peripheral edema  GI: NEGATIVE for nausea, abdominal pain, heartburn, or change in bowel habits  : NEGATIVE for frequency, dysuria, or hematuria  MUSCULOSKELETAL:recent pathologic fracture has multiple fractures in her spine. Declined to take anything for her bones or bone density.   NEURO: deny any weakness in her legs.   ENDOCRINE: NEGATIVE for temperature intolerance, skin/hair changes  HEME: NEGATIVE for bleeding problems  PSYCHIATRIC: NEGATIVE for changes in mood or affect      Objective    /88   Pulse 84   Temp 98  F (36.7  C)   Resp 16   Wt 54.4 kg (120 lb)   SpO2 98%   BMI 20.60 kg/m    Body mass index is 20.6 kg/m .  Physical Exam   GENERAL: healthy, alert and no distress  RESP: lungs  clear to auscultation - no rales, rhonchi or wheezes  CV: It regular rate and rhythm, normal S1 S2, no S3 or S4, no murmur, click or rub, no peripheral edema and peripheral pulses strong, rate is slightly elevated.   ABDOMEN: belly is bloated., bowel sounds are active.    MS: lower back is painful. Stiff movements. Good balance. No leg weakness or instability. Pain in the small of her back.   NEURO: Normal strength and tone, mentation intact and speech normal    No results found for any visits on 04/08/21.  Lumbar Spine Ct W/o Contrast    Result Date: 4/3/2021  CT LUMBAR SPINE W/O CONTRAST HISTORY: Compression fracture, L-spine . COMPARISON: MR lumbar spine 11/6/2017, radiographs to 518. TECHNIQUE: Helical noncontrast CT images of the lumbar spine. FINDINGS: Osteoporosis. There is mild superior endplate height loss at L2 associated with subchondral lucency, compatible with an acute fracture. Additional fractures at T12, L1, L3 and L4 are chronic. Associated retropulsion at each fracture level is minimal. No severe spinal stenosis is present. Diffuse facet degeneration is present. Bilateral SI joint degeneration is seen.     IMPRESSION: Osteoporosis. Acute osteoporotic pathologic fracture of L2 associated with mild height loss.  FLORA JEAN-BAPTISTE MD      1. Pathological compression fracture of vertebra with routine healing, subsequent encounter  She is not willing to take any bisphosphonate's calcium and vit d are taken.    - Vitamin D Deficiency  - Comprehensive metabolic panel (BMP + Alb, Alk Phos, ALT, AST, Total. Bili, TP)    2. Vitamin D deficiency, unspecified   Taking large amounts of supplements.   - Vitamin D Deficiency      3. Typical atrial flutter (H)  Rate is a bit rapid. She is not willing to take anything for this. Won't take anything like a betablocker or coumadin, or Xarelto or Eliquis.

## 2021-04-08 NOTE — LETTER
April 13, 2021      Bianka SAGAR Haim  [address]  [place] [address]        Dear ,    We are writing to inform you of your test results.    Your test results fall within the expected range(s) or remain unchanged from previous results.  Please continue with current treatment plan.    Resulted Orders   Vitamin D Deficiency   Result Value Ref Range    Vitamin D Deficiency screening 51 20 - 75 ug/L      Comment:      Season, race, dietary intake, and treatment affect the concentration of   25-hydroxy-Vitamin D. Values may decrease during winter months and increase   during summer months. Values 20-29 ug/L may indicate Vitamin D insufficiency   and values <20 ug/L may indicate Vitamin D deficiency.  Vitamin D determination is routinely performed by an immunoassay specific for   25 hydroxyvitamin D3.  If an individual is on vitamin D2 (ergocalciferol)   supplementation, please specify 25 OH vitamin D2 and D3 level determination by   LCMSMS test VITD23.     Comprehensive metabolic panel (BMP + Alb, Alk Phos, ALT, AST, Total. Bili, TP)   Result Value Ref Range    Sodium 132 (L) 133 - 144 mmol/L    Potassium 4.7 3.4 - 5.3 mmol/L    Chloride 101 94 - 109 mmol/L    Carbon Dioxide 24 20 - 32 mmol/L    Anion Gap 7 3 - 14 mmol/L    Glucose 88 70 - 99 mg/dL    Urea Nitrogen 32 (H) 7 - 30 mg/dL    Creatinine 0.73 0.52 - 1.04 mg/dL    GFR Estimate 75 >60 mL/min/[1.73_m2]      Comment:      Non  GFR Calc  Starting 12/18/2018, serum creatinine based estimated GFR (eGFR) will be   calculated using the Chronic Kidney Disease Epidemiology Collaboration   (CKD-EPI) equation.      GFR Estimate If Black 87 >60 mL/min/[1.73_m2]      Comment:       GFR Calc  Starting 12/18/2018, serum creatinine based estimated GFR (eGFR) will be   calculated using the Chronic Kidney Disease Epidemiology Collaboration   (CKD-EPI) equation.      Calcium 9.4 8.5 - 10.1 mg/dL    Bilirubin Total 0.7 0.2 - 1.3 mg/dL     Albumin 3.7 3.4 - 5.0 g/dL    Protein Total 7.4 6.8 - 8.8 g/dL    Alkaline Phosphatase 89 40 - 150 U/L    ALT 27 0 - 50 U/L    AST 23 0 - 45 U/L       If you have any questions or concerns, please call the clinic at the number listed above.       Sincerely,      ORTEGA Rivas

## 2021-04-10 LAB — DEPRECATED CALCIDIOL+CALCIFEROL SERPL-MC: 51 UG/L (ref 20–75)

## 2021-05-01 ENCOUNTER — HOSPITAL ENCOUNTER (EMERGENCY)
Facility: HOSPITAL | Age: 86
Discharge: HOME | End: 2021-05-01
Attending: NURSE PRACTITIONER | Admitting: NURSE PRACTITIONER
Payer: MEDICARE

## 2021-05-01 ENCOUNTER — APPOINTMENT (OUTPATIENT)
Dept: GENERAL RADIOLOGY | Facility: HOSPITAL | Age: 86
End: 2021-05-01
Attending: NURSE PRACTITIONER
Payer: MEDICARE

## 2021-05-01 VITALS
TEMPERATURE: 98.5 F | BODY MASS INDEX: 20.6 KG/M2 | OXYGEN SATURATION: 97 % | RESPIRATION RATE: 18 BRPM | HEART RATE: 80 BPM | WEIGHT: 120 LBS | SYSTOLIC BLOOD PRESSURE: 153 MMHG | DIASTOLIC BLOOD PRESSURE: 93 MMHG

## 2021-05-01 DIAGNOSIS — M25.551 HIP PAIN, RIGHT: Primary | ICD-10-CM

## 2021-05-01 PROCEDURE — 99213 OFFICE O/P EST LOW 20 MIN: CPT | Performed by: NURSE PRACTITIONER

## 2021-05-01 PROCEDURE — G0463 HOSPITAL OUTPT CLINIC VISIT: HCPCS

## 2021-05-01 PROCEDURE — 73502 X-RAY EXAM HIP UNI 2-3 VIEWS: CPT

## 2021-05-01 ASSESSMENT — ENCOUNTER SYMPTOMS
BACK PAIN: 0
ARTHRALGIAS: 1
MYALGIAS: 1
WOUND: 0
PSYCHIATRIC NEGATIVE: 1

## 2021-05-01 NOTE — ED TRIAGE NOTES
Pt presents with her  and states that she fell today and landed on her right hip and has pain there and also to her right upper leg. Rates pain at 6-7 and describes it as a sore aching type pain. Took Ibuprofen at 1100 and Tylenol at 1300 today.

## 2021-05-01 NOTE — ED TRIAGE NOTES
"\"Here for having a fall at 1000 this morning.  I fell on some boards outside on my right side.  My right hip is tender and sore.  I am able to walk independently.\"  "

## 2021-05-01 NOTE — DISCHARGE INSTRUCTIONS
Schedule appointment with primary care provider to follow up regarding fall and right hip pain.      - After initial injury you can try heat for comfort if this feels better than ice  - Topical anesthetic such as Biofreeze, Bengay, Icy Hot, Lidocaine, others.     Return to urgent care/ED with any worsening in condition or additional concerns.

## 2021-05-01 NOTE — ED PROVIDER NOTES
History     Chief Complaint   Patient presents with     Fall     HPI  Bianka Whitmore is a 85 year old female who presents to urgent care today (accompanied by spouse) for complaints of right hip pain after a fall today at 1000.  Patient fell on a board that they use to move things in and out of the shed.  Denies hitting head or LOC.  Ibuprofen at 1100 and tylenol at 1300, which was helpful.  No previous right hip fractures, dislocations or surgeries.  Able to ambulate without difficulty.  Denies any lower back pain as patient was seen 4/3/2021 in ED for compression fracture of L2 vertebra.  Denies any other injuries.  Patient states main concern today is to just rule out a hip fracture.  No other concerns.      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 yes         Diverticulitis of colon 03/26/2013     Priority: Medium     Osteoarthritis 03/26/2013     Priority: Medium     Osteoporosis/osteopenia increased risk 03/26/2013     Priority: Medium     ACP (advance care planning) 12/06/2012     Priority: Medium     Advance Care Planning 6/22/2016: ACP Review of Chart / Resources Provided:  Reviewed chart for advance care plan.  Bianka Whitmore has no plan or code status on file however states presence of ACP document. Copy requested. Confirmed code status reflects current  choices pending receipt of document/advance care plan review.  Confirmed/documented legally designated decision makers.  Added by Munira Rodgers                Past Medical History:    Past Medical History:   Diagnosis Date     Basal cell carcinoma      Cardiomegaly 2007     Compression fracture of thoracic vertebra (H) 10/30/2014     Compression fx, lumbar spine, with routine healing, subsequent encounter 2016     Hyperlipidemia      Osteoporosis      Typical atrial flutter (H) 2016     Varicose vein of leg 2016       Past Surgical History:    Past Surgical History:   Procedure Laterality Date     APPENDECTOMY       COLONOSCOPY N/A 2017    Procedure: COLONOSCOPY;  COLONOSCOPY WITH POLYPECTOMY AND SCLEROTHERAPY;  Surgeon: Matt Gonzalez MD;  Location: [facility]     ENDOSCOPIC STRIPPING VEIN(S)       HYSTERECTOMY       SALPINGO OOPHORECTOMY,R/L/BRIAN         Family History:    Family History   Problem Relation Age of Onset     Other - See Comments Mother 85        Old age, cause of death     Cancer Father 84        Rectal cancer, cause of death/Stomach cancer, cause of death     C.A.D. Maternal Aunt      C.A.D. Maternal Uncle      Cancer Son         thymic carcinoma       Social History:  Marital Status:   [2]  Social History     Tobacco Use     Smoking status: Former Smoker     Packs/day: 0.50     Years: 3.00     Pack years: 1.50     Types: Cigarettes     Start date: 1950     Quit date: 3/26/1953     Years since quittin.1     Smokeless tobacco: Never Used     Tobacco comment: Quit in ; no passive smoke exposure   Substance Use Topics     Alcohol use: No     Drug use: No        Medications:    Ascorbic Acid (VITAMIN C PO)  B Complex-Biotin-FA (VITAMIN B50 COMPLEX PO)  Flaxseed, Linseed, (FLAX SEED OIL) 1000 MG capsule  MAGNESIUM OXIDE PO  NONFORMULARY  NONFORMULARY  Omega-3 Fatty Acids (FISH OIL) 1200 MG CAPS  VITAMIN E COMPLEX PO      Review of Systems    Musculoskeletal: Positive for arthralgias and myalgias. Negative for back pain and gait problem.   Skin: Negative for wound.   Psychiatric/Behavioral: Negative.      Physical Exam   BP: 153/93  Pulse: 80  Temp: 98.5  F (36.9  C)  Resp: 18  Weight: 54.4 kg (120 lb)  SpO2: 97 %    Physical Exam  Vitals signs and nursing note reviewed.   Constitutional:       General: She is not in acute distress.     Appearance: She is not ill-appearing.   Cardiovascular:      Rate and Rhythm: Normal rate and regular rhythm.      Pulses: Normal pulses.      Heart sounds: Normal heart sounds.   Pulmonary:      Effort: Pulmonary effort is normal.      Breath sounds: Normal breath sounds.   Musculoskeletal:      Right hip: She exhibits normal range of motion, normal strength, no tenderness, no swelling and no deformity.      Comments: PHYSICAL EXAMINATION:  General Hip Exam: no signs of deformity. Skin intact, no signs of skin changes and/or previous trauma to the hip. Leg length appears to be equal bilaterally.     Gait: Patient is able to rise from a seated position. Patient is able to bear weight. No signs of compensation/guarding in gait.    Neurovascular exam: distal pulses and sensation intact.      Skin:     General: Skin is warm.      Capillary Refill: Capillary refill takes less than 2 seconds.   Neurological:      Mental Status: She is alert.   Psychiatric:         Mood and Affect: Mood normal.       ED Course     Results for orders placed or performed during the hospital encounter of 05/01/21 (from the past 24 hour(s))   XR Pelvis including Hip Right 2-3 Views    Narrative    PROCEDURE: XR PELVIS AND HIP RIGHT 2 VIEWS 5/1/2021 4:15 PM    HISTORY: Right hip pain post fall    COMPARISONS: None.    TECHNIQUE: Pelvis one view, right hip 2 views,    FINDINGS: Pelvis: The pelvis is intact. The sacrum and sacroiliac  joints appear normal. The left hip joint and left proximal femur  appears normal. There are calcifications in the  right side of the  abdomen which appear to be gallstones. Heavy atherosclerotic  calcifications are seen in the abdominal aorta and iliac vessels.    Right hip 2 views: The acetabulum and proximal femur on the right  appears normal. Articular spaces are normal in height. Surgical staple  line is seen overlying the pelvis.         Impression    IMPRESSION: No pelvic or right hip fracture.    MEMO DIAZ MD       Medications - No data to display    Assessments & Plan (with Medical Decision Making)     I have reviewed the nursing notes.    I have reviewed the findings, diagnosis, plan and need for follow up with the patient.  (M25.551) Hip pain, right  (primary encounter diagnosis)  Plan:   -Schedule appointment with primary care provider to follow up regarding fall and right hip pain.    - After initial injury you can try heat for comfort if this feels better than ice  - Topical anesthetic such as Biofreeze, Bengay, Icy Hot, Lidocaine, others.     Return to urgent care/ED with any worsening in condition or additional concerns.    Patient in agreement with treatment plan.     New Prescriptions    No medications on file     Final diagnoses:   Hip pain, right     5/1/2021   [facility]     Leti Joyce NP  05/01/21 [id number]

## 2021-05-02 NOTE — PROGRESS NOTES
"Otolaryngology Progress Note          Bianka Whitmore is a 85 year old female  Follow-up of her right pinna.  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  She notes frequent scabbing and occasional bleeding of the skin lesion  She has a hx of BCCA arm  No solid organ transplant  No tobacco  No immune deficiency      Physical Exam  /82 (BP Location: Right arm, Cuff Size: Adult Regular)   Pulse 82   Temp 97.8  F (36.6  C) (Tympanic)   Ht 1.575 m (5' 2\")   Wt 54.4 kg (120 lb)   SpO2 95%   BMI 21.95 kg/m    General - The patient is well nourished and well developed, and appears to have good nutritional status.  Alert and oriented to person and place, interactive.  Head and Face - Normocephalic and atraumatic, with no gross asymmetry noted of the contour of the facial features.  The facial nerve is intact upper face, with strong symmetric movements, wearing mask.  Neck-no palpable lymphadenopathy or parotid mass, no palpable fixed thyroid mass over 4 cm.  Trachea is midline.  Ears- right antihelix with raised irregular scaly lesion measuring 1.6 cm  External auditory canal patent     Office Procedure:   I discussed the risks and complications of skin lesion excision, including local anesthesia, bleeding, infection, injury to the facial nerve, scar formation, hypertrophic healing or keloid, numbness to area, recurrence of lesion, benign versus malignant pathology and possible need for further surgery. All questions were answered.    After informed consent was discussed and a time- out taken, I proceeded to cleanse the skin around the lesion with alcohol.  I then demarcated the lesion parallel to relaxed skin tension " lines in a natural crease.  The area was prepped and draped in the normal sterile fashion.  I then infiltrated the skin with 1% lidocaine with 1:200,000 epinephrine.  I  used a 15-blade to create an elliptical incision around the lesion  at the right antihelix, with margins of 2 mm of grossly normal skin.  I then elevated the skin ellipse and a thin cuff of underlying subdermal fat with curved iris scissors.  I dissected down through normal subcutaneous tissue for complete removal of the lesion.  It was marked at 12:00 and sent to pathology  Hemostasis was achieved with direct pressure and hand held cautery. I then irrigated the wound and gently suctioned the area.  I advanced the adjacent skin for tension free closure using tenotomy scissors.  The total length of the incision was  2.2 cm.  I then proceeded to close the incision by using 5-0 nylon, simple interrupted sutures.  Antibiotic ointment was then applied and the wound was dressed.  The patient tolerated the procedure without any difficulty.    Impression/Plan    ICD-10-CM    1. Skin lesion of right external ear  H61.91    2. 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  This was discussed today.    Further procedures may include skin excision with frozens and flap repair.    The risks of surgery were discussed, if further surgery is necessary.  These include but are not limited to anesthesia, scar or keloid formation, infection, flap failure, change in appearance of surrounding facial structures,  numbness to the skin, injury to the facial nerve with permanent facial weakness which is exceedingly rare but possible.  Temporary weakness to the face may occur with the use of local anesthesia.    The patient expressed understanding.  All questions were answered.  Photos were taken.      I have instructed the  patient on wound care and signs of infection.  Written instructions provided.  We will contact the patient with pathology results.    Sunscreen use and skin cancer preventive measures were reinforced        Monet Onofre D.O.  Otolaryngology/Head and Neck Surgery  Allergy

## 2021-05-03 ENCOUNTER — OFFICE VISIT (OUTPATIENT)
Dept: OTOLARYNGOLOGY | Facility: OTHER | Age: 86
End: 2021-05-03
Attending: OTOLARYNGOLOGY
Payer: MEDICARE

## 2021-05-03 VITALS
BODY MASS INDEX: 22.08 KG/M2 | DIASTOLIC BLOOD PRESSURE: 82 MMHG | HEIGHT: 62 IN | HEART RATE: 82 BPM | SYSTOLIC BLOOD PRESSURE: 120 MMHG | TEMPERATURE: 97.8 F | OXYGEN SATURATION: 95 % | WEIGHT: 120 LBS

## 2021-05-03 DIAGNOSIS — H61.91 SKIN LESION OF RIGHT EXTERNAL EAR: Primary | ICD-10-CM

## 2021-05-03 DIAGNOSIS — Z85.828 HISTORY OF BASAL CELL CARCINOMA: ICD-10-CM

## 2021-05-03 PROCEDURE — G0463 HOSPITAL OUTPT CLINIC VISIT: HCPCS

## 2021-05-03 PROCEDURE — 99213 OFFICE O/P EST LOW 20 MIN: CPT | Mod: 25 | Performed by: OTOLARYNGOLOGY

## 2021-05-03 PROCEDURE — 11443 EXC FACE-MM B9+MARG 2.1-3 CM: CPT | Performed by: OTOLARYNGOLOGY

## 2021-05-03 PROCEDURE — 88305 TISSUE EXAM BY PATHOLOGIST: CPT | Mod: TC | Performed by: OTOLARYNGOLOGY

## 2021-05-03 ASSESSMENT — PAIN SCALES - GENERAL: PAINLEVEL: NO PAIN (0)

## 2021-05-03 ASSESSMENT — MIFFLIN-ST. JEOR: SCORE: 942.57

## 2021-05-03 NOTE — NURSING NOTE
"Chief Complaint   Patient presents with     RECHECK     Follow Up Lesion of Right External Ear       Initial /82 (BP Location: Right arm, Cuff Size: Adult Regular)   Pulse 82   Temp 97.8  F (36.6  C) (Tympanic)   Ht 1.575 m (5' 2\")   Wt 54.4 kg (120 lb)   SpO2 95%   BMI 21.95 kg/m   Estimated body mass index is 21.95 kg/m  as calculated from the following:    Height as of this encounter: 1.575 m (5' 2\").    Weight as of this encounter: 54.4 kg (120 lb).  Medication Reconciliation: complete  Rut Abdi LPN    "

## 2021-05-03 NOTE — LETTER
"    5/3/2021         RE: Bianka Whitmore  [address]  [place] [address]        Dear Colleague,    Thank you for referring your patient, Bianka Whitmore, to the [facility]. Please see a copy of my visit note below.    Otolaryngology Progress Note          Bianka Whitmroe is a 85 year old female  Follow-up of her right pinna.  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  She notes frequent scabbing and occasional bleeding of the skin lesion  She has a hx of BCCA arm  No solid organ transplant  No tobacco  No immune deficiency      Physical Exam  /82 (BP Location: Right arm, Cuff Size: Adult Regular)   Pulse 82   Temp 97.8  F (36.6  C) (Tympanic)   Ht 1.575 m (5' 2\")   Wt 54.4 kg (120 lb)   SpO2 95%   BMI 21.95 kg/m    General - The patient is well nourished and well developed, and appears to have good nutritional status.  Alert and oriented to person and place, interactive.  Head and Face - Normocephalic and atraumatic, with no gross asymmetry noted of the contour of the facial features.  The facial nerve is intact upper face, with strong symmetric movements, wearing mask.  Neck-no palpable lymphadenopathy or parotid mass, no palpable fixed thyroid mass over 4 cm.  Trachea is midline.  Ears- right antihelix with raised irregular scaly lesion measuring 1.6 cm  External auditory canal patent     Office Procedure:   I discussed the risks and complications of skin lesion excision, including local anesthesia, bleeding, infection, injury to the facial nerve, scar formation, hypertrophic healing or keloid, numbness to area, recurrence of lesion, benign versus malignant pathology and possible " need for further surgery. All questions were answered.    After informed consent was discussed and a time- out taken, I proceeded to cleanse the skin around the lesion with alcohol.  I then demarcated the lesion parallel to relaxed skin tension lines in a natural crease.  The area was prepped and draped in the normal sterile fashion.  I then infiltrated the skin with 1% lidocaine with 1:200,000 epinephrine.  I  used a 15-blade to create an elliptical incision around the lesion  at the right antihelix, with margins of 2 mm of grossly normal skin.  I then elevated the skin ellipse and a thin cuff of underlying subdermal fat with curved iris scissors.  I dissected down through normal subcutaneous tissue for complete removal of the lesion.  It was marked at 12:00 and sent to pathology  Hemostasis was achieved with direct pressure and hand held cautery. I then irrigated the wound and gently suctioned the area.  I advanced the adjacent skin for tension free closure using tenotomy scissors.  The total length of the incision was  2.2 cm.  I then proceeded to close the incision by using 5-0 nylon, simple interrupted sutures.  Antibiotic ointment was then applied and the wound was dressed.  The patient tolerated the procedure without any difficulty.    Impression/Plan    ICD-10-CM    1. Skin lesion of right external ear  H61.91    2. 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  This was discussed today.    Further procedures may include skin excision with frozens and flap repair.    The risks of surgery were discussed, if further surgery is necessary.  These include but are not limited to anesthesia, scar or keloid formation, infection, flap failure, change in appearance of surrounding facial structures,  numbness to the skin, injury to the facial nerve with permanent facial  weakness which is exceedingly rare but possible.  Temporary weakness to the face may occur with the use of local anesthesia.    The patient expressed understanding.  All questions were answered.  Photos were taken.      I have instructed the patient on wound care and signs of infection.  Written instructions provided.  We will contact the patient with pathology results.    Sunscreen use and skin cancer preventive measures were reinforced        Monet Onofre D.O.  Otolaryngology/Head and Neck Surgery  Allergy              Again, thank you for allowing me to participate in the care of your patient.        Sincerely,        Monet Onofre MD

## 2021-05-03 NOTE — PATIENT INSTRUCTIONS
Thank you for allowing Dr. Onofre and our ENT team to participate in your care.  If your medications are too expensive, please give the nurse a call.  We can possibly change this medication.  If you have a scheduling or an appointment question please contact our Health Unit Coordinator at their direct line [phone] ext [phone].   ALL nursing questions or concerns can be directed to your ENT nurse at: [phone] - Ivana    Postoperative Instructions for Wound Care:    After Surgery:   1. Clean the wound with a gentle skin cleanser (ex: Cetaphil) three times a day    2. Apply antibiotic ointment (Bacitracin) to the wound three times a day for 2 weeks    3. Cover the wound with a sterile dressing (if advised)      6 Weeks from the date of surgery:    1. Apply any type of lotion to the suture site    2. Massage the surgical area 1-2 times daily in a circular motion for 5 minutes, for a period of 2 months. This will help the scar to heal better

## 2021-05-06 LAB — COPATH REPORT: NORMAL

## 2021-05-10 ENCOUNTER — OFFICE VISIT (OUTPATIENT)
Dept: FAMILY MEDICINE | Facility: OTHER | Age: 86
End: 2021-05-10
Attending: PHYSICIAN ASSISTANT
Payer: MEDICARE

## 2021-05-10 VITALS
SYSTOLIC BLOOD PRESSURE: 122 MMHG | HEIGHT: 62 IN | HEART RATE: 86 BPM | TEMPERATURE: 97.7 F | OXYGEN SATURATION: 91 % | WEIGHT: 120 LBS | DIASTOLIC BLOOD PRESSURE: 86 MMHG | BODY MASS INDEX: 22.08 KG/M2

## 2021-05-10 DIAGNOSIS — M25.551 RIGHT HIP PAIN: ICD-10-CM
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
## 2021-05-10 PROCEDURE — G0463 HOSPITAL OUTPT CLINIC VISIT: HCPCS

## 2021-05-10 PROCEDURE — 99214 OFFICE O/P EST MOD 30 MIN: CPT | Performed by: PHYSICIAN ASSISTANT

## 2021-05-10 RX ORDER — CELECOXIB 100 MG/1
100 CAPSULE ORAL 2 TIMES DAILY
Qty: 60 CAPSULE | Refills: 3 | Status: SHIPPED | OUTPATIENT
Start: 2021-05-10 | End: 2021-05-14

## 2021-05-10 ASSESSMENT — MIFFLIN-ST. JEOR: SCORE: 942.57

## 2021-05-10 ASSESSMENT — ANXIETY QUESTIONNAIRES
5. BEING SO RESTLESS THAT IT IS HARD TO SIT STILL: NOT AT ALL
6. BECOMING EASILY ANNOYED OR IRRITABLE: NOT AT ALL
3. WORRYING TOO MUCH ABOUT DIFFERENT THINGS: NOT AT ALL
2. NOT BEING ABLE TO STOP OR CONTROL WORRYING: NOT AT ALL
7. FEELING AFRAID AS IF SOMETHING AWFUL MIGHT HAPPEN: NOT AT ALL
GAD7 TOTAL SCORE: 0
1. FEELING NERVOUS, ANXIOUS, OR ON EDGE: NOT AT ALL
4. TROUBLE RELAXING: NOT AT ALL

## 2021-05-10 ASSESSMENT — PATIENT HEALTH QUESTIONNAIRE - PHQ9: SUM OF ALL RESPONSES TO PHQ QUESTIONS 1-9: 0

## 2021-05-10 NOTE — NURSING NOTE
"Chief Complaint   Patient presents with     follow up on lumbar fracture       Initial Blood Pressure 122/86 (BP Location: Left arm, Patient Position: Sitting, Cuff Size: Adult Regular)   Pulse 86   Temperature 97.7  F (36.5  C) (Tympanic)   Height 1.575 m (5' 2\")   Weight 54.4 kg (120 lb)   Oxygen Saturation 91%   Body Mass Index 21.95 kg/m   Estimated body mass index is 21.95 kg/m  as calculated from the following:    Height as of this encounter: 1.575 m (5' 2\").    Weight as of this encounter: 54.4 kg (120 lb).  Medication Reconciliation: complete  Breanne Willson LPN  "

## 2021-05-10 NOTE — PROGRESS NOTES
Subjective     Bianka Whitmore is a 85 year old female who presents to clinic today for the following health issues:    HPI         Concern - Follow up from lumbar fracture   Onset: 4/3/2021  Description: back pain has subsided, but she feel on 2021 and her right hip is hurting, she did go to ER and they stated it was negative for a fracture  Intensity: 6-7/10  Progression of Symptoms:  constant  Accompanying Signs & Symptoms: right hip pain  Previous history of similar problem: none  Precipitating factors:        Worsened by: none  Alleviating factors:        Improved by: none  Therapies tried and outcome: None        Patient Active Problem List   Diagnosis     Diverticulitis of colon     Osteoarthritis     Osteoporosis/osteopenia increased risk     ACP (advance care planning)     Malignant basal cell neoplasm of skin     Compression fracture of thoracic vertebra (H)     Geographic tongue     Varicose vein of leg     Compression fx, lumbar spine, with routine healing, subsequent encounter     Typical atrial flutter (H)     Past Surgical History:   Procedure Laterality Date     APPENDECTOMY       COLONOSCOPY N/A 2017    Procedure: COLONOSCOPY;  COLONOSCOPY WITH POLYPECTOMY AND SCLEROTHERAPY;  Surgeon: Matt Gonzalez MD;  Location: [facility]     ENDOSCOPIC STRIPPING VEIN(S)       HYSTERECTOMY       SALPINGO OOPHORECTOMY,R/L/BRIAN         Social History     Tobacco Use     Smoking status: Former Smoker     Packs/day: 0.50     Years: 3.00     Pack years: 1.50     Types: Cigarettes     Start date: 1950     Quit date: 3/26/1953     Years since quittin.1     Smokeless tobacco: Never Used     Tobacco comment: Quit in ; no passive smoke exposure   Substance Use Topics     Alcohol use: No     Family History   Problem Relation Age of Onset     Other - See Comments Mother 85        Old age, cause of death     Cancer Father 84        Rectal cancer, cause of death/Stomach cancer, cause of death     C.A.DBriana  Maternal Aunt      CLIVE. Maternal Uncle      Cancer Son         thymic carcinoma         Current Outpatient Medications   Medication Sig Dispense Refill     Ascorbic Acid (VITAMIN C PO) Take 500 mg by mouth 2 times daily       B Complex-Biotin-FA (VITAMIN B50 COMPLEX PO) Take 1 tablet by mouth daily       Flaxseed, Linseed, (FLAX SEED OIL) 1000 MG capsule Take 1 capsule by mouth daily       MAGNESIUM OXIDE PO Take by mouth daily        NONFORMULARY daily 1 tablespoon of chopped fresh garlic        NONFORMULARY 4 times daily Extra virgin olive oil 2 tablespoons  Daily        Omega-3 Fatty Acids (FISH OIL) 1200 MG CAPS Take 1,200 mg by mouth 2 times daily        VITAMIN E COMPLEX PO Take 400 Units by mouth daily       Allergies   Allergen Reactions     Amoxicillin      Intolerant       Codeine      Hydrocodone Nausea and Vomiting     Recent Labs   Lab Test 04/08/21  1657 10/19/20  0905 10/27/17  0957 10/27/17  0957 11/10/16  0900 08/03/16  0736   LDL  --   --   --   --   --  104*   HDL  --   --   --   --   --  74   TRIG  --   --   --   --   --  63   ALT 27  --   --  36 47  --    CR 0.73 0.73   < > 0.69 0.63  --    GFRESTIMATED 75 75   < > 82 >90  Non  GFR Calc    --    GFRESTBLACK 87 87   < > >90 >90  African American GFR Calc    --    POTASSIUM 4.7 5.1   < > 4.3 4.9  --    TSH  --  3.97  --  2.81  --   --     < > = values in this interval not displayed.      BP Readings from Last 3 Encounters:   05/10/21 122/86   05/03/21 120/82   05/01/21 153/93    Wt Readings from Last 3 Encounters:   05/10/21 54.4 kg (120 lb)   05/03/21 54.4 kg (120 lb)   05/01/21 54.4 kg (120 lb)                    Reviewed and updated as needed this visit by Provider                Review of Systems   Constitutional, HEENT, cardiovascular, pulmonary, gi and gu systems are negative, except as otherwise noted.      Objective    Blood Pressure 122/86 (BP Location: Left arm, Patient Position: Sitting, Cuff Size: Adult Regular)    "Pulse 86   Temperature 97.7  F (36.5  C) (Tympanic)   Height 1.575 m (5' 2\")   Weight 54.4 kg (120 lb)   Oxygen Saturation 91%   Body Mass Index 21.95 kg/m    Body mass index is 21.95 kg/m .  Physical Exam   GENERAL: healthy, alert and no distress  EYES: Eyes grossly normal to inspection, PERRL and conjunctivae and sclerae normal  HENT: ear canals and TM's normal, nose and mouth without ulcers or lesions  NECK: no adenopathy, no asymmetry, masses, or scars and thyroid normal to palpation  RESP: lungs clear to auscultation - no rales, rhonchi or wheezes  CV: regular rate and rhythm, normal S1 S2, no S3 or S4, no murmur, click or rub, no peripheral edema and peripheral pulses strong  MS: decreased range of motion right hip with pain in the groin. Can't rotate. Midline when sitting.   SKIN: large hematoma on back of right hip   NEURO: no numbness no tingling no hypethesia.   PSYCH: is alert and is very uncomfortable. 10 days of pain taking     Diagnostic Test Results:  Labs reviewed in Epic  No results found for any visits on 05/10/21.        Assessment & Plan     Fall, subsequent encounter  She is very uncomfortable, she is a very tough lady. Took nothing with a wrist fracture.  Strong suspicion she has a hip fracture or pelvic fracture.  Has osteoporosis . Taking calcium and vit d.   - MR Hip Right w/o Contrast; Future  - celecoxib (CELEBREX) 100 MG capsule; Take 1 capsule (100 mg) by mouth 2 times daily    Right hip pain  She is going to be having an MRI. Want her to get a walker and toe touch. No fractures on xray. Large hematoma. Ice the area is not helping.   - MR Hip Right w/o Contrast; Future  - celecoxib (CELEBREX) 100 MG capsule; Take 1 capsule (100 mg) by mouth 2 times daily        See Patient Instructions    No follow-ups on file.    ORTEGA Caruso  [facility] [place]  "

## 2021-05-11 ENCOUNTER — ALLIED HEALTH/NURSE VISIT (OUTPATIENT)
Dept: OTOLARYNGOLOGY | Facility: OTHER | Age: 86
End: 2021-05-11
Payer: MEDICARE

## 2021-05-11 DIAGNOSIS — Z48.02 VISIT FOR SUTURE REMOVAL: Primary | ICD-10-CM

## 2021-05-11 ASSESSMENT — ANXIETY QUESTIONNAIRES: GAD7 TOTAL SCORE: 0

## 2021-05-11 NOTE — NURSING NOTE
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  Area cleansed with cetaphil and water.  Sutures were removed.  Pictures were taken and Bacitracin applied.  No signs or symptoms of infection.

## 2021-05-12 ENCOUNTER — TELEPHONE (OUTPATIENT)
Dept: FAMILY MEDICINE | Facility: OTHER | Age: 86
End: 2021-05-12

## 2021-05-12 ENCOUNTER — NURSE TRIAGE (OUTPATIENT)
Dept: FAMILY MEDICINE | Facility: OTHER | Age: 86
End: 2021-05-12

## 2021-05-12 NOTE — TELEPHONE ENCOUNTER
I received a message from patient she does not want to take the celebrex she has read the all the literature and has not started the medication due to the side effects it may cause. She wanted to let you know. But is wondering if there is an alternative that does not have as harsh of side effects as the celebrex does.

## 2021-05-12 NOTE — TELEPHONE ENCOUNTER
Can use her medication ibuprofen and tylenol if needed. Was trying ot make it easier on her stomach.

## 2021-05-14 ENCOUNTER — HOSPITAL ENCOUNTER (EMERGENCY)
Facility: HOSPITAL | Age: 86
Discharge: HOME | End: 2021-05-14
Attending: NURSE PRACTITIONER | Admitting: NURSE PRACTITIONER
Payer: MEDICARE

## 2021-05-14 VITALS
TEMPERATURE: 96.8 F | DIASTOLIC BLOOD PRESSURE: 94 MMHG | OXYGEN SATURATION: 98 % | SYSTOLIC BLOOD PRESSURE: 151 MMHG | HEART RATE: 75 BPM | RESPIRATION RATE: 15 BRPM

## 2021-05-14 DIAGNOSIS — R10.13 EPIGASTRIC PAIN: Primary | ICD-10-CM

## 2021-05-14 DIAGNOSIS — K92.1 BLACK TARRY STOOLS: ICD-10-CM

## 2021-05-14 LAB
ALBUMIN SERPL-MCNC: 3.3 G/DL (ref 3.4–5)
ALP SERPL-CCNC: 130 U/L (ref 40–150)
ALT SERPL W P-5'-P-CCNC: 29 U/L (ref 0–50)
ANION GAP SERPL CALCULATED.3IONS-SCNC: 6 MMOL/L (ref 3–14)
AST SERPL W P-5'-P-CCNC: 71 U/L (ref 0–45)
BASOPHILS # BLD AUTO: 0 10E9/L (ref 0–0.2)
BASOPHILS NFR BLD AUTO: 0.5 %
BILIRUB SERPL-MCNC: 0.6 MG/DL (ref 0.2–1.3)
BUN SERPL-MCNC: 30 MG/DL (ref 7–30)
CALCIUM SERPL-MCNC: 9 MG/DL (ref 8.5–10.1)
CHLORIDE SERPL-SCNC: 100 MMOL/L (ref 94–109)
CO2 SERPL-SCNC: 21 MMOL/L (ref 20–32)
CREAT SERPL-MCNC: 0.55 MG/DL (ref 0.52–1.04)
DIFFERENTIAL METHOD BLD: ABNORMAL
EOSINOPHIL # BLD AUTO: 0.1 10E9/L (ref 0–0.7)
EOSINOPHIL NFR BLD AUTO: 0.8 %
ERYTHROCYTE [DISTWIDTH] IN BLOOD BY AUTOMATED COUNT: 13.7 % (ref 10–15)
GFR SERPL CREATININE-BSD FRML MDRD: 83 ML/MIN/{1.73_M2}
GLUCOSE SERPL-MCNC: 81 MG/DL (ref 70–99)
HCT VFR BLD AUTO: 38.5 % (ref 35–47)
HEMOCCULT SP1 STL QL: NEGATIVE
HGB BLD-MCNC: 13.4 G/DL (ref 11.7–15.7)
IMM GRANULOCYTES # BLD: 0 10E9/L (ref 0–0.4)
IMM GRANULOCYTES NFR BLD: 0.4 %
LYMPHOCYTES # BLD AUTO: 1.8 10E9/L (ref 0.8–5.3)
LYMPHOCYTES NFR BLD AUTO: 23.1 %
MCH RBC QN AUTO: 34.1 PG (ref 26.5–33)
MCHC RBC AUTO-ENTMCNC: 34.8 G/DL (ref 31.5–36.5)
MCV RBC AUTO: 98 FL (ref 78–100)
MONOCYTES # BLD AUTO: 0.6 10E9/L (ref 0–1.3)
MONOCYTES NFR BLD AUTO: 8.1 %
NEUTROPHILS # BLD AUTO: 5.3 10E9/L (ref 1.6–8.3)
NEUTROPHILS NFR BLD AUTO: 67.1 %
NRBC # BLD AUTO: 0 10*3/UL
NRBC BLD AUTO-RTO: 0 /100
PLATELET # BLD AUTO: 209 10E9/L (ref 150–450)
POTASSIUM SERPL-SCNC: 4.1 MMOL/L (ref 3.4–5.3)
POTASSIUM SERPL-SCNC: 6.6 MMOL/L (ref 3.4–5.3)
PROT SERPL-MCNC: 7.3 G/DL (ref 6.8–8.8)
RBC # BLD AUTO: 3.93 10E12/L (ref 3.8–5.2)
SODIUM SERPL-SCNC: 127 MMOL/L (ref 133–144)
TROPONIN I SERPL-MCNC: <0.015 UG/L (ref 0–0.04)
WBC # BLD AUTO: 7.8 10E9/L (ref 4–11)

## 2021-05-14 PROCEDURE — 99284 EMERGENCY DEPT VISIT MOD MDM: CPT | Mod: 25

## 2021-05-14 PROCEDURE — 85025 COMPLETE CBC W/AUTO DIFF WBC: CPT | Performed by: NURSE PRACTITIONER

## 2021-05-14 PROCEDURE — 82274 ASSAY TEST FOR BLOOD FECAL: CPT | Performed by: NURSE PRACTITIONER

## 2021-05-14 PROCEDURE — 250N000011 HC RX IP 250 OP 636: Performed by: NURSE PRACTITIONER

## 2021-05-14 PROCEDURE — 99284 EMERGENCY DEPT VISIT MOD MDM: CPT | Performed by: NURSE PRACTITIONER

## 2021-05-14 PROCEDURE — 93005 ELECTROCARDIOGRAM TRACING: CPT

## 2021-05-14 PROCEDURE — 80053 COMPREHEN METABOLIC PANEL: CPT | Performed by: NURSE PRACTITIONER

## 2021-05-14 PROCEDURE — C9113 INJ PANTOPRAZOLE SODIUM, VIA: HCPCS | Performed by: NURSE PRACTITIONER

## 2021-05-14 PROCEDURE — 36415 COLL VENOUS BLD VENIPUNCTURE: CPT | Performed by: NURSE PRACTITIONER

## 2021-05-14 PROCEDURE — 84132 ASSAY OF SERUM POTASSIUM: CPT | Performed by: NURSE PRACTITIONER

## 2021-05-14 PROCEDURE — 93010 ELECTROCARDIOGRAM REPORT: CPT | Performed by: INTERNAL MEDICINE

## 2021-05-14 PROCEDURE — 250N000009 HC RX 250: Performed by: NURSE PRACTITIONER

## 2021-05-14 PROCEDURE — 250N000013 HC RX MED GY IP 250 OP 250 PS 637: Performed by: NURSE PRACTITIONER

## 2021-05-14 PROCEDURE — 84484 ASSAY OF TROPONIN QUANT: CPT | Performed by: NURSE PRACTITIONER

## 2021-05-14 RX ORDER — FAMOTIDINE 20 MG/1
40 TABLET, FILM COATED ORAL DAILY
Qty: 28 TABLET | Refills: 0 | Status: SHIPPED | OUTPATIENT
Start: 2021-05-14 | End: 2021-05-28

## 2021-05-14 RX ORDER — FAMOTIDINE 20 MG/1
40 TABLET, FILM COATED ORAL 2 TIMES DAILY
Status: DISCONTINUED | OUTPATIENT
Start: 2021-05-14 | End: 2021-05-14 | Stop reason: HOSPADM

## 2021-05-14 RX ORDER — ACETAMINOPHEN 325 MG/1
975 TABLET ORAL ONCE
Status: COMPLETED | OUTPATIENT
Start: 2021-05-14 | End: 2021-05-14

## 2021-05-14 RX ADMIN — LIDOCAINE HYDROCHLORIDE 30 ML: 20 SOLUTION ORAL; TOPICAL at 18:13

## 2021-05-14 RX ADMIN — FAMOTIDINE 40 MG: 20 TABLET, FILM COATED ORAL at 20:11

## 2021-05-14 RX ADMIN — ACETAMINOPHEN 975 MG: 325 TABLET, FILM COATED ORAL at 18:56

## 2021-05-14 RX ADMIN — PANTOPRAZOLE SODIUM 40 MG: 40 INJECTION, POWDER, FOR SOLUTION INTRAVENOUS at 18:14

## 2021-05-14 ASSESSMENT — ENCOUNTER SYMPTOMS
HEMATURIA: 0
ABDOMINAL PAIN: 1
WOUND: 0
BACK PAIN: 1
CHILLS: 0
DIZZINESS: 0
SHORTNESS OF BREATH: 0
FREQUENCY: 0
VOMITING: 0
PALPITATIONS: 0
DYSURIA: 0
BLOOD IN STOOL: 0
DIFFICULTY URINATING: 0
NAUSEA: 1
CONSTIPATION: 0
LIGHT-HEADEDNESS: 0
DIARRHEA: 0
FEVER: 0
HEADACHES: 0

## 2021-05-14 NOTE — ED PROVIDER NOTES
"  History     Chief Complaint   Patient presents with     Abdominal Pain     c/o abd pain x 2 weeks, notes blood stools xc 4 days     HPI  Bianka Whitmore is a 85 year old female who presents ambulatory accompanied by significant other for evaluation of 10 day history of epigastric pain and 5-6 day history of dark stools. Stools started out black and \"looked like coffee grounds.\" Does endorse using quite a bit of ibuprofen to manage chronic back pain. She stopped taking ibuprofen and has only been using acetaminophen since onset of epigastric pain. Denies history of gastritis, PUD. Currently 2-3/10 epigastric discomfort. Nothing specific worsens pain. Improves some after eating. Last ate hard boiled egg prior to arrival. Has had some nausea with dry heaving today. No diarrhea. Last dark stool was yesterday - no BM today. Does take a multivitamin with iron for the last 3 months but has not had black stool since taking other than this current episode.     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 yes         Diverticulitis of colon 03/26/2013     Priority: Medium     Osteoarthritis 03/26/2013     Priority: Medium     Osteoporosis/osteopenia increased risk 03/26/2013     Priority: Medium     ACP (advance care planning) 12/06/2012     Priority: Medium     Advance Care Planning " 2016: ACP Review of Chart / Resources Provided:  Reviewed chart for advance care plan.  Bianka Whitmore has no plan or code status on file however states presence of ACP document. Copy requested. Confirmed code status reflects current choices pending receipt of document/advance care plan review.  Confirmed/documented legally designated decision makers.  Added by Munira Rodgers                Past Medical History:    Past Medical History:   Diagnosis Date     Basal cell carcinoma      Cardiomegaly 2007     Compression fracture of thoracic vertebra (H) 10/30/2014     Compression fx, lumbar spine, with routine healing, subsequent encounter 2016     Hyperlipidemia      Osteoporosis      Typical atrial flutter (H) 2016     Varicose vein of leg 2016       Past Surgical History:    Past Surgical History:   Procedure Laterality Date     APPENDECTOMY       COLONOSCOPY N/A 2017    Procedure: COLONOSCOPY;  COLONOSCOPY WITH POLYPECTOMY AND SCLEROTHERAPY;  Surgeon: Matt Gonzalez MD;  Location: [facility]     ENDOSCOPIC STRIPPING VEIN(S)       HYSTERECTOMY       SALPINGO OOPHORECTOMY,R/L/BRIAN         Family History:    Family History   Problem Relation Age of Onset     Other - See Comments Mother 85        Old age, cause of death     Cancer Father 84        Rectal cancer, cause of death/Stomach cancer, cause of death     C.A.D. Maternal Aunt      C.A.D. Maternal Uncle      Cancer Son         thymic carcinoma       Social History:  Marital Status:   [2]  Social History     Tobacco Use     Smoking status: Former Smoker     Packs/day: 0.50     Years: 3.00     Pack years: 1.50     Types: Cigarettes     Start date: 1950     Quit date: 3/26/1953     Years since quittin.1     Smokeless tobacco: Never Used     Tobacco comment: Quit in ; no passive smoke exposure   Substance Use Topics     Alcohol use: No     Drug use: No        Medications:    Ascorbic Acid (VITAMIN C PO)  B  Complex-Biotin-FA (VITAMIN B50 COMPLEX PO)  famotidine (PEPCID) 20 MG tablet  Flaxseed, Linseed, (FLAX SEED OIL) 1000 MG capsule  MAGNESIUM OXIDE PO  NONFORMULARY  NONFORMULARY  Omega-3 Fatty Acids (FISH OIL) 1200 MG CAPS  omeprazole (PRILOSEC) 20 MG DR capsule  VITAMIN E COMPLEX PO          Review of Systems   Constitutional: Negative for chills and fever.   Eyes: Negative for visual disturbance.   Respiratory: Negative for shortness of breath.    Cardiovascular: Negative for chest pain and palpitations.   Gastrointestinal: Positive for abdominal pain and nausea. Negative for blood in stool (no BRBPR, +dark stools), constipation, diarrhea and vomiting.   Genitourinary: Negative for difficulty urinating, dysuria, frequency and hematuria.   Musculoskeletal: Positive for back pain (chronic - no worse than usual).   Skin: Negative for rash and wound.   Neurological: Negative for dizziness, syncope, light-headedness and headaches.       Physical Exam   BP: 132/73  Pulse: 90  Temp: 96.8  F (36  C)  Resp: 16  SpO2: 98 %  Lying Orthostatic BP: 144/89  Lying Orthostatic Pulse: 105 bpm  Sitting Orthostatic BP: 141/76  Sitting Orthostatic Pulse: 94 bpm  Standing Orthostatic BP: 144/89  Standing Orthostatic Pulse: 81 bpm      Physical Exam  Constitutional:       General: She is not in acute distress.     Appearance: Normal appearance.   Cardiovascular:      Rate and Rhythm: Normal rate and regular rhythm.      Heart sounds: S1 normal and S2 normal.   Pulmonary:      Effort: Pulmonary effort is normal.      Breath sounds: Normal breath sounds. No wheezing, rhonchi or rales.   Chest:      Chest wall: No tenderness.   Abdominal:      General: Abdomen is flat. Bowel sounds are normal.      Palpations: Abdomen is soft.      Tenderness: There is abdominal tenderness in the epigastric area. There is no guarding or rebound.   Genitourinary:     Rectum: Guaiac result negative (black stool with negative hemoccult). No tenderness or  external hemorrhoid. Normal anal tone.   Musculoskeletal:      Right lower leg: No edema.      Left lower leg: No edema.      Comments: FROM of upper and lower extremities   Skin:     General: Skin is warm and dry.      Capillary Refill: Capillary refill takes less than 2 seconds.      Coloration: Skin is not pale.   Neurological:      Mental Status: She is alert and oriented to person, place, and time.   Psychiatric:         Mood and Affect: Mood normal.         Speech: Speech normal.         Behavior: Behavior normal. Behavior is cooperative.         ED Course     ED Course as of May 14 2240   Fri May 14, 2021   1846 Patient re-evaluated. Abdominal pain resolved. Reviewed results with her and discharge plan. She is in agreement with plan.    Norma Garcia CNP on 5/14/2021 at 6:47 PM         2009 Spoke with lab regarding elevated potassium - sample is borderline hemolyzed. Patient does not have a history of CKD, potassium supplement, medications to cause hyperkalemia. Plan to re-check sample.    Norma Garcia CNP on 5/14/2021 at 8:09 PM          Procedures               EKG Interpretation:      Interpreted by Norma Garcia CNP  Time reviewed: 1935   Symptoms at time of EKG: epigastric pain   Rhythm: atrial fibrillation  Rate: ventricular rate 80  Axis: Normal  Ectopy: none  Conduction: normal QRS of 78 ms  ST Segments/ T Waves: No acute ischemic changes  Q Waves: none  Comparison to prior: atrial fibrillation unchanged    Clinical Impression: atrial fibrillation - rate controlled               Results for orders placed or performed during the hospital encounter of 05/14/21 (from the past 24 hour(s))   Immunos occult blood   Result Value Ref Range    Occult Blood Slide 1 Negative NEG^Negative   CBC with platelets differential   Result Value Ref Range    WBC 7.8 4.0 - 11.0 10e9/L    RBC Count 3.93 3.8 - 5.2 10e12/L    Hemoglobin 13.4 11.7 - 15.7 g/dL    Hematocrit 38.5 35.0 - 47.0 %    MCV 98 78 - 100 fl     MCH 34.1 (H) 26.5 - 33.0 pg    MCHC 34.8 31.5 - 36.5 g/dL    RDW 13.7 10.0 - 15.0 %    Platelet Count 209 150 - 450 10e9/L    Diff Method Automated Method     % Neutrophils 67.1 %    % Lymphocytes 23.1 %    % Monocytes 8.1 %    % Eosinophils 0.8 %    % Basophils 0.5 %    % Immature Granulocytes 0.4 %    Nucleated RBCs 0 0 /100    Absolute Neutrophil 5.3 1.6 - 8.3 10e9/L    Absolute Lymphocytes 1.8 0.8 - 5.3 10e9/L    Absolute Monocytes 0.6 0.0 - 1.3 10e9/L    Absolute Eosinophils 0.1 0.0 - 0.7 10e9/L    Absolute Basophils 0.0 0.0 - 0.2 10e9/L    Abs Immature Granulocytes 0.0 0 - 0.4 10e9/L    Absolute Nucleated RBC 0.0    Comprehensive metabolic panel   Result Value Ref Range    Sodium 127 (L) 133 - 144 mmol/L    Potassium 6.6 (HH) 3.4 - 5.3 mmol/L    Chloride 100 94 - 109 mmol/L    Carbon Dioxide 21 20 - 32 mmol/L    Anion Gap 6 3 - 14 mmol/L    Glucose 81 70 - 99 mg/dL    Urea Nitrogen 30 7 - 30 mg/dL    Creatinine 0.55 0.52 - 1.04 mg/dL    GFR Estimate 83 >60 mL/min/[1.73_m2]    GFR Estimate If Black 97 >60 mL/min/[1.73_m2]    Calcium 9.0 8.5 - 10.1 mg/dL    Bilirubin Total 0.6 0.2 - 1.3 mg/dL    Albumin 3.3 (L) 3.4 - 5.0 g/dL    Protein Total 7.3 6.8 - 8.8 g/dL    Alkaline Phosphatase 130 40 - 150 U/L    ALT 29 0 - 50 U/L    AST 71 (H) 0 - 45 U/L   Troponin I   Result Value Ref Range    Troponin I ES <0.015 0.000 - 0.045 ug/L   Potassium   Result Value Ref Range    Potassium 4.1 3.4 - 5.3 mmol/L       Medications   famotidine (PEPCID) tablet 40 mg (40 mg Oral Given 5/14/21 2011)   lidocaine (XYLOCAINE) 2 % 15 mL, alum & mag hydroxide-simethicone (MAALOX) 15 mL GI Cocktail (30 mLs Oral Given 5/14/21 1813)   pantoprazole (PROTONIX) IV push injection 40 mg (40 mg Intravenous Given 5/14/21 1814)   acetaminophen (TYLENOL) tablet 975 mg (975 mg Oral Given 5/14/21 1856)       Assessments & Plan (with Medical Decision Making)     I have reviewed the nursing notes.    I have reviewed the findings, diagnosis, plan and  "need for follow up with the patient.  (R10.13) Epigastric pain  (primary encounter diagnosis)  (K92.1) Black tarry stools  Pleasant 85-year old female presents ambulatory for estimated 10 day history of epigastric pain and 5-6 day history of dark, black stools (started as looking like \"coffee grounds\" but has not had any today). She does endorse long-standing history of ibuprofen use due to her chronic back pain. She has been using only acetaminophen for pain lately. Work-up as above. Orthostatic blood pressures normal, hemoglobin/HCT normal. Rectal exam with black stool sent for hemoccult and resulted negative. GI cocktail resolved pain. Given age and risk factors EKG obtained- atrial fibrillation, no acute ischemic changes and troponin normal. Plan for outpatient treatment of gastritis/PUD with close outpatient follow-up and strict return to ED precautions.     Norma Garcia CNP        Discharge Medication List as of 5/14/2021  8:50 PM      START taking these medications    Details   famotidine (PEPCID) 20 MG tablet Take 2 tablets (40 mg) by mouth daily for 14 days, Disp-28 tablet, R-0, E-Prescribe      omeprazole (PRILOSEC) 20 MG DR capsule Take 2 capsules (40 mg) by mouth daily, Disp-30 capsule, R-0, E-Prescribe             Final diagnoses:   Epigastric pain   Black tarry stools       5/14/2021   [facility]     Norma Garcia CNP  05/14/21 8727    "

## 2021-05-14 NOTE — TELEPHONE ENCOUNTER
"Pt reports she was taking ibuprofen then quit due to \"sore stomach\". Stopped ibuprofen about two weeks ago.  Black stools started 4 days ago. Pt reports black tarry stools. Pt advised to ED. Pt refused at first then stated she will go now.     Reason for Disposition    Tarry or jet black-colored stool    Tarry or jet black-colored stool (not dark green)    Additional Information    Negative: Rectal bleeding, bloody stools, or blood in stool (bowel movement)    Negative: Shock suspected (e.g., cold/pale/clammy skin, too weak to stand, low BP, rapid pulse)    Negative: Difficult to awaken or acting confused (e.g., disoriented, slurred speech)    Negative: Passed out (i.e., lost consciousness, collapsed and was not responding)    Negative: [1] Vomiting AND [2] contains red blood or black (\"coffee ground\") material  (Exception: few red streaks in vomit that only happened once)    Negative: Sounds like a life-threatening emergency to the triager    Negative: Diarrhea is main symptom    Negative: Stool color other than brown or tan is main concern  (no bleeding and no melena)    Answer Assessment - Initial Assessment Questions  1. COLOR: \"What color is it?\" \"Is that color in part or all of the stool?\"      Black stools  2. ONSET: \"When was the unusual color first noted?\"      5/10/21  3. CAUSE: \"Have you eaten any food or taken any medicine of this color?\" (See listing in BACKGROUND)      Pt reports stopping ibuprofen due to sore stomach, no changes in diet  4. OTHER SYMPTOMS: \"Do you have any other symptoms?\" (e.g., diarrhea, jaundice, abdominal pain, fever).      \"sore stomach\" \"burning\" denies diarrhea and all other sx listed    Protocols used: STOOLS - UNUSUAL COLOR-A-AH, RECTAL BLEEDING-A-AH      "

## 2021-05-15 NOTE — DISCHARGE INSTRUCTIONS
"(R10.13) Epigastric pain  (primary encounter diagnosis)  (K92.1) Black tarry stools  Pleasant 85-year old female presents ambulatory for estimated 10 day history of epigastric pain and 5-6 day history of dark, black stools (started as looking like \"coffee grounds\" but has not had any today). She does endorse long-standing history of ibuprofen use due to her chronic back pain. She has been using only acetaminophen for pain lately. Work-up as above. Orthostatic blood pressures normal, hemoglobin/HCT normal. Rectal exam with black stool sent for hemoccult and resulted negative. GI cocktail resolved pain. Given age and risk factors EKG obtained- atrial fibrillation, no acute ischemic changes and troponin normal. Plan for outpatient treatment of gastritis/PUD with close outpatient follow-up and strict return to ED precautions.     Avoid ibuprofen (Aleve), naproxen (Advil), salicylate (Aspirin)  Use only acetaminophen (tylenol) for pain  START famotidine (Pepcid) 40 mg once daily x 14 days  START omeprazole (prilosec) 40 mg once daily x 4-6 weeks   Refer to attached education.         RETURN TO THE ED WITH NEW OR WORSENING SYMPTOMS.    FOLLOW-UP WITH YOUR PRIMARY CARE PROVIDER IN 5-7 DAYS.      Norma Garcia CNP    Results for orders placed or performed during the hospital encounter of 05/14/21   CBC with platelets differential     Status: Abnormal   Result Value Ref Range    WBC 7.8 4.0 - 11.0 10e9/L    RBC Count 3.93 3.8 - 5.2 10e12/L    Hemoglobin 13.4 11.7 - 15.7 g/dL    Hematocrit 38.5 35.0 - 47.0 %    MCV 98 78 - 100 fl    MCH 34.1 (H) 26.5 - 33.0 pg    MCHC 34.8 31.5 - 36.5 g/dL    RDW 13.7 10.0 - 15.0 %    Platelet Count 209 150 - 450 10e9/L    Diff Method Automated Method     % Neutrophils 67.1 %    % Lymphocytes 23.1 %    % Monocytes 8.1 %    % Eosinophils 0.8 %    % Basophils 0.5 %    % Immature Granulocytes 0.4 %    Nucleated RBCs 0 0 /100    Absolute Neutrophil 5.3 1.6 - 8.3 10e9/L    Absolute Lymphocytes " 1.8 0.8 - 5.3 10e9/L    Absolute Monocytes 0.6 0.0 - 1.3 10e9/L    Absolute Eosinophils 0.1 0.0 - 0.7 10e9/L    Absolute Basophils 0.0 0.0 - 0.2 10e9/L    Abs Immature Granulocytes 0.0 0 - 0.4 10e9/L    Absolute Nucleated RBC 0.0    Comprehensive metabolic panel     Status: None (In process)   Result Value Ref Range    Sodium PENDING 133 - 144 mmol/L    Potassium PENDING 3.4 - 5.3 mmol/L    Chloride PENDING 94 - 109 mmol/L    Carbon Dioxide PENDING 20 - 32 mmol/L    Anion Gap PENDING 3 - 14 mmol/L    Glucose PENDING 70 - 99 mg/dL    Urea Nitrogen PENDING 7 - 30 mg/dL    Creatinine PENDING 0.52 - 1.04 mg/dL    GFR Estimate PENDING >60 mL/min/[1.73_m2]    GFR Estimate If Black PENDING >60 mL/min/[1.73_m2]    Calcium PENDING 8.5 - 10.1 mg/dL    Bilirubin Total PENDING 0.2 - 1.3 mg/dL    Albumin PENDING 3.4 - 5.0 g/dL    Protein Total PENDING 6.8 - 8.8 g/dL    Alkaline Phosphatase PENDING 40 - 150 U/L    ALT 29 0 - 50 U/L    AST PENDING 0 - 45 U/L   Immunos occult blood     Status: None   Result Value Ref Range    Occult Blood Slide 1 Negative NEG^Negative   Troponin I     Status: None   Result Value Ref Range    Troponin I ES <0.015 0.000 - 0.045 ug/L               Norma Garcia, CNP

## 2021-05-15 NOTE — ED NOTES
Face to face report given with opportunity to observe patient.    Report given to EFE Carpenter RN   5/14/2021  7:03 PM

## 2021-05-17 ENCOUNTER — TELEPHONE (OUTPATIENT)
Dept: FAMILY MEDICINE | Facility: OTHER | Age: 86
End: 2021-05-17

## 2021-05-17 NOTE — TELEPHONE ENCOUNTER
8:56 AM    Reason for Call: OVERBOOK    Patient went to ER this 5-14 was told to make an follow up appt this week. Please call pt.     The patient is requesting an appointment for this week 5-17 - 55-21-21 with Yael Mckeon or another provider if approved.    Was an appointment offered for this call? No  If yes : Appointment type              Date    Preferred method for responding to this message: Telephone Call  What is your phone number ? [phone]    If we cannot reach you directly, may we leave a detailed response at the number you provided? Yes    Can this message wait until your PCP/provider returns, if unavailable today? Ángela Purvis

## 2021-05-17 NOTE — PROGRESS NOTES
Assessment & Plan     (K92.1) Black stools  (primary encounter diagnosis)  Comment: improved  Plan: slight area of darkness in brown stool this am    (R10.13) Epigastric pain  Comment: improved. Pain 8/10 when seen in ER. Today pain 1-2/10  Plan: Improved. Filled Omeprazole for additional 4 weeks. She will follow up if pain has not fully subsided       See Patient Instructions    Return if symptoms worsen or fail to improve.    Zaira Heath NP  [facility] - MARCELA Carlton is a 85 year old who presents for the following health issues     HPI     ED/UC Followup:    Facility:  [facility] [place]   Date of visit: 5/14/21  Reason for visit: black tarry stools, epigastric pain   Current Status: improved         Review of Systems   Constitutional, HEENT, cardiovascular, pulmonary, gi and gu systems are negative, except as otherwise noted.      Objective    /84   Pulse 86   Temp 97.1  F (36.2  C) (Tympanic)   Wt 53.5 kg (118 lb)   SpO2 98%   BMI 21.58 kg/m    Body mass index is 21.58 kg/m .  Physical Exam   GENERAL: healthy, alert and no distress  RESP: lungs clear to auscultation - no rales, rhonchi or wheezes  CV: regular rate and rhythm, normal S1 S2, no S3 or S4, no murmur, click or rub, no peripheral edema and peripheral pulses strong  ABDOMEN: soft, nontender, no hepatosplenomegaly, no masses and bowel sounds normal  MS: no gross musculoskeletal defects noted, no edema  SKIN: no suspicious lesions or rashes  NEURO: Normal strength and tone, mentation intact and speech normal  PSYCH: mentation appears normal, affect normal/bright

## 2021-05-20 ENCOUNTER — OFFICE VISIT (OUTPATIENT)
Dept: FAMILY MEDICINE | Facility: OTHER | Age: 86
End: 2021-05-20
Attending: NURSE PRACTITIONER
Payer: MEDICARE

## 2021-05-20 VITALS
HEART RATE: 86 BPM | TEMPERATURE: 97.1 F | WEIGHT: 118 LBS | SYSTOLIC BLOOD PRESSURE: 132 MMHG | DIASTOLIC BLOOD PRESSURE: 84 MMHG | OXYGEN SATURATION: 98 % | BODY MASS INDEX: 21.58 KG/M2

## 2021-05-20 DIAGNOSIS — R10.13 EPIGASTRIC PAIN: ICD-10-CM

## 2021-05-20 DIAGNOSIS — K92.1 BLACK STOOLS: Primary | ICD-10-CM

## 2021-05-20 PROCEDURE — G0463 HOSPITAL OUTPT CLINIC VISIT: HCPCS

## 2021-05-20 PROCEDURE — G0463 HOSPITAL OUTPT CLINIC VISIT: HCPCS | Mod: 25

## 2021-05-20 PROCEDURE — 99213 OFFICE O/P EST LOW 20 MIN: CPT | Performed by: NURSE PRACTITIONER

## 2021-05-20 RX ORDER — OMEPRAZOLE 40 MG/1
40 CAPSULE, DELAYED RELEASE ORAL DAILY
Qty: 30 CAPSULE | Refills: 0 | Status: SHIPPED | OUTPATIENT
Start: 2021-05-20 | End: 2021-06-19

## 2021-05-20 ASSESSMENT — PAIN SCALES - GENERAL: PAINLEVEL: MILD PAIN (2)

## 2021-05-20 NOTE — NURSING NOTE
"Chief Complaint   Patient presents with     ER F/U     right hip and stomach       Initial /84   Pulse 86   Temp 97.1  F (36.2  C) (Tympanic)   Wt 53.5 kg (118 lb)   SpO2 98%   BMI 21.58 kg/m   Estimated body mass index is 21.58 kg/m  as calculated from the following:    Height as of 5/10/21: 1.575 m (5' 2\").    Weight as of this encounter: 53.5 kg (118 lb).  Medication Reconciliation: complete  Candido Mercedes LPN  "

## 2021-05-20 NOTE — PATIENT INSTRUCTIONS
Patient Education     Understanding Gastritis  Gastritis is a painful inflammation of the stomach lining. It has a number of causes. Gastritis and its symptoms can be eased with treatment. Work with your healthcare provider to find ways to treat your symptoms.     The stomach  To digest the food you eat, your stomach makes strong acids and enzymes. A healthy stomach has built-in defenses that protect its lining from damage by these acids and enzymes.   When you have gastritis  Acids may damage the stomach lining when the built-in defenses of the stomach don t work as they should. The stomach lining can then become inflamed. When this happens, it is called gastritis.   Causes of gastritis  Gastritis has many causes. They may include:     Aspirin and nonsteroidal anti-inflammatory drugs (NSAIDs)    Tobacco use    Alcohol use    Helicobacter pylori (H. pylori) bacteria    Trauma from injuries, burns, or major surgery    Cocaine use    Exposure to radiation    Critical illness or autoimmune disorders  Common symptoms  With gastritis, you may notice one or more of these:     A burning feeling in your upper belly    Pain that happens after eating certain foods    Gas or a bloated feeling in your stomach    Frequent belching    Nausea with or without vomiting    Loss of appetite    Feeling full quickly    Blood in vomit    Stools that look black and tarry    Paleness    Tiredness (fatigue)  [employer] last reviewed this educational content on 4/1/2019 2000-2021 [employer]. All rights reserved. This information is not intended as a substitute for professional medical care. Always follow your healthcare professional's instructions.

## 2021-05-24 ENCOUNTER — HOSPITAL ENCOUNTER (OUTPATIENT)
Dept: MRI IMAGING | Facility: HOSPITAL | Age: 86
Discharge: HOME | End: 2021-05-24
Attending: PHYSICIAN ASSISTANT | Admitting: PHYSICIAN ASSISTANT
Payer: MEDICARE

## 2021-05-24 DIAGNOSIS — M25.551 RIGHT HIP PAIN: ICD-10-CM
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
## 2021-05-24 PROCEDURE — G1004 CDSM NDSC: HCPCS

## 2021-05-25 DIAGNOSIS — S72.009A CLOSED FRACTURE OF HIP, UNSPECIFIED LATERALITY, INITIAL ENCOUNTER (H): Primary | ICD-10-CM

## 2021-05-28 ENCOUNTER — MEDICAL CORRESPONDENCE (OUTPATIENT)
Dept: MRI IMAGING | Facility: HOSPITAL | Age: 86
End: 2021-05-28

## 2021-05-28 ENCOUNTER — TELEPHONE (OUTPATIENT)
Dept: FAMILY MEDICINE | Facility: OTHER | Age: 86
End: 2021-05-28

## 2021-05-28 RX ORDER — HYDROCODONE BITARTRATE AND ACETAMINOPHEN 5; 325 MG/1; MG/1
TABLET ORAL
COMMUNITY
Start: 2021-05-27 | End: 2022-06-22

## 2021-05-28 NOTE — TELEPHONE ENCOUNTER
Patient left voicemail on my answering machine that she doesn't want to keep taking her omeprazole  due to the side effects of osteoporosis. She said she already has osteoporosis so she is not taking this.      JONAH Camarillo

## 2021-06-03 ENCOUNTER — APPOINTMENT (OUTPATIENT)
Dept: MRI IMAGING | Facility: HOSPITAL | Age: 86
End: 2021-06-03
Attending: EMERGENCY MEDICINE
Payer: MEDICARE

## 2021-06-03 ENCOUNTER — HOSPITAL ENCOUNTER (EMERGENCY)
Facility: HOSPITAL | Age: 86
Discharge: HOME | End: 2021-06-03
Attending: EMERGENCY MEDICINE | Admitting: EMERGENCY MEDICINE
Payer: MEDICARE

## 2021-06-03 ENCOUNTER — TELEPHONE (OUTPATIENT)
Dept: FAMILY MEDICINE | Facility: OTHER | Age: 86
End: 2021-06-03

## 2021-06-03 VITALS
HEIGHT: 63 IN | OXYGEN SATURATION: 93 % | BODY MASS INDEX: 20.9 KG/M2 | HEART RATE: 91 BPM | TEMPERATURE: 98.8 F | RESPIRATION RATE: 18 BRPM | SYSTOLIC BLOOD PRESSURE: 147 MMHG | DIASTOLIC BLOOD PRESSURE: 100 MMHG

## 2021-06-03 DIAGNOSIS — M84.48XA SACRAL INSUFFICIENCY FRACTURE, INITIAL ENCOUNTER: ICD-10-CM

## 2021-06-03 PROCEDURE — 73721 MRI JNT OF LWR EXTRE W/O DYE: CPT | Mod: LT,MF

## 2021-06-03 PROCEDURE — G1004 CDSM NDSC: HCPCS

## 2021-06-03 PROCEDURE — 99284 EMERGENCY DEPT VISIT MOD MDM: CPT | Performed by: EMERGENCY MEDICINE

## 2021-06-03 PROCEDURE — 99284 EMERGENCY DEPT VISIT MOD MDM: CPT | Mod: 25

## 2021-06-03 PROCEDURE — 72148 MRI LUMBAR SPINE W/O DYE: CPT | Mod: ME

## 2021-06-03 NOTE — TELEPHONE ENCOUNTER
2:33 PM    Reason for Call: OVERBOOK    Patient was in the ER on 6/3/21 needs a follow up appt     The patient is requesting an appointment for follow up appt asap  with Yael Mckeon or any other avail provider if allowed .    Was an appointment offered for this call? Yes  If yes : Appointment type              Date 6/24/21 and was denied     Preferred method for responding to this message: Telephone Call  What is your phone number ?    If we cannot reach you directly, may we leave a detailed response at the number you provided? Yes    Can this message wait until your PCP/provider returns, if unavailable today? Ángela Alarcon

## 2021-06-03 NOTE — ED PROVIDER NOTES
History     Chief Complaint   Patient presents with     Hip Pain     HPI  Bianka Whitmore is a 85 year old female who presents to the emergency department from her home via privately owned vehicle for evaluation of left hip pain.  In early May she suffered a occult right acetabular fracture.  She initially presented after a fall with negative x-rays but continued to have pain over the subsequent several days and ultimately underwent an MRI which identified the evidence of a acetabular fracture.  This did not require operative intervention and since that time she has had follow-up with orthopedics and this seems to be healing well.  Over the past 1 to 2 weeks she has now begun to develop significant pain in the left hip.      She states she had gotten up to go to the bathroom one night and went back to bed and because she was having some pain in the right hip from the previous injury she decided to lay on her left hip.  When she woke the next morning she experienced significant pain in the left hip and has had persistent pain there since then.  She has not been able to take NSAIDs at home because of possible gastritis/GI bleeding that she experienced when she was taking NSAIDs earlier in May for treatment of the right hip pain.  She was prescribed what sounds to be hydrocodone which she states has not been helping her pain, and only upsets her stomach.      She is apparently scheduled for an MRI in the next couple of weeks, but comes to the emergency department today because she is not able to fail her at the pain at home not able to achieve activities of daily living per her normal routine.  Previous to her fall and injury in early May, she was normally ambulatory without a walker, now she is needed to use a walker significantly in her home to help with ambulation due to the pain in the left hip region.  She rates her pain as a 4-6 out of 10 in severity localized to the left hip.  She denies numbness or tingling  extending down the leg.  She does not recall any recent new falls sudden twists or slips into the initial fall in early May.  She otherwise denies any other recent injuries or illnesses.  No associated urinary or fecal incontinence.  No recent fevers.  Previous operative interventions to the hips.  She has had a previous history of bursitis with cortisone injections, she feels like this pain for the past couple of weeks is similar to that pain.    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 yes         Diverticulitis of colon 03/26/2013     Priority: Medium     Osteoarthritis 03/26/2013     Priority: Medium     Osteoporosis/osteopenia increased risk 03/26/2013     Priority: Medium     ACP (advance care planning) 12/06/2012     Priority: Medium     Advance Care Planning 6/22/2016: ACP Review of Chart / Resources Provided:  Reviewed chart for advance care plan.  Bianka SAGAR Parryrica has no plan or code status on file however states presence of ACP document. Copy requested. Confirmed code status reflects current choices pending receipt of document/advance care plan review.  Confirmed/documented legally designated decision makers.  Added by Munira Rodgers                Past Medical History:    Past Medical History:   Diagnosis Date     Basal cell carcinoma      Cardiomegaly  2007     Compression fracture of thoracic vertebra (H) 10/30/2014     Compression fx, lumbar spine, with routine healing, subsequent encounter 2016     Hyperlipidemia      Osteoporosis      Typical atrial flutter (H) 2016     Varicose vein of leg 2016       Past Surgical History:    Past Surgical History:   Procedure Laterality Date     APPENDECTOMY       COLONOSCOPY N/A 2017    Procedure: COLONOSCOPY;  COLONOSCOPY WITH POLYPECTOMY AND SCLEROTHERAPY;  Surgeon: Matt Gonzalez MD;  Location: [facility] OR     ENDOSCOPIC STRIPPING VEIN(S)       HYSTERECTOMY       SALPINGO OOPHORECTOMY,R/L/BRIAN         Family History:    Family History   Problem Relation Age of Onset     Other - See Comments Mother 85        Old age, cause of death     Cancer Father 84        Rectal cancer, cause of death/Stomach cancer, cause of death     C.A.D. Maternal Aunt      C.A.D. Maternal Uncle      Cancer Son         thymic carcinoma       Social History:  Marital Status:   [2]  Social History     Tobacco Use     Smoking status: Former Smoker     Packs/day: 0.50     Years: 3.00     Pack years: 1.50     Types: Cigarettes     Start date: 1950     Quit date: 3/26/1953     Years since quittin.2     Smokeless tobacco: Never Used     Tobacco comment: Quit in ; no passive smoke exposure   Substance Use Topics     Alcohol use: No     Drug use: No        Medications:    Ascorbic Acid (VITAMIN C PO)  B Complex-Biotin-FA (VITAMIN B50 COMPLEX PO)  Flaxseed, Linseed, (FLAX SEED OIL) 1000 MG capsule  MAGNESIUM OXIDE PO  NONFORMULARY  NONFORMULARY  Omega-3 Fatty Acids (FISH OIL) 1200 MG CAPS  VITAMIN E COMPLEX PO  HYDROcodone-acetaminophen (NORCO) 5-325 MG tablet  omeprazole (PRILOSEC) 40 MG DR capsule          Review of Systems  Pertinent positives and negatives as noted in the HPI, remainder of 10 point review systems otherwise negative    Physical Exam   BP: 137/92  Pulse: 91  Temp: 98.8  F (37.1  C)  Resp:  "16  Height: 160 cm (5' 3\")  SpO2: 98 %      Physical Exam  Vitals signs and nursing note reviewed.   Constitutional:       General: She is not in acute distress.     Appearance: Normal appearance. She is normal weight. She is not ill-appearing.   HENT:      Head: Normocephalic and atraumatic.      Nose: Nose normal.      Mouth/Throat:      Mouth: Mucous membranes are moist.   Neck:      Musculoskeletal: Normal range of motion and neck supple.   Cardiovascular:      Rate and Rhythm: Normal rate and regular rhythm.      Pulses: Normal pulses.      Heart sounds: Normal heart sounds.   Pulmonary:      Effort: Pulmonary effort is normal.      Breath sounds: Normal breath sounds.   Abdominal:      General: Abdomen is flat.      Palpations: Abdomen is soft.      Tenderness: There is no abdominal tenderness.   Musculoskeletal:         General: Tenderness present. No swelling, deformity or signs of injury.      Right lower leg: No edema.      Left lower leg: No edema.      Comments: Patient has tenderness on palpation over the left trochanteric bursa, as well as tenderness palpation of the inferior posterior gluteal region.  She tolerates full passive range of motion of the left hip including axial loading of the hip joint in various angles of application without significant production of pain in the joint.  Left knee and ankle examinations show no evidence of pain on range of motion or palpation.  There is no peripheral edema, she has intact light touch sensation L4 L5-S1 nerve distribution, negative straight leg raise.  No obvious lumbar tenderness on palpation.   Skin:     General: Skin is warm and dry.      Capillary Refill: Capillary refill takes less than 2 seconds.   Neurological:      General: No focal deficit present.      Mental Status: She is alert and oriented to person, place, and time. Mental status is at baseline.   Psychiatric:         Mood and Affect: Mood normal.         Behavior: Behavior normal.         " Thought Content: Thought content normal.         Judgment: Judgment normal.         ED Course        Procedures              Critical Care time:  none             Results for orders placed or performed during the hospital encounter of 06/03/21 (from the past 24 hour(s))   MR Hip Left w/o Contrast    Narrative    MR HIP LEFT W/O CONTRAST, 6/3/2021 1:28 PM    History: Female, age 85 years; Hip pain, stress fracture suspected, no  prior imaging    Comparison: MRI pelvis and right hip 5/24/2021    Technique: Coronal T1 and T2 STIR the pelvis; axial T2 STIR the  pelvis; coronal proton-density without with fat saturation; axial T1  and proton density fat saturation and sagittal proton density fat  saturation of the left hip .    FINDINGS: An insufficiency fracture has developed within the left  hemisacrum with marrow edema extending throughout the peripheral  margin of the sacrum to the SI joint.     Hip joints are congruent. Articular cartilage of the left hip joint is  normal in thickness. There is suggestion of a subtle concentric tear  involving the anterior superior aspect of the left hip labrum  extending from 12:00 to 2:00.    Small volume of fluid is seen in the left and right iliac fossa,  slightly. In volume on the left.    Muscles of the pelvis demonstrate mild generalized atrophy. There is  mild edema seen within the left gluteus minimus without evidence of an  acute or subacute muscle tear.    Fracture deformities and marrow edema again seen within the right  inferior pubic ramus, superior pubic ramus and right pubic bone at the  symphysis. Mild edema also now seen within the left pectineus and  abductor muscles.      Impression    IMPRESSION:   Left sacral insufficiency fracture has developed when compared to the  previous MRI dated 5/24/2021.    Mild edema within the abductor muscles of the left hip appears  slightly more pronounced suggesting recurrent injury or perhaps an  acute injury on top of chronic  change.    Fracture deformities again seen in the right hemipelvis involving the  acetabulum, superior and inferior pubic ramus.    KOBI LANDRY MD   Lumbar spine MRI w/o contrast    Narrative    PROCEDURE: MR LUMBAR SPINE W/O CONTRAST 6/3/2021 1:28 PM    HISTORY: Low back pain, increased fracture risk    COMPARISONS: CT of the lumbar spine dated 4/3/2021.    Meds/Dose Given: None.    TECHNIQUE: Sagittal T1, T2 and STIR sequences and axial T2-weighted  images.    FINDINGS: There is a superior endplate compression deformity at the L2  level, more compressed than on the prior CT. There is associated  marrow edema. There is some retropulsion of the posterior superior  margin of the vertebral body into the spinal canal with some  effacement of ventral CSF but no significant nerve root compression.    There are additional compression deformities throughout the lumbar  spine, stable compared to the prior exam. These include wedge  compression deformities of L4 and L3 with superior endplate deformity  at L1. There is also a compression deformity at T12.    At the L5-S1 level no significant disc bulge or protrusion is seen and  neural foramina are patent.    At the L4-5 level there is a mild broad-based disc bulge which does  cause mild mass effect on L5 nerve roots. No significant left-sided  nerve root ganglion compression is seen by disc protrudes into the  right neural foramina and there is at least moderate compression of  the right L4 nerve root ganglion.    At the L3-4 level there is a mild broad-based disc bulge. Mild facet  degenerative changes seen. There is no significant foraminal  narrowing.    At the L2-3 level there is a mild broad-based disc bulge. Mild facet  degenerative change is seen.    No significant disc protrusion is seen at L1-2.    There are multiple stones within the gallbladder. There is a left  renal cyst.         Impression    IMPRESSION:   1. Progression of compression deformity at the L2  "level with  associated marrow edema. Retropulsed posterior superior margin of  vertebral body with effacement of ventral CSF.  2. Other chronic compression deformities.  3. Right L4 nerve root ganglion compression due to lateral disc at the  L4-5 level.  4. Cholelithiasis.    RAINA LUNA MD       Medications - No data to display    Assessments & Plan (with Medical Decision Making)     I have reviewed the nursing notes.    I have reviewed the findings, diagnosis, plan and need for follow up with the patient.    Patient presents the above history and exam concerning for possible occult fracture involving the pelvis or left hip.  Potential for bursitis and musculoskeletal pain also centered.    Diagnostic evaluation includes MRI as noted above which ultimately reveals evidence of a left sacral insufficiency fracture.  I discussed these findings with the patient and that this is a nonoperative injury.  I discussed potential course of care including admission to the hospital for IV analgesics, potential TCU placement, as well as prescribing other analgesic medications at home.  Social work also help to assess the patient and recommend potential course of care.  Ultimately the patient is very insistent to be discharged home.  She does not want prescriptions for any stronger pain medication besides the hydrocodone that was previously prescribed as she states \"it does not help my pain, and only gets upset my stomach.\"  I think this is a somewhat reasonable course of care to be discharged home but did encourage the patient if she finds that she is not able to tolerate the pain well there that she should come back to the ED and may end up needing a TCU placement at that time.  Close follow-up with primary care also recommended within the next several days.  Patient indicates agreement and understanding plan of care, she is subsequently discharged in stable condition with good prognosis into the care of her " .    Discharge Medication List as of 6/3/2021  2:42 PM          Final diagnoses:   Sacral insufficiency fracture, initial encounter       6/3/2021   [facility]     Rolando Self MD  06/03/21 [id number]

## 2021-06-03 NOTE — ED NOTES
Patient presents with complaints of left hip pain. States she did fall and fracture her right hip but has done nothing to injure the left. States she had just rolled to get up one early morning for the bathroom and that left hup started hurting. She states that it's been going on for a few weeks now and nothing is helping with the pain.

## 2021-06-03 NOTE — ED TRIAGE NOTES
"Patient presents with complaints of right hip pain.  Patient reports in May she had a fall and injured her left hip and has had that evaluated and had a MRI showing a 'fracture'.  Notes that since then her right hip has started to bother her and \"nothing helps\" patient is able to walk with a walker; did not use a walker prior to her fall.  "

## 2021-06-03 NOTE — ED NOTES
Care Transitions focused note:      Chart reviewed, met with patient and her spouse was also in the room.  Pt stated she needs her pain managed but is unwilling to take stronger pain medication at this time.  She does not wish to go into a nursing facility for short term rehab.  She would like to return home with her spouse and her son who will help her.  She is likely not to manage well at home at this point but will be private pay at the nursing home as she doesn't have a qualifying stay.  Pt states she can do her own sponge bath and also has a walker.      I will send a message to her pcp, Yael Roger and I let scheduling know and they will reach out to patient with a follow up appt.    Will follow as needed.  Provider and nsg updated.    BRIAN Chacon

## 2021-06-03 NOTE — ED NOTES
Pt verbalized understanding of DCIs, including f/u and s/sx of when to return to ED. WC to exit with .

## 2021-06-10 NOTE — PROGRESS NOTES
Assessment & Plan     Sacral insufficiency fracture with routine healing, subsequent encounter  Improving.  Denies pain. She is walking with the support of her  while out of the house and using a walker at home.  She is encouraged to use a walker for support.  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  She is aware of her risk of recurrent fractures.       She is scheduled for follow up MRI from [facility] 6/14       See Patient Instructions    No follow-ups on file.    LAUREN [facility] - MARCELA Carlton is a 85 year old who presents for the following health issues     HPI     ED/UC Followup:    Facility:  [facility]  Date of visit: 6/3/21  Reason for visit: Sacral Fx  Current Status: states she is much better - able to walk now. She is trying to be careful with walking    She does have osteoporosis - take calcium supplement - states she will not take any medication for her osteoporosis    IMPRESSION: MR left Hip 6/3/21  Left sacral insufficiency fracture has developed when compared to the  previous MRI dated 5/24/2021.     Mild edema within the abductor muscles of the left hip appears  slightly more pronounced suggesting recurrent injury or perhaps an  acute injury on top of chronic change.     Fracture deformities again seen in the right hemipelvis involving the  acetabulum, superior and inferior pubic ramus.     KOBI LANDRY MD    IMPRESSION:  MR lumbar spine 6/3/21  1. Progression of compression deformity at the L2 level with  associated marrow edema. Retropulsed posterior superior margin of  vertebral body with effacement of ventral CSF.  2. Other chronic compression deformities.  3. Right L4 nerve root ganglion compression due to lateral disc at the  L4-5 level.  4. Cholelithiasis.     RAINA LUNA MD    Copied from ER Note on  "6/3/21  Diagnostic evaluation includes MRI as noted above which ultimately reveals evidence of a left sacral insufficiency fracture.  I discussed these findings with the patient and that this is a nonoperative injury.  I discussed potential course of care including admission to the hospital for IV analgesics, potential TCU placement, as well as prescribing other analgesic medications at home.  Social work also help to assess the patient and recommend potential course of care.  Ultimately the patient is very insistent to be discharged home.  She does not want prescriptions for any stronger pain medication besides the hydrocodone that was previously prescribed as she states \"it does not help my pain, and only gets upset my stomach.\"  I think this is a somewhat reasonable course of care to be discharged home but did encourage the patient if she finds that she is not able to tolerate the pain well there that she should come back to the ED and may end up needing a TCU placement at that time.  Close follow-up with primary care also recommended within the next several days.  Patient indicates agreement and understanding plan of care, she is subsequently discharged in stable condition with good prognosis into the care of her .        Review of Systems   CONSTITUTIONAL: NEGATIVE for fever, chills, change in weight  INTEGUMENTARY/SKIN: NEGATIVE for worrisome rashes, moles or lesions  RESP: NEGATIVE for significant cough or SOB  CV: NEGATIVE for chest pain, palpitations or peripheral edema  MUSCULOSKELETAL: NEGATIVE for significant arthralgias or myalgia  NEURO: NEGATIVE for weakness, dizziness or paresthesias      Objective    /72   Pulse 86   Temp 97.3  F (36.3  C) (Tympanic)   Wt 53.5 kg (118 lb)   SpO2 98%   BMI 20.90 kg/m    Body mass index is 20.9 kg/m .  Physical Exam   GENERAL: alert and no distress  RESP: lungs clear to auscultation - no rales, rhonchi or wheezes  CV: regular rate and rhythm, normal " S1 S2, no S3 or S4, no murmur, click or rub, no peripheral edema and peripheral pulses strong  ABDOMEN: soft, nontender, no hepatosplenomegaly, no masses and bowel sounds normal  MS: mild left hip discomfort  NEURO: sensory exam grossly normal, mentation intact and gait abnormal: slow guarded gait and needs assistance to stand   PSYCH: mentation appears normal, affect normal/bright

## 2021-06-11 ENCOUNTER — OFFICE VISIT (OUTPATIENT)
Dept: FAMILY MEDICINE | Facility: OTHER | Age: 86
End: 2021-06-11
Attending: NURSE PRACTITIONER
Payer: MEDICARE

## 2021-06-11 VITALS
TEMPERATURE: 97.3 F | DIASTOLIC BLOOD PRESSURE: 72 MMHG | WEIGHT: 118 LBS | BODY MASS INDEX: 20.9 KG/M2 | HEART RATE: 86 BPM | OXYGEN SATURATION: 98 % | SYSTOLIC BLOOD PRESSURE: 136 MMHG

## 2021-06-11 DIAGNOSIS — M84.48XD SACRAL INSUFFICIENCY FRACTURE WITH ROUTINE HEALING, SUBSEQUENT ENCOUNTER: Primary | ICD-10-CM

## 2021-06-11 PROCEDURE — G0463 HOSPITAL OUTPT CLINIC VISIT: HCPCS | Mod: 25

## 2021-06-11 PROCEDURE — 99213 OFFICE O/P EST LOW 20 MIN: CPT | Performed by: NURSE PRACTITIONER

## 2021-06-11 PROCEDURE — G0463 HOSPITAL OUTPT CLINIC VISIT: HCPCS

## 2021-06-11 ASSESSMENT — PAIN SCALES - GENERAL: PAINLEVEL: NO PAIN (0)

## 2021-06-11 NOTE — NURSING NOTE
"Chief Complaint   Patient presents with     ER F/U       Initial /72   Pulse 86   Temp 97.3  F (36.3  C) (Tympanic)   Wt 53.5 kg (118 lb)   SpO2 98%   BMI 20.90 kg/m   Estimated body mass index is 20.9 kg/m  as calculated from the following:    Height as of 6/3/21: 1.6 m (5' 3\").    Weight as of this encounter: 53.5 kg (118 lb).  Medication Reconciliation: complete  Candido Mercedes LPN  "

## 2021-06-11 NOTE — PATIENT INSTRUCTIONS
Slowly increase activity as able   Use a cane or walker for support     Follow up if pain returns or worsens again

## 2021-06-11 NOTE — Clinical Note
Good Afternoon  DIRK Carlton states she is feeling better with limited pain.    Theresa AGUILAR FNP-BC  Family Nurse Practitioner

## 2021-07-06 ENCOUNTER — TRANSFERRED RECORDS (OUTPATIENT)
Dept: HEALTH INFORMATION MANAGEMENT | Facility: CLINIC | Age: 86
End: 2021-07-06

## 2021-08-17 ENCOUNTER — OFFICE VISIT (OUTPATIENT)
Dept: OTOLARYNGOLOGY | Facility: OTHER | Age: 86
End: 2021-08-17
Attending: OTOLARYNGOLOGY
Payer: MEDICARE

## 2021-08-17 VITALS
SYSTOLIC BLOOD PRESSURE: 132 MMHG | DIASTOLIC BLOOD PRESSURE: 74 MMHG | HEIGHT: 63 IN | OXYGEN SATURATION: 95 % | TEMPERATURE: 97.5 F | HEART RATE: 70 BPM | WEIGHT: 113 LBS | BODY MASS INDEX: 20.02 KG/M2

## 2021-08-17 DIAGNOSIS — H93.91 LESION OF RIGHT EAR: ICD-10-CM

## 2021-08-17 DIAGNOSIS — H93.90 EAR LESION: Primary | ICD-10-CM

## 2021-08-17 PROCEDURE — G0463 HOSPITAL OUTPT CLINIC VISIT: HCPCS | Mod: 25

## 2021-08-17 PROCEDURE — 11442 EXC FACE-MM B9+MARG 1.1-2 CM: CPT | Performed by: OTOLARYNGOLOGY

## 2021-08-17 PROCEDURE — 88305 TISSUE EXAM BY PATHOLOGIST: CPT | Mod: TC | Performed by: OTOLARYNGOLOGY

## 2021-08-17 PROCEDURE — 99212 OFFICE O/P EST SF 10 MIN: CPT | Mod: 25 | Performed by: OTOLARYNGOLOGY

## 2021-08-17 RX ORDER — CEPHALEXIN 500 MG/1
500 CAPSULE ORAL 2 TIMES DAILY
Qty: 10 CAPSULE | Refills: 0 | Status: SHIPPED | OUTPATIENT
Start: 2021-08-17 | End: 2021-08-22

## 2021-08-17 ASSESSMENT — PAIN SCALES - GENERAL: PAINLEVEL: MODERATE PAIN (4)

## 2021-08-17 ASSESSMENT — MIFFLIN-ST. JEOR: SCORE: 926.69

## 2021-08-17 NOTE — PROGRESS NOTES
"Otolaryngology Progress Note          Bianka Whitmore is a 85 year old female    Follow-up of an excision of a right antihelix lesion which was performed on 5/3/2021    Final pathology showed actinic keratosis with negative margins no evidence of dysplasia    She presents back because the area did not seem to heal   The right ear is uncomfortable to touch overlying the former excision site  No drainage or erythema       Physical Exam  /74 (Cuff Size: Adult Regular)   Pulse 70   Temp 97.5  F (36.4  C) (Tympanic)   Ht 1.6 m (5' 3\")   Wt 51.3 kg (113 lb)   SpO2 95%   BMI 20.02 kg/m    General - The patient is well nourished and well developed, and appears to have good nutritional status.  Alert and oriented to person and place, interactive.  Head and Face - Normocephalic and atraumatic, with no gross asymmetry noted of the contour of the facial features.  The facial nerve is intact, with strong symmetric movements to upper face, wearing mask  Ears- External auditory canals are patent, tympanic membranes are intact without effusion or worrisome retractions   Right antihelix with small 2 mm superficial area of healing by secondary intention surrounding area has mild erythema.  No fluctuance    Office Procedure:   I discussed the risks and complications of skin lesion excision, including local anesthesia, bleeding, infection, injury to the facial nerve, scar formation, hypertrophic healing or keloid, numbness to area, recurrence of lesion, benign versus malignant pathology and possible need for further surgery. All questions were answered.    After informed consent was discussed and a time- out taken, I proceeded to cleanse the skin around the lesion with alcohol.  I then demarcated the lesion parallel to relaxed skin tension lines in a natural crease.  The area was prepped and draped in the normal sterile fashion.  I then infiltrated the skin with 1% lidocaine with 1:200,000 epinephrine.  I  used a 15-blade " to create an elliptical incision around the right antihelix  lesion, with margins of 2 mm of grossly normal skin.  I then elevated the skin ellipse and a thin cuff of underlying subdermal fat with curved iris scissors.  I dissected down through normal subcutaneous tissue for complete removal of the lesion.  After the lesion was completely removed, I then placed it in formalin for permanent section.  Hemostasis was achieved with direct pressure and hand held cautery. I then irrigated the wound and gently suctioned the area.  The antihelical cartilage is normal in appearance.  I widely advanced the adjacent skin for tension free closure using tenotomy scissors.  The total length of the incision was  1.2 cm.  I then proceeded to close the incision by using simple interrupted buried knot sutures, using 5-0 nylon, simple interrupted sutures.  Antibiotic ointment was then applied and the wound was dressed.  The patient tolerated the procedure without any difficulty.    Impression/Plan    ICD-10-CM    1. Ear lesion  H93.90 Surgical pathology exam     cephALEXin (KEFLEX) 500 MG capsule     EXC BENIGN SKIN LESION FACE/EARS 1.1-2.0 CM         Wear dimas dressing  Cephalexin x 5 days with probiotic  No anaphylactic pcn allergy, states it caused GI upset      Additional surgery may need to be scheduled based on final pathology.  This was discussed today.    Further procedures may include skin excision with frozens and flap repair.    The risks of surgery were discussed, if further surgery is necessary.  These include but are not limited to anesthesia, scar or keloid formation, infection, flap failure, change in appearance of surrounding facial structures,  numbness to the skin, injury to the facial nerve with permanent facial weakness which is exceedingly rare but possible.  Temporary weakness to the face may occur with the use of local anesthesia.    The patient expressed understanding.  All questions were  answered.  Photos were taken.      I have instructed the patient on wound care and signs of infection.  Written instructions provided.  We will contact the patient with pathology results.    Sunscreen use and skin cancer preventive measures were reinforced      Monet Onofre D.O.  Otolaryngology/Head and Neck Surgery  Allergy

## 2021-08-17 NOTE — NURSING NOTE
"Chief Complaint   Patient presents with     Follow Up     Right Ear Lesion       Initial /74 (Cuff Size: Adult Regular)   Pulse 70   Temp 97.5  F (36.4  C) (Tympanic)   Ht 1.6 m (5' 3\")   Wt 51.3 kg (113 lb)   SpO2 95%   BMI 20.02 kg/m   Estimated body mass index is 20.02 kg/m  as calculated from the following:    Height as of this encounter: 1.6 m (5' 3\").    Weight as of this encounter: 51.3 kg (113 lb).  Medication Reconciliation: complete  Ivana Marcum LPN    "

## 2021-08-17 NOTE — LETTER
"    8/17/2021         RE: Bianka Whitmore  [address] [facility] [address]        Dear Colleague,    Thank you for referring your patient, Bianka Whitmore, to the [facility]. Please see a copy of my visit note below.    Otolaryngology Progress Note          Bianka Whitmore is a 85 year old female    Follow-up of an excision of a right antihelix lesion which was performed on 5/3/2021    Final pathology showed actinic keratosis with negative margins no evidence of dysplasia    She presents back because the area did not seem to heal   The right ear is uncomfortable to touch overlying the former excision site  No drainage or erythema       Physical Exam  /74 (Cuff Size: Adult Regular)   Pulse 70   Temp 97.5  F (36.4  C) (Tympanic)   Ht 1.6 m (5' 3\")   Wt 51.3 kg (113 lb)   SpO2 95%   BMI 20.02 kg/m    General - The patient is well nourished and well developed, and appears to have good nutritional status.  Alert and oriented to person and place, interactive.  Head and Face - Normocephalic and atraumatic, with no gross asymmetry noted of the contour of the facial features.  The facial nerve is intact, with strong symmetric movements to upper face, wearing mask  Ears- External auditory canals are patent, tympanic membranes are intact without effusion or worrisome retractions   Right antihelix with small 2 mm superficial area of healing by secondary intention surrounding area has mild erythema.  No fluctuance    Office Procedure:   I discussed the risks and complications of skin lesion excision, including local anesthesia, bleeding, infection, injury to the facial nerve, scar formation, hypertrophic healing or keloid, numbness to area, recurrence of lesion, benign versus malignant pathology and possible need for further surgery. All questions were answered.    After informed consent was discussed and a time- out taken, I proceeded to cleanse the skin around the lesion with alcohol.  " I then demarcated the lesion parallel to relaxed skin tension lines in a natural crease.  The area was prepped and draped in the normal sterile fashion.  I then infiltrated the skin with 1% lidocaine with 1:200,000 epinephrine.  I  used a 15-blade to create an elliptical incision around the right antihelix  lesion, with margins of 2 mm of grossly normal skin.  I then elevated the skin ellipse and a thin cuff of underlying subdermal fat with curved iris scissors.  I dissected down through normal subcutaneous tissue for complete removal of the lesion.  After the lesion was completely removed, I then placed it in formalin for permanent section.  Hemostasis was achieved with direct pressure and hand held cautery. I then irrigated the wound and gently suctioned the area.  The antihelical cartilage is normal in appearance.  I widely advanced the adjacent skin for tension free closure using tenotomy scissors.  The total length of the incision was  1.2 cm.  I then proceeded to close the incision by using simple interrupted buried knot sutures, using 5-0 nylon, simple interrupted sutures.  Antibiotic ointment was then applied and the wound was dressed.  The patient tolerated the procedure without any difficulty.    Impression/Plan    ICD-10-CM    1. Ear lesion  H93.90 Surgical pathology exam     cephALEXin (KEFLEX) 500 MG capsule     EXC BENIGN SKIN LESION FACE/EARS 1.1-2.0 CM         Wear dimas dressing  Cephalexin x 5 days with probiotic  No anaphylactic pcn allergy, states it caused GI upset      Additional surgery may need to be scheduled based on final pathology.  This was discussed today.    Further procedures may include skin excision with frozens and flap repair.    The risks of surgery were discussed, if further surgery is necessary.  These include but are not limited to anesthesia, scar or keloid formation, infection, flap failure, change in appearance of surrounding facial structures,  numbness to the skin,  injury to the facial nerve with permanent facial weakness which is exceedingly rare but possible.  Temporary weakness to the face may occur with the use of local anesthesia.    The patient expressed understanding.  All questions were answered.  Photos were taken.      I have instructed the patient on wound care and signs of infection.  Written instructions provided.  We will contact the patient with pathology results.    Sunscreen use and skin cancer preventive measures were reinforced      Monet Onofre D.O.  Otolaryngology/Head and Neck Surgery  Allergy              Again, thank you for allowing me to participate in the care of your patient.        Sincerely,        Monet Onofre MD

## 2021-08-17 NOTE — PATIENT INSTRUCTIONS
Thank you for allowing Dr. Onofre and our ENT team to participate in your care.  If your medications are too expensive, please give the nurse a call.  We can possibly change this medication.  If you have a scheduling or an appointment question please contact our Health Unit Coordinator at their direct line [phone] ext [phone].   ALL nursing questions or concerns can be directed to your ENT nurse at: [phone] - Ivana    Take the antibiotic as prescribed; take with a probiotic  Wear the dressing to your ear with sleeping      POST PROCEDURE INSTRUCTIONS      Remove your dressing in 24 hours (If you have one)    Wash incision with Gentle Cleanser Twice Daily (Cetaphil, Baby Shampoo, etc)    Apply Bacitracin Ointment Three Times Daily; After 1 week, use Aquaphor Ointment     Cover with a clean dressing if in a dirty [place] environment or when wet/soiled    Keep incision clean and dry   Do NOT soak in water such as a tub bath or swimming   Do NOT put make-up, powders, hairspray, lotions, etc on the incision       You can apply ice to the surgical area to help reduce swelling. (no longer than 20 minutes at a time)      You can use acetaminophen(Tylenol) or the prescription you received for pain.       If you have any bleeding, cover the wound with clean gauze and hold pressure for 10 Minutes. If the bleeding does not stop or is heavy and profuse, call the clinic or go to the Urgent Care/Emergency Department.    SIGNS OF INFECTION ARE:    Redness, swelling, red streaks, pus, drainage, warmth, fever, increased pain, foul smell.     Contact your primary health care provider if you notice any of the warning signs.     FOLLOW - UP    Follow-up in clinic for a nurse only visit in 7 days for suture removal.     Pathology results will be called to you when they are back. Usually 7-10 days.      6 WEEKS POST PROCEDURE      Apply ANY type of lotion to the suture site(Example - Vaseline Intensive Care or Vitamin  E)    Massage the surgical area 1-2 times daily in a circular motion for 5 minutes, for a period of 2 months. This will help the scar heal better.

## 2021-08-20 LAB
PATH REPORT.COMMENTS IMP SPEC: NORMAL
PATH REPORT.FINAL DX SPEC: NORMAL
PATH REPORT.GROSS SPEC: NORMAL
PATH REPORT.MICROSCOPIC SPEC OTHER STN: NORMAL
PATH REPORT.RELEVANT HX SPEC: NORMAL

## 2021-08-20 PROCEDURE — 88305 TISSUE EXAM BY PATHOLOGIST: CPT | Mod: 26 | Performed by: DERMATOLOGY

## 2021-08-25 ENCOUNTER — ALLIED HEALTH/NURSE VISIT (OUTPATIENT)
Dept: OTOLARYNGOLOGY | Facility: OTHER | Age: 86
End: 2021-08-25
Payer: MEDICARE

## 2021-08-25 DIAGNOSIS — H93.91 LESION OF RIGHT EAR: Primary | ICD-10-CM

## 2021-08-25 NOTE — NURSING NOTE
Patient seen today for suture removal from the right ear.  I removed 3 stitches from the right ear, incision not well approximated, no signs of infection.  I placed 3 -1/8 inch steri strips to incision, gave instructions to care for steri strips.

## 2021-10-20 NOTE — NURSING NOTE
"Chief Complaint   Patient presents with     Right Ankle - Pain       Initial /81 (BP Location: Left arm, Patient Position: Sitting, Cuff Size: Adult Regular)  Pulse 90  Temp 97.3  F (36.3  C) (Tympanic)  Ht 5' 4\" (1.626 m)  Wt 118 lb (53.5 kg)  BMI 20.25 kg/m2 Estimated body mass index is 20.25 kg/(m^2) as calculated from the following:    Height as of this encounter: 5' 4\" (1.626 m).    Weight as of this encounter: 118 lb (53.5 kg).  Medication Reconciliation: complete    Elodia Pang LPN  " Is This A New Presentation, Or A Follow-Up?: Skin Lesion How Severe Is Your Skin Lesion?: mild Has Your Skin Lesion Been Treated?: been treated

## 2022-01-07 ENCOUNTER — PREP FOR PROCEDURE (OUTPATIENT)
Dept: OTOLARYNGOLOGY | Facility: OTHER | Age: 87
End: 2022-01-07

## 2022-01-07 ENCOUNTER — OFFICE VISIT (OUTPATIENT)
Dept: OTOLARYNGOLOGY | Facility: OTHER | Age: 87
End: 2022-01-07
Attending: OTOLARYNGOLOGY
Payer: MEDICARE

## 2022-01-07 VITALS
OXYGEN SATURATION: 94 % | DIASTOLIC BLOOD PRESSURE: 76 MMHG | HEIGHT: 63 IN | HEART RATE: 80 BPM | WEIGHT: 118 LBS | SYSTOLIC BLOOD PRESSURE: 128 MMHG | BODY MASS INDEX: 20.91 KG/M2 | TEMPERATURE: 97.1 F

## 2022-01-07 DIAGNOSIS — L91.0 HYPERTROPHIC SCAR: ICD-10-CM

## 2022-01-07 DIAGNOSIS — H61.91 SKIN LESION OF RIGHT EAR: Primary | ICD-10-CM

## 2022-01-07 DIAGNOSIS — Z01.818 PRE-OP TESTING: ICD-10-CM

## 2022-01-07 PROCEDURE — 99214 OFFICE O/P EST MOD 30 MIN: CPT | Performed by: OTOLARYNGOLOGY

## 2022-01-07 PROCEDURE — G0463 HOSPITAL OUTPT CLINIC VISIT: HCPCS

## 2022-01-07 ASSESSMENT — MIFFLIN-ST. JEOR: SCORE: 944.37

## 2022-01-07 ASSESSMENT — PAIN SCALES - GENERAL: PAINLEVEL: MILD PAIN (3)

## 2022-01-07 NOTE — LETTER
"    1/7/2022         RE: Bianka Whitmore  [address] [facility] [address]        Dear Colleague,    Thank you for referring your patient, Bianka Whitmore, to the [facility]. Please see a copy of my visit note below.    Otolaryngology Progress Note          Bianka Whitmore is a 86 year old female    Presents for follow-up of a right antihelix lesion.  I initially saw her on 5 3 for excision of the antihelix skin lesion which showed actinic keratosis with negative margins and no dysplasia.    I again saw her on 817 due to some antihelix pain and there is a small 2 mm superficial area that was healing by secondary intention    No evidence of infection.  I reexcise this area and closed the wound tension-free with 5-0 nylon.  She was placed on Keflex.    Pathology was returned as an ulcer with irregular epidermal hyperplasia and hypergranulosis negative for carcinoma    She again has a tender nonhealing area on the antihelix.  No drainage.  This area is bothersome to touch      Physical Exam  /76 (Cuff Size: Adult Regular)   Pulse 80   Temp 97.1  F (36.2  C) (Tympanic)   Ht 1.6 m (5' 3\")   Wt 53.5 kg (118 lb)   SpO2 94%   BMI 20.90 kg/m    General - The patient is well nourished and well developed, and appears to have good nutritional status.  Alert and oriented to person and place, interactive.  Head and Face - Normocephalic and atraumatic, with no gross asymmetry noted of the contour of the facial features.  The facial nerve is intact, with strong symmetric movements to upper face.    Right mid antihelix with a 0.6 cm area of raised hypertrophic scar with a small 2 mm area of granulation.  Tender, no erythema or drainage    Impression/Plan  Bianka Whitmore is a 86 year old female    ICD-10-CM    1. Skin lesion of right ear  H61.91    2. Hypertrophic scar  L91.0        I encourage Bianka to consider reexcision with skin graft.  She is adamant she does not want a skin " graft.    The only other option is reexcision with flap repair but she persistently has hypertrophic scar formation and wound dehiscence.  This may happen again even with flap repair.    I also discussed surveillance with would care alone but the area is uncomfortable and she would like to proceed with surgery.  I offered her a second opinion with plastics in [place] and she declined.    I discussed the risks and complications of excision right antihelix lesion with flap repair including local anesthesia, bleeding, infection,  scar formation, hypertrophic healing or keloid, numbness to area, possible need for additional surgery or skin graft.    I will plan on excising more of her antihelix cartilage and advancement flap.  This is all discussed with Bianka I showed her my plan with a mirror while looking at her ear.        Monet Onofre D.O.  Otolaryngology/Head and Neck Surgery  Allergy                  Again, thank you for allowing me to participate in the care of your patient.        Sincerely,        Monet Onofre MD

## 2022-01-07 NOTE — PROGRESS NOTES
"Otolaryngology Progress Note          Bianka Whitmore is a 86 year old female    Presents for follow-up of a right antihelix lesion.  I initially saw her on 5 3 for excision of the antihelix skin lesion which showed actinic keratosis with negative margins and no dysplasia.    I again saw her on 817 due to some antihelix pain and there is a small 2 mm superficial area that was healing by secondary intention    No evidence of infection.  I reexcise this area and closed the wound tension-free with 5-0 nylon.  She was placed on Keflex.    Pathology was returned as an ulcer with irregular epidermal hyperplasia and hypergranulosis negative for carcinoma    She again has a tender nonhealing area on the antihelix.  No drainage.  This area is bothersome to touch      Physical Exam  /76 (Cuff Size: Adult Regular)   Pulse 80   Temp 97.1  F (36.2  C) (Tympanic)   Ht 1.6 m (5' 3\")   Wt 53.5 kg (118 lb)   SpO2 94%   BMI 20.90 kg/m    General - The patient is well nourished and well developed, and appears to have good nutritional status.  Alert and oriented to person and place, interactive.  Head and Face - Normocephalic and atraumatic, with no gross asymmetry noted of the contour of the facial features.  The facial nerve is intact, with strong symmetric movements to upper face.    Right mid antihelix with a 0.6 cm area of raised hypertrophic scar with a small 2 mm area of granulation.  Tender, no erythema or drainage    Impression/Plan  Bianka Whitmore is a 86 year old female    ICD-10-CM    1. Skin lesion of right ear  H61.91    2. Hypertrophic scar  L91.0        I encourage Bianka to consider reexcision with skin graft.  She is adamant she does not want a skin graft.    The only other option is reexcision with flap repair but she persistently has hypertrophic scar formation and wound dehiscence.  This may happen again even with flap repair.    I also discussed surveillance with would care alone but the area is " uncomfortable and she would like to proceed with surgery.  I offered her a second opinion with plastics in [place] and she declined.    I discussed the risks and complications of excision right antihelix lesion with flap repair including local anesthesia, bleeding, infection,  scar formation, hypertrophic healing or keloid, numbness to area, possible need for additional surgery or skin graft.    I will plan on excising more of her antihelix cartilage and advancement flap.  This is all discussed with Bianka I showed her my plan with a mirror while looking at her ear.        Monet Onofre D.O.  Otolaryngology/Head and Neck Surgery  Allergy

## 2022-01-07 NOTE — PATIENT INSTRUCTIONS
Thank you for allowing Dr. Onofre and our ENT team to participate in your care.  If your medications are too expensive, please give the nurse a call.  We can possibly change this medication.  If you have a scheduling or an appointment question please contact our Health Unit Coordinator at their direct line [phone] ext [phone].   ALL nursing questions or concerns can be directed to your ENT nurse at: [phone] - Ivana    Complete Covid Test 4 days before surgery  Follow up in surgery      POST PROCEDURE INSTRUCTIONS      Remove your dressing in 24 hours (If you have one)    Wash incision with Gentle Cleanser Twice Daily (Cetaphil, Baby Shampoo, etc)    Apply Bacitracin Ointment Three Times Daily; After 1 week, use Aquaphor Ointment     Cover with a clean dressing if in a dirty [place] environment or when wet/soiled    Keep incision clean and dry   Do NOT soak in water such as a tub bath or swimming   Do NOT put make-up, powders, hairspray, lotions, etc on the incision       You can apply ice to the surgical area to help reduce swelling. (no longer than 20 minutes at a time)      You can use acetaminophen(Tylenol) or the prescription you received for pain.       If you have any bleeding, cover the wound with clean gauze and hold pressure for 10 Minutes. If the bleeding does not stop or is heavy and profuse, call the clinic or go to the Urgent Care/Emergency Department.    SIGNS OF INFECTION ARE:    Redness, swelling, red streaks, pus, drainage, warmth, fever, increased pain, foul smell.     Contact your primary health care provider if you notice any of the warning signs.     FOLLOW - UP    Follow-up in clinic for a nurse only visit in 7 days for suture removal.     Pathology results will be called to you when they are back. Usually 7-10 days.      6 WEEKS POST PROCEDURE      Apply ANY type of lotion to the suture site(Example - Vaseline Intensive Care or Vitamin E)    Massage the surgical area 1-2 times daily  in a circular motion for 5 minutes, for a period of 2 months. This will help the scar heal better.           HOW TO PREPARE-      You need to have a scheduled Pre-Op with your primary care physician within 30 days of your scheduled surgery.        You need a friend or family member available to drive you home AND stay with you for 24 hours after you leave the hospital. You will not be allowed to drive yourself. IF you need to take a taxi or the bus you MUST have a responsible person to ride with you. YOUR PROCEDURE WILL BE CANCELLED IF YOU DO NOT HAVE A RESPONSIBLE ADULT TO DRIVE YOU HOME.       You CANNOT have anything to eat or drink after midnight the night before your surgery, including water and coffee. Your stomach needs to be completely empty. Do NOT chew gum, suck on hard candy, or smoke. You can brush your teeth the morning of surgery.       The Surgery Education Nurses will call you  1-2 weeks prior to your surgery date at  [phone] or toll free [phone]. Please have your medication and allergy lists ready.      Stop your aspirin or other NSAIDs(Ibuprofen, Motrin, Aleve, Celebrex, Naproxen, etc...) 7 days before your surgery.      Hospital admitting will call you the day before your surgery with your exact arrival time.       Please call your primary care physician if you should become ill within 24 hours of scheduled surgery. (ex.vomiting, diarrhea, fever)          You will need to wash the night before AND the morning of you procedure with a liquid antibacterial soap, like Dial.

## 2022-01-07 NOTE — NURSING NOTE
"Chief Complaint   Patient presents with     Follow Up     Right Ear Lesion       Initial /76 (Cuff Size: Adult Regular)   Pulse 80   Temp 97.1  F (36.2  C) (Tympanic)   Ht 1.6 m (5' 3\")   Wt 53.5 kg (118 lb)   SpO2 94%   BMI 20.90 kg/m   Estimated body mass index is 20.9 kg/m  as calculated from the following:    Height as of this encounter: 1.6 m (5' 3\").    Weight as of this encounter: 53.5 kg (118 lb).  Medication Reconciliation: complete  Ivana Marcum LPN    "

## 2022-01-10 ENCOUNTER — TELEPHONE (OUTPATIENT)
Dept: OTOLARYNGOLOGY | Facility: OTHER | Age: 87
End: 2022-01-10
Payer: MEDICARE

## 2022-01-10 NOTE — TELEPHONE ENCOUNTER
This patient calls to cancel her upcoming surgery with Dr. Onofre. She thought about it over the weekend and is not willing to follow through with it. She was instructed to call back in the future if she is interested in surgery. Covid test and post op also cancelled. Surgery notified.

## 2022-05-24 ENCOUNTER — TELEPHONE (OUTPATIENT)
Dept: OTOLARYNGOLOGY | Facility: OTHER | Age: 87
End: 2022-05-24
Payer: MEDICARE

## 2022-05-24 DIAGNOSIS — H61.91 SKIN LESION OF RIGHT EAR: Primary | ICD-10-CM

## 2022-05-24 NOTE — TELEPHONE ENCOUNTER
Patient wishes to see derm to discuss if there are other options for her at this time. She does not wish to complete surgery at this time and I declined further options in clinic. I appreciate their recommendations. TR

## 2022-06-20 ENCOUNTER — TELEPHONE (OUTPATIENT)
Dept: FAMILY MEDICINE | Facility: OTHER | Age: 87
End: 2022-06-20

## 2022-06-20 ENCOUNTER — HOSPITAL ENCOUNTER (EMERGENCY)
Facility: HOSPITAL | Age: 87
Discharge: HOME | End: 2022-06-20
Attending: EMERGENCY MEDICINE | Admitting: EMERGENCY MEDICINE
Payer: MEDICARE

## 2022-06-20 VITALS
SYSTOLIC BLOOD PRESSURE: 151 MMHG | HEART RATE: 87 BPM | OXYGEN SATURATION: 99 % | TEMPERATURE: 96.8 F | DIASTOLIC BLOOD PRESSURE: 99 MMHG | RESPIRATION RATE: 16 BRPM
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
## 2022-06-20 DIAGNOSIS — S50.862A TICK BITE OF LEFT FOREARM, INITIAL ENCOUNTER: ICD-10-CM

## 2022-06-20 PROCEDURE — 99283 EMERGENCY DEPT VISIT LOW MDM: CPT

## 2022-06-20 PROCEDURE — 86618 LYME DISEASE ANTIBODY: CPT | Performed by: EMERGENCY MEDICINE

## 2022-06-20 PROCEDURE — 99283 EMERGENCY DEPT VISIT LOW MDM: CPT | Performed by: EMERGENCY MEDICINE

## 2022-06-20 PROCEDURE — 36415 COLL VENOUS BLD VENIPUNCTURE: CPT | Performed by: EMERGENCY MEDICINE

## 2022-06-20 PROCEDURE — 87207 SMEAR SPECIAL STAIN: CPT | Performed by: EMERGENCY MEDICINE

## 2022-06-20 PROCEDURE — 87015 SPECIMEN INFECT AGNT CONCNTJ: CPT | Performed by: EMERGENCY MEDICINE

## 2022-06-20 PROCEDURE — 87798 DETECT AGENT NOS DNA AMP: CPT | Performed by: EMERGENCY MEDICINE

## 2022-06-20 RX ORDER — DOXYCYCLINE 100 MG/1
100 CAPSULE ORAL 2 TIMES DAILY
Qty: 28 CAPSULE | Refills: 0 | Status: SHIPPED | OUTPATIENT
Start: 2022-06-20 | End: 2022-06-22

## 2022-06-20 ASSESSMENT — ENCOUNTER SYMPTOMS
CARDIOVASCULAR NEGATIVE: 1
GASTROINTESTINAL NEGATIVE: 1
NEUROLOGICAL NEGATIVE: 1
RESPIRATORY NEGATIVE: 1
MUSCULOSKELETAL NEGATIVE: 1
WOUND: 1
ENDOCRINE NEGATIVE: 1
CONSTITUTIONAL NEGATIVE: 1
EYES NEGATIVE: 1

## 2022-06-20 NOTE — TELEPHONE ENCOUNTER
Emergency Department and Urgent Care Follow-up      Reason for ER/UC visit: tick bite  o Date seen: 6.20.2022      New or Worsening symptoms:  no       Prescription Received/Picked up from Pharmacy?: doxyclcline  o Medications started? yes  o Any questions or issues regarding your prescription?: no      Follow-up Results or Labs that are pending: blood work in process      Questions or concerns?: no      ER Recommends Follow-up by: 3 days      RN Recommendations:Scheduled 6.22.2022  o Appointment scheduled:     If you start feeling worse, or have any further questions, please feel free to contact Nurse Triage at [phone].  If needing immediate medical attention at any time please call 911/Go to the ER.    Lucina Torres RN

## 2022-06-20 NOTE — ED PROVIDER NOTES
History     Chief Complaint   Patient presents with     Tick Bite     HPI  Bianka Whitmore is a 86 year old female who was to the emergency department complaining of a tick bite on her left forearm.  She discovered a tick on Saturday.  She is unsure how long the tick was attached.  She does have an area about the size of a dime of redness surrounding the bite.  She states is not particularly painful.  She denies other symptoms at this time.    Allergies:  Allergies   Allergen Reactions     Amoxicillin      Intolerant       Codeine      Hydrocodone Nausea and Vomiting     Morphine      Sensitive to Codeine.       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 yes         Diverticulitis of colon 03/26/2013     Priority: Medium     Osteoarthritis 03/26/2013     Priority: Medium     Osteoporosis/osteopenia increased risk 03/26/2013     Priority: Medium     ACP (advance care planning) 12/06/2012     Priority: Medium     Advance Care Planning 6/22/2016: ACP Review of Chart / Resources Provided:  Reviewed chart for advance care plan.  Bianka Whitmore has no plan or code status on file however states presence of ACP document. Copy requested. Confirmed code status reflects current choices pending receipt of document/advance care plan review.  Confirmed/documented legally designated decision makers.  Added by Muniar Rodgers                Past Medical History:    Past Medical History:   Diagnosis Date     Basal  cell carcinoma      Cardiomegaly 2007     Compression fracture of thoracic vertebra (H) 10/30/2014     Compression fx, lumbar spine, with routine healing, subsequent encounter 2016     Hyperlipidemia      Osteoporosis      Typical atrial flutter (H) 2016     Varicose vein of leg 2016       Past Surgical History:    Past Surgical History:   Procedure Laterality Date     APPENDECTOMY       COLONOSCOPY N/A 2017    Procedure: COLONOSCOPY;  COLONOSCOPY WITH POLYPECTOMY AND SCLEROTHERAPY;  Surgeon: Matt Gonzalez MD;  Location: [facility] OR     ENDOSCOPIC STRIPPING VEIN(S)       HYSTERECTOMY       SALPINGO OOPHORECTOMY,R/L/BRIAN         Family History:    Family History   Problem Relation Age of Onset     Other - See Comments Mother 85        Old age, cause of death     Cancer Father 84        Rectal cancer, cause of death/Stomach cancer, cause of death     C.A.D. Maternal Aunt      C.A.D. Maternal Uncle      Cancer Son         thymic carcinoma       Social History:  Marital Status:   [2]  Social History     Tobacco Use     Smoking status: Former Smoker     Packs/day: 0.50     Years: 3.00     Pack years: 1.50     Types: Cigarettes     Start date: 1950     Quit date: 3/26/1953     Years since quittin.2     Smokeless tobacco: Never Used     Tobacco comment: Quit in ; no passive smoke exposure   Substance Use Topics     Alcohol use: No     Drug use: No        Medications:    doxycycline hyclate (VIBRAMYCIN) 100 MG capsule  Ascorbic Acid (VITAMIN C PO)  B Complex-Biotin-FA (VITAMIN B50 COMPLEX PO)  Flaxseed, Linseed, (FLAX SEED OIL) 1000 MG capsule  HYDROcodone-acetaminophen (NORCO) 5-325 MG tablet  MAGNESIUM OXIDE PO  NONFORMULARY  NONFORMULARY  Omega-3 Fatty Acids (FISH OIL) 1200 MG CAPS  VITAMIN E COMPLEX PO          Review of Systems   Constitutional: Negative.    HENT: Negative.    Eyes: Negative.    Respiratory: Negative.    Cardiovascular: Negative.    Gastrointestinal:  Negative.    Endocrine: Negative.    Genitourinary: Negative.    Musculoskeletal: Negative.    Skin: Positive for wound.   Neurological: Negative.    Please see history of chief complaint.  All other systems reviewed and they are found unremarkable    Physical Exam   BP: 151/99  Pulse: 87  Temp: 96.8  F (36  C)  Resp: 16  SpO2: 99 %      Physical Exam 86-year-old female very pleasant and cooperative with my exam.  Lungs clear heart regular abdomen soft extremities a full range of motion patient has a dime size area of erythema on the palmar aspect of her mid forearm.  There is no drainage there is no fluctuance there is no lymphangitis.    ED Course      Appropriate titers will be drawn and we will start the patient on a course of doxycycline.  I will advise her to be reassessed by her primary provider later this week                               No results found for this or any previous visit (from the past 24 hour(s)).    Medications - No data to display    Assessments & Plan (with Medical Decision Making)     I have reviewed the nursing notes.    I have reviewed the findings, diagnosis, plan and need for follow up with the patient.      New Prescriptions    DOXYCYCLINE HYCLATE (VIBRAMYCIN) 100 MG CAPSULE    Take 1 capsule (100 mg) by mouth 2 times daily for 14 days       Final diagnoses:   Tick bite of left forearm, initial encounter       6/20/2022   [facility]     Sean Medina, DO  06/20/22 0960

## 2022-06-20 NOTE — ED NOTES
Patient given plan of care. Patient advised to  antibiotic. Patient denies any questions/concerns. Patient to return to the ER with any new or worsening symptoms. Patient advised lab work is running. Patient will be notified if anything is positive. Patient verbalized understanding. Patient ambulated out of the department with a steady and balanced gait and with .

## 2022-06-20 NOTE — ED NOTES
Patient reports no pain. Patient reports she removed. Patient states there was pain previously. Patient advised she removed it Saturday. The area is slightly swollen, red and warm to the touch.

## 2022-06-21 LAB — B BURGDOR IGG+IGM SER QL: 0.34

## 2022-06-21 NOTE — PROGRESS NOTES
Assessment & Plan     Nonvenomous insect bite of left hand, subsequent encounter  Tick bite of left forearm, subsequent encounter    She adamantly states she is not going to take the Doxycycline prescribed by the ER as they called her and told her she doesn't have Lyme Disease.  We discussed that the testing can take a couple weeks to turn positive, and I think we should treat her as recommended by the ER doctor as it unlikely that the testing done in the ER would  Lyme Disease so soon after a tic bite.  She states she read the list of potential side effects, and given her age she is not interested in risking this.  We discussed potential repeat testing.  For now, she just wants to observe and if she develops a target rash she will reconsider taking the antibiotics, otherwise she has no interest in this.  She is aware of potential to have an untreated Lyme Disease.    Skin Lesion of Face (Lesion on Left Cheek)  Benign appearing, resembles a cutaneous horn.  Discussed potential removal here in office or derm referral.  She wishes to hold off for now.      ENDER WEINBERG, DO  [facility] [place]    Subjective   Bianka is a 86 year old, presenting for the following health issues:  ER F/U (Tick Bite)      HPI   ER Follow-up    Bianka is a 86 year old female whom I am seeing for the first time today.  She states she was seen in the ER on 6/20/22 for a deer tic bite to her left forearm.  Was present for a couple days prior, engorged, surrounding redness/swelling.  States ER doctor removed the tic for her and told her it was a deer tic.  She was prescribed Doxycycline, but she states she never started taking this in part due to fear of side effects after reading drug info, and also in part the ER called her and told her she doesn't have Lyme Disease based on the blood testing they did when she was seen.  She reports the redness/swelling is decreasing since the tic was removed.  No fever, chills,  myalgias/arthralgias, no headache.  Feeling well at her baseline.  She does want me to look under the small scab area and make sure other remaining.    Also concern of a hard crusty lesion on her left cheek lateral to the eye.  Catches on her mask.  Wondering about potentially having it removed.    Review of Systems   Constitutional: Negative for fever.   Cardiovascular: Negative for palpitations.            Objective    /80   Pulse 87   Temp 97.5  F (36.4  C) (Tympanic)   Resp 18   Wt 54.4 kg (120 lb)   SpO2 96%   BMI 21.26 kg/m    Body mass index is 21.26 kg/m .  Physical Exam  Constitutional:       Appearance: Normal appearance.   Pulmonary:      Effort: Pulmonary effort is normal.   Skin:     Comments: Left mid forearm, 3mm scab centrally in a 2cm diameter firm nonblanchable red/purple discoloration.  No tenderness, no fluctuance.  At patient request, area cleaned with alcohol swab and sterile iris scissors used to bluntly pry off the scab revealing no foreign body or purulence.    Raised hard/crusty/pedunculated lesion lateral to left eye on the upper cheek area   Neurological:      Mental Status: She is alert.            .  ..

## 2022-06-22 ENCOUNTER — OFFICE VISIT (OUTPATIENT)
Dept: FAMILY MEDICINE | Facility: OTHER | Age: 87
End: 2022-06-22
Attending: FAMILY MEDICINE
Payer: MEDICARE

## 2022-06-22 VITALS
HEART RATE: 87 BPM | BODY MASS INDEX: 21.26 KG/M2 | OXYGEN SATURATION: 96 % | RESPIRATION RATE: 18 BRPM | SYSTOLIC BLOOD PRESSURE: 126 MMHG | DIASTOLIC BLOOD PRESSURE: 80 MMHG | TEMPERATURE: 97.5 F | WEIGHT: 120 LBS

## 2022-06-22 DIAGNOSIS — S50.862D TICK BITE OF LEFT FOREARM, SUBSEQUENT ENCOUNTER: ICD-10-CM

## 2022-06-22 DIAGNOSIS — S60.562D: Primary | ICD-10-CM
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
## 2022-06-22 DIAGNOSIS — L98.9 SKIN LESION OF FACE: ICD-10-CM
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
## 2022-06-22 LAB
ANAPLASMA BLD MOD GIEMSA: NEGATIVE
BABESIA SPEC: NEGATIVE
EHRLICHIA SPEC QL MICRO: NEGATIVE

## 2022-06-22 PROCEDURE — G0463 HOSPITAL OUTPT CLINIC VISIT: HCPCS | Mod: 25

## 2022-06-22 PROCEDURE — 99213 OFFICE O/P EST LOW 20 MIN: CPT | Performed by: FAMILY MEDICINE

## 2022-06-22 PROCEDURE — G0463 HOSPITAL OUTPT CLINIC VISIT: HCPCS

## 2022-06-22 ASSESSMENT — ENCOUNTER SYMPTOMS
FEVER: 0
PALPITATIONS: 0

## 2022-06-22 NOTE — NURSING NOTE
"Chief Complaint   Patient presents with     ER F/U     Tick Bite       Initial /80   Pulse 87   Temp 97.5  F (36.4  C) (Tympanic)   Resp 18   Wt 54.4 kg (120 lb)   SpO2 96%   BMI 21.26 kg/m   Estimated body mass index is 21.26 kg/m  as calculated from the following:    Height as of 1/7/22: 1.6 m (5' 3\").    Weight as of this encounter: 54.4 kg (120 lb).  Medication Reconciliation: complete  Usha Lucas LPN  "

## 2022-06-24 LAB
A PHAGOCYTOPH DNA BLD QL NAA+PROBE: NOT DETECTED
E CHAFFEENSIS DNA BLD QL NAA+PROBE: NOT DETECTED
E EWINGII DNA SPEC QL NAA+PROBE: NOT DETECTED
EHRLICHIA DNA SPEC QL NAA+PROBE: NOT DETECTED

## 2022-10-14 NOTE — PROGRESS NOTES
SUBJECTIVE:   Bianka is a 87 year old who presents for Preventive Visit.      Patient has been advised of split billing requirements and indicates understanding: Yes  Are you in the first 12 months of your Medicare coverage?  No    HPI  Do you feel safe in your environment? Yes    Have you ever done Advance Care Planning? (For example, a Health Directive, POLST, or a discussion with a medical provider or your loved ones about your wishes): Yes, patient states has an Advance Care Planning document and will bring a copy to the clinic.    No issues with hearing.    Fall risk  Fallen 2 or more times in the past year?: No  Any fall with injury in the past year?: No    Cognitive Screening   1) Repeat 3 items (Leader, Season, Table)    2) Clock draw: NORMAL  3) 3 item recall:   Recalls 1 object   Results: NORMAL clock, 1-2 items recalled: COGNITIVE IMPAIRMENT LESS LIKELY    Mini-CogTM Copyright S Billy. Licensed by the author for use in [facility]; reprinted with permission (jeff@[facility]). All rights reserved.      Do you have sleep apnea, excessive snoring or daytime drowsiness?: no    Reviewed and updated as needed this visit by clinical staff   Tobacco  Allergies  Meds   Med Hx            Reviewed and updated as needed this visit by Provider                 Social History     Tobacco Use     Smoking status: Former     Packs/day: 0.50     Years: 3.00     Pack years: 1.50     Types: Cigarettes     Start date: 1950     Quit date: 3/26/1953     Years since quittin.6     Smokeless tobacco: Never     Tobacco comments:     Quit in ; no passive smoke exposure   Substance Use Topics     Alcohol use: No     If you drink alcohol do you typically have >3 drinks per day or >7 drinks per week? Not applicable    Alcohol Use 10/17/2022   Prescreen: >3 drinks/day or >7 drinks/week? No           Chronic/Recurring Back Pain Follow Up      Where is your back pain located? (Select all that apply) entire back      How would you describe your back pain?  sharp and stabbing    Where does your back pain spread? Up and down the back.     Since your last clinic visit for back pain, how has your pain changed? unchanged    Does your back pain interfere with your job? Not applicable    Since your last visit, have you tried any new treatment? No      Current providers sharing in care for this patient include:   Patient Care Team:  Yael Mckeon PA as PCP - General (Family Practice)  Yael Mckeon PA as Assigned PCP  Monet Onofre MD as Assigned Surgical Provider    The following health maintenance items are reviewed in Epic and correct as of today:  Health Maintenance   Topic Date Due     HEPATITIS B IMMUNIZATION (1 of 3 - 3-dose series) Never done     DTAP/TDAP/TD IMMUNIZATION (2 - Td or Tdap) 05/21/2009     ZOSTER IMMUNIZATION (2 of 3) 01/14/2013     MEDICARE ANNUAL WELLNESS VISIT  06/13/2020     COVID-19 Vaccine (3 - Booster for Moderna series) 04/30/2021     INFLUENZA VACCINE (1) 09/01/2022     ADVANCE CARE PLANNING  04/13/2023     FALL RISK ASSESSMENT  10/17/2023     PHQ-2 (once per calendar year)  Completed     Pneumococcal Vaccine: 65+ Years  Completed     IPV IMMUNIZATION  Aged Out     MENINGITIS IMMUNIZATION  Aged Out     Lab work is in process  Labs reviewed in EPIC  BP Readings from Last 3 Encounters:   10/17/22 130/80   06/22/22 126/80   06/20/22 151/99    Wt Readings from Last 3 Encounters:   10/17/22 56.7 kg (125 lb)   06/22/22 54.4 kg (120 lb)   01/07/22 53.5 kg (118 lb)                  Patient Active Problem List   Diagnosis     Diverticulitis of colon     Osteoarthritis     Age-related osteoporosis with current pathological fracture with routine healing     ACP (advance care planning)     Malignant basal cell neoplasm of skin     Compression fracture of thoracic vertebra (H)     Geographic tongue     Varicose vein of leg     Compression fx, lumbar spine, with routine healing, subsequent  encounter     Typical atrial flutter (H)     Past Surgical History:   Procedure Laterality Date     APPENDECTOMY       COLONOSCOPY N/A 2017    Procedure: COLONOSCOPY;  COLONOSCOPY WITH POLYPECTOMY AND SCLEROTHERAPY;  Surgeon: Matt Gonzalez MD;  Location: [facility]     ENDOSCOPIC STRIPPING VEIN(S)       HYSTERECTOMY       SALPINGO OOPHORECTOMY,R/L/BRIAN         Social History     Tobacco Use     Smoking status: Former     Packs/day: 0.50     Years: 3.00     Pack years: 1.50     Types: Cigarettes     Start date: 1950     Quit date: 3/26/1953     Years since quittin.6     Smokeless tobacco: Never     Tobacco comments:     Quit in ; no passive smoke exposure   Substance Use Topics     Alcohol use: No     Family History   Problem Relation Age of Onset     Other - See Comments Mother 85        Old age, cause of death     Cancer Father 84        Rectal cancer, cause of death/Stomach cancer, cause of death     C.A.D. Maternal Aunt      C.A.D. Maternal Uncle      Cancer Son         thymic carcinoma         Current Outpatient Medications   Medication Sig Dispense Refill     Ascorbic Acid (VITAMIN C PO) Take 500 mg by mouth 2 times daily       B Complex-Biotin-FA (VITAMIN B50 COMPLEX PO) Take 1 tablet by mouth daily       Flaxseed, Linseed, (FLAX SEED OIL) 1000 MG capsule Take 1 capsule by mouth daily       MAGNESIUM OXIDE PO Take by mouth daily        NONFORMULARY daily 1 tablespoon of chopped fresh garlic        NONFORMULARY 4 times daily Extra virgin olive oil 2 tablespoons  Daily        Omega-3 Fatty Acids (FISH OIL) 1200 MG CAPS Take 1,200 mg by mouth 2 times daily        VITAMIN E COMPLEX PO Take 400 Units by mouth daily       Allergies   Allergen Reactions     Amoxicillin      Intolerant       Codeine      Hydrocodone Nausea and Vomiting     Morphine      Sensitive to Codeine.     Recent Labs   Lab Test 21  1812 21  1657 10/19/20  0905 18  1554 10/27/17  0957  11/10/16  0900 08/03/16  0736   LDL  --   --   --   --   --   --   --  104*   HDL  --   --   --   --   --   --   --  74   TRIG  --   --   --   --   --   --   --  63   ALT  --  29 27  --   --  36   < >  --    CR  --  0.55 0.73 0.73   < > 0.69   < >  --    GFRESTIMATED  --  83 75 75   < > 82   < >  --    GFRESTBLACK  --  97 87 87   < > >90   < >  --    POTASSIUM 4.1 6.6* 4.7 5.1   < > 4.3   < >  --    TSH  --   --   --  3.97  --  2.81  --   --     < > = values in this interval not displayed.      Pneumonia Vaccine:For adults 65 years or older who do not have an immunocompromising condition, cerebrospinal fluid leak, or cochlear implant and want to receive PPSV23 ONLY: Administer 1 dose of PPSV23. Anyone who received any doses of PPSV23 before age 65 should receive 1 final dose of the vaccine at age 65 or older. Administer this last dose at least 5 years after the prior PPSV23 dose.  Mammogram Screening: Mammogram Screening - Mammography discussed and declined    Mammogram Screening - Mammography discussed and declined  Pertinent mammograms are reviewed under the imaging tab.    Review of Systems  CONSTITUTIONAL: NEGATIVE for fever, chills, change in weight  INTEGUMENTARY/SKIN: positive  for worrisome rashes, moles or lesions, has one on her face wants to try cryo before seeing ENt again.    EYES: NEGATIVE for vision changes or irritation  ENT/MOUTH: NEGATIVE for ear, mouth and throat problems  RESP: NEGATIVE for significant cough or SOB  BREAST: NEGATIVE for masses, tenderness or discharge  CV: NEGATIVE for chest pain, palpitations or peripheral edema  GI: NEGATIVE for nausea, abdominal pain, heartburn, or change in bowel habits  : NEGATIVE for frequency, dysuria, or hematuria  MUSCULOSKELETAL: NEGATIVE for significant arthralgias or myalgia  NEURO: NEGATIVE for weakness, dizziness or paresthesias  ENDOCRINE: NEGATIVE for temperature intolerance, skin/hair changes  HEME: NEGATIVE for bleeding problems  PSYCHIATRIC:  "NEGATIVE for changes in mood or affect    OBJECTIVE:   /80 (BP Location: Right arm, Patient Position: Chair)   Pulse 83   Temp 97  F (36.1  C)   Resp 16   Ht 1.6 m (5' 3\")   Wt 56.7 kg (125 lb)   SpO2 96%   BMI 22.14 kg/m   Estimated body mass index is 22.14 kg/m  as calculated from the following:    Height as of this encounter: 1.6 m (5' 3\").    Weight as of this encounter: 56.7 kg (125 lb).  Physical Exam  GENERAL: healthy, alert and no distress  EYES: Eyes grossly normal to inspection, PERRL and conjunctivae and sclerae normal  HENT: ear canals and TM's normal, nose and mouth without ulcers or lesions  NECK: no adenopathy, no asymmetry, masses, or scars and thyroid normal to palpation  RESP: lungs clear to auscultation - no rales, rhonchi or wheezes  CV: regular rate and rhythm, normal S1 S2, no S3 or S4, 3/6  murmur on left sternal border. Not radiating. No  click or rub, no peripheral edema and peripheral pulses strong  ABDOMEN: soft, nontender, no hepatosplenomegaly, no masses and bowel sounds normal  MS: no gross musculoskeletal defects noted, no edema  SKIN:lesion on left cheek she is manipulating this. We will cryo this x3 per protocol.  Tolerated well.   NEURO: Normal strength and tone, mentation intact and speech normal  BACK: no CVA tenderness, no paralumbar tenderness  PSYCH: mentation appears normal, affect normal/bright  LYMPH: no cervical, supraclavicular, axillary, or inguinal adenopathy    Diagnostic Test Results:  Labs reviewed in Epic  Non fasting will have come back.     ASSESSMENT / PLAN:       ICD-10-CM    1. Encounter for Medicare annual wellness exam  Z00.00       2. Age-related osteoporosis with current pathological fracture with routine healing  M80.00XD UA reflex to Microscopic and Culture - [place]     Comprehensive metabolic panel (BMP + Alb, Alk Phos, ALT, AST, Total. Bili, TP)      3. Typical atrial flutter (H)  I48.3 CBC with platelets and differential     TSH with free " "T4 reflex      4. Lipid screening  Z13.220 Lipid Profile (Chol, Trig, HDL, LDL calc)      5. Worries  R45.82 TSH with free T4 reflex      6. Scheduled immunizations not up to date  Z28.39       we went over all her immunizations she refused it all of them. Doesn't want them.  She is doing well. She has severe bone thinning refuses bisphosphonate. She does take 10,000 unit(s) vitamin D every day.  She has a few lesions on her right arm and face she would like frozen seeing ENT.  Has been manipulating the facial lesion.         COUNSELING:  Reviewed preventive health counseling, as reflected in patient instructions       Regular exercise       Healthy diet/nutrition       Vision screening       Hearing screening       Bladder control       Fall risk prevention       Immunizations    Declined all vaccinations.  Doesn't believe in them.              Osteoporosis prevention/bone health       Colon cancer screening       Advanced Planning     Estimated body mass index is 22.14 kg/m  as calculated from the following:    Height as of this encounter: 1.6 m (5' 3\").    Weight as of this encounter: 56.7 kg (125 lb).        She reports that she quit smoking about 69 years ago. Her smoking use included cigarettes. She started smoking about 72 years ago. She has a 1.50 pack-year smoking history. She has never used smokeless tobacco.      Appropriate preventive services were discussed with this patient, including applicable screening as appropriate for cardiovascular disease, diabetes, osteopenia/osteoporosis, and glaucoma.  As appropriate for age/gender, discussed screening for colorectal cancer, prostate cancer, breast cancer, and cervical cancer. Checklist reviewing preventive services available has been given to the patient.    Reviewed patients plan of care and provided an AVS. The Complex Care Plan (for patients with higher acuity and needing more deliberate coordination of services) for Bianka meets the Care Plan " requirement. This Care Plan has been established and reviewed with the Patient and spouse.    Counseling Resources:  ATP IV Guidelines  Pooled Cohorts Equation Calculator  Breast Cancer Risk Calculator  Breast Cancer: Medication to Reduce Risk  FRAX Risk Assessment  ICSI Preventive Guidelines  Dietary Guidelines for Americans, 2010  USDA's MyPlate  ASA Prophylaxis  Lung CA Screening    ORTEGA Caruso  [facility] [place]    Identified Health Risks:

## 2022-10-17 ENCOUNTER — OFFICE VISIT (OUTPATIENT)
Dept: FAMILY MEDICINE | Facility: OTHER | Age: 87
End: 2022-10-17
Attending: PHYSICIAN ASSISTANT
Payer: MEDICARE

## 2022-10-17 VITALS
WEIGHT: 125 LBS | HEART RATE: 83 BPM | DIASTOLIC BLOOD PRESSURE: 80 MMHG | OXYGEN SATURATION: 96 % | BODY MASS INDEX: 22.15 KG/M2 | HEIGHT: 63 IN | TEMPERATURE: 97 F | SYSTOLIC BLOOD PRESSURE: 130 MMHG | RESPIRATION RATE: 16 BRPM

## 2022-10-17 DIAGNOSIS — Z00.00 ENCOUNTER FOR MEDICARE ANNUAL WELLNESS EXAM: Primary | ICD-10-CM

## 2022-10-17 DIAGNOSIS — L57.0 AK (ACTINIC KERATOSIS): ICD-10-CM

## 2022-10-17 DIAGNOSIS — R45.82 WORRIES: ICD-10-CM

## 2022-10-17 DIAGNOSIS — Z28.39 SCHEDULED IMMUNIZATIONS NOT UP TO DATE: ICD-10-CM

## 2022-10-17 DIAGNOSIS — Z13.220 LIPID SCREENING: ICD-10-CM

## 2022-10-17 DIAGNOSIS — L20.89 OTHER ATOPIC DERMATITIS: ICD-10-CM

## 2022-10-17 DIAGNOSIS — M80.00XD AGE-RELATED OSTEOPOROSIS WITH CURRENT PATHOLOGICAL FRACTURE WITH ROUTINE HEALING: ICD-10-CM

## 2022-10-17 DIAGNOSIS — I48.3 TYPICAL ATRIAL FLUTTER (H): ICD-10-CM

## 2022-10-17 DIAGNOSIS — R01.1 HEART MURMUR: ICD-10-CM

## 2022-10-17 PROCEDURE — G0439 PPPS, SUBSEQ VISIT: HCPCS | Performed by: PHYSICIAN ASSISTANT

## 2022-10-17 PROCEDURE — 17000 DESTRUCT PREMALG LESION: CPT | Performed by: PHYSICIAN ASSISTANT

## 2022-10-17 RX ORDER — TRIAMCINOLONE ACETONIDE 1 MG/G
CREAM TOPICAL 2 TIMES DAILY
Qty: 30 G | Refills: 1 | Status: SHIPPED | OUTPATIENT
Start: 2022-10-17 | End: 2023-01-04

## 2022-10-17 ASSESSMENT — ENCOUNTER SYMPTOMS
WEAKNESS: 0
COUGH: 0
NAUSEA: 0
HEMATOCHEZIA: 0
JOINT SWELLING: 0
EYE PAIN: 0
FEVER: 0
ABDOMINAL PAIN: 0
FREQUENCY: 0
HEMATURIA: 0
NERVOUS/ANXIOUS: 0
CONSTIPATION: 0
HEADACHES: 0
SORE THROAT: 0
DIARRHEA: 0
BREAST MASS: 0
CHILLS: 0
SHORTNESS OF BREATH: 0
DYSURIA: 0
DIZZINESS: 0
MYALGIAS: 0
HEARTBURN: 0
PARESTHESIAS: 0
PALPITATIONS: 0
ARTHRALGIAS: 0

## 2022-10-17 ASSESSMENT — PAIN SCALES - GENERAL: PAINLEVEL: NO PAIN (0)

## 2022-10-17 ASSESSMENT — ACTIVITIES OF DAILY LIVING (ADL): CURRENT_FUNCTION: NO ASSISTANCE NEEDED

## 2022-10-17 NOTE — NURSING NOTE
"Chief Complaint   Patient presents with     Physical       Initial /80 (BP Location: Right arm, Patient Position: Chair)   Pulse 83   Temp 97  F (36.1  C)   Resp 16   Ht 1.6 m (5' 3\")   Wt 56.7 kg (125 lb)   SpO2 96%   BMI 22.14 kg/m   Estimated body mass index is 22.14 kg/m  as calculated from the following:    Height as of this encounter: 1.6 m (5' 3\").    Weight as of this encounter: 56.7 kg (125 lb).  Medication Reconciliation: complete  Noy Benitez LPN    "

## 2022-10-24 ENCOUNTER — LAB (OUTPATIENT)
Dept: LAB | Facility: OTHER | Age: 87
End: 2022-10-24
Payer: MEDICARE

## 2022-10-24 DIAGNOSIS — R45.82 WORRIES: ICD-10-CM

## 2022-10-24 DIAGNOSIS — M80.00XD AGE-RELATED OSTEOPOROSIS WITH CURRENT PATHOLOGICAL FRACTURE WITH ROUTINE HEALING: ICD-10-CM

## 2022-10-24 DIAGNOSIS — I48.3 TYPICAL ATRIAL FLUTTER (H): ICD-10-CM

## 2022-10-24 DIAGNOSIS — Z13.220 LIPID SCREENING: ICD-10-CM

## 2022-10-24 LAB
ALBUMIN SERPL BCG-MCNC: 3.8 G/DL (ref 3.5–5.2)
ALBUMIN UR-MCNC: NEGATIVE MG/DL
ALP SERPL-CCNC: 78 U/L (ref 35–104)
ALT SERPL W P-5'-P-CCNC: 15 U/L (ref 10–35)
ANION GAP SERPL CALCULATED.3IONS-SCNC: 9 MMOL/L (ref 7–15)
APPEARANCE UR: CLEAR
AST SERPL W P-5'-P-CCNC: 26 U/L (ref 10–35)
BASOPHILS # BLD AUTO: 0 10E3/UL (ref 0–0.2)
BASOPHILS NFR BLD AUTO: 0 %
BILIRUB SERPL-MCNC: 1.4 MG/DL
BILIRUB UR QL STRIP: NEGATIVE
BUN SERPL-MCNC: 17.4 MG/DL (ref 8–23)
CALCIUM SERPL-MCNC: 9.1 MG/DL (ref 8.8–10.2)
CHLORIDE SERPL-SCNC: 100 MMOL/L (ref 98–107)
CHOLEST SERPL-MCNC: 220 MG/DL
COLOR UR AUTO: YELLOW
CREAT SERPL-MCNC: 0.64 MG/DL (ref 0.51–0.95)
DEPRECATED HCO3 PLAS-SCNC: 26 MMOL/L (ref 22–29)
EOSINOPHIL # BLD AUTO: 0.1 10E3/UL (ref 0–0.7)
EOSINOPHIL NFR BLD AUTO: 2 %
ERYTHROCYTE [DISTWIDTH] IN BLOOD BY AUTOMATED COUNT: 13 % (ref 10–15)
GFR SERPL CREATININE-BSD FRML MDRD: 85 ML/MIN/1.73M2
GLUCOSE SERPL-MCNC: 95 MG/DL (ref 70–99)
GLUCOSE UR STRIP-MCNC: NEGATIVE MG/DL
HCT VFR BLD AUTO: 46.1 % (ref 35–47)
HDLC SERPL-MCNC: 71 MG/DL
HGB BLD-MCNC: 15.8 G/DL (ref 11.7–15.7)
HGB UR QL STRIP: NEGATIVE
IMM GRANULOCYTES # BLD: 0 10E3/UL
IMM GRANULOCYTES NFR BLD: 0 %
KETONES UR STRIP-MCNC: NEGATIVE MG/DL
LDLC SERPL CALC-MCNC: 136 MG/DL
LEUKOCYTE ESTERASE UR QL STRIP: NEGATIVE
LYMPHOCYTES # BLD AUTO: 1.6 10E3/UL (ref 0.8–5.3)
LYMPHOCYTES NFR BLD AUTO: 26 %
MCH RBC QN AUTO: 33.3 PG (ref 26.5–33)
MCHC RBC AUTO-ENTMCNC: 34.3 G/DL (ref 31.5–36.5)
MCV RBC AUTO: 97 FL (ref 78–100)
MONOCYTES # BLD AUTO: 0.5 10E3/UL (ref 0–1.3)
MONOCYTES NFR BLD AUTO: 9 %
NEUTROPHILS # BLD AUTO: 3.7 10E3/UL (ref 1.6–8.3)
NEUTROPHILS NFR BLD AUTO: 63 %
NITRATE UR QL: NEGATIVE
NONHDLC SERPL-MCNC: 149 MG/DL
NRBC # BLD AUTO: 0 10E3/UL
NRBC BLD AUTO-RTO: 0 /100
PH UR STRIP: 6 [PH] (ref 4.7–8)
PLATELET # BLD AUTO: 158 10E3/UL (ref 150–450)
POTASSIUM SERPL-SCNC: 4.4 MMOL/L (ref 3.4–5.3)
PROT SERPL-MCNC: 7.1 G/DL (ref 6.4–8.3)
RBC # BLD AUTO: 4.75 10E6/UL (ref 3.8–5.2)
SODIUM SERPL-SCNC: 135 MMOL/L (ref 136–145)
SP GR UR STRIP: 1.02 (ref 1–1.03)
TRIGL SERPL-MCNC: 64 MG/DL
TSH SERPL DL<=0.005 MIU/L-ACNC: 3.18 UIU/ML (ref 0.3–4.2)
UROBILINOGEN UR STRIP-MCNC: NORMAL MG/DL
WBC # BLD AUTO: 6 10E3/UL (ref 4–11)

## 2022-10-24 PROCEDURE — 85014 HEMATOCRIT: CPT | Mod: ZL

## 2022-10-24 PROCEDURE — 36415 COLL VENOUS BLD VENIPUNCTURE: CPT | Mod: ZL

## 2022-10-24 PROCEDURE — 80061 LIPID PANEL: CPT | Mod: ZL

## 2022-10-24 PROCEDURE — 84443 ASSAY THYROID STIM HORMONE: CPT | Mod: ZL

## 2022-10-24 PROCEDURE — 80053 COMPREHEN METABOLIC PANEL: CPT | Mod: ZL

## 2022-10-24 PROCEDURE — 81003 URINALYSIS AUTO W/O SCOPE: CPT | Mod: ZL

## 2022-10-26 NOTE — RESULT ENCOUNTER NOTE
Not sure why bili is up alone.  Her other lab is good.  Tsh is normal. Cbc elevated Hemoglobin.   Is she taking iron?  See us back in 3 months for recheck.

## 2022-11-01 ENCOUNTER — OFFICE VISIT (OUTPATIENT)
Dept: DERMATOLOGY | Facility: OTHER | Age: 87
End: 2022-11-01
Attending: DERMATOLOGY
Payer: MEDICARE

## 2022-11-01 VITALS
DIASTOLIC BLOOD PRESSURE: 74 MMHG | HEART RATE: 55 BPM | SYSTOLIC BLOOD PRESSURE: 132 MMHG | OXYGEN SATURATION: 97 % | RESPIRATION RATE: 16 BRPM

## 2022-11-01 DIAGNOSIS — H61.001 CHONDRODERMATITIS NODULARIS HELICIS OF RIGHT EAR: Primary | ICD-10-CM

## 2022-11-01 DIAGNOSIS — H61.91 SKIN LESION OF RIGHT EAR: ICD-10-CM

## 2022-11-01 PROCEDURE — 11900 INJECT SKIN LESIONS </W 7: CPT | Performed by: DERMATOLOGY

## 2022-11-01 PROCEDURE — G0463 HOSPITAL OUTPT CLINIC VISIT: HCPCS

## 2022-11-01 PROCEDURE — 99202 OFFICE O/P NEW SF 15 MIN: CPT | Mod: 25 | Performed by: DERMATOLOGY

## 2022-11-01 RX ORDER — TRIAMCINOLONE ACETONIDE 1 MG/G
CREAM TOPICAL
Qty: 15 G | Refills: 1 | Status: SHIPPED | OUTPATIENT
Start: 2022-11-01 | End: 2023-01-04

## 2022-11-01 ASSESSMENT — PAIN SCALES - GENERAL: PAINLEVEL: NO PAIN (0)

## 2022-11-01 NOTE — LETTER
11/1/2022       RE: Bianka Whitmore  [address]  [facility] [address]     Dear Colleague,    Thank you for referring your patient, Bianka Whitmore, to the [facility]. Please see a copy of my visit note below.    Visit Date: 11/01/2022    SUBJECTIVE:  First visit for Ms. Whitmore who comes in because of a right ear problem that has been analyzed and biopsied several times.  The lesion persists and it is tender, and she has difficulty sleeping when she rolls over on the ear in the middle of the night.  She has seen Dr. Onofre on 2 occasions that I can find in the record.  A biopsy was done showing only actinic changes, but no cancer changes.    OBJECTIVE:  An oval, approximately 8 x 10 mm soft, pink lesion on the antihelix of the right ear that has a small crust on the surface.     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  I am surprised that no inflamed cartilage has been reported on the biopsy reports.     PLAN:  Advised that further biopsy might be appropriate, but she is rather adamant that no further surgery to be done on the ear.  I suggested an alternative treatment would be for me to inject the lesion intralesionally with triamcinolone and then prescribe triamcinolone cream for her use 3 times a week on the lesion.  She seemed pleased that this was offered as a nonsurgical treatment.    With her permission, we went ahead and injected approximately 0.2 mL of triamcinolone 10 mg/mL into the lesion.  I then prescribed 0.1 triamcinolone cream to use 3 times a week topically.  I asked Ms. Whitmore to return in a few months if things are not better.  Note that she was given an ear protective device, but found it very difficult to use, so does not wish any device of that sort.  I advised that pillows with a central hole are available for this  condition.  That might be something that we could look into further if the lesion persists.  The lesion was photographed, and this was entered into her record.    LOREN Lopez MD        D: 2022   T: 2022   MT: ANNA    Name:     MARISEL PETERSON  MRN:      [id number]        Account:    [id number]   :      1935           Visit Date: 2022     Document: [id number]      Again, thank you for allowing me to participate in the care of your patient.      Sincerely,    LOREN Lopez MD

## 2022-11-01 NOTE — NURSING NOTE
"Chief Complaint   Patient presents with     Consult     Skin Check     Right ear lesion       Initial /74   Pulse 55   Resp 16   SpO2 97%  Estimated body mass index is 22.14 kg/m  as calculated from the following:    Height as of 10/17/22: 1.6 m (5' 3\").    Weight as of 10/17/22: 56.7 kg (125 lb).  Medication Reconciliation: complete  Ashley Forde LPN    "

## 2022-11-01 NOTE — PATIENT INSTRUCTIONS
I think your ear problem is likely related to some irritated cartilage plus some changes related to the sun.  No cancer has been found as you know.    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 The cream to  be used about 3 times a week is similar.

## 2022-11-01 NOTE — PROGRESS NOTES
Visit Date: 11/01/2022    SUBJECTIVE:  First visit for Ms. Whitmore who comes in because of a right ear problem that has been analyzed and biopsied several times.  The lesion persists and it is tender, and she has difficulty sleeping when she rolls over on the ear in the middle of the night.  She has seen Dr. Onofre on 2 occasions that I can find in the record.  A biopsy was done showing only actinic changes, but no cancer changes.    OBJECTIVE:  An oval, approximately 8 x 10 mm soft, pink lesion on the antihelix of the right ear that has a small crust on the surface.     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  I am surprised that no inflamed cartilage has been reported on the biopsy reports.     PLAN:  Advised that further biopsy might be appropriate, but she is rather adamant that no further surgery to be done on the ear.  I suggested an alternative treatment would be for me to inject the lesion intralesionally with triamcinolone and then prescribe triamcinolone cream for her use 3 times a week on the lesion.  She seemed pleased that this was offered as a nonsurgical treatment.    With her permission, we went ahead and injected approximately 0.2 mL of triamcinolone 10 mg/mL into the lesion.  I then prescribed 0.1 triamcinolone cream to use 3 times a week topically.  I asked Ms. Whitmore to return in a few months if things are not better.  Note that she was given an ear protective device, but found it very difficult to use, so does not wish any device of that sort.  I advised that pillows with a central hole are available for this condition.  That might be something that we could look into further if the lesion persists.  The lesion was photographed, and this was entered into her record.    LOREN Lopez MD        D: 11/01/2022   T: 11/01/2022   MT: ANNA    Name:     MARISEL WHITMORE  MRN:      [id number]        Account:     [id number]   :      1935           Visit Date: 2022     Document: [id number]

## 2022-11-14 ENCOUNTER — TELEPHONE (OUTPATIENT)
Dept: FAMILY MEDICINE | Facility: OTHER | Age: 87
End: 2022-11-14

## 2022-11-14 NOTE — CONFIDENTIAL NOTE
Patient requesting recent labs be mailed, writer printed lab results placed in pod 3/4 bin to be mailed.  Norma Marcus LPN

## 2022-11-17 ENCOUNTER — HOSPITAL ENCOUNTER (OUTPATIENT)
Dept: CARDIOLOGY | Facility: HOSPITAL | Age: 87
Discharge: HOME | End: 2022-11-17
Attending: PHYSICIAN ASSISTANT | Admitting: INTERNAL MEDICINE
Payer: MEDICARE

## 2022-11-17 DIAGNOSIS — R01.1 HEART MURMUR: ICD-10-CM

## 2022-11-17 DIAGNOSIS — I48.3 TYPICAL ATRIAL FLUTTER (H): ICD-10-CM

## 2022-11-17 LAB — LVEF ECHO: NORMAL

## 2022-11-17 PROCEDURE — 93306 TTE W/DOPPLER COMPLETE: CPT | Mod: 26 | Performed by: INTERNAL MEDICINE

## 2022-11-17 PROCEDURE — 93306 TTE W/DOPPLER COMPLETE: CPT

## 2022-11-30 DIAGNOSIS — I48.3 TYPICAL ATRIAL FLUTTER (H): ICD-10-CM

## 2022-11-30 DIAGNOSIS — I51.7 ATRIAL ENLARGEMENT, BILATERAL: Primary | ICD-10-CM

## 2022-11-30 NOTE — RESULT ENCOUNTER NOTE
Both the chambers in the upper heart are very big.  Mitral valve doesn't open and close well.  That is the murmur.   Does she want to discuss with a cardiologist ?  Up to her.

## 2022-12-01 ENCOUNTER — TELEPHONE (OUTPATIENT)
Dept: FAMILY MEDICINE | Facility: OTHER | Age: 87
End: 2022-12-01

## 2022-12-01 DIAGNOSIS — I48.3 TYPICAL ATRIAL FLUTTER (H): Primary | ICD-10-CM

## 2023-01-04 ENCOUNTER — ANCILLARY PROCEDURE (OUTPATIENT)
Dept: GENERAL RADIOLOGY | Facility: OTHER | Age: 88
End: 2023-01-04
Attending: NURSE PRACTITIONER
Payer: MEDICARE

## 2023-01-04 ENCOUNTER — PATIENT OUTREACH (OUTPATIENT)
Dept: CARE COORDINATION | Facility: OTHER | Age: 88
End: 2023-01-04

## 2023-01-04 ENCOUNTER — OFFICE VISIT (OUTPATIENT)
Dept: CARDIOLOGY | Facility: OTHER | Age: 88
End: 2023-01-04
Attending: PHYSICIAN ASSISTANT
Payer: MEDICARE

## 2023-01-04 VITALS
RESPIRATION RATE: 18 BRPM | BODY MASS INDEX: 22.92 KG/M2 | HEART RATE: 78 BPM | TEMPERATURE: 98.6 F | DIASTOLIC BLOOD PRESSURE: 78 MMHG | SYSTOLIC BLOOD PRESSURE: 128 MMHG | WEIGHT: 129.4 LBS

## 2023-01-04 DIAGNOSIS — I48.3 TYPICAL ATRIAL FLUTTER (H): ICD-10-CM

## 2023-01-04 DIAGNOSIS — I50.20 HFREF (HEART FAILURE WITH REDUCED EJECTION FRACTION) (H): Primary | ICD-10-CM

## 2023-01-04 DIAGNOSIS — E78.5 HYPERLIPIDEMIA LDL GOAL <100: ICD-10-CM

## 2023-01-04 DIAGNOSIS — R09.89 BIBASILAR CRACKLES: ICD-10-CM

## 2023-01-04 DIAGNOSIS — I27.20 PULMONARY HYPERTENSION (H): ICD-10-CM

## 2023-01-04 DIAGNOSIS — I50.20 HFREF (HEART FAILURE WITH REDUCED EJECTION FRACTION) (H): ICD-10-CM

## 2023-01-04 DIAGNOSIS — J06.9 VIRAL URI WITH COUGH: ICD-10-CM

## 2023-01-04 DIAGNOSIS — I48.3 TYPICAL ATRIAL FLUTTER (H): Primary | ICD-10-CM

## 2023-01-04 DIAGNOSIS — I51.7 BIATRIAL ENLARGEMENT: ICD-10-CM

## 2023-01-04 DIAGNOSIS — I34.1 MITRAL VALVE PROLAPSE: ICD-10-CM

## 2023-01-04 DIAGNOSIS — R60.0 BILATERAL LEG EDEMA: ICD-10-CM

## 2023-01-04 LAB
ANION GAP SERPL CALCULATED.3IONS-SCNC: 10 MMOL/L (ref 7–15)
BUN SERPL-MCNC: 17.1 MG/DL (ref 8–23)
CALCIUM SERPL-MCNC: 9.4 MG/DL (ref 8.8–10.2)
CHLORIDE SERPL-SCNC: 100 MMOL/L (ref 98–107)
CREAT SERPL-MCNC: 0.6 MG/DL (ref 0.51–0.95)
DEPRECATED HCO3 PLAS-SCNC: 25 MMOL/L (ref 22–29)
ERYTHROCYTE [DISTWIDTH] IN BLOOD BY AUTOMATED COUNT: 13.3 % (ref 10–15)
GFR SERPL CREATININE-BSD FRML MDRD: 86 ML/MIN/1.73M2
GLUCOSE SERPL-MCNC: 100 MG/DL (ref 70–99)
HCT VFR BLD AUTO: 44.8 % (ref 35–47)
HGB BLD-MCNC: 15.2 G/DL (ref 11.7–15.7)
MCH RBC QN AUTO: 33.3 PG (ref 26.5–33)
MCHC RBC AUTO-ENTMCNC: 33.9 G/DL (ref 31.5–36.5)
MCV RBC AUTO: 98 FL (ref 78–100)
NT-PROBNP SERPL-MCNC: 2876 PG/ML (ref 0–1800)
PLATELET # BLD AUTO: 170 10E3/UL (ref 150–450)
POTASSIUM SERPL-SCNC: 4.3 MMOL/L (ref 3.4–5.3)
RBC # BLD AUTO: 4.57 10E6/UL (ref 3.8–5.2)
SODIUM SERPL-SCNC: 135 MMOL/L (ref 136–145)
WBC # BLD AUTO: 9.6 10E3/UL (ref 4–11)

## 2023-01-04 PROCEDURE — 36415 COLL VENOUS BLD VENIPUNCTURE: CPT | Mod: ZL | Performed by: NURSE PRACTITIONER

## 2023-01-04 PROCEDURE — 80048 BASIC METABOLIC PNL TOTAL CA: CPT | Mod: ZL | Performed by: NURSE PRACTITIONER

## 2023-01-04 PROCEDURE — 93005 ELECTROCARDIOGRAM TRACING: CPT | Performed by: NURSE PRACTITIONER

## 2023-01-04 PROCEDURE — 71046 X-RAY EXAM CHEST 2 VIEWS: CPT | Mod: TC

## 2023-01-04 PROCEDURE — 93010 ELECTROCARDIOGRAM REPORT: CPT | Performed by: INTERNAL MEDICINE

## 2023-01-04 PROCEDURE — 85027 COMPLETE CBC AUTOMATED: CPT | Mod: ZL | Performed by: NURSE PRACTITIONER

## 2023-01-04 PROCEDURE — 83880 ASSAY OF NATRIURETIC PEPTIDE: CPT | Mod: ZL | Performed by: NURSE PRACTITIONER

## 2023-01-04 PROCEDURE — 99214 OFFICE O/P EST MOD 30 MIN: CPT | Performed by: NURSE PRACTITIONER

## 2023-01-04 RX ORDER — FUROSEMIDE 20 MG
20 TABLET ORAL DAILY
Qty: 90 TABLET | Refills: 1 | Status: SHIPPED | OUTPATIENT
Start: 2023-01-04 | End: 2023-12-09

## 2023-01-04 ASSESSMENT — PAIN SCALES - GENERAL: PAINLEVEL: NO PAIN (0)

## 2023-01-04 NOTE — PROGRESS NOTES
[facility] HEART CARE   CARDIOLOGY CONSULT     Bianka Whitmore   [address]  [facility] [address]      Yael Mckeon     Chief Complaint   Patient presents with     New Patient     Abnormal echocardiogram          HPI:   Bianka Whitmore is a pleasant 78-year old female who presents for cardiology consult. She has no significant cardiac history. She has a non-cardiac history including osteoporosis with history of compression fracture thoracic/lumbar spine, diverticulitis. She was referred to cardiology by her primary care provider for recent transthoracic echocardiogram showing decreased LVEF of 45-50%, severe bi-atrial enlargement, mitral valve prolapse with mild mitral insufficiency, mild pulmonary hypertension with RVSP 40mmHg above the right atrial pressure.    No chest pain or pressure. No dyspnea, dyspnea on exertion. No LE edema.     She tells me that she spends the majority of her day on her feet being active doing housework, cooking, cleaning, doing laundry. She is able to carry her laundry basket up and down her basement stairs- she estimates 10-12 stairs- and she tolerates this without chest pain, dyspnea. She denies any racing/skipping/fluttering sensations in the chest. No lightheaded, dizziness. No exertional lightheadedness, dizziness. No near-syncope or syncopal episodes.     Mrs. Whitmore tells me that she overall feels well. She is able to do the activities she wishes to do in the day and does not have any bothersome or limiting symptoms.  She did see her young grand-daughter over Christmas who had upper respiratory illness. A few days after [place] she started with sore throat, rhinorrhea, congestion and cough. She denies dyspnea, wheezing, fever, chills, body aches.     Smoking History: Started smoking in late teens, early twenties. Smoked maybe a couple cigarettes per day until her mid-twenties.     Alcohol History: None    Substance Abuse History: None    Sleep History: Sleeps in bed with 1  pillow. No orthopnea, no PND. No snoring. No witnessed apnea. Wakes up feeling well rested 6-7 mornings out of the week. No long daytime naps- sometimes falls asleep in chair for about 10 minutes after lunch.     Family History: No known significant cardiac issues. She has five siblings (she is the second oldest) and none with any cardiac issues. Parents did not have cardiac issues.      RELEVANT TESTING:  Transthoracic Echocardiogram 11/17/2022   Interpretation Summary  Left ventricular function is decreased. The ejection fraction is 45-50%  (mildly reduced).  Global right ventricular function is normal.  Severe left atrial enlargement is present.  Severe right atrial enlargement is present.  Mitral valve prolapse is present.  Mild mitral insufficiency is present.  Mild aortic valve calcification is present.  Trace aortic insufficiency is present.  Mild to moderate tricuspid insufficiency is present.  Right ventricular systolic pressure is 40mmHg above the right atrial pressure.  Mild pulmonic insufficiency is present.      PAST MEDICAL HISTORY:   Past Medical History:   Diagnosis Date     Basal cell carcinoma      Cardiomegaly 01/29/2007     Compression fracture of thoracic vertebra (H) 10/30/2014     Compression fx, lumbar spine, with routine healing, subsequent encounter 6/22/2016     Hyperlipidemia      Osteoporosis      Typical atrial flutter (H) 7/25/2016     Varicose vein of leg 6/22/2016          FAMILY HISTORY:   Family History   Problem Relation Age of Onset     Other - See Comments Mother 85        Old age, cause of death     Cancer Father 84        Rectal cancer, cause of death/Stomach cancer, cause of death     C.A.D. Maternal Aunt      C.A.D. Maternal Uncle      Cancer Son         thymic carcinoma          PAST SURGICAL HISTORY:   Past Surgical History:   Procedure Laterality Date     APPENDECTOMY       COLONOSCOPY N/A 12/4/2017    Procedure: COLONOSCOPY;  COLONOSCOPY WITH POLYPECTOMY AND  SCLEROTHERAPY;  Surgeon: Matt Gonzalez MD;  Location: [facility]     ENDOSCOPIC STRIPPING VEIN(S)       HYSTERECTOMY       SALPINGO OOPHORECTOMY,R/L/BRIAN            SOCIAL HISTORY:   Social History     Socioeconomic History     Marital status:    Occupational History     Occupation: Housewife     Comment: Retired   Tobacco Use     Smoking status: Former     Packs/day: 0.50     Years: 3.00     Pack years: 1.50     Types: Cigarettes     Start date: 1950     Quit date: 3/26/1953     Years since quittin.8     Smokeless tobacco: Never     Tobacco comments:     Quit in ; no passive smoke exposure   Substance and Sexual Activity     Alcohol use: No     Drug use: No     Sexual activity: Not Currently   Other Topics Concern     Blood Transfusions Yes     Caffeine Concern No     Parent/sibling w/ CABG, MI or angioplasty before 65F 55M? No          CURRENT MEDICATIONS:   Current Outpatient Medications   Medication     Ascorbic Acid (VITAMIN C PO)     B Complex-Biotin-FA (VITAMIN B50 COMPLEX PO)     Flaxseed, Linseed, (FLAX SEED OIL) 1000 MG capsule     furosemide (LASIX) 20 MG tablet     MAGNESIUM OXIDE PO     NONFORMULARY     NONFORMULARY     Omega-3 Fatty Acids (FISH OIL) 1200 MG CAPS     VITAMIN E COMPLEX PO     No current facility-administered medications for this visit.            ALLERGIES:   Allergies   Allergen Reactions     Amoxicillin      Intolerant       Codeine      Hydrocodone Nausea and Vomiting     Morphine      Sensitive to Codeine.          ROS:   CONSTITUTIONAL: No reported fever or chills. No changes in weight.  ENT: No visual disturbance, ear ache, epistaxis or sore throat.   CARDIOVASCULAR: No chest pain, chest pressure or chest discomfort. No palpitations or lower extremity edema.   RESPIRATORY: +cough. No shortness of breath, dyspnea upon exertion, wheezing or hemoptysis.   GI: No abdominal pain. No nausea, vomiting or diarrhea. No melena or hematochezia. No changes in bowel habits.    : No hematuria or dysuria. No hesitancy or incontinence.   NEUROLOGICAL: No headache, lightheadedness, dizziness, syncope, ataxia, paresthesias or weakness.   HEMATOLOGIC: No history of anemia. No bleeding or excessive bruising. No history of blood clots.   MUSCULOSKELETAL: No joint pain or swelling, no muscle pain.  ENDOCRINOLOGIC: No temperature intolerance. No hair or skin changes.  SKIN: No abnormal rashes or sores, no unusual itching.  PSYCHIATRIC: No changes in mood, feeling down or anxious. No changes in sleep.      PHYSICAL EXAM:     Vitals: /78 (BP Location: Right arm, Patient Position: Sitting, Cuff Size: Adult Regular)   Pulse 78   Temp 98.6  F (37  C) (Tympanic)   Resp 18   Wt 58.7 kg (129 lb 6.4 oz)   BMI 22.92 kg/m    BMI= Body mass index is 22.92 kg/m .    GENERAL: The patient is a well-developed, well-nourished, in no apparent distress.  HEENT: Head is normocephalic and atraumatic. Eyes are symmetrical with normal visual tracking. No icterus, no xanthelasmas.  NECK: Supple. No adenopathy, asymmetry or masses. Carotid upstroke are normal bilaterally, no cervical bruits, JVP not visible.   CHEST/ LUNGS: Bibasilar rales, no wheezing or rhonchi. No use of accessory muscles, no retractions, respirations unlabored and normal respiratory rate.   CARDIO: Regular rate and irregularly irregular rhythm normal with S1 and S2, no S3 or S4 and no murmur, click or rub. Precordium quiet with normal PMI.    ABD: Abdomen is flat, soft and nontender, nondistended. no palpable masses and no abdominal bruits heard.   EXTREMITIES: No clubbing, cyanosis or edema present. Peripheral pulses normal and equal bilaterally.  VASC: Radial, femoral, dorsalis pedis and posterior tibialis pulses normal and symmetric with no vascular bruits heard.  MUSCULOSKELETAL: No joint swelling.   NEUROLOGIC: Alert and oriented X3. Normal speech, gait and affect. No focal neurologic deficits.   SKIN: No jaundice. No rashes or  visible skin lesions present. No ecchymosis.            EKG Interpretation:      Rhythm: Normal sinus  and Atrial fibrillation - controlled  Rate: Normal- 88 bpm  Axis: Normal  Ectopy: None  Conduction: normal QRS duration 80 ms, normal  ms, normal QTc 450 ms  ST Segments/ T Waves: No ST-T wave changes and No acute ischemic changes  Q Waves: None  Comparison to prior: Unchanged      LAB RESULTS:   Orders placed or performed in visit on 01/04/23     furosemide (LASIX) 20 MG tablet          ASSESSMENT:   Bianka Whitmore is a pleasant 78-year old female who presents for cardiology consult. She has no significant cardiac history. She has a non-cardiac history including osteoporosis with history of compression fracture thoracic/lumbar spine, diverticulitis. She was referred to cardiology by her primary care provider for recent transthoracic echocardiogram showing decreased LVEF of 45-50%, severe bi-atrial enlargement, mitral valve prolapse with mild mitral insufficiency, mild pulmonary hypertension with RVSP 40mmHg above the right atrial pressure.    Mrs. Whitmore tells me that she feels well. She has recent upper respiratory symptoms she attributes to exposure to granddaughter and illness but otherwise is able to continue with daily activities and independence. She prefers to avoid medications for management of conditions and takes several supplements to treat medical conditions.       PLAN:   1. Atrial fibrillation/Atrial flutter    No symptoms of racing/skipping/fluttering. No lightheaded or dizziness. No dyspnea, dyspnea on exertion.     She tells me she was diagnosed with this years ago    EKG from 2019 shows atrial flutter. Today's EKG shows atrial fibrillation with controlled ventricular rate of 88 bpm      2. CHADs-VASC >=2    She is not on chronic oral anticoagulation for stroke prophylaxis with history of atrial fibrillation.    She is aware of risks of not being on chronic oral anticoagulation therapy  including stroke which could be debilitating or fatal.       3. Heart Failure with mid-range ejection fraction (LVEF 45-50% on TTE 11/2022)  NYHA Symptom Class I  Stage C    Discussed options for medical management.   Since she has no symptoms and overall feels well- she is not wanting to pursue medications      ACE-I/ARB/ARNi: declines     BB: declines    SGLT-2 Inhibitor: declines    Aldosterone antagonist: declines    SCD prophylaxis does not meet criteria for implant with LVEF of 45-50%    %BiV pacing: N/A    Fluid status Hypervolemic- bibasilar rales, trace LE edema, elevated BNP    Cardiac Rehab: Not indicated (EF>35%)    Sleep Apnea Evaluation: no symptoms warranting need for evaluation      4. Biatrial enlargement    Severe    Would make rhythm control more difficult to obtain with longstanding atrial fibrillation      5. Mitral valve prolapse    Asymptomatic    Mild mitral insufficiency    TTE in 1 year for routine surveillance    6. Hyperlipidemia    Takes flax seed oil, fish oil for management  Recent Labs   Lab Test 10/24/22  0727 08/03/16  0736   CHOL 220* 191   HDL 71 74   * 104*   TRIG 64 63       7. Mild pulmonary hypertension    Bibasilar rales. She has had upper respiratory symptoms since just after Christmas. CXR today is negative for acute findings- no pneumonia. No pleural effusions.     Discussed diuretic to help with bibasilar crackles, trace LE edema. She does agree to try a small dose of diuretic    Start furosemide 20 mg once daily      8. Viral URI with cough    Symptoms for about 1 week. Symptoms improving    No concerning HPI, exam, CXR findings today    Continue with symptomatic treatments. Follow-up with PCP/urgent care with new/worsening symptoms.     Follow-up in 2 weeks with labs prior to appointment.      Thank you for allowing me to participate in the care of your patient. Please do not hesitate to contact me if you have any questions.     Norma Garcia, CNP

## 2023-01-04 NOTE — PATIENT INSTRUCTIONS
Thank you for allowing Norma Garcia CNP and our  team to participate in your care. Please call our office at [phone] with scheduling questions or if you need to cancel or change your appointment. With any other questions or concerns you may call cardiology nurse at [phone] or [phone].       If you experience chest pain, chest pressure, chest tightness, shortness of breath, fainting, lightheadedness, nausea, vomiting, or other concerning symptoms, please report to the Emergency Department or call 911. These symptoms may be emergent, and best treated in the Emergency Department.    Atrial fibrillation/Atrial flutter  She tells me she was diagnosed with this for years.  EKG from 2019 shows atrial flutter. Today's EKG shows atrial fibrillation with controlled ventricular rate of 88 bpm      CHADs-VASC >=2  She is not on chronic oral anticoagulation for stroke prophylaxis with history of atrial fibrillation. She is aware of risks of not being on chronic oral anticoagulation therapy.       Heart Failure with midrange ejection fraction (LVEF 45-50% on TTE 11/2022)  NYHA Symptom Class I  Stage C    Discussed options for medical management.   Since she has no symptoms and overall feels well- she is not wanting to pursue medications  May consider diuretic for hypervolemia.      Biatrial enlargement      Hyperlipidemia  Takes flax seed oil, fish oil for management  Recent Labs   Lab Test 10/24/22  0727 08/03/16  0736   CHOL 220* 191   HDL 71 74   * 104*   TRIG 64 63       Mild pulmonary hypertension  Bibasilar rales. She has had upper respiratory symptoms since just after [place]. Plan for CXR, labs today.   Discussed diuretic to help with bibasilar crackles, trace LE edema should CXR not show a focal pneumonia- she will consider this and let us know when we call with results.     Follow-up with cardiology if you would like to start a diuretic; otherwise, continue to follow-up with primary  [place].      Norma Garcia, CNP

## 2023-01-06 ENCOUNTER — OFFICE VISIT (OUTPATIENT)
Dept: CARDIOLOGY | Facility: OTHER | Age: 88
End: 2023-01-06
Attending: NURSE PRACTITIONER
Payer: MEDICARE

## 2023-01-06 VITALS
HEART RATE: 98 BPM | DIASTOLIC BLOOD PRESSURE: 74 MMHG | HEIGHT: 63 IN | WEIGHT: 128.1 LBS | OXYGEN SATURATION: 95 % | BODY MASS INDEX: 22.7 KG/M2 | TEMPERATURE: 99 F | SYSTOLIC BLOOD PRESSURE: 122 MMHG | RESPIRATION RATE: 20 BRPM

## 2023-01-06 DIAGNOSIS — R09.89 BIBASILAR CRACKLES: ICD-10-CM

## 2023-01-06 DIAGNOSIS — I50.20 HFREF (HEART FAILURE WITH REDUCED EJECTION FRACTION) (H): Primary | ICD-10-CM

## 2023-01-06 DIAGNOSIS — R60.0 BILATERAL LEG EDEMA: ICD-10-CM

## 2023-01-06 DIAGNOSIS — I27.20 PULMONARY HYPERTENSION (H): ICD-10-CM

## 2023-01-06 DIAGNOSIS — I51.7 BIATRIAL ENLARGEMENT: ICD-10-CM

## 2023-01-06 DIAGNOSIS — I34.1 MITRAL VALVE PROLAPSE: ICD-10-CM

## 2023-01-06 DIAGNOSIS — I48.3 TYPICAL ATRIAL FLUTTER (H): ICD-10-CM

## 2023-01-06 DIAGNOSIS — E78.5 HYPERLIPIDEMIA LDL GOAL <100: ICD-10-CM

## 2023-01-06 DIAGNOSIS — R05.1 ACUTE COUGH: ICD-10-CM

## 2023-01-06 PROCEDURE — G0463 HOSPITAL OUTPT CLINIC VISIT: HCPCS

## 2023-01-06 PROCEDURE — 99214 OFFICE O/P EST MOD 30 MIN: CPT | Performed by: NURSE PRACTITIONER

## 2023-01-06 RX ORDER — AZITHROMYCIN 250 MG/1
TABLET, FILM COATED ORAL
Qty: 6 TABLET | Refills: 0 | Status: SHIPPED | OUTPATIENT
Start: 2023-01-06 | End: 2023-01-11

## 2023-01-06 ASSESSMENT — PAIN SCALES - GENERAL: PAINLEVEL: NO PAIN (0)

## 2023-01-06 NOTE — PROGRESS NOTES
[facility] HEART CARE   CARDIOLOGY PROGRESS NOTE     Chief Complaint   Patient presents with     RECHECK     Heart Problem          Diagnosis:    ICD-10-CM    1. HFrEF (heart failure with reduced ejection fraction) (H)  I50.20       2. Biatrial enlargement  I51.7       3. Mitral valve prolapse  I34.1       4. Pulmonary hypertension (H)  I27.20       5. Bibasilar crackles  R09.89       6. Hyperlipidemia LDL goal <100  E78.5       7. Bilateral leg edema  R60.0       8. Typical atrial flutter (H)  I48.3       9. Acute cough  R05.1 azithromycin (ZITHROMAX) 250 MG tablet            Assessment/Plan:    1. Atrial fibrillation/Atrial flutter    No symptoms of racing/skipping/fluttering. No lightheaded or dizziness. No dyspnea, dyspnea on exertion.     She tells me she was diagnosed with this years ago    EKG from 2019 shows atrial flutter. Today's EKG shows atrial fibrillation with controlled ventricular rate of 88 bpm    Rate is tachycardic today- likely due to acute illness. She is asymptomatic.      2. CHADs-VASC >=2    She is not on chronic oral anticoagulation for stroke prophylaxis with history of atrial fibrillation.    She is aware of risks of not being on chronic oral anticoagulation therapy including stroke which could be debilitating or fatal.       3. Heart Failure with mid-range ejection fraction (LVEF 45-50% on TTE 11/2022)  NYHA Symptom Class I  Stage C    Discussed options for medical management.   Since she has no symptoms and overall feels well- she is not wanting to pursue medications      ACE-I/ARB/ARNi: declines     BB: declines    SGLT-2 Inhibitor: declines    Aldosterone antagonist: declines    SCD prophylaxis does not meet criteria for implant with LVEF of 45-50%    %BiV pacing: N/A    Fluid status Hypervolemic- bibasilar rales, trace LE edema, elevated BNP. She did agree to starting furosemide at prior visit. She picked up prescription but decided not to start due to concerns of side effects she read.  We spent time reviewing potential side effects and benefits of starting furosemide.     Cardiac Rehab: Not indicated (EF>35%)    Sleep Apnea Evaluation: no symptoms warranting need for evaluation      4. Biatrial enlargement    Severe    Would make rhythm control more difficult to obtain with longstanding atrial fibrillation      5. Mitral valve prolapse    Asymptomatic    Mild mitral insufficiency    TTE in 1 year for routine surveillance    6. Hyperlipidemia    Takes flax seed oil, fish oil for management  Recent Labs   Lab Test 10/24/22  0727 08/03/16  0736   CHOL 220* 191   HDL 71 74   * 104*   TRIG 64 63       7. Mild pulmonary hypertension    Bibasilar rales. She has had upper respiratory symptoms since just after Christmas. CXR 1/4/2023 negative for acute findings- no pneumonia. No pleural effusions. Coughing and symptoms worsening.     Discussed diuretic to help with bibasilar crackles, trace LE edema. She does agree to try a small dose of diuretic    Start furosemide 20 mg once daily      8. Cough    Symptoms for about 10 days, symptoms were improving but have started to worsen over the last day or so.     She is allergic to amoxicillin- causes stomach upset. We will have her start azithromycin. Reviewd signs/symptoms warranting re-evaluation by urgent care/primary care provider.     Follow-up in next week with labs prior to appointment.      Interval history:  Bianka Whitmore is a pleasant 78-year old female who presented to our  staff today to follow-up due to feeling worse. She is accompanied by her  and son. She tells me that she has not started furosemide. She also tells me that she continues with coughing and it is getting worse and more bothersome. The coughing is actually her and her son's main concern. They are concerned about possible pneumonia. She denies fever, dyspnea, dyspnea on exertion. She has not been able to sleep due to cough. She has not taken anything over the  counter for cough. She has not had any chest pain, racing/skipping sensation in chest. No increased LE edema.     We spent time reviewing imaging and lab results from visit earlier this week.       HPI:    Bianka Whitmore is a pleasant 78-year old female who presents for cardiology consult. She has no significant cardiac history. She has a non-cardiac history including osteoporosis with history of compression fracture thoracic/lumbar spine, diverticulitis. She was referred to cardiology by her primary care provider for recent transthoracic echocardiogram showing decreased LVEF of 45-50%, severe bi-atrial enlargement, mitral valve prolapse with mild mitral insufficiency, mild pulmonary hypertension with RVSP 40mmHg above the right atrial pressure.    No chest pain or pressure. No dyspnea, dyspnea on exertion. No LE edema.     She tells me that she spends the majority of her day on her feet being active doing housework, cooking, cleaning, doing laundry. She is able to carry her laundry basket up and down her basement stairs- she estimates 10-12 stairs- and she tolerates this without chest pain, dyspnea. She denies any racing/skipping/fluttering sensations in the chest. No lightheaded, dizziness. No exertional lightheadedness, dizziness. No near-syncope or syncopal episodes.     Mrs. Whitmore tells me that she overall feels well. She is able to do the activities she wishes to do in the day and does not have any bothersome or limiting symptoms.  She did see her young grand-daughter over [place] who had upper respiratory illness. A few days after [place] she started with sore throat, rhinorrhea, congestion and cough. She denies dyspnea, wheezing, fever, chills, body aches.     Smoking History: Started smoking in late teens, early twenties. Smoked maybe a couple cigarettes per day until her mid-twenties.     Alcohol History: None    Substance Abuse History: None    Sleep History: Sleeps in bed with 1 pillow. No orthopnea,  no PND. No snoring. No witnessed apnea. Wakes up feeling well rested 6-7 mornings out of the week. No long daytime naps- sometimes falls asleep in chair for about 10 minutes after lunch.     Family History: No known significant cardiac issues. She has five siblings (she is the second oldest) and none with any cardiac issues. Parents did not have cardiac issues.        RELEVANT TESTING:  Transthoracic Echocardiogram 11/17/2022   Interpretation Summary  Left ventricular function is decreased. The ejection fraction is 45-50%  (mildly reduced).  Global right ventricular function is normal.  Severe left atrial enlargement is present.  Severe right atrial enlargement is present.  Mitral valve prolapse is present.  Mild mitral insufficiency is present.  Mild aortic valve calcification is present.  Trace aortic insufficiency is present.  Mild to moderate tricuspid insufficiency is present.  Right ventricular systolic pressure is 40mmHg above the right atrial pressure.  Mild pulmonic insufficiency is present.      Past Medical History:   Diagnosis Date     Basal cell carcinoma      Cardiomegaly 01/29/2007     Compression fracture of thoracic vertebra (H) 10/30/2014     Compression fx, lumbar spine, with routine healing, subsequent encounter 6/22/2016     Hyperlipidemia      Osteoporosis      Typical atrial flutter (H) 7/25/2016     Varicose vein of leg 6/22/2016       Past Surgical History:   Procedure Laterality Date     APPENDECTOMY       COLONOSCOPY N/A 12/4/2017    Procedure: COLONOSCOPY;  COLONOSCOPY WITH POLYPECTOMY AND SCLEROTHERAPY;  Surgeon: Matt Gonzalez MD;  Location: [facility]     ENDOSCOPIC STRIPPING VEIN(S)       HYSTERECTOMY       SALPINGO OOPHORECTOMY,R/L/BRIAN         Allergies   Allergen Reactions     Amoxicillin      Intolerant       Codeine      Hydrocodone Nausea and Vomiting     Morphine      Sensitive to Codeine.       Current Outpatient Medications   Medication Sig Dispense Refill     Ascorbic Acid  (VITAMIN C PO) Take 500 mg by mouth 2 times daily       azithromycin (ZITHROMAX) 250 MG tablet Take 2 tablets (500 mg) by mouth daily for 1 day, THEN 1 tablet (250 mg) daily for 4 days. 6 tablet 0     B Complex-Biotin-FA (VITAMIN B50 COMPLEX PO) Take 1 tablet by mouth daily       Flaxseed, Linseed, (FLAX SEED OIL) 1000 MG capsule Take 1 capsule by mouth daily       MAGNESIUM OXIDE PO Take by mouth daily        NONFORMULARY daily 1 tablespoon of chopped fresh garlic        NONFORMULARY 4 times daily Extra virgin olive oil 2 tablespoons  Daily        Omega-3 Fatty Acids (FISH OIL) 1200 MG CAPS Take 1,200 mg by mouth 2 times daily        VITAMIN E COMPLEX PO Take 400 Units by mouth daily       furosemide (LASIX) 20 MG tablet Take 1 tablet (20 mg) by mouth daily for 90 days (Patient not taking: Reported on 2023) 90 tablet 1       Social History     Socioeconomic History     Marital status:      Spouse name: Not on file     Number of children: Not on file     Years of education: Not on file     Highest education level: Not on file   Occupational History     Occupation: Housewife     Comment: Retired   Tobacco Use     Smoking status: Former     Packs/day: 0.50     Years: 3.00     Pack years: 1.50     Types: Cigarettes     Start date: 1950     Quit date: 3/26/1953     Years since quittin.8     Smokeless tobacco: Never     Tobacco comments:     Quit in ; no passive smoke exposure   Substance and Sexual Activity     Alcohol use: No     Drug use: No     Sexual activity: Not Currently   Other Topics Concern      Service Not Asked     Blood Transfusions Yes     Caffeine Concern No     Occupational Exposure Not Asked     Hobby Hazards Not Asked     Sleep Concern Not Asked     Stress Concern Not Asked     Weight Concern Not Asked     Special Diet Not Asked     Back Care Not Asked     Exercise Not Asked     Bike Helmet Not Asked     Seat Belt Not Asked     Self-Exams Not Asked     Parent/sibling w/  "CABG, MI or angioplasty before 65F 55M? No   Social History Narrative     Not on file     Social Determinants of Health     Financial Resource Strain: Not on file   Food Insecurity: Not on file   Transportation Needs: Not on file   Physical Activity: Not on file   Stress: Not on file   Social Connections: Not on file   Intimate Partner Violence: Not on file   Housing Stability: Not on file       LAB RESULTS:   Orders placed or performed in visit on 01/06/23     azithromycin (ZITHROMAX) 250 MG tablet         Review of systems: Negative except that which was noted in the HPI.    Physical examination:    Vitals: /74 (BP Location: Left arm, Patient Position: Sitting, Cuff Size: Adult Regular)   Pulse 98   Temp 99  F (37.2  C) (Tympanic)   Resp 20   Ht 1.6 m (5' 3\")   Wt 58.1 kg (128 lb 1.6 oz)   SpO2 95%   BMI 22.69 kg/m    BMI= Body mass index is 22.69 kg/m .      GENERAL APPEARANCE:  alert and no distress  HEENT: no icterus, no xanthelasmas, normal pupil size and reaction, no cyanosis.  NECK: no adenopathy, no asymmetry, masses.  CHEST: bibasilar rales with L>R, no use of accessory muscles, no retractions, respirations are unlabored, normal respiratory rate. Wet, loose cough  CARDIOVASCULAR: Irregularly, irregular rhythm. Tachycardic rate. 3/6 systolic murmur auscultated loudest at LLSB.  EXTREMITIES: Trace LE edema. no clubbing, cyanosis.  NEURO: alert and oriented normal speech, and affect  VASC: No vascular bruits heard.  SKIN: no ecchymoses, no rashes        Thank you for allowing me to participate in the care of your patient. Please do not hesitate to contact me if you have any questions.       Norma Garcia, KRISTIAN    "

## 2023-01-06 NOTE — PATIENT INSTRUCTIONS
Start furosemide (Lasix) 20 mg every morning for pulmonary hypertension, trace LE edema  Start azithromycin 500 mg (2 tablets) today then 250 mg (1 tablet) for 4 days. Take with food.   - Take entire course of antibiotic even if you start to feel better.  - Antibiotics can cause stomach upset including nausea and diarrhea. Read your bottle or ask the pharmacist if antibiotic can be taken with food to help prevent nausea. If you have symptoms of diarrhea you can take an over-the-counter probiotic and/or increase foods with probiotics such as yogurt, [place], sauerkraut.   -- Symptomatic treatments recommended.  - Honey can be soothing for sore throat  - Warm salt water gurgles can help soothe sore throat  - Rest  - Humidifier can help with congestion and help keep mucus membranes such as throat and nose from drying out.  - Sleeping slightly propped up can help with congestion and postnasal drainage that can worsen cough at bedtime.  - As long as you have never been told to take Tylenol and/or Ibuprofen you can use them to manage fever and body aches per package instructions  Make sure you eat when you take ibuprofen to avoid stomach upset.  - OTC cough medications per package instructions to help with cough. Check to see if the cough/cold medication already has acetaminophen (Tylenol) in it. If it does avoid taking additional Tylenol.  - If sudden onset of new fever, worsening symptoms return for further evaluation.  - Education provided on symptoms of post-viral bacterial infections including ear infection and pneumonia. This would require re-evaluation for treatment.

## 2023-01-13 ENCOUNTER — OFFICE VISIT (OUTPATIENT)
Dept: CARDIOLOGY | Facility: OTHER | Age: 88
End: 2023-01-13
Attending: NURSE PRACTITIONER
Payer: MEDICARE

## 2023-01-13 ENCOUNTER — LAB (OUTPATIENT)
Dept: LAB | Facility: OTHER | Age: 88
End: 2023-01-13
Attending: NURSE PRACTITIONER
Payer: MEDICARE

## 2023-01-13 VITALS
OXYGEN SATURATION: 97 % | SYSTOLIC BLOOD PRESSURE: 154 MMHG | HEART RATE: 87 BPM | DIASTOLIC BLOOD PRESSURE: 90 MMHG | WEIGHT: 125 LBS | HEIGHT: 63 IN | BODY MASS INDEX: 22.15 KG/M2 | RESPIRATION RATE: 16 BRPM | TEMPERATURE: 98.4 F

## 2023-01-13 DIAGNOSIS — R05.1 ACUTE COUGH: ICD-10-CM

## 2023-01-13 DIAGNOSIS — I34.1 MITRAL VALVE PROLAPSE: ICD-10-CM

## 2023-01-13 DIAGNOSIS — I27.20 PULMONARY HYPERTENSION (H): ICD-10-CM

## 2023-01-13 DIAGNOSIS — R60.0 BILATERAL LEG EDEMA: ICD-10-CM

## 2023-01-13 DIAGNOSIS — I50.20 HFREF (HEART FAILURE WITH REDUCED EJECTION FRACTION) (H): ICD-10-CM

## 2023-01-13 DIAGNOSIS — I48.11 LONGSTANDING PERSISTENT ATRIAL FIBRILLATION (H): Primary | ICD-10-CM

## 2023-01-13 DIAGNOSIS — R09.89 BIBASILAR CRACKLES: ICD-10-CM

## 2023-01-13 DIAGNOSIS — I51.7 BIATRIAL ENLARGEMENT: ICD-10-CM

## 2023-01-13 DIAGNOSIS — E78.5 HYPERLIPIDEMIA LDL GOAL <100: ICD-10-CM

## 2023-01-13 LAB
ANION GAP SERPL CALCULATED.3IONS-SCNC: 9 MMOL/L (ref 7–15)
BUN SERPL-MCNC: 18.9 MG/DL (ref 8–23)
CALCIUM SERPL-MCNC: 9.8 MG/DL (ref 8.8–10.2)
CHLORIDE SERPL-SCNC: 97 MMOL/L (ref 98–107)
CREAT SERPL-MCNC: 0.68 MG/DL (ref 0.51–0.95)
DEPRECATED HCO3 PLAS-SCNC: 27 MMOL/L (ref 22–29)
GFR SERPL CREATININE-BSD FRML MDRD: 84 ML/MIN/1.73M2
GLUCOSE SERPL-MCNC: 107 MG/DL (ref 70–99)
HOLD SPECIMEN: NORMAL
NT-PROBNP SERPL-MCNC: 2206 PG/ML (ref 0–1800)
POTASSIUM SERPL-SCNC: 4.6 MMOL/L (ref 3.4–5.3)
SODIUM SERPL-SCNC: 133 MMOL/L (ref 136–145)

## 2023-01-13 PROCEDURE — 36415 COLL VENOUS BLD VENIPUNCTURE: CPT | Mod: ZL

## 2023-01-13 PROCEDURE — 80048 BASIC METABOLIC PNL TOTAL CA: CPT | Mod: ZL

## 2023-01-13 PROCEDURE — 99214 OFFICE O/P EST MOD 30 MIN: CPT | Performed by: NURSE PRACTITIONER

## 2023-01-13 PROCEDURE — G0463 HOSPITAL OUTPT CLINIC VISIT: HCPCS

## 2023-01-13 PROCEDURE — 83880 ASSAY OF NATRIURETIC PEPTIDE: CPT | Mod: ZL

## 2023-01-13 ASSESSMENT — PAIN SCALES - GENERAL: PAINLEVEL: NO PAIN (0)

## 2023-01-13 NOTE — PROGRESS NOTES
[facility] HEART CARE   CARDIOLOGY PROGRESS NOTE     Chief Complaint   Patient presents with     RECHECK     Heart failure.          Diagnosis:    ICD-10-CM    1. Longstanding persistent atrial fibrillation (H)  I48.11       2. Biatrial enlargement  I51.7       3. HFrEF (heart failure with reduced ejection fraction) (H)  I50.20       4. Mitral valve prolapse w/mild insufficience on TTE 11/2022  I34.1       5. Pulmonary hypertension (H)  I27.20       6. Bibasilar crackles  R09.89       7. Hyperlipidemia LDL goal <100  E78.5       8. Acute cough  R05.1             Assessment/Plan:    1. Atrial fibrillation/Atrial flutter    No symptoms of racing/skipping/fluttering. No lightheaded or dizziness. No dyspnea, dyspnea on exertion.     She tells me she was diagnosed with this years ago    EKG from 2019 shows atrial flutter. Today's EKG shows atrial fibrillation with controlled ventricular rate of 88 bpm    Rate is tachycardic today- likely due to acute illness. She is asymptomatic.      2. CHADs-VASC >=2    She is not on chronic oral anticoagulation for stroke prophylaxis with history of atrial fibrillation.    She is aware of risks of not being on chronic oral anticoagulation therapy including stroke which could be debilitating or fatal.       3. Heart Failure with mid-range ejection fraction (LVEF 45-50% on TTE 11/2022)  NYHA Symptom Class I  Stage C    Discussed options for medical management.   Since she has no symptoms and overall feels well- she is not wanting to pursue medications      ACE-I/ARB/ARNi: declines     BB: declines    SGLT-2 Inhibitor: declines    Aldosterone antagonist: declines    SCD prophylaxis does not meet criteria for implant with LVEF of 45-50%    %BiV pacing: N/A    Fluid status Hypervolemic- bibasilar rales, trace LE edema, elevated BNP. She did agree to starting furosemide at prior visit. She picked up prescription but decided not to start due to concerns of side effects she read. She then  decided to try one dose and endorses that she could not void for several hours so she is no longer going to take furosemide.    Cardiac Rehab: Not indicated (EF>35%)    Sleep Apnea Evaluation: no symptoms warranting need for evaluation      4. Biatrial enlargement    Severe    Would make rhythm control more difficult to obtain with longstanding atrial fibrillation    5. Mitral valve prolapse    Asymptomatic    Mild mitral insufficiency    TTE in 1 year for routine surveillance    6. Hyperlipidemia    Takes flax seed oil, fish oil for management    Not interested in statin medication  Recent Labs   Lab Test 10/24/22  0727 08/03/16  0736   CHOL 220* 191   HDL 71 74   * 104*   TRIG 64 63       7. Mild pulmonary hypertension    Bibasilar rales. She has had upper respiratory symptoms since just after Christmas. CXR 1/4/2023 negative for acute findings- no pneumonia. No pleural effusions. Coughing and symptoms worsened so she was empirically started on azithromycin for duration of symptoms and worsening. Upper respiratory symptoms, cough improved. Continues with bibasilar rales.      Discussed diuretic to help with bibasilar crackles, trace LE edema. She does not want to take any medications at this time.       8. Cough    Symptoms improved.      Since she is not wanting to proceed with any pharmacologic management of conditions she can follow-up with primary care per their direction and can follow-up with cardiology as needed.      Interval history:  Bianka Whitmore is a pleasant 87-year old female who presents for cardiology follow-up accompanied by her son. Recall, she has a cardiac history including atrial fibrillation, newly identified HFrEF, mitral valve prolapse with mild mitral insufficiency, mild pulmonary hypertension.     She was previously seen in consult for abnormal TTE. We have reviewed these results as well as recommendations previously and again today. She continues to deny any chest pain, dyspnea,  dyspnea on exertion, increased LE edema. She did have recent acute illness with upper respiratory symptoms, cough which has improved. She did take one dose of furosemide for HFrEF with hypervolemia and pulmonary hypertension. She was unable to void for several hours so has decided she no longer wants to take this medication.       HPI:    Bianka Whitmore is a pleasant 78-year old female who presents for cardiology consult. She has cardiac history including atrial fibrillation, newly identified HFrEF, mitral valve prolapse.. She has a non-cardiac history including osteoporosis with history of compression fracture thoracic/lumbar spine, diverticulitis. She was referred to cardiology by her primary care provider for recent transthoracic echocardiogram showing decreased LVEF of 45-50%, severe bi-atrial enlargement, mitral valve prolapse with mild mitral insufficiency, mild pulmonary hypertension with RVSP 40mmHg above the right atrial pressure.    No chest pain or pressure. No dyspnea, dyspnea on exertion. No LE edema.     She tells me that she spends the majority of her day on her feet being active doing housework, cooking, cleaning, doing laundry. She is able to carry her laundry basket up and down her basement stairs- she estimates 10-12 stairs- and she tolerates this without chest pain, dyspnea. She denies any racing/skipping/fluttering sensations in the chest. No lightheaded, dizziness. No exertional lightheadedness, dizziness. No near-syncope or syncopal episodes.     Mrs. Whitmore tells me that she overall feels well. She is able to do the activities she wishes to do in the day and does not have any bothersome or limiting symptoms.  She did see her young grand-daughter over Christmas who had upper respiratory illness. A few days after José Miguel she started with sore throat, rhinorrhea, congestion and cough. She denies dyspnea, wheezing, fever, chills, body aches.     Smoking History: Started smoking in late teens,  early twenties. Smoked maybe a couple cigarettes per day until her mid-twenties.     Alcohol History: None    Substance Abuse History: None    Sleep History: Sleeps in bed with 1 pillow. No orthopnea, no PND. No snoring. No witnessed apnea. Wakes up feeling well rested 6-7 mornings out of the week. No long daytime naps- sometimes falls asleep in chair for about 10 minutes after lunch.     Family History: No known significant cardiac issues. She has five siblings (she is the second oldest) and none with any cardiac issues. Parents did not have cardiac issues.        RELEVANT TESTING:  Transthoracic Echocardiogram 11/17/2022   Interpretation Summary  Left ventricular function is decreased. The ejection fraction is 45-50%  (mildly reduced).  Global right ventricular function is normal.  Severe left atrial enlargement is present.  Severe right atrial enlargement is present.  Mitral valve prolapse is present.  Mild mitral insufficiency is present.  Mild aortic valve calcification is present.  Trace aortic insufficiency is present.  Mild to moderate tricuspid insufficiency is present.  Right ventricular systolic pressure is 40mmHg above the right atrial pressure.  Mild pulmonic insufficiency is present.      Past Medical History:   Diagnosis Date     Basal cell carcinoma      Cardiomegaly 01/29/2007     Compression fracture of thoracic vertebra (H) 10/30/2014     Compression fx, lumbar spine, with routine healing, subsequent encounter 6/22/2016     Hyperlipidemia      Osteoporosis      Typical atrial flutter (H) 7/25/2016     Varicose vein of leg 6/22/2016       Past Surgical History:   Procedure Laterality Date     APPENDECTOMY       COLONOSCOPY N/A 12/4/2017    Procedure: COLONOSCOPY;  COLONOSCOPY WITH POLYPECTOMY AND SCLEROTHERAPY;  Surgeon: Matt Gonzalez MD;  Location: [facility]     ENDOSCOPIC STRIPPING VEIN(S)       HYSTERECTOMY       SALPINGO OOPHORECTOMY,R/L/BRIAN         Allergies   Allergen Reactions      Amoxicillin      Intolerant       Codeine      Hydrocodone Nausea and Vomiting     Morphine      Sensitive to Codeine.       Current Outpatient Medications   Medication Sig Dispense Refill     Ascorbic Acid (VITAMIN C PO) Take 500 mg by mouth 2 times daily       B Complex-Biotin-FA (VITAMIN B50 COMPLEX PO) Take 1 tablet by mouth daily       Flaxseed, Linseed, (FLAX SEED OIL) 1000 MG capsule Take 1 capsule by mouth daily       MAGNESIUM OXIDE PO Take by mouth daily        NONFORMULARY daily 1 tablespoon of chopped fresh garlic        NONFORMULARY 4 times daily Extra virgin olive oil 2 tablespoons  Daily        Omega-3 Fatty Acids (FISH OIL) 1200 MG CAPS Take 1,200 mg by mouth 2 times daily        VITAMIN E COMPLEX PO Take 400 Units by mouth daily       furosemide (LASIX) 20 MG tablet Take 1 tablet (20 mg) by mouth daily for 90 days (Patient not taking: Reported on 2023) 90 tablet 1       Social History     Socioeconomic History     Marital status:      Spouse name: Not on file     Number of children: Not on file     Years of education: Not on file     Highest education level: Not on file   Occupational History     Occupation: Housewife     Comment: Retired   Tobacco Use     Smoking status: Former     Packs/day: 0.50     Years: 3.00     Pack years: 1.50     Types: Cigarettes     Start date: 1950     Quit date: 3/26/1953     Years since quittin.8     Smokeless tobacco: Never     Tobacco comments:     Quit in ; no passive smoke exposure   Substance and Sexual Activity     Alcohol use: No     Drug use: No     Sexual activity: Not Currently   Other Topics Concern      Service Not Asked     Blood Transfusions Yes     Caffeine Concern No     Occupational Exposure Not Asked     Hobby Hazards Not Asked     Sleep Concern Not Asked     Stress Concern Not Asked     Weight Concern Not Asked     Special Diet Not Asked     Back Care Not Asked     Exercise Not Asked     Bike Helmet Not Asked     Seat  "Belt Not Asked     Self-Exams Not Asked     Parent/sibling w/ CABG, MI or angioplasty before 65F 55M? No   Social History Narrative     Not on file     Social Determinants of Health     Financial Resource Strain: Not on file   Food Insecurity: Not on file   Transportation Needs: Not on file   Physical Activity: Not on file   Stress: Not on file   Social Connections: Not on file   Intimate Partner Violence: Not on file   Housing Stability: Not on file       LAB RESULTS:   Orders placed or performed in visit on 01/06/23     azithromycin (ZITHROMAX) 250 MG tablet         Review of systems: Negative except that which was noted in the HPI.    Physical examination:    Vitals: BP (!) 154/90   Pulse 87   Temp 98.4  F (36.9  C) (Tympanic)   Resp 16   Ht 1.6 m (5' 3\")   Wt 56.7 kg (125 lb)   SpO2 97%   BMI 22.14 kg/m    BMI= Body mass index is 22.14 kg/m .      GENERAL APPEARANCE:  alert and no distress, much better than last week  HEENT: no icterus, no xanthelasmas, normal pupil size and reaction, no cyanosis.  NECK: no adenopathy, no asymmetry, masses.  CHEST: bibasilar rales, no use of accessory muscles, no retractions, respirations are unlabored, normal respiratory rate. No cough today.  CARDIOVASCULAR: Irregularly, irregular rhythm. Normal rate. 3/6 systolic murmur auscultated loudest at LLSB.  EXTREMITIES: Trace LE edema. no clubbing, cyanosis.  NEURO: alert and oriented normal speech, and affect  VASC: No vascular bruits heard.  SKIN: no ecchymoses, no rashes        Thank you for allowing me to participate in the care of your patient. Please do not hesitate to contact me if you have any questions.       Norma Garcia, KRISTIAN    "

## 2023-01-13 NOTE — PATIENT INSTRUCTIONS
Thank you for allowing Norma Garcia CNP and our  team to participate in your care. Please call our office at [phone] with scheduling questions or if you need to cancel or change your appointment. With any other questions or concerns you may call cardiology nurse at [phone] or [phone].       If you experience chest pain, chest pressure, chest tightness, shortness of breath, fainting, lightheadedness, nausea, vomiting, or other concerning symptoms, please report to the Emergency Department or call 911. These symptoms may be emergent, and best treated in the Emergency Department.    Follow up if needed.

## 2023-01-16 NOTE — PATIENT INSTRUCTIONS
Thank you for choosing [facility].   I have office hours 8:00 am to 4:30 pm on Monday's, Wednesday's, Thursday's and Friday's. My nurse and I are out of the office every Tuesday.    Following your visit, when your labs and diagnostic testing have returned, I will review then and you will be contacted by my nurse.  If you are on My Chart, you can also view results there.    For refills, notify your pharmacy regarding what you need and the pharmacy will generate a refill request. Do not call my nurse as she is unable to process refill request. Please plan ahead and allow 3-5 days for refill requests.    You will generally receive a reminder call the day prior to your appointment.  If you cannot attend your appointment, please cancel your appointment with as much notice as possible.  If there is a pattern of failure to present for your appointments, I cannot provide consistent, meaningful, ongoing care for you. It is very important to me that you come in for your care, so we can best assist you with your health care needs.    IMPORTANT:  Please note that it is my standard of practice to NOT participate in prescribing ongoing requested Narcotic Analgesic therapy, and/or participate in the prescribing of other controlled substances.  My nurse and I am happy to assist you with the process of referral for alternative pain management as needed, and other treatment modalities including but not limited to:  Physical Therapy, Physical Medicine and Rehab, Counseling, Chiropractic Care, Orthopedic Care, and non-narcotic medication management.     In the event that you need to be seen for emergent concerns and I am out of office,  please see one of my colleagues for acute concerns.  You may also present to  or ER.  I appreciate the opportunity to serve you and look forward to supporting your healthcare needs in the future. Please contact me with any questions or concerns that you may  have.    Sincerely,      Yael Mckeon RN, PA-C        Yes

## 2023-02-01 ENCOUNTER — OFFICE VISIT (OUTPATIENT)
Dept: FAMILY MEDICINE | Facility: OTHER | Age: 88
End: 2023-02-01
Attending: PHYSICIAN ASSISTANT
Payer: MEDICARE

## 2023-02-01 VITALS
HEART RATE: 75 BPM | SYSTOLIC BLOOD PRESSURE: 140 MMHG | RESPIRATION RATE: 16 BRPM | WEIGHT: 124 LBS | BODY MASS INDEX: 21.97 KG/M2 | OXYGEN SATURATION: 98 % | TEMPERATURE: 96.8 F | DIASTOLIC BLOOD PRESSURE: 90 MMHG

## 2023-02-01 DIAGNOSIS — R17 ELEVATED BILIRUBIN: Primary | ICD-10-CM

## 2023-02-01 LAB
ALBUMIN SERPL BCG-MCNC: 4.1 G/DL (ref 3.5–5.2)
ALP SERPL-CCNC: 101 U/L (ref 35–104)
ALT SERPL W P-5'-P-CCNC: 16 U/L (ref 10–35)
AST SERPL W P-5'-P-CCNC: 30 U/L (ref 10–35)
BILIRUB DIRECT SERPL-MCNC: <0.2 MG/DL (ref 0–0.3)
BILIRUB SERPL-MCNC: 0.9 MG/DL
PROT SERPL-MCNC: 7.3 G/DL (ref 6.4–8.3)

## 2023-02-01 PROCEDURE — 99214 OFFICE O/P EST MOD 30 MIN: CPT | Performed by: PHYSICIAN ASSISTANT

## 2023-02-01 PROCEDURE — G0463 HOSPITAL OUTPT CLINIC VISIT: HCPCS | Performed by: PHYSICIAN ASSISTANT

## 2023-02-01 PROCEDURE — 36415 COLL VENOUS BLD VENIPUNCTURE: CPT | Mod: ZL | Performed by: PHYSICIAN ASSISTANT

## 2023-02-01 PROCEDURE — 80076 HEPATIC FUNCTION PANEL: CPT | Mod: ZL | Performed by: PHYSICIAN ASSISTANT

## 2023-02-01 ASSESSMENT — PAIN SCALES - GENERAL: PAINLEVEL: NO PAIN (0)

## 2023-02-01 NOTE — PROGRESS NOTES
Assessment & Plan     Elevated bilirubin  Has known gallstones for quiet sometime.  The HB is better and her gallstones are NOT symptomatic.   They want to have a little care as possible. Very limited on dersire to take medication as well. Remains in A fib . No desire for coumadin. Or other anticoagulation medications.    - Hepatic panel (Albumin, ALT, AST, Bili, Alk Phos, TP); Future    Review of external notes as documented elsewhere in note  Ordering of each unique test  Prescription drug management  40  minutes spent on the date of the encounter doing chart review, history and exam, documentation and further activities per the note       See Patient Instructions    No follow-ups on file.    ORTEGA Caruso  [facility] - MARCELA Carlton is a 87 year old accompanied by her spouse, presenting for the following health issues:  Follow Up (3 month, elevated bili and hemoglobin)      HPI           Review of Systems   CONSTITUTIONAL: NEGATIVE for fever, chills, change in weight  INTEGUMENTARY/SKIN: NEGATIVE for worrisome rashes, moles or lesions  EYES: NEGATIVE for vision changes or irritation  ENT/MOUTH: NEGATIVE for ear, mouth and throat problems  RESP: NEGATIVE for significant cough or SOB, has improved since seeing her Cardiology provider. Was given ABX and has helped her.   BREAST: NEGATIVE for masses, tenderness or discharge  CV: NEGATIVE for chest pain, palpitations or peripheral edema  GI: NEGATIVE for nausea, abdominal pain, heartburn, or change in bowel habits  : NEGATIVE for frequency, dysuria, or hematuria  MUSCULOSKELETAL:back degeneration and she is shrinking. Many compression fractures.  Not sx.   NEURO: NEGATIVE for weakness, dizziness or paresthesias  ENDOCRINE: NEGATIVE for temperature intolerance, skin/hair changes  HEME: NEGATIVE for bleeding problems  PSYCHIATRIC: NEGATIVE for changes in mood or affect      Objective    BP (!) 140/90 (BP Location: Left arm,  Patient Position: Sitting, Cuff Size: Adult Regular)   Pulse 75   Temp 96.8  F (36  C) (Tympanic)   Resp 16   Wt 56.2 kg (124 lb)   SpO2 98%   BMI 21.97 kg/m    Body mass index is 21.97 kg/m .  Physical Exam   GENERAL: healthy, alert and no distress  NECK: no adenopathy, no asymmetry, masses, or scars and thyroid normal to palpation  RESP: lungs clear to auscultation - no rales, rhonchi or wheezes  CV: regular rate and rhythm, normal S1 S2, no S3 or S4, no murmur, click or rub, no peripheral edema and peripheral pulses strong  ABDOMEN: soft, nontender, no hepatosplenomegaly, no masses and bowel sounds normal  MS: no gross musculoskeletal defects noted, no edema  BACK: no CVA tenderness, no paralumbar tenderness  PSYCH: mentation appears normal, affect normal/bright    Lab on 01/13/2023   Component Date Value Ref Range Status     Sodium 01/13/2023 133 (L)  136 - 145 mmol/L Final     Potassium 01/13/2023 4.6  3.4 - 5.3 mmol/L Final     Chloride 01/13/2023 97 (L)  98 - 107 mmol/L Final     Carbon Dioxide (CO2) 01/13/2023 27  22 - 29 mmol/L Final     Anion Gap 01/13/2023 9  7 - 15 mmol/L Final     Urea Nitrogen 01/13/2023 18.9  8.0 - 23.0 mg/dL Final     Creatinine 01/13/2023 0.68  0.51 - 0.95 mg/dL Final     Calcium 01/13/2023 9.8  8.8 - 10.2 mg/dL Final     Glucose 01/13/2023 107 (H)  70 - 99 mg/dL Final     GFR Estimate 01/13/2023 84  >60 mL/min/1.73m2 Final    Effective December 21, 2021 eGFRcr in adults is calculated using the 2021 CKD-EPI creatinine equation which includes age and gender (Ras et al., NEJM, DOI: 10.1056/QDTSgg8512288)     N Terminal Pro BNP Outpatient 01/13/2023 2,206 (H)  0 - 1,800 pg/mL Final    Reference range shown and results flagged as abnormal are for the outpatient, non acute settings. Establishing a baseline value for each individual patient is useful for follow-up.    Suggested inpatient cut points for confirming diagnosis of CHF in an acute setting are:  >450 pg/mL (age 18 to  less than 50)  >900 pg/mL (age 50 to less than 75)  >1800 pg/mL (75 yrs and older)    An inpatient or emergency department NT-proPBNP <300 pg/mL effectively rules out acute CHF, with 99% negative predictive value.         Hold Specimen 01/13/2023 [facility]   Final     Hold Specimen 01/13/2023 [facility]   Final     Hold Specimen 01/13/2023 [facility]   Final

## 2023-08-03 NOTE — PATIENT INSTRUCTIONS
Thank you for allowing Ashley Stephen and our ENT team to participate in your care.  If your medications are too expensive, please give the nurse a call.  We can possibly change this medication.  If you have a scheduling or an appointment question please contact our Health Unit Coordinator at their direct line [phone].   ALL nursing questions or concerns can be directed to your ENT nurse at: [phone] - Raquel    Complete ofloxacin drops - 10 drops to the left ear twice daily x 7 days  Follow up as needed for ear cleaning

## 2023-08-07 ENCOUNTER — OFFICE VISIT (OUTPATIENT)
Dept: OTOLARYNGOLOGY | Facility: OTHER | Age: 88
End: 2023-08-07
Attending: NURSE PRACTITIONER
Payer: MEDICARE

## 2023-08-07 VITALS
HEIGHT: 63 IN | HEART RATE: 75 BPM | BODY MASS INDEX: 21.26 KG/M2 | TEMPERATURE: 98 F | SYSTOLIC BLOOD PRESSURE: 120 MMHG | OXYGEN SATURATION: 98 % | DIASTOLIC BLOOD PRESSURE: 68 MMHG | WEIGHT: 120 LBS

## 2023-08-07 DIAGNOSIS — H61.22 CERUMEN DEBRIS ON TYMPANIC MEMBRANE OF LEFT EAR: Primary | ICD-10-CM

## 2023-08-07 DIAGNOSIS — H61.22 IMPACTED CERUMEN, LEFT EAR: ICD-10-CM

## 2023-08-07 DIAGNOSIS — H61.21 CERUMINOSIS, RIGHT: ICD-10-CM

## 2023-08-07 PROCEDURE — G0463 HOSPITAL OUTPT CLINIC VISIT: HCPCS | Mod: 25 | Performed by: NURSE PRACTITIONER

## 2023-08-07 PROCEDURE — 69210 REMOVE IMPACTED EAR WAX UNI: CPT | Mod: LT | Performed by: NURSE PRACTITIONER

## 2023-08-07 PROCEDURE — 99213 OFFICE O/P EST LOW 20 MIN: CPT | Mod: 25 | Performed by: NURSE PRACTITIONER

## 2023-08-07 RX ORDER — OFLOXACIN 3 MG/ML
10 SOLUTION AURICULAR (OTIC) 2 TIMES DAILY
Qty: 7 ML | Refills: 0 | Status: SHIPPED | OUTPATIENT
Start: 2023-08-07 | End: 2023-08-14

## 2023-08-07 ASSESSMENT — PAIN SCALES - GENERAL: PAINLEVEL: NO PAIN (0)

## 2023-08-07 NOTE — LETTER
8/7/2023         RE: Bianka Whitmore  [address]  [facility] [address]        Dear Colleague,    Thank you for referring your patient, Bianka Whitmore, to the [facility]. Please see a copy of my visit note below.    Otolaryngology Note         Chief Complaint:     Patient presents with:  Ear Problem: Ear Cleaning            History of Present Illness:     Bianka Whitmore is a 87 year old female who presents today for ear cleaning.  She has been feeling some plugged sensation in the left ear.      She last had ears cleaned in 2021   She has no concerns with hearing when the ears are not feeling plugged  + tinnitus, chronic  No vertigo, facial numbness or weakness.      No otalgia, otorrhea.    NO hx of COM or otologic surgeries.          Medications:     Current Outpatient Rx   Medication Sig Dispense Refill     Ascorbic Acid (VITAMIN C PO) Take 500 mg by mouth 2 times daily       B Complex-Biotin-FA (VITAMIN B50 COMPLEX PO) Take 1 tablet by mouth daily       Flaxseed, Linseed, (FLAX SEED OIL) 1000 MG capsule Take 1 capsule by mouth daily       MAGNESIUM OXIDE PO Take by mouth daily        NONFORMULARY daily 1 tablespoon of chopped fresh garlic        NONFORMULARY 4 times daily Extra virgin olive oil 2 tablespoons  Daily        ofloxacin (FLOXIN) 0.3 % otic solution Place 10 drops Into the left ear 2 times daily for 7 days 7 mL 0     Omega-3 Fatty Acids (FISH OIL) 1200 MG CAPS Take 1,200 mg by mouth 2 times daily        VITAMIN E COMPLEX PO Take 400 Units by mouth daily       furosemide (LASIX) 20 MG tablet Take 1 tablet (20 mg) by mouth daily for 90 days (Patient not taking: Reported on 1/6/2023) 90 tablet 1            Allergies:     Allergies: Amoxicillin, Codeine, Hydrocodone, and Morphine          Past Medical History:     Past Medical History:   Diagnosis Date     Basal cell carcinoma      Cardiomegaly 01/29/2007     Compression fracture of thoracic vertebra (H) 10/30/2014      "Compression fx, lumbar spine, with routine healing, subsequent encounter 2016     Hyperlipidemia      Osteoporosis      Typical atrial flutter (H) 2016     Varicose vein of leg 2016            Past Surgical History:     Past Surgical History:   Procedure Laterality Date     APPENDECTOMY       COLONOSCOPY N/A 2017    Procedure: COLONOSCOPY;  COLONOSCOPY WITH POLYPECTOMY AND SCLEROTHERAPY;  Surgeon: Matt Gonzalez MD;  Location: [facility]     ENDOSCOPIC STRIPPING VEIN(S)       HYSTERECTOMY       SALPINGO OOPHORECTOMY,R/L/BRIAN         ENT family history reviewed         Social History:     Social History     Tobacco Use     Smoking status: Former     Packs/day: 0.50     Years: 3.00     Pack years: 1.50     Types: Cigarettes     Start date: 1950     Quit date: 3/26/1953     Years since quittin.4     Smokeless tobacco: Never     Tobacco comments:     Quit in ; no passive smoke exposure   Vaping Use     Vaping Use: Never used   Substance Use Topics     Alcohol use: No     Drug use: No            Review of Systems:     ROS: See HPI         Physical Exam:     /68 (BP Location: Right arm, Patient Position: Sitting, Cuff Size: Adult Regular)   Pulse 75   Temp 98  F (36.7  C) (Tympanic)   Ht 1.6 m (5' 3\")   Wt 54.4 kg (120 lb)   SpO2 98%   BMI 21.26 kg/m      General - The patient is well nourished and well developed, and appears to have good nutritional status.  Alert and oriented to person and place, answers questions and cooperates with examination appropriately.   Head and Face - Normocephalic and atraumatic, with no gross asymmetry noted.  The facial nerve is intact, with strong symmetric movements.  Voice and Breathing - The patient was breathing comfortably without the use of accessory muscles. There was no wheezing, stridor. The patients voice was clear and strong, and had appropriate pitch and quality.  Ears - The ears were examined with binocular microscopy and with " otoscope.  External ears normal. Right canal with ceruminosis.  Left canal cerumen impacted.  The right ear was cleaned with gentle suctioning and cupped forceps, cerumen loop.   Right tympanic membrane is intact without effusion, retraction or mass. The left ear was cleaned in the same manner.  There is scant cerumen debris remaining on the left TM and distal canal.  Left tympanic membrane is intact without effusion, retraction or mass.  Eyes - Extraocular movements intact, sclera were not icteric or injected, conjunctiva were pink and moist.  Mouth - Examination of the oral cavity showed pink, healthy oral mucosa. Dentition in good condition. No lesions or ulcerations noted. The tongue was mobile and midline.   Throat - The walls of the oropharynx were smooth, pink, moist, symmetric, and had no lesions or ulcerations.  The tonsillar pillars and soft palate were symmetric. The uvula was midline on elevation.    Neck - Full range of motion on passive movement.  Palpation of the occipital, submental, submandibular, internal jugular chain, and supraclavicular nodes did not demonstrate any abnormal lymph nodes or masses.  Palpation of the thyroid was soft and smooth, with no nodules or goiter appreciated.  The trachea was mobile and midline.  Nose - External contour is symmetric, no gross deflection or scars.  Nasal mucosa is pink and moist with no abnormal mucus.  The septum and turbinates were evaluated with nasal speculum, no polyps, masses, or purulence noted on examination.         Assessment and Plan:       ICD-10-CM    1. Cerumen debris on tympanic membrane of left ear  H61.22 ofloxacin (FLOXIN) 0.3 % otic solution      2. Impacted cerumen, left ear  H61.22       3. Ceruminosis, right  H61.21           Complete ofloxacin drops - 10 drops to the left ear twice daily x 7 days  Follow up as needed for ear cleaning    Ashley KEITA  [facility]      Again, thank you for allowing me to  participate in the care of your patient.        Sincerely,        Ashley Stephen, NP

## 2023-08-07 NOTE — PROGRESS NOTES
Otolaryngology Note         Chief Complaint:     Patient presents with:  Ear Problem: Ear Cleaning            History of Present Illness:     Bianka Whitmore is a 87 year old female who presents today for ear cleaning.  She has been feeling some plugged sensation in the left ear.      She last had ears cleaned in 2021   She has no concerns with hearing when the ears are not feeling plugged  + tinnitus, chronic  No vertigo, facial numbness or weakness.      No otalgia, otorrhea.    NO hx of COM or otologic surgeries.          Medications:     Current Outpatient Rx   Medication Sig Dispense Refill    Ascorbic Acid (VITAMIN C PO) Take 500 mg by mouth 2 times daily      B Complex-Biotin-FA (VITAMIN B50 COMPLEX PO) Take 1 tablet by mouth daily      Flaxseed, Linseed, (FLAX SEED OIL) 1000 MG capsule Take 1 capsule by mouth daily      MAGNESIUM OXIDE PO Take by mouth daily       NONFORMULARY daily 1 tablespoon of chopped fresh garlic       NONFORMULARY 4 times daily Extra virgin olive oil 2 tablespoons  Daily       ofloxacin (FLOXIN) 0.3 % otic solution Place 10 drops Into the left ear 2 times daily for 7 days 7 mL 0    Omega-3 Fatty Acids (FISH OIL) 1200 MG CAPS Take 1,200 mg by mouth 2 times daily       VITAMIN E COMPLEX PO Take 400 Units by mouth daily      furosemide (LASIX) 20 MG tablet Take 1 tablet (20 mg) by mouth daily for 90 days (Patient not taking: Reported on 1/6/2023) 90 tablet 1            Allergies:     Allergies: Amoxicillin, Codeine, Hydrocodone, and Morphine          Past Medical History:     Past Medical History:   Diagnosis Date    Basal cell carcinoma     Cardiomegaly 01/29/2007    Compression fracture of thoracic vertebra (H) 10/30/2014    Compression fx, lumbar spine, with routine healing, subsequent encounter 6/22/2016    Hyperlipidemia     Osteoporosis     Typical atrial flutter (H) 7/25/2016    Varicose vein of leg 6/22/2016            Past Surgical History:     Past Surgical History:   Procedure  "Laterality Date    APPENDECTOMY      COLONOSCOPY N/A 2017    Procedure: COLONOSCOPY;  COLONOSCOPY WITH POLYPECTOMY AND SCLEROTHERAPY;  Surgeon: Matt Gonzalez MD;  Location: [facility]    ENDOSCOPIC STRIPPING VEIN(S)      HYSTERECTOMY      SALPINGO OOPHORECTOMY,R/L/BRIAN         ENT family history reviewed         Social History:     Social History     Tobacco Use    Smoking status: Former     Packs/day: 0.50     Years: 3.00     Pack years: 1.50     Types: Cigarettes     Start date: 1950     Quit date: 3/26/1953     Years since quittin.4    Smokeless tobacco: Never    Tobacco comments:     Quit in ; no passive smoke exposure   Vaping Use    Vaping Use: Never used   Substance Use Topics    Alcohol use: No    Drug use: No            Review of Systems:     ROS: See HPI         Physical Exam:     /68 (BP Location: Right arm, Patient Position: Sitting, Cuff Size: Adult Regular)   Pulse 75   Temp 98  F (36.7  C) (Tympanic)   Ht 1.6 m (5' 3\")   Wt 54.4 kg (120 lb)   SpO2 98%   BMI 21.26 kg/m      General - The patient is well nourished and well developed, and appears to have good nutritional status.  Alert and oriented to person and place, answers questions and cooperates with examination appropriately.   Head and Face - Normocephalic and atraumatic, with no gross asymmetry noted.  The facial nerve is intact, with strong symmetric movements.  Voice and Breathing - The patient was breathing comfortably without the use of accessory muscles. There was no wheezing, stridor. The patients voice was clear and strong, and had appropriate pitch and quality.  Ears - The ears were examined with binocular microscopy and with otoscope.  External ears normal. Right canal with ceruminosis.  Left canal cerumen impacted.  The right ear was cleaned with gentle suctioning and cupped forceps, cerumen loop.   Right tympanic membrane is intact without effusion, retraction or mass. The left ear was cleaned in the " same manner.  There is scant cerumen debris remaining on the left TM and distal canal.  Left tympanic membrane is intact without effusion, retraction or mass.  Eyes - Extraocular movements intact, sclera were not icteric or injected, conjunctiva were pink and moist.  Mouth - Examination of the oral cavity showed pink, healthy oral mucosa. Dentition in good condition. No lesions or ulcerations noted. The tongue was mobile and midline.   Throat - The walls of the oropharynx were smooth, pink, moist, symmetric, and had no lesions or ulcerations.  The tonsillar pillars and soft palate were symmetric. The uvula was midline on elevation.    Neck - Full range of motion on passive movement.  Palpation of the occipital, submental, submandibular, internal jugular chain, and supraclavicular nodes did not demonstrate any abnormal lymph nodes or masses.  Palpation of the thyroid was soft and smooth, with no nodules or goiter appreciated.  The trachea was mobile and midline.  Nose - External contour is symmetric, no gross deflection or scars.  Nasal mucosa is pink and moist with no abnormal mucus.  The septum and turbinates were evaluated with nasal speculum, no polyps, masses, or purulence noted on examination.         Assessment and Plan:       ICD-10-CM    1. Cerumen debris on tympanic membrane of left ear  H61.22 ofloxacin (FLOXIN) 0.3 % otic solution      2. Impacted cerumen, left ear  H61.22       3. Ceruminosis, right  H61.21           Complete ofloxacin drops - 10 drops to the left ear twice daily x 7 days  Follow up as needed for ear cleaning    Ashley KEITA  [facility]

## 2023-10-25 ENCOUNTER — OFFICE VISIT (OUTPATIENT)
Dept: SURGERY | Facility: OTHER | Age: 88
End: 2023-10-25
Attending: SURGERY
Payer: MEDICARE

## 2023-10-25 VITALS
HEART RATE: 86 BPM | SYSTOLIC BLOOD PRESSURE: 146 MMHG | BODY MASS INDEX: 20.49 KG/M2 | HEIGHT: 64 IN | OXYGEN SATURATION: 93 % | DIASTOLIC BLOOD PRESSURE: 90 MMHG | RESPIRATION RATE: 16 BRPM | TEMPERATURE: 98.1 F | WEIGHT: 120 LBS

## 2023-10-25 DIAGNOSIS — D03.4 MELANOMA IN SITU OF SCALP (H): Primary | ICD-10-CM

## 2023-10-25 PROCEDURE — G0463 HOSPITAL OUTPT CLINIC VISIT: HCPCS

## 2023-10-25 PROCEDURE — 99203 OFFICE O/P NEW LOW 30 MIN: CPT | Performed by: SURGERY

## 2023-10-25 ASSESSMENT — PAIN SCALES - GENERAL: PAINLEVEL: NO PAIN (0)

## 2023-10-25 NOTE — PROGRESS NOTES
[facility] Surgery Consultation    CC:  Biopsy results     HPI:  This 88 year old year old female with history of CHF, a-fib not on anticoagulation, and basal cell carcinoma is seen at the request of Hermilo Unger [facility]\"" dermatology for evaluation of recent shave biopsy obtained on skin check. When asked if she has had skin cancer in the past she says no but she does have basal cell carcinoma on her history. She appears to be in good health does follow with cardiology for heart failure. She is here today with . She notes the lesion on forehead had been present for many years prior to biopsy. Was not ulcerated.     Past Medical History:   Diagnosis Date    Basal cell carcinoma     Cardiomegaly 01/29/2007    Compression fracture of thoracic vertebra (H) 10/30/2014    Compression fx, lumbar spine, with routine healing, subsequent encounter 6/22/2016    Hyperlipidemia     Osteoporosis     Typical atrial flutter (H) 7/25/2016    Varicose vein of leg 6/22/2016       Past Surgical History:   Procedure Laterality Date    APPENDECTOMY      COLONOSCOPY N/A 12/4/2017    Procedure: COLONOSCOPY;  COLONOSCOPY WITH POLYPECTOMY AND SCLEROTHERAPY;  Surgeon: Matt Gonzalez MD;  Location: [facility]    ENDOSCOPIC STRIPPING VEIN(S)      HYSTERECTOMY      SALPINGO OOPHORECTOMY,R/L/BRIAN         Allergies   Allergen Reactions    Amoxicillin      Intolerant      Codeine     Hydrocodone Nausea and Vomiting    Morphine      Sensitive to Codeine.       Current Outpatient Medications   Medication    Ascorbic Acid (VITAMIN C PO)    B Complex-Biotin-FA (VITAMIN B50 COMPLEX PO)    Flaxseed, Linseed, (FLAX SEED OIL) 1000 MG capsule    MAGNESIUM OXIDE PO    NONFORMULARY    NONFORMULARY    Omega-3 Fatty Acids (FISH OIL) 1200 MG CAPS    VITAMIN E COMPLEX PO    furosemide (LASIX) 20 MG tablet     No current facility-administered medications for this visit.       HABITS:    Social History     Tobacco Use    Smoking status: Former      "Packs/day: 0.50     Years: 3.00     Additional pack years: 0.00     Total pack years: 1.50     Types: Cigarettes     Start date: 1950     Quit date: 3/26/1953     Years since quittin.6    Smokeless tobacco: Never    Tobacco comments:     Quit in ; no passive smoke exposure   Vaping Use    Vaping Use: Never used   Substance Use Topics    Alcohol use: No    Drug use: No       Family History   Problem Relation Age of Onset    Other - See Comments Mother 85        Old age, cause of death    Cancer Father 84        Rectal cancer, cause of death/Stomach cancer, cause of death    C.A.D. Maternal Aunt     C.A.D. Maternal Uncle     Cancer Son         thymic carcinoma       REVIEW OF SYSTEMS:  Ten point review of systems negative except those mentioned in the HPI.     OBJECTIVE:    BP (!) 146/90 (BP Location: Right arm, Patient Position: Sitting, Cuff Size: Adult Regular)   Pulse 86   Temp 98.1  F (36.7  C) (Tympanic)   Resp 16   Ht 1.626 m (5' 4\")   Wt 54.4 kg (120 lb)   SpO2 93%   BMI 20.60 kg/m      GENERAL: Generally appears well, in no distress with appropriate affect.  HEENT:   Sclerae anicteric - normocephalic atraumatic  :  deferred  Extremities:  Extremities normal. No deformities, edema, or skin discoloration.  Skin: there is a 0.5 cm round scar on the right frontal scalp.   Neurological: grossly intact  Psych:  Alert, oriented, affect appropriate with normal decision making ability.    IMPRESSION:    Discussion held about diagnosis of melanoma in situ, did discuss cannot confirm margin is clear with pathology report even if there is no obvious residual lesion on exam. Discussed different options including excisional biopsy with 0.5cm margin in operating room, discussed going to [facility] for Mohs procedure as this would limit her scar. Also discussed observation but again with this could not confirm it has been completely eliminated. She would like to discuss this with her son who is in [place] " as she does not drive transportation is an issue. Did give a copy of pathology as well as card after she make decision.     PLAN:    See above     Delta Cartagena MD,     10/25/2023  10:45 AM

## 2023-12-01 ENCOUNTER — HOSPITAL ENCOUNTER (OUTPATIENT)
Dept: CARDIOLOGY | Facility: HOSPITAL | Age: 88
Discharge: HOME | End: 2023-12-01
Attending: NURSE PRACTITIONER | Admitting: INTERNAL MEDICINE
Payer: MEDICARE

## 2023-12-01 DIAGNOSIS — I27.20 PULMONARY HYPERTENSION (H): ICD-10-CM

## 2023-12-01 DIAGNOSIS — I34.1 MITRAL VALVE PROLAPSE: ICD-10-CM

## 2023-12-01 DIAGNOSIS — I50.20 HFREF (HEART FAILURE WITH REDUCED EJECTION FRACTION) (H): ICD-10-CM

## 2023-12-01 DIAGNOSIS — I51.7 BIATRIAL ENLARGEMENT: ICD-10-CM

## 2023-12-01 LAB — LVEF ECHO: NORMAL

## 2023-12-01 PROCEDURE — 93306 TTE W/DOPPLER COMPLETE: CPT | Mod: 26 | Performed by: INTERNAL MEDICINE

## 2023-12-01 PROCEDURE — 93306 TTE W/DOPPLER COMPLETE: CPT

## 2023-12-09 ENCOUNTER — APPOINTMENT (OUTPATIENT)
Dept: CT IMAGING | Facility: HOSPITAL | Age: 88
End: 2023-12-09
Attending: EMERGENCY MEDICINE
Payer: MEDICARE

## 2023-12-09 ENCOUNTER — HOSPITAL ENCOUNTER (EMERGENCY)
Facility: HOSPITAL | Age: 88
Discharge: HOME | End: 2023-12-09
Attending: EMERGENCY MEDICINE | Admitting: EMERGENCY MEDICINE
Payer: MEDICARE

## 2023-12-09 VITALS
HEIGHT: 64 IN | SYSTOLIC BLOOD PRESSURE: 149 MMHG | DIASTOLIC BLOOD PRESSURE: 94 MMHG | RESPIRATION RATE: 22 BRPM | BODY MASS INDEX: 20.6 KG/M2 | HEART RATE: 85 BPM | OXYGEN SATURATION: 93 % | TEMPERATURE: 97.6 F

## 2023-12-09 DIAGNOSIS — S20.212A CHEST WALL CONTUSION, LEFT, INITIAL ENCOUNTER: ICD-10-CM

## 2023-12-09 DIAGNOSIS — S29.9XXA INJURY OF UPPER BACK, INITIAL ENCOUNTER: ICD-10-CM

## 2023-12-09 LAB
HOLD SPECIMEN: NORMAL

## 2023-12-09 PROCEDURE — G1010 CDSM STANSON: HCPCS

## 2023-12-09 PROCEDURE — 99284 EMERGENCY DEPT VISIT MOD MDM: CPT | Mod: 25

## 2023-12-09 PROCEDURE — 250N000013 HC RX MED GY IP 250 OP 250 PS 637: Performed by: EMERGENCY MEDICINE

## 2023-12-09 PROCEDURE — 99284 EMERGENCY DEPT VISIT MOD MDM: CPT | Performed by: EMERGENCY MEDICINE

## 2023-12-09 PROCEDURE — 71250 CT THORAX DX C-: CPT | Mod: MG

## 2023-12-09 RX ORDER — ACETAMINOPHEN 325 MG/1
650 TABLET ORAL ONCE
Status: COMPLETED | OUTPATIENT
Start: 2023-12-09 | End: 2023-12-09

## 2023-12-09 RX ADMIN — ACETAMINOPHEN 650 MG: 325 TABLET, FILM COATED ORAL at 09:28

## 2023-12-09 ASSESSMENT — ACTIVITIES OF DAILY LIVING (ADL): ADLS_ACUITY_SCORE: 35

## 2023-12-09 NOTE — ED TRIAGE NOTES
Patient presents ambulatory to the ED for evaluation after a fall. Patient verbalized she slipped on ice, landed on her butt and left hand. Denies hitting head or LOC.     Reports chest and left shoulder pain which radiates to her back. Also intermittently reports neck pain.     Edema noted on medial side of left clavicle. ROM intact to left side.       Triage Assessment (Adult)       Row Name 12/09/23 0853          Triage Assessment    Airway WDL WDL     Additional Documentation Breath Sounds (Group)        Breath Sounds    Breath Sounds All Fields     All Lung Fields Breath Sounds Anterior:;Lateral:;clear;equal bilaterally        Cardiac WDL    Cardiac WDL X;rhythm     Pulse Rate & Regularity apical pulse irregular        Cognitive/Neuro/Behavioral WDL    Cognitive/Neuro/Behavioral WDL WDL

## 2023-12-09 NOTE — ED PROVIDER NOTES
EMERGENCY DEPARTMENT ENCOUNTER      NAME: Bianka Whitmore  AGE: 88 year old female  YOB: 1935  MRN: [id number]  EVALUATION DATE & TIME: 2023  8:53 AM    PCP: Yael Mckeon    ED PROVIDER: Yobany Sandoval M.D.      Chief Complaint   Patient presents with    Fall         FINAL IMPRESSION:  1. Chest wall contusion, left, initial encounter    2. Injury of upper back, initial encounter          ED COURSE & MEDICAL DECISION MAKIN year old female presents to the Emergency Department for evaluation of fall.  Patient sustained a mechanical slip and fall from standing this morning.  She fell onto her bottom and outstretched left arm.  She complains of pain primarily in the sternum, left upper chest, mid thoracic back, and left shoulder.  She has a palpable swelling to the left sternoclavicular joint region.  Proceeded with CT imaging of the chest and thoracic spine.  This revealed chronic multilevel compression deformities of the thoracic spine however no severe change from previous.  Patient does not seem to have any focal area of midline tenderness and I do not have any suspicion for a significant unstable occult injury requiring further advanced imaging such as MRI.  Her chest CT was actually negative for any displaced rib fracture, clavicle fracture, pneumothorax or other serious chest trauma.  Patient was resting stably on recheck, declined anything but Tylenol here for pain.  We discussed the reassuring results of her imaging with the patient and her family.  They were comfortable with continuing to help her recover at home.  Clinic follow-up was advised to review any ongoing concerns within the next week or two after this ED visit.  Patient was discharged in stable condition.    At the conclusion of the encounter I discussed the results of all of the tests and the disposition. The questions were answered. The patient or family acknowledged understanding and was agreeable with the care  plan.           MEDICATIONS GIVEN IN THE EMERGENCY:  Medications   acetaminophen (TYLENOL) tablet 650 mg (650 mg Oral $Given 12/9/23 8035)       NEW PRESCRIPTIONS STARTED AT TODAY'S ER VISIT  New Prescriptions    No medications on file          =================================================================    HPI    Patient information was obtained from: Patient      Bianka Whitmore is a 88 year old female who presents to this ED today for evaluation of fall.  Patient slipped on ice and fell, landing on her bottom, and her outstretched left arm.  She reports pain primarily focused into her chest, both on her sternum and left upper chest as well as radiating to the left shoulder.  She also reports some pain in the middle of her upper back.  She relays a history of previous thoracic spine fractures.  She has not taken anything for pain.  She denies hitting her head or losing consciousness.  Denies any neck pain.  Denies any low back pain or pelvic pain.  Denies any other extremity injuries.      REVIEW OF SYSTEMS   All systems reviewed and negative except as noted in HPI.    PAST MEDICAL HISTORY:  Past Medical History:   Diagnosis Date    Basal cell carcinoma     Cardiomegaly 01/29/2007    Compression fracture of thoracic vertebra (H) 10/30/2014    Compression fx, lumbar spine, with routine healing, subsequent encounter 6/22/2016    Hyperlipidemia     Osteoporosis     Typical atrial flutter (H) 7/25/2016    Varicose vein of leg 6/22/2016       PAST SURGICAL HISTORY:  Past Surgical History:   Procedure Laterality Date    APPENDECTOMY      COLONOSCOPY N/A 12/4/2017    Procedure: COLONOSCOPY;  COLONOSCOPY WITH POLYPECTOMY AND SCLEROTHERAPY;  Surgeon: Matt Gonzalez MD;  Location: [facility]    ENDOSCOPIC STRIPPING VEIN(S)      HYSTERECTOMY      SALPINGO OOPHORECTOMY,R/L/BRIAN             CURRENT MEDICATIONS:    No current facility-administered medications for this encounter.     Current Outpatient Medications  "  Medication    Ascorbic Acid (VITAMIN C PO)    B Complex-Biotin-FA (VITAMIN B50 COMPLEX PO)    Flaxseed, Linseed, (FLAX SEED OIL) 1000 MG capsule    MAGNESIUM OXIDE PO    NONFORMULARY    NONFORMULARY    Omega-3 Fatty Acids (FISH OIL) 1200 MG CAPS    VITAMIN E COMPLEX PO         ALLERGIES:  Allergies   Allergen Reactions    Amoxicillin      Intolerant      Codeine     Hydrocodone Nausea and Vomiting    Morphine      Sensitive to Codeine.       FAMILY HISTORY:  Family History   Problem Relation Age of Onset    Other - See Comments Mother 85        Old age, cause of death    Cancer Father 84        Rectal cancer, cause of death/Stomach cancer, cause of death    C.A.D. Maternal Aunt     C.A.D. Maternal Uncle     Cancer Son         thymic carcinoma       SOCIAL HISTORY:   Social History     Socioeconomic History    Marital status:    Occupational History    Occupation: Housewife     Comment: Retired   Tobacco Use    Smoking status: Former     Packs/day: 0.50     Years: 3.00     Additional pack years: 0.00     Total pack years: 1.50     Types: Cigarettes     Start date: 1950     Quit date: 3/26/1953     Years since quittin.7    Smokeless tobacco: Never    Tobacco comments:     Quit in ; no passive smoke exposure   Vaping Use    Vaping Use: Never used   Substance and Sexual Activity    Alcohol use: No    Drug use: No    Sexual activity: Not Currently   Other Topics Concern    Blood Transfusions Yes    Caffeine Concern No    Parent/sibling w/ CABG, MI or angioplasty before 65F 55M? No       VITALS:  /78   Pulse 79   Temp 97.6  F (36.4  C) (Oral)   Resp 22   Ht 1.626 m (5' 4\")   SpO2 95%   BMI 20.60 kg/m      PHYSICAL EXAM    Constitutional: Elderly female patient, sitting up in bed, no acute distress  HENT: Head and neck are without any visible external trauma.  Ranging neck with no discomfort.  No midline cervical spine tenderness.  Eyes: EOMI, Conjunctiva normal.  Respiratory: Breathing " comfortably on room air. Speaks full sentences easily. Lungs clear to ascultation.  Cardiovascular: Normal heart rate, Regular rhythm. No peripheral edema.  Abdomen: Soft, nontender  Musculoskeletal: There is some tenderness to the left upper chest wall and upper sternum.  There is soft tissue swelling and tenderness at the left sternoclavicular joint region without any other visible clavicle deformity.  Range of motion in all major joints including affected left shoulder are normal.  Midline thoracic and lumbar spine are without any palpable deformity or tenderness.  Back is otherwise without any visible external trauma.  Integument: Warm, Dry.  Neurologic: Alert & awake, Normal motor function, Normal sensory function, No focal deficits noted.   Psychiatric: Cooperative. Affect appropriate.     LAB:  All pertinent labs reviewed and interpreted.  Labs Ordered and Resulted from Time of ED Arrival to Time of ED Departure - No data to display    RADIOLOGY:  Reviewed all pertinent imaging. Please see official radiology report.  CT Thoracic Spine w/o Contrast   Final Result   IMPRESSION:   Chronic appearing multilevel compression deformities, generally   similar to comparison studies. No definite acute abnormality.      BASIM GONZALEZ MD            SYSTEM ID:  [id number]      Chest CT w/o contrast   Final Result   IMPRESSION:   1.  No acute visceral abnormality in the chest.   2.  Marked biatrial enlargement, can be seen in the setting of atrial   fibrillation.      BASIM GONZALEZ MD            SYSTEM ID:  [id number]            Yobany Sandoval M.D.  Emergency Medicine  [facility]  [address] [place] [id number] [phone]  Dept: [phone]       Yobany Sandoval MD  12/09/23 2024

## 2023-12-09 NOTE — DISCHARGE INSTRUCTIONS
You were seen in the emergency department for chest wall pain, shoulder pain, and upper back pain after a fall.  Your evaluation included CT imaging of your chest and upper back.  We can see multiple old compression injuries but it did not look like there were any new serious injuries today like rib fractures, clavicle fracture, or new severe compression deformities.  We think it should be okay for you to monitor and manage things at home at this point.  We would recommend using Tylenol 650 mg every 6 hours for pain.  You could also try other over-the-counter agents like lidocaine patches if you find this helpful.  We are hopeful that you will be feeling better over the next several days.  Clinic follow-up would be advised for any lingering symptoms.

## 2023-12-09 NOTE — ED NOTES
"Discharged home. Reports pain decreased since tylenol administration    Vital signs:  Temp: 97.6  F (36.4  C) Temp src: Oral BP: 149/94 Pulse: 85   Resp: 22 SpO2: 93 % O2 Device: None (Room air)   Height: 162.6 cm (5' 4\")    Estimated body mass index is 20.6 kg/m  as calculated from the following:    Height as of this encounter: 1.626 m (5' 4\").    Weight as of 10/25/23: 54.4 kg (120 lb).        "

## 2023-12-11 ENCOUNTER — TELEPHONE (OUTPATIENT)
Dept: FAMILY MEDICINE | Facility: OTHER | Age: 88
End: 2023-12-11

## 2023-12-11 NOTE — TELEPHONE ENCOUNTER
ER F/U  23  hest wall contusion, left, initial encounter    Injury of upper back, initial encounter      ED COURSE & MEDICAL DECISION MAKIN year old female presents to the Emergency Department for evaluation of fall.  Patient sustained a mechanical slip and fall from standing this morning.  She fell onto her bottom and outstretched left arm.  She complains of pain primarily in the sternum, left upper chest, mid thoracic back, and left shoulder.  She has a palpable swelling to the left sternoclavicular joint region.  Proceeded with CT imaging of the chest and thoracic spine.  This revealed chronic multilevel compression deformities of the thoracic spine however no severe change from previous.  Patient does not seem to have any focal area of midline tenderness and I do not have any suspicion for a significant unstable occult injury requiring further advanced imaging such as MRI.  Her chest CT was actually negative for any displaced rib fracture, clavicle fracture, pneumothorax or other serious chest trauma.  Patient was resting stably on recheck, declined anything but Tylenol here for pain.  We discussed the reassuring results of her imaging with the patient and her family.  They were comfortable with continuing to help her recover at home.  Clinic follow-up was advised to review any ongoing concerns within the next week or two after this ED visit.  Patient was discharged in stable condition.     At the conclusion of the encounter I discussed the results of all of the tests and the disposition. The questions were answered. The patient or family acknowledged understanding and was agreeable with the care plan.

## 2023-12-13 ENCOUNTER — OFFICE VISIT (OUTPATIENT)
Dept: FAMILY MEDICINE | Facility: OTHER | Age: 88
End: 2023-12-13
Attending: PHYSICIAN ASSISTANT
Payer: MEDICARE

## 2023-12-13 VITALS
DIASTOLIC BLOOD PRESSURE: 78 MMHG | HEART RATE: 81 BPM | BODY MASS INDEX: 21.97 KG/M2 | SYSTOLIC BLOOD PRESSURE: 110 MMHG | OXYGEN SATURATION: 94 % | TEMPERATURE: 98.5 F | WEIGHT: 128 LBS | RESPIRATION RATE: 16 BRPM

## 2023-12-13 DIAGNOSIS — I51.7 ATRIAL ENLARGEMENT, BILATERAL: Primary | ICD-10-CM

## 2023-12-13 DIAGNOSIS — I48.91 ATRIAL FIBRILLATION BY ELECTROCARDIOGRAM (H): ICD-10-CM

## 2023-12-13 DIAGNOSIS — Z09 HOSPITAL DISCHARGE FOLLOW-UP: ICD-10-CM

## 2023-12-13 PROCEDURE — G0463 HOSPITAL OUTPT CLINIC VISIT: HCPCS

## 2023-12-13 PROCEDURE — 99496 TRANSJ CARE MGMT HIGH F2F 7D: CPT | Performed by: PHYSICIAN ASSISTANT

## 2023-12-13 PROCEDURE — 99214 OFFICE O/P EST MOD 30 MIN: CPT | Performed by: PHYSICIAN ASSISTANT

## 2023-12-13 ASSESSMENT — PAIN SCALES - GENERAL: PAINLEVEL: SEVERE PAIN (6)

## 2023-12-13 NOTE — PROGRESS NOTES
Assessment & Plan     Atrial enlargement, bilateral  EF ok at 46%. Seeing cardiology yearly. Rate is controlled in A fib. On large amount of Garlic so essentially on a blood thinner.  No weight changes and has severe coronary calcium.   - Adult Cardiology Eval [facility] Referral    Atrial fibrillation by electrocardiogram (H)  Sees cardiology every year.  No chest pain. Had echo last year.   - Adult Cardiology [facility] Referral    Hospital discharge follow-up  Slipped on ice and has chest wall contusion. Moving her arm on left gingerly, and has been doing good breathing exercises.  Very painful but refused anything for pain.  Doesn't like modern medication unless life or death. Has A fib and refused blood thinner and other medications.  In chronic failure, but tolerates well.     Review of external notes as documented elsewhere in note  Ordering of each unique test  Prescription drug management  10  minutes spent by me on the date of the encounter doing chart review, history and exam, documentation and further activities per the note     MED REC REQUIRED  Post Medication Reconciliation Status:     See Patient Instructions    No follow-ups on file.    ORTEGA Caruso  [facility] - MARCELA Carlton is a 88 year old, presenting for the following health issues:  ER F/U        12/13/2023     9:35 AM   Additional Questions   Roomed by Kian Izquierdo LPN   Accompanied by self         12/13/2023     9:35 AM   Patient Reported Additional Medications   Patient reports taking the following new medications none       HPI     ED/UC Followup:    Facility:  [facility] [place]  Date of visit: 12/9/23  Reason for visit: Fall  Current Status: bruising on chest starting at the neck and going to the left breast and pain that wraps from the chest and around to the mid back. Breathing causes some pain as well.  Vascular Disease Follow-up    How often do you take nitroglycerin? Never  Do you take an aspirin  every day? No  How many servings of fruits and vegetables do you eat daily?  0-1  On average, how many sweetened beverages do you drink each day (Examples: soda, juice, sweet tea, etc.  Do NOT count diet or artificially sweetened beverages)?   0  How many days per week do you exercise enough to make your heart beat faster? 4  How many minutes a day do you exercise enough to make your heart beat faster? 20 - 29  How many days per week do you miss taking your medication? 0      Review of Systems   Constitutional, HEENT, cardiovascular, pulmonary, gi and gu systems are negative, except as otherwise noted.      Objective    /78 (BP Location: Left arm, Patient Position: Sitting, Cuff Size: Adult Regular)   Pulse 81   Temp 98.5  F (36.9  C) (Tympanic)   Resp 16   Wt 58.1 kg (128 lb)   SpO2 94%   BMI 21.97 kg/m    Body mass index is 21.97 kg/m .  Physical Exam   GENERAL: healthy, alert and no distress  EYES: Eyes grossly normal to inspection, PERRL and conjunctivae and sclerae normal  HENT: ear canals and TM's normal, nose and mouth without ulcers or lesions  NECK: no adenopathy, no asymmetry, masses, or scars, and has bruise on clavicle.  Swelling. Painful to touch. Moves neck well.   RESP: lungs clear to auscultation - no rales, rhonchi or wheezes  CV: irregular rate and rhythm.  I hear no murmur today. No JVD   ABDOMEN: soft, nontender, no hepatosplenomegaly, no masses and bowel sounds normal  MS: bruising on chest wall and goes into the chest region on left breast.  Arm is sore on movement. Should has good movement except behind her.  No assistance with arm movement from other arm.    SKIN: large bruise yellow and green with purple. Goes under the arm and also down her breast.  Painful to touch.   PSYCH: mentation appears normal, affect normal/bright    Admission on 12/09/2023, Discharged on 12/09/2023   Component Date Value Ref Range Status    Hold Specimen 12/09/2023 [facility]   Final    Hold Specimen  12/09/2023 [facility]   Final    Hold Specimen 12/09/2023 [facility]   Final

## 2024-01-05 ENCOUNTER — OFFICE VISIT (OUTPATIENT)
Dept: CARDIOLOGY | Facility: OTHER | Age: 89
End: 2024-01-05
Attending: NURSE PRACTITIONER
Payer: MEDICARE

## 2024-01-05 VITALS
HEIGHT: 63 IN | WEIGHT: 128.1 LBS | DIASTOLIC BLOOD PRESSURE: 80 MMHG | HEART RATE: 83 BPM | SYSTOLIC BLOOD PRESSURE: 128 MMHG | OXYGEN SATURATION: 96 % | BODY MASS INDEX: 22.7 KG/M2

## 2024-01-05 DIAGNOSIS — I51.7 ATRIAL ENLARGEMENT, BILATERAL: Primary | ICD-10-CM

## 2024-01-05 DIAGNOSIS — I48.91 ATRIAL FIBRILLATION BY ELECTROCARDIOGRAM (H): ICD-10-CM

## 2024-01-05 DIAGNOSIS — I35.0 NONRHEUMATIC AORTIC VALVE STENOSIS: ICD-10-CM

## 2024-01-05 DIAGNOSIS — I51.7 BIATRIAL ENLARGEMENT: ICD-10-CM

## 2024-01-05 DIAGNOSIS — I50.20 HFREF (HEART FAILURE WITH REDUCED EJECTION FRACTION) (H): ICD-10-CM

## 2024-01-05 DIAGNOSIS — I27.20 PULMONARY HYPERTENSION (H): ICD-10-CM

## 2024-01-05 PROCEDURE — G0463 HOSPITAL OUTPT CLINIC VISIT: HCPCS

## 2024-01-05 PROCEDURE — 93005 ELECTROCARDIOGRAM TRACING: CPT | Performed by: NURSE PRACTITIONER

## 2024-01-05 PROCEDURE — 93010 ELECTROCARDIOGRAM REPORT: CPT | Performed by: INTERNAL MEDICINE

## 2024-01-05 PROCEDURE — 99214 OFFICE O/P EST MOD 30 MIN: CPT | Performed by: NURSE PRACTITIONER

## 2024-01-05 ASSESSMENT — PAIN SCALES - GENERAL: PAINLEVEL: NO PAIN (0)

## 2024-01-05 NOTE — PROGRESS NOTES
[facility]   CARDIOLOGY PROGRESS NOTE     Chief Complaint   Patient presents with    New Patient     A-fib          Diagnosis:    ICD-10-CM    1. Atrial enlargement, bilateral  I51.7 EKG 12-lead complete w/read - (Clinic Performed)     Echocardiogram Complete      2. Atrial fibrillation by electrocardiogram (H)  I48.91 Echocardiogram Complete      3. Biatrial enlargement  I51.7 Echocardiogram Complete      4. HFrEF (heart failure with reduced ejection fraction) (H)  I50.20 Echocardiogram Complete      5. Pulmonary hypertension (H)  I27.20 Echocardiogram Complete      6. Nonrheumatic aortic valve stenosis  I35.0 Echocardiogram Complete            Assessment/Plan:    Longstanding persistent atrial fibrillation  She does endorse today that she gets fluttering sensation in her chest while in bed at night and this has been ongoing for years, not overly bothersome. No lightheaded or dizziness. No dyspnea, dyspnea on exertion.   Diagnosed with this years ago   Today's EKG shows atrial fibrillation with controlled ventricular rate of 80 bpm      CHADs-VASC >=2  She is not on chronic oral anticoagulation for stroke prophylaxis with history of atrial fibrillation. Reviewed again today.  She is aware of risks of not being on chronic oral anticoagulation therapy including stroke which could be debilitating or fatal.       Heart Failure with mid-range ejection fraction (LVEF 45% on TTE 11/2023)  NYHA Symptom Class II- LE edema, bibasilar rales. She denies dyspnea, dyspnea on exertion, chest discomfort, orthopnea, PND.   Stage C    Discussed options for medical management.   Since she has no symptoms and overall feels well- she is not wanting to pursue medications    ACE-I/ARB/ARNi:   Declines   BB:   declines  SGLT-2 Inhibitor:   declines  Aldosterone antagonist:   declines  SCD prophylaxis:  Does not meet criteria for implant with LVEF of 45%  Fluid status Hypervolemic:  Bibasilar rales, +1-+2 bilateral LE edema  She  did agree to starting furosemide at prior visit. She picked up prescription but decided not to start due to concerns of side effects she read. She then decided to try one dose and endorses that she could not void for several hours so she is no longer going to take furosemide.  We reviewed TTE results from 12/2023 as well as exam findings including increased LE edema from prior visit and bibasilar rales. Again, she declines any medical intervention.   Cardiac Rehab:  Not indicated (EF>35%)  Sleep Apnea Evaluation:   no symptoms warranting need for evaluation      Biatrial enlargement  Severe  Would make rhythm control more difficult to obtain with longstanding atrial fibrillation    Mitral valve prolapse  Asymptomatic  Mild mitral insufficiency on TTE 12/2023- stable compared to TTE 2022  TTE in 1 year for routine surveillance    Aortic valve stenosis  Asymptomatic  Moderate AV calcification.  Bicuspid aortic valve could not be excluded.  Moderate aortic stenosis.  TTE in 1 year for routine surveillance, certainly sooner with symptoms.  Reviewed symptoms of severe aortic stenosis today.  We also discussed aortic valve replacement if disease progresses to severe.  This is something that she would consider although she has been against use of any pharmacologic's since I have been seeing her.     Ascending aortic aneurysm  3.5 cm which is mildly dilated when compared to body surface area.  Plan for TTE 1 year for routine surveillance    Hyperlipidemia  Takes flax seed oil, fish oil for management  Not interested in statin medication  Recent Labs   Lab Test 10/24/22  0727 08/03/16  0736   CHOL 220* 191   HDL 71 74   * 104*   TRIG 64 63     Follow-up with cardiology in 1 year after transthoracic echocardiogram, certainly sooner with acute concerns.      Interval history:  Bianka Whitmore is a pleasant 87-year old female who presents for cardiology follow-up accompanied by her son. Recall, she has a cardiac history  including atrial fibrillation, newly identified HFrEF, mitral valve prolapse with mild mitral insufficiency, mild pulmonary hypertension.       Today, Ms. Whitmore endorses that she has recovered from injuries due to fall about a month ago.   She denies any chest discomfort today. She was having pain in her ribs and collar bone after fall but this has resolved.   She was previously seen in consult for abnormal TTE. We have reviewed these results as well as recommendations previously and again today.  She denies any dyspnea, dyspnea on exertion. No orthopnea. After fall 12/2023 she did have some increased LE edema but this has improved.   She does admit to sensation of racing in her chest mostly at night when she is laying in bed. Not overly bothersome and has been ongoing for years.   No episodes of lightheadedness, dizziness, near-syncope.   She continues to live independently at home with her .  They do daily exercises in their home.  She also does her own shopping, cooking, housework.  They do have 2 sons who help get them to appointments, grocery store, etc.      HPI:    Bianka Whitmore is a pleasant 78-year old female who presents for cardiology consult. She has cardiac history including atrial fibrillation, newly identified HFrEF, mitral valve prolapse.. She has a non-cardiac history including osteoporosis with history of compression fracture thoracic/lumbar spine, diverticulitis. She was referred to cardiology by her primary care provider for recent transthoracic echocardiogram showing decreased LVEF of 45-50%, severe bi-atrial enlargement, mitral valve prolapse with mild mitral insufficiency, mild pulmonary hypertension with RVSP 40mmHg above the right atrial pressure.    No chest pain or pressure. No dyspnea, dyspnea on exertion. No LE edema.     She tells me that she spends the majority of her day on her feet being active doing housework, cooking, cleaning, doing laundry. She is able to carry her  laundry basket up and down her basement stairs- she estimates 10-12 stairs- and she tolerates this without chest pain, dyspnea. She denies any racing/skipping/fluttering sensations in the chest. No lightheaded, dizziness. No exertional lightheadedness, dizziness. No near-syncope or syncopal episodes.     Mrs. Whitmore tells me that she overall feels well. She is able to do the activities she wishes to do in the day and does not have any bothersome or limiting symptoms.  She did see her young grand-daughter over Christmas who had upper respiratory illness. A few days after [place] she started with sore throat, rhinorrhea, congestion and cough. She denies dyspnea, wheezing, fever, chills, body aches.     Smoking History: Started smoking in late teens, early twenties. Smoked maybe a couple cigarettes per day until her mid-twenties.     Alcohol History: None    Substance Abuse History: None    Sleep History: Sleeps in bed with 1 pillow. No orthopnea, no PND. No snoring. No witnessed apnea. Wakes up feeling well rested 6-7 mornings out of the week. No long daytime naps- sometimes falls asleep in chair for about 10 minutes after lunch.     Family History: No known significant cardiac issues. She has five siblings (she is the second oldest) and none with any cardiac issues. Parents did not have cardiac issues.        RELEVANT TESTING:    Transthoracic Echocardiogram 12/2023  Interpretation Summary  Left ventricular function is decreased. The ejection fraction is 45-50%  (mildly reduced).  Global right ventricular function is normal.  Severe biatrial enlargement is present.     Cannot exclude a bicuspid aortic valve.  Moderate aortic stenosis is present.Moderate aortic valve calcification is  present. Low flow low gradient aortic valve stenosis (SVI is 24), MG is not  well assessed in this study. JOSE LUIS is 1.3 cm2.  Mild to moderate mitral insufficiency is present.  Mild to moderate tricuspid insufficiency is present.  IVC  diameter and respiratory changes fall into an intermediate range  suggesting an RA pressure of 8 mmHg.  This study was compared with the study from 11/17/2022 . No significant  changes noted. Aortic valve stenosis appear similar in the previous.     Recommend a limited study to assess AV gradients from multiple windows.        Transthoracic Echocardiogram 11/17/2022   Interpretation Summary  Left ventricular function is decreased. The ejection fraction is 45-50%  (mildly reduced).  Global right ventricular function is normal.  Severe left atrial enlargement is present.  Severe right atrial enlargement is present.  Mitral valve prolapse is present.  Mild mitral insufficiency is present.  Mild aortic valve calcification is present.  Trace aortic insufficiency is present.  Mild to moderate tricuspid insufficiency is present.  Right ventricular systolic pressure is 40mmHg above the right atrial pressure.  Mild pulmonic insufficiency is present.      Past Medical History:   Diagnosis Date    Basal cell carcinoma     Cardiomegaly 01/29/2007    Compression fracture of thoracic vertebra (H) 10/30/2014    Compression fx, lumbar spine, with routine healing, subsequent encounter 6/22/2016    Hyperlipidemia     Osteoporosis     Typical atrial flutter (H) 7/25/2016    Varicose vein of leg 6/22/2016       Past Surgical History:   Procedure Laterality Date    APPENDECTOMY      COLONOSCOPY N/A 12/4/2017    Procedure: COLONOSCOPY;  COLONOSCOPY WITH POLYPECTOMY AND SCLEROTHERAPY;  Surgeon: Matt Gonzalez MD;  Location: [facility] OR    ENDOSCOPIC STRIPPING VEIN(S)      HYSTERECTOMY      SALPINGO OOPHORECTOMY,R/L/BRIAN         Allergies   Allergen Reactions    Amoxicillin      Intolerant      Codeine     Hydrocodone Nausea and Vomiting    Morphine      Sensitive to Codeine.       Current Outpatient Medications   Medication Sig Dispense Refill    Ascorbic Acid (VITAMIN C PO) Take 500 mg by mouth 2 times daily      B Complex-Biotin-FA  (VITAMIN B50 COMPLEX PO) Take 1 tablet by mouth daily      Flaxseed, Linseed, (FLAX SEED OIL) 1000 MG capsule Take 1 capsule by mouth daily      MAGNESIUM OXIDE PO Take by mouth daily       NONFORMULARY daily 1 tablespoon of chopped fresh garlic       NONFORMULARY 4 times daily Extra virgin olive oil 2 tablespoons  Daily       Omega-3 Fatty Acids (FISH OIL) 1200 MG CAPS Take 1,200 mg by mouth 2 times daily       VITAMIN E COMPLEX PO Take 400 Units by mouth daily         Social History     Socioeconomic History    Marital status:      Spouse name: Not on file    Number of children: Not on file    Years of education: Not on file    Highest education level: Not on file   Occupational History    Occupation: Housewife     Comment: Retired   Tobacco Use    Smoking status: Former     Packs/day: 0.50     Years: 3.00     Additional pack years: 0.00     Total pack years: 1.50     Types: Cigarettes     Start date: 1950     Quit date: 3/26/1953     Years since quittin.8    Smokeless tobacco: Never    Tobacco comments:     Quit in ; no passive smoke exposure   Vaping Use    Vaping Use: Never used   Substance and Sexual Activity    Alcohol use: No    Drug use: No    Sexual activity: Not Currently   Other Topics Concern     Service Not Asked    Blood Transfusions Yes    Caffeine Concern No    Occupational Exposure Not Asked    Hobby Hazards Not Asked    Sleep Concern Not Asked    Stress Concern Not Asked    Weight Concern Not Asked    Special Diet Not Asked    Back Care Not Asked    Exercise Not Asked    Bike Helmet Not Asked    Seat Belt Not Asked    Self-Exams Not Asked    Parent/sibling w/ CABG, MI or angioplasty before 65F 55M? No   Social History Narrative    Not on file     Social Determinants of Health     Financial Resource Strain: Low Risk  (2023)    Financial Resource Strain     Within the past 12 months, have you or your family members you live with been unable to get utilities (heat,  "electricity) when it was really needed?: No   Food Insecurity: Low Risk  (12/13/2023)    Food Insecurity     Within the past 12 months, did you worry that your food would run out before you got money to buy more?: No     Within the past 12 months, did the food you bought just not last and you didn t have money to get more?: No   Transportation Needs: Low Risk  (12/13/2023)    Transportation Needs     Within the past 12 months, has lack of transportation kept you from medical appointments, getting your medicines, non-medical meetings or appointments, work, or from getting things that you need?: No   Physical Activity: Not on file   Stress: Not on file   Social Connections: Not on file   Interpersonal Safety: Not on file   Housing Stability: Low Risk  (12/13/2023)    Housing Stability     Do you have housing? : Yes     Are you worried about losing your housing?: No       LAB RESULTS:   Results for orders placed or performed in visit on 01/05/24   EKG 12-lead complete w/read - (Clinic Performed)     Status: None (Preliminary result)   Result Value Ref Range    Systolic Blood Pressure  mmHg    Diastolic Blood Pressure  mmHg    Ventricular Rate 81 BPM    Atrial Rate  BPM    GA Interval  ms    QRS Duration 88 ms     ms    QTc 457 ms    P Axis  degrees    R AXIS 59 degrees    T Axis 24 degrees    Interpretation ECG       Atrial fibrillation  Low voltage QRS  Cannot rule out Anterior infarct , age undetermined  Abnormal ECG  No previous ECGs available           Review of systems: Negative except that which was noted in the HPI.    Physical examination:    Vitals: /80 (BP Location: Left arm, Patient Position: Sitting, Cuff Size: Adult Regular)   Pulse 83   Ht 1.588 m (5' 2.5\")   Wt 58.1 kg (128 lb 1.6 oz)   SpO2 96%   BMI 23.06 kg/m    BMI= Body mass index is 23.06 kg/m .      GENERAL APPEARANCE:  alert and no distress  HEENT: no icterus, no xanthelasmas, normal pupil size and reaction, no cyanosis.  NECK: " no adenopathy, no asymmetry, masses.  CHEST: bibasilar rales, no use of accessory muscles, no retractions, respirations are unlabored, normal respiratory rate.   CARDIOVASCULAR: Irregularly, irregular rhythm. Normal rate. 3/6 systolic murmur auscultated loudest at RUSB.  EXTREMITIES: +1-2 bilateral lower extremity edema.  Goes up to about 3 finger breaths under knees.  This is increased from prior visit.  NEURO: alert and oriented normal speech, and affect  VASC: No vascular bruits heard.  SKIN: no ecchymoses, no rashes        Thank you for allowing me to participate in the care of your patient. Please do not hesitate to contact me if you have any questions.       Norma Garcia, CNP

## 2024-01-05 NOTE — PATIENT INSTRUCTIONS
Thank you for allowing Norma Garcia CNP and our  team to participate in your care. Please call our office at [phone] with scheduling questions or if you need to cancel or change your appointment. With any other questions or concerns you may call cardiology nurse at  [phone].       If you experience chest pain, chest pressure, chest tightness, shortness of breath, fainting, lightheadedness, nausea, vomiting, or other concerning symptoms, please report to the Emergency Department or call 911. These symptoms may be emergent, and best treated in the Emergency Department.       Plan for repeat transthoracic echocardiogram in 1 year to re-evaluate heart functioning and aortic valve    Follow-up with cardiology in 1 year after TTE, certainly sooner with acute concerns.      Norma Garcia CNP

## 2024-01-06 LAB
ATRIAL RATE - MUSE: NORMAL BPM
DIASTOLIC BLOOD PRESSURE - MUSE: NORMAL MMHG
INTERPRETATION ECG - MUSE: NORMAL
P AXIS - MUSE: NORMAL DEGREES
PR INTERVAL - MUSE: NORMAL MS
QRS DURATION - MUSE: 88 MS
QT - MUSE: 394 MS
QTC - MUSE: 457 MS
R AXIS - MUSE: 59 DEGREES
SYSTOLIC BLOOD PRESSURE - MUSE: NORMAL MMHG
T AXIS - MUSE: 24 DEGREES
VENTRICULAR RATE- MUSE: 81 BPM

## 2024-01-15 ENCOUNTER — OFFICE VISIT (OUTPATIENT)
Dept: FAMILY MEDICINE | Facility: OTHER | Age: 89
End: 2024-01-15
Attending: PHYSICIAN ASSISTANT
Payer: MEDICARE

## 2024-01-15 VITALS
RESPIRATION RATE: 16 BRPM | HEART RATE: 83 BPM | BODY MASS INDEX: 22.95 KG/M2 | HEIGHT: 62 IN | TEMPERATURE: 97.5 F | DIASTOLIC BLOOD PRESSURE: 78 MMHG | OXYGEN SATURATION: 95 % | SYSTOLIC BLOOD PRESSURE: 122 MMHG | WEIGHT: 124.7 LBS

## 2024-01-15 DIAGNOSIS — I51.7 ATRIAL ENLARGEMENT, BILATERAL: Primary | ICD-10-CM

## 2024-01-15 DIAGNOSIS — I48.91 ATRIAL FIBRILLATION BY ELECTROCARDIOGRAM (H): ICD-10-CM

## 2024-01-15 DIAGNOSIS — D03.4 MELANOMA IN SITU OF SCALP (H): ICD-10-CM

## 2024-01-15 DIAGNOSIS — I50.9 CHRONIC CONGESTIVE HEART FAILURE, UNSPECIFIED HEART FAILURE TYPE (H): ICD-10-CM

## 2024-01-15 LAB
ALBUMIN SERPL BCG-MCNC: 4.3 G/DL (ref 3.5–5.2)
ALP SERPL-CCNC: 104 U/L (ref 40–150)
ALT SERPL W P-5'-P-CCNC: 15 U/L (ref 0–50)
ANION GAP SERPL CALCULATED.3IONS-SCNC: 11 MMOL/L (ref 7–15)
AST SERPL W P-5'-P-CCNC: 28 U/L (ref 0–45)
BASOPHILS # BLD AUTO: 0 10E3/UL (ref 0–0.2)
BASOPHILS NFR BLD AUTO: 0 %
BILIRUB SERPL-MCNC: 1.4 MG/DL
BUN SERPL-MCNC: 16.9 MG/DL (ref 8–23)
CALCIUM SERPL-MCNC: 9.4 MG/DL (ref 8.8–10.2)
CHLORIDE SERPL-SCNC: 100 MMOL/L (ref 98–107)
CHOLEST SERPL-MCNC: 216 MG/DL
CREAT SERPL-MCNC: 0.59 MG/DL (ref 0.51–0.95)
DEPRECATED HCO3 PLAS-SCNC: 25 MMOL/L (ref 22–29)
EGFRCR SERPLBLD CKD-EPI 2021: 86 ML/MIN/1.73M2
EOSINOPHIL # BLD AUTO: 0 10E3/UL (ref 0–0.7)
EOSINOPHIL NFR BLD AUTO: 1 %
ERYTHROCYTE [DISTWIDTH] IN BLOOD BY AUTOMATED COUNT: 13.2 % (ref 10–15)
FASTING STATUS PATIENT QL REPORTED: YES
GLUCOSE SERPL-MCNC: 86 MG/DL (ref 70–99)
HCT VFR BLD AUTO: 46.4 % (ref 35–47)
HDLC SERPL-MCNC: 79 MG/DL
HGB BLD-MCNC: 15.5 G/DL (ref 11.7–15.7)
IMM GRANULOCYTES # BLD: 0 10E3/UL
IMM GRANULOCYTES NFR BLD: 0 %
LDLC SERPL CALC-MCNC: 126 MG/DL
LYMPHOCYTES # BLD AUTO: 1.7 10E3/UL (ref 0.8–5.3)
LYMPHOCYTES NFR BLD AUTO: 31 %
MCH RBC QN AUTO: 32.7 PG (ref 26.5–33)
MCHC RBC AUTO-ENTMCNC: 33.4 G/DL (ref 31.5–36.5)
MCV RBC AUTO: 98 FL (ref 78–100)
MONOCYTES # BLD AUTO: 0.5 10E3/UL (ref 0–1.3)
MONOCYTES NFR BLD AUTO: 8 %
NEUTROPHILS # BLD AUTO: 3.3 10E3/UL (ref 1.6–8.3)
NEUTROPHILS NFR BLD AUTO: 60 %
NONHDLC SERPL-MCNC: 137 MG/DL
NRBC # BLD AUTO: 0 10E3/UL
NRBC BLD AUTO-RTO: 0 /100
NT-PROBNP SERPL-MCNC: 1938 PG/ML (ref 0–1800)
PLATELET # BLD AUTO: 160 10E3/UL (ref 150–450)
POTASSIUM SERPL-SCNC: 4.1 MMOL/L (ref 3.4–5.3)
PROT SERPL-MCNC: 7.2 G/DL (ref 6.4–8.3)
RBC # BLD AUTO: 4.74 10E6/UL (ref 3.8–5.2)
SODIUM SERPL-SCNC: 136 MMOL/L (ref 135–145)
TRIGL SERPL-MCNC: 54 MG/DL
TSH SERPL DL<=0.005 MIU/L-ACNC: 2.73 UIU/ML (ref 0.3–4.2)
WBC # BLD AUTO: 5.5 10E3/UL (ref 4–11)

## 2024-01-15 PROCEDURE — 99214 OFFICE O/P EST MOD 30 MIN: CPT | Performed by: PHYSICIAN ASSISTANT

## 2024-01-15 PROCEDURE — 84443 ASSAY THYROID STIM HORMONE: CPT | Mod: ZL | Performed by: PHYSICIAN ASSISTANT

## 2024-01-15 PROCEDURE — 36415 COLL VENOUS BLD VENIPUNCTURE: CPT | Mod: ZL | Performed by: PHYSICIAN ASSISTANT

## 2024-01-15 PROCEDURE — 85025 COMPLETE CBC W/AUTO DIFF WBC: CPT | Mod: ZL | Performed by: PHYSICIAN ASSISTANT

## 2024-01-15 PROCEDURE — 80053 COMPREHEN METABOLIC PANEL: CPT | Mod: ZL | Performed by: PHYSICIAN ASSISTANT

## 2024-01-15 PROCEDURE — 80061 LIPID PANEL: CPT | Mod: ZL | Performed by: PHYSICIAN ASSISTANT

## 2024-01-15 PROCEDURE — 83880 ASSAY OF NATRIURETIC PEPTIDE: CPT | Mod: ZL | Performed by: PHYSICIAN ASSISTANT

## 2024-01-15 PROCEDURE — G0463 HOSPITAL OUTPT CLINIC VISIT: HCPCS

## 2024-01-15 RX ORDER — FUROSEMIDE 20 MG
20 TABLET ORAL 2 TIMES DAILY
Qty: 60 TABLET | Refills: 3 | Status: SHIPPED | OUTPATIENT
Start: 2024-01-15 | End: 2024-05-09

## 2024-01-15 ASSESSMENT — PAIN SCALES - GENERAL: PAINLEVEL: NO PAIN (0)

## 2024-01-15 NOTE — LETTER
January 19, 2024      Bianka Whitmore  [address]  [place] [address]        Dear ,    We are writing to inform you of your test results.    Labs are stable.  Your heart failure marker is the same as it was a year ago.  Your cholesterol is the same too high. Your chem panel is very good.     Resulted Orders   BNP-N terminal pro   Result Value Ref Range    N Terminal Pro BNP Outpatient 1,938 (H) 0 - 1,800 pg/mL      Comment:      Reference range shown and results flagged as abnormal are for the outpatient, non acute settings. Establishing a baseline value for each individual patient is useful for follow-up.    Suggested inpatient cut points for confirming diagnosis of CHF in an acute setting are:  >450 pg/mL (age 18 to less than 50)  >900 pg/mL (age 50 to less than 75)  >1800 pg/mL (75 yrs and older)    An inpatient or emergency department NT-proPBNP <300 pg/mL effectively rules out acute CHF, with 99% negative predictive value.       TSH with free T4 reflex   Result Value Ref Range    TSH 2.73 0.30 - 4.20 uIU/mL   Comprehensive metabolic panel (BMP + Alb, Alk Phos, ALT, AST, Total. Bili, TP)   Result Value Ref Range    Sodium 136 135 - 145 mmol/L      Comment:      Reference intervals for this test were updated on 09/26/2023 to more accurately reflect our healthy population. There may be differences in the flagging of prior results with similar values performed with this method. Interpretation of those prior results can be made in the context of the updated reference intervals.     Potassium 4.1 3.4 - 5.3 mmol/L    Carbon Dioxide (CO2) 25 22 - 29 mmol/L    Anion Gap 11 7 - 15 mmol/L    Urea Nitrogen 16.9 8.0 - 23.0 mg/dL    Creatinine 0.59 0.51 - 0.95 mg/dL    GFR Estimate 86 >60 mL/min/1.73m2    Calcium 9.4 8.8 - 10.2 mg/dL    Chloride 100 98 - 107 mmol/L    Glucose 86 70 - 99 mg/dL    Alkaline Phosphatase 104 40 - 150 U/L      Comment:      Reference intervals for this test were updated on  11/14/2023 to more accurately reflect our healthy population. There may be differences in the flagging of prior results with similar values performed with this method. Interpretation of those prior results can be made in the context of the updated reference intervals.    AST 28 0 - 45 U/L      Comment:      Reference intervals for this test were updated on 6/12/2023 to more accurately reflect our healthy population. There may be differences in the flagging of prior results with similar values performed with this method. Interpretation of those prior results can be made in the context of the updated reference intervals.    ALT 15 0 - 50 U/L      Comment:      Reference intervals for this test were updated on 6/12/2023 to more accurately reflect our healthy population. There may be differences in the flagging of prior results with similar values performed with this method. Interpretation of those prior results can be made in the context of the updated reference intervals.      Protein Total 7.2 6.4 - 8.3 g/dL    Albumin 4.3 3.5 - 5.2 g/dL    Bilirubin Total 1.4 (H) <=1.2 mg/dL   Lipid Profile (Chol, Trig, HDL, LDL calc)   Result Value Ref Range    Cholesterol 216 (H) <200 mg/dL    Triglycerides 54 <150 mg/dL    Direct Measure HDL 79 >=50 mg/dL    LDL Cholesterol Calculated 126 (H) <=100 mg/dL    Non HDL Cholesterol 137 (H) <130 mg/dL    Patient Fasting > 8hrs? Yes     Narrative    Cholesterol  Desirable:  <200 mg/dL    Triglycerides  Normal:  Less than 150 mg/dL  Borderline High:  150-199 mg/dL  High:  200-499 mg/dL  Very High:  Greater than or equal to 500 mg/dL    Direct Measure HDL  Female:  Greater than or equal to 50 mg/dL   Male:  Greater than or equal to 40 mg/dL    LDL Cholesterol  Desirable:  <100mg/dL  Above Desirable:  100-129 mg/dL   Borderline High:  130-159 mg/dL   High:  160-189 mg/dL   Very High:  >= 190 mg/dL    Non HDL Cholesterol  Desirable:  130 mg/dL  Above Desirable:  130-159 mg/dL  Borderline  High:  160-189 mg/dL  High:  190-219 mg/dL  Very High:  Greater than or equal to 220 mg/dL   CBC with platelets and differential   Result Value Ref Range    WBC Count 5.5 4.0 - 11.0 10e3/uL    RBC Count 4.74 3.80 - 5.20 10e6/uL    Hemoglobin 15.5 11.7 - 15.7 g/dL    Hematocrit 46.4 35.0 - 47.0 %    MCV 98 78 - 100 fL    MCH 32.7 26.5 - 33.0 pg    MCHC 33.4 31.5 - 36.5 g/dL    RDW 13.2 10.0 - 15.0 %    Platelet Count 160 150 - 450 10e3/uL    % Neutrophils 60 %    % Lymphocytes 31 %    % Monocytes 8 %    % Eosinophils 1 %    % Basophils 0 %    % Immature Granulocytes 0 %    NRBCs per 100 WBC 0 <1 /100    Absolute Neutrophils 3.3 1.6 - 8.3 10e3/uL    Absolute Lymphocytes 1.7 0.8 - 5.3 10e3/uL    Absolute Monocytes 0.5 0.0 - 1.3 10e3/uL    Absolute Eosinophils 0.0 0.0 - 0.7 10e3/uL    Absolute Basophils 0.0 0.0 - 0.2 10e3/uL    Absolute Immature Granulocytes 0.0 <=0.4 10e3/uL    Absolute NRBCs 0.0 10e3/uL       If you have any questions or concerns, please call the clinic at the number listed above.       Sincerely,      ORTEGA Rivas

## 2024-01-15 NOTE — PROGRESS NOTES
Assessment & Plan     Atrial enlargement, bilateral  Echo and CT confirm.  Needing treatment for heart failure and again discussed at length.  Bianka doesn't want to take anything.     Atrial fibrillation by electrocardiogram (H)  Rate is controlled. And her risk is high for stroke . We had a long discussion , taking garlic for anticoagulation , has for many years.     Chronic congestive heart failure, unspecified heart failure type (H)  Weight is table. Willing to take lasix only if gains 3 # . That is the only thing willing to take. Discussion on spironolactone and not willing to take.     Melanoma in situ of scalp (H)  Seeing dermatology. Saw a surgeon. He said to watch this. She is wanting this off. We will check with [place] derm.     Review of external notes as documented elsewhere in note  Ordering of each unique test  Prescription drug management  10  minutes spent by me on the date of the encounter doing chart review, history and exam, documentation and further activities per the note       See Patient Instructions    Return in about 3 months (around 4/15/2024) for Follow up.    ORTEGA Caruso  [facility]    Subjective   Bianka is a 88 year old, presenting for the following health issues:  Follow Up        1/15/2024     8:30 AM   Additional Questions   Roomed by Apoorva MIRZA     Hypertension Follow-up    Do you check your blood pressure regularly outside of the clinic? Yes sometimes  Are you following a low salt diet? Yes as much as possible  Are your blood pressures ever more than 140 on the top number (systolic) OR more   than 90 on the bottom number (diastolic), for example 140/90? No    Heart Failure Follow-up   Are you experiencing any shortness of breath? No  Are you experiencing any swelling in your legs or feet?  No  Are you using more pillows than usual? No  Do you cough at night?  No  Do you check your weight daily?  Yes every few days or once a week  "  Have you had a weight change recently?  No  Are you having any of the following side effects from your medications? (Select all that apply)  The patient does not report symptoms of dizziness, fatigue, cough, swelling, or slow heart beat.  Since your last visit, how many times have you gone to the cardiologist, urgent care, emergency room, or hospital because of your heart failure?   None  Last Echo: Echo result w/o MOPS: Interpretation SummaryLeft ventricular function is decreased. The ejection fraction is 45-50%(mildly reduced).Global right ventricular function is normal.Severe biatrial enlargement is present. Cannot exclude a bicuspid aortic valve.Moderate aortic stenosis is present.Moderate aortic valve calcification ispresent. Low flow low gradient aortic valve stenosis (SVI is 24), MG is notwell assessed in this study. JOSE LUIS is 1.3 cm2.Mild to moderate mitral insufficiency is present.Mild to moderate tricuspid insufficiency is present.IVC diameter and respiratory changes fall into an intermediate rangesuggesting an RA pressure of 8 mmHg.This study was compared with the study from 11/17/2022 . No significantchanges noted. Aortic valve stenosis appear similar in the previous. Recommend a limited study to assess AV gradients from multiple windows.           Review of Systems   Constitutional, HEENT, cardiovascular, pulmonary, gi and gu systems are negative, except as otherwise noted.      Objective    /78   Pulse 83   Temp 97.5  F (36.4  C) (Tympanic)   Resp 16   Ht 1.575 m (5' 2\")   Wt 56.6 kg (124 lb 11.2 oz)   SpO2 95%   BMI 22.81 kg/m    Body mass index is 22.81 kg/m .  Physical Exam   GENERAL: healthy, alert and no distress  NECK: no adenopathy, no asymmetry, masses, or scars and thyroid normal to palpation  RESP: lungs clear to auscultation - no rales, rhonchi or wheezes  CV: irregularly irregular rhythm, grade 2/6 LSB murmur heard best over the anterior chest, and no peripheral edema  ABDOMEN: " soft, nontender, no hepatosplenomegaly, no masses and bowel sounds normal  MS: no gross musculoskeletal defects noted, no edema  SKIN: small lesion ont he mid forehead that is very dark.  Occasion pain.   PSYCH: mentation appears normal, affect normal/bright    Office Visit on 01/05/2024   Component Date Value Ref Range Status    Ventricular Rate 01/05/2024 81  BPM Final    QRS Duration 01/05/2024 88  ms Final    QT 01/05/2024 394  ms Final    QTc 01/05/2024 457  ms Final    R AXIS 01/05/2024 59  degrees Final    T Tuckerman 01/05/2024 24  degrees Final    Interpretation ECG 01/05/2024    Final                    Value:Atrial fibrillation  Low voltage QRS  Cannot rule out Anterior infarct , age undetermined  No previous ECGs available  Confirmed by MD ZAIDA, HAYDER ([id number]) on 1/6/2024 12:20:52 PM

## 2024-04-23 ENCOUNTER — TRANSFERRED RECORDS (OUTPATIENT)
Dept: HEALTH INFORMATION MANAGEMENT | Facility: CLINIC | Age: 89
End: 2024-04-23

## 2024-04-23 ENCOUNTER — APPOINTMENT (OUTPATIENT)
Dept: CT IMAGING | Facility: HOSPITAL | Age: 89
End: 2024-04-23
Attending: STUDENT IN AN ORGANIZED HEALTH CARE EDUCATION/TRAINING PROGRAM
Payer: MEDICARE

## 2024-04-23 ENCOUNTER — APPOINTMENT (OUTPATIENT)
Dept: GENERAL RADIOLOGY | Facility: HOSPITAL | Age: 89
End: 2024-04-23
Attending: STUDENT IN AN ORGANIZED HEALTH CARE EDUCATION/TRAINING PROGRAM
Payer: MEDICARE

## 2024-04-23 ENCOUNTER — HOSPITAL ENCOUNTER (EMERGENCY)
Facility: HOSPITAL | Age: 89
Discharge: ANOTHER HOSPITAL | End: 2024-04-23
Attending: STUDENT IN AN ORGANIZED HEALTH CARE EDUCATION/TRAINING PROGRAM | Admitting: STUDENT IN AN ORGANIZED HEALTH CARE EDUCATION/TRAINING PROGRAM
Payer: MEDICARE

## 2024-04-23 VITALS
RESPIRATION RATE: 16 BRPM | OXYGEN SATURATION: 96 % | SYSTOLIC BLOOD PRESSURE: 89 MMHG | TEMPERATURE: 97.3 F | HEART RATE: 109 BPM | DIASTOLIC BLOOD PRESSURE: 60 MMHG

## 2024-04-23 DIAGNOSIS — I21.3 ST ELEVATION MYOCARDIAL INFARCTION (STEMI), UNSPECIFIED ARTERY (H): ICD-10-CM

## 2024-04-23 LAB
ERYTHROCYTE [DISTWIDTH] IN BLOOD BY AUTOMATED COUNT: 13.3 % (ref 10–15)
HCT VFR BLD AUTO: 46.9 % (ref 35–47)
HGB BLD-MCNC: 15.7 G/DL (ref 11.7–15.7)
HOLD SPECIMEN: NORMAL
MCH RBC QN AUTO: 32.9 PG (ref 26.5–33)
MCHC RBC AUTO-ENTMCNC: 33.5 G/DL (ref 31.5–36.5)
MCV RBC AUTO: 98 FL (ref 78–100)
NT-PROBNP SERPL-MCNC: 3714 PG/ML (ref 0–1800)
PLATELET # BLD AUTO: 209 10E3/UL (ref 150–450)
RBC # BLD AUTO: 4.77 10E6/UL (ref 3.8–5.2)
TROPONIN T SERPL HS-MCNC: 39 NG/L
WBC # BLD AUTO: 10.4 10E3/UL (ref 4–11)

## 2024-04-23 PROCEDURE — 93005 ELECTROCARDIOGRAM TRACING: CPT

## 2024-04-23 PROCEDURE — 258N000003 HC RX IP 258 OP 636: Performed by: STUDENT IN AN ORGANIZED HEALTH CARE EDUCATION/TRAINING PROGRAM

## 2024-04-23 PROCEDURE — 96375 TX/PRO/DX INJ NEW DRUG ADDON: CPT | Mod: XU

## 2024-04-23 PROCEDURE — 96374 THER/PROPH/DIAG INJ IV PUSH: CPT

## 2024-04-23 PROCEDURE — 36415 COLL VENOUS BLD VENIPUNCTURE: CPT | Performed by: STUDENT IN AN ORGANIZED HEALTH CARE EDUCATION/TRAINING PROGRAM

## 2024-04-23 PROCEDURE — 99291 CRITICAL CARE FIRST HOUR: CPT | Mod: 25

## 2024-04-23 PROCEDURE — 83880 ASSAY OF NATRIURETIC PEPTIDE: CPT | Performed by: STUDENT IN AN ORGANIZED HEALTH CARE EDUCATION/TRAINING PROGRAM

## 2024-04-23 PROCEDURE — 250N000011 HC RX IP 250 OP 636

## 2024-04-23 PROCEDURE — 84484 ASSAY OF TROPONIN QUANT: CPT | Performed by: STUDENT IN AN ORGANIZED HEALTH CARE EDUCATION/TRAINING PROGRAM

## 2024-04-23 PROCEDURE — 71045 X-RAY EXAM CHEST 1 VIEW: CPT

## 2024-04-23 PROCEDURE — 93010 ELECTROCARDIOGRAM REPORT: CPT | Performed by: INTERNAL MEDICINE

## 2024-04-23 PROCEDURE — 250N000011 HC RX IP 250 OP 636: Performed by: STUDENT IN AN ORGANIZED HEALTH CARE EDUCATION/TRAINING PROGRAM

## 2024-04-23 PROCEDURE — 74174 CTA ABD&PLVS W/CONTRAST: CPT | Mod: MG

## 2024-04-23 PROCEDURE — 99291 CRITICAL CARE FIRST HOUR: CPT | Performed by: STUDENT IN AN ORGANIZED HEALTH CARE EDUCATION/TRAINING PROGRAM

## 2024-04-23 PROCEDURE — 85027 COMPLETE CBC AUTOMATED: CPT | Performed by: STUDENT IN AN ORGANIZED HEALTH CARE EDUCATION/TRAINING PROGRAM

## 2024-04-23 PROCEDURE — 250N000013 HC RX MED GY IP 250 OP 250 PS 637: Performed by: STUDENT IN AN ORGANIZED HEALTH CARE EDUCATION/TRAINING PROGRAM

## 2024-04-23 PROCEDURE — 250N000011 HC RX IP 250 OP 636: Performed by: RADIOLOGY

## 2024-04-23 RX ORDER — SODIUM CHLORIDE 9 MG/ML
INJECTION, SOLUTION INTRAVENOUS CONTINUOUS
Status: DISCONTINUED | OUTPATIENT
Start: 2024-04-23 | End: 2024-04-23 | Stop reason: HOSPADM

## 2024-04-23 RX ORDER — ASPIRIN 81 MG/1
324 TABLET, CHEWABLE ORAL ONCE
Status: COMPLETED | OUTPATIENT
Start: 2024-04-23 | End: 2024-04-23

## 2024-04-23 RX ORDER — IOPAMIDOL 755 MG/ML
100 INJECTION, SOLUTION INTRAVASCULAR ONCE
Status: COMPLETED | OUTPATIENT
Start: 2024-04-23 | End: 2024-04-23

## 2024-04-23 RX ORDER — NALOXONE HYDROCHLORIDE 0.4 MG/ML
0.4 INJECTION, SOLUTION INTRAMUSCULAR; INTRAVENOUS; SUBCUTANEOUS
Status: DISCONTINUED | OUTPATIENT
Start: 2024-04-23 | End: 2024-04-23 | Stop reason: HOSPADM

## 2024-04-23 RX ORDER — ONDANSETRON 2 MG/ML
4 INJECTION INTRAMUSCULAR; INTRAVENOUS ONCE
Status: COMPLETED | OUTPATIENT
Start: 2024-04-23 | End: 2024-04-23

## 2024-04-23 RX ORDER — ATROPINE SULFATE 0.4 MG/ML
0.5 AMPUL (ML) INJECTION ONCE
Status: COMPLETED | OUTPATIENT
Start: 2024-04-23 | End: 2024-04-23

## 2024-04-23 RX ORDER — DOPAMINE HYDROCHLORIDE 160 MG/100ML
INJECTION, SOLUTION INTRAVENOUS
Status: COMPLETED
Start: 2024-04-23 | End: 2024-04-23

## 2024-04-23 RX ORDER — NALOXONE HYDROCHLORIDE 0.4 MG/ML
0.2 INJECTION, SOLUTION INTRAMUSCULAR; INTRAVENOUS; SUBCUTANEOUS
Status: DISCONTINUED | OUTPATIENT
Start: 2024-04-23 | End: 2024-04-23 | Stop reason: HOSPADM

## 2024-04-23 RX ORDER — HEPARIN SODIUM 5000 [USP'U]/.5ML
60 INJECTION, SOLUTION INTRAVENOUS; SUBCUTANEOUS ONCE
Status: COMPLETED | OUTPATIENT
Start: 2024-04-23 | End: 2024-04-23

## 2024-04-23 RX ORDER — HYDROMORPHONE HYDROCHLORIDE 1 MG/ML
0.5 INJECTION, SOLUTION INTRAMUSCULAR; INTRAVENOUS; SUBCUTANEOUS EVERY 30 MIN PRN
Status: DISCONTINUED | OUTPATIENT
Start: 2024-04-23 | End: 2024-04-23 | Stop reason: HOSPADM

## 2024-04-23 RX ORDER — HEPARIN SODIUM 10000 [USP'U]/100ML
500 INJECTION, SOLUTION INTRAVENOUS CONTINUOUS
Status: DISCONTINUED | OUTPATIENT
Start: 2024-04-23 | End: 2024-04-23

## 2024-04-23 RX ADMIN — HEPARIN SODIUM 3400 UNITS: 5000 INJECTION, SOLUTION INTRAVENOUS; SUBCUTANEOUS at 16:33

## 2024-04-23 RX ADMIN — ONDANSETRON 4 MG: 2 INJECTION INTRAMUSCULAR; INTRAVENOUS at 16:08

## 2024-04-23 RX ADMIN — DOPAMINE HYDROCHLORIDE 1 MCG/KG/MIN: 160 INJECTION, SOLUTION INTRAVENOUS at 16:24

## 2024-04-23 RX ADMIN — TICAGRELOR 180 MG: 90 TABLET ORAL at 16:29

## 2024-04-23 RX ADMIN — IOPAMIDOL 100 ML: 755 INJECTION, SOLUTION INTRAVENOUS at 16:23

## 2024-04-23 RX ADMIN — ASPIRIN 81 MG CHEWABLE TABLET 324 MG: 81 TABLET CHEWABLE at 15:58

## 2024-04-23 RX ADMIN — ATROPINE SULFATE 0.5 MG: 0.4 INJECTION, SOLUTION INTRAVENOUS at 16:01

## 2024-04-23 RX ADMIN — HEPARIN SODIUM 500 UNITS/HR: 10000 INJECTION, SOLUTION INTRAVENOUS at 16:33

## 2024-04-23 RX ADMIN — SODIUM CHLORIDE 1000 ML: 9 INJECTION, SOLUTION INTRAVENOUS at 16:31

## 2024-04-23 ASSESSMENT — COLUMBIA-SUICIDE SEVERITY RATING SCALE - C-SSRS
6. HAVE YOU EVER DONE ANYTHING, STARTED TO DO ANYTHING, OR PREPARED TO DO ANYTHING TO END YOUR LIFE?: NO
1. IN THE PAST MONTH, HAVE YOU WISHED YOU WERE DEAD OR WISHED YOU COULD GO TO SLEEP AND NOT WAKE UP?: NO
2. HAVE YOU ACTUALLY HAD ANY THOUGHTS OF KILLING YOURSELF IN THE PAST MONTH?: NO

## 2024-04-23 ASSESSMENT — ACTIVITIES OF DAILY LIVING (ADL)
ADLS_ACUITY_SCORE: 35

## 2024-04-23 NOTE — ED TRIAGE NOTES
Patient presents with c/o synopsizing after lunch between 12 and 1, now feels nauseated, having tightness in chest. Does report her chest and back between shoulder blades were sore prior to syncopal episode, denies hitting head and she did not fall just leaned forward.

## 2024-04-23 NOTE — ED NOTES
Patient wheeled into ED Rm 10, accompanied by her  and son.    Patient c/o midsternal chest pain that radiates through to her back w/ nausea.   She had a syncopal episode after lunch between [id number].   Currently is A&Ox4.   Fingers and nose are cyanotic. Does not appear to have any respiratory distress.     Patient placed on STAT pads.   2IV established.

## 2024-04-23 NOTE — ED PROVIDER NOTES
History     Chief Complaint   Patient presents with    Chest Pain     HPI  Bianka Whitmore is a 88 year old female who presents with syncope and chest pain radiating to back. Started after lunch. Syncopized while in a chair. Did not hit head. No hx of MI. Does have hx of CHF. No recent illness and felt fine up until symptoms started 30 minutes PTA. No hx of PE. No leg swelling.    Allergies:  Allergies   Allergen Reactions    Amoxicillin      Intolerant      Codeine     Hydrocodone Nausea and Vomiting    Morphine      Sensitive to Codeine.       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 yes        Diverticulitis of colon 03/26/2013     Priority: Medium    Osteoarthritis 03/26/2013     Priority: Medium    Age-related osteoporosis with current pathological fracture with routine healing 03/26/2013     Priority: Medium    ACP (advance care planning) 12/06/2012     Priority: Medium     Advance Care Planning 6/22/2016: ACP Review of Chart / Resources Provided:  Reviewed chart for advance care plan.  Bianka Whitmore has no plan or code status on file however states presence of ACP document. Copy requested. Confirmed code status reflects current choices pending receipt of document/advance care plan review.  Confirmed/documented legally designated decision makers.  Added by Munira PRESSLEY  Vishal                Past Medical History:    Past Medical History:   Diagnosis Date    Basal cell carcinoma     Cardiomegaly 2007    Compression fracture of thoracic vertebra (H) 10/30/2014    Compression fx, lumbar spine, with routine healing, subsequent encounter 2016    Hyperlipidemia     Osteoporosis     Typical atrial flutter (H) 2016    Varicose vein of leg 2016       Past Surgical History:    Past Surgical History:   Procedure Laterality Date    APPENDECTOMY      COLONOSCOPY N/A 2017    Procedure: COLONOSCOPY;  COLONOSCOPY WITH POLYPECTOMY AND SCLEROTHERAPY;  Surgeon: Matt Gonzalez MD;  Location: [facility] OR    ENDOSCOPIC STRIPPING VEIN(S)      HYSTERECTOMY      SALPINGO OOPHORECTOMY,R/L/BRIAN         Family History:    Family History   Problem Relation Age of Onset    Other - See Comments Mother 85        Old age, cause of death    Cancer Father 84        Rectal cancer, cause of death/Stomach cancer, cause of death    C.A.D. Maternal Aunt     C.A.D. Maternal Uncle     Cancer Son         thymic carcinoma       Social History:  Marital Status:   [2]  Social History     Tobacco Use    Smoking status: Former     Current packs/day: 0.00     Average packs/day: 0.5 packs/day for 3.2 years (1.6 ttl pk-yrs)     Types: Cigarettes     Start date: 1950     Quit date: 3/26/1953     Years since quittin.1    Smokeless tobacco: Never    Tobacco comments:     Quit in ; no passive smoke exposure   Vaping Use    Vaping status: Never Used   Substance Use Topics    Alcohol use: No    Drug use: No        Medications:    ACE/ARB/ARNI NOT PRESCRIBED (INTENTIONAL)  Ascorbic Acid (VITAMIN C PO)  B Complex-Biotin-FA (VITAMIN B50 COMPLEX PO)  Flaxseed, Linseed, (FLAX SEED OIL) 1000 MG capsule  furosemide (LASIX) 20 MG tablet  MAGNESIUM OXIDE PO  NONFORMULARY  NONFORMULARY  Omega-3 Fatty Acids (FISH OIL) 1200 MG CAPS  VITAMIN E COMPLEX PO          Review of Systems   All other systems  reviewed and are negative.      Physical Exam   BP: (!) 89/60  Pulse: 109  Temp: 97.3  F (36.3  C)  Resp: 16  SpO2: 96 %      Physical Exam  Vitals and nursing note reviewed.   Constitutional:       General: She is in acute distress.      Appearance: She is well-developed. She is ill-appearing and toxic-appearing.   HENT:      Head: Normocephalic and atraumatic.   Eyes:      Extraocular Movements: Extraocular movements intact.      Pupils: Pupils are equal, round, and reactive to light.   Cardiovascular:      Rate and Rhythm: Regular rhythm. Bradycardia present.   Pulmonary:      Effort: Pulmonary effort is normal.   Abdominal:      Palpations: Abdomen is soft.   Musculoskeletal:         General: Normal range of motion.      Cervical back: Normal range of motion and neck supple.   Skin:     General: Skin is warm.      Capillary Refill: Capillary refill takes less than 2 seconds.   Neurological:      General: No focal deficit present.      Mental Status: She is alert and oriented to person, place, and time.   Psychiatric:         Mood and Affect: Mood normal.         ED Course     ED Course as of 04/23/24 1642   Tue Apr 23, 2024   1608 Discussed with Dr. Stapleton who will accept patient for STEMI.   1622 Slight delay in meds 2/2 concern for dissection. Cta obtained and discussed with radiology. No dissection. Giving heparin/brilinta.     Procedures          EKG obtained. Inferior STEMI. Sinus bradycardia without P waves concerning for junctional bradycardia from SA node injury.     Critical Care time:  was 60 minutes for this patient excluding procedures.    The patient has a STEMI     The patient was evaluated on arrival to room   Cath lab transfer was delayed due to hemodynamic instability/Shock and was delayed due to time taken to rule out Aortic Dissection (TAY, CT)   ASA given in the ER                   Results for orders placed or performed during the hospital encounter of 04/23/24 (from the past 24 hour(s))    EKG 12-lead, tracing only   Result Value Ref Range    Systolic Blood Pressure  mmHg    Diastolic Blood Pressure  mmHg    Ventricular Rate 40 BPM    Atrial Rate  BPM    VT Interval  ms    QRS Duration 110 ms     ms    QTc 430 ms    P Axis  degrees    R AXIS 96 degrees    T Axis 103 degrees    Interpretation ECG       Junctional bradycardia with Premature supraventricular complexes  Anterolateral infarct (cited on or before 05-JAN-2024)  Inferior injury pattern  ** ** ACUTE MI / STEMI ** **  Consider right ventricular involvement in acute inferior infarct  Abnormal ECG  When compared with ECG of 05-JAN-2024 09:28,  Significant changes have occurred     Troponin T, High Sensitivity   Result Value Ref Range    Troponin T, High Sensitivity 39 (H) <=14 ng/L   CBC with platelets   Result Value Ref Range    WBC Count 10.4 4.0 - 11.0 10e3/uL    RBC Count 4.77 3.80 - 5.20 10e6/uL    Hemoglobin 15.7 11.7 - 15.7 g/dL    Hematocrit 46.9 35.0 - 47.0 %    MCV 98 78 - 100 fL    MCH 32.9 26.5 - 33.0 pg    MCHC 33.5 31.5 - 36.5 g/dL    RDW 13.3 10.0 - 15.0 %    Platelet Count 209 150 - 450 10e3/uL   NT pro BNP   Result Value Ref Range    N terminal Pro BNP Inpatient 3,714 (H) 0 - 1,800 pg/mL   [facility]    Narrative    The following orders were created for panel order [facility].  Procedure                               Abnormality         Status                     ---------                               -----------         ------                     Extra Blue Top Tube[[id number]]                              In process                 Extra Red Top Tube[[id number]]                               In process                 Extra Green Top (Lithium...[[id number]]                      In process                 Extra Purple Top Tube[[id number]]                            In process                 Extra Green Top (Lithium...[[id number]]                                                   Please view results for these tests on  the individual orders.   XR Chest Port 1 View    Narrative    XR CHEST PORT 1 VIEW    HISTORY: 88 yearsFemale Chest Pain    TECHNIQUE: A single view of the chest was performed    COMPARISON: 1/4/2023    FINDINGS: Again seen is cardiomegaly. Pulmonary vascularity is within  normal limits.    Previous CT from 12/9/2023 demonstrates enlargement of the right  atrium.      Impression    IMPRESSION: No acute change. Cardiomegaly. Lungs are otherwise clear.    DELLA MUHAMMAD MD         SYSTEM ID:  [id number]       Medications   sodium chloride 0.9 % infusion (has no administration in time range)   HOLD: nitroGLYcerin IF (has no administration in time range)   naloxone (NARCAN) injection 0.2 mg (has no administration in time range)   naloxone (NARCAN) injection 0.4 mg (has no administration in time range)   naloxone (NARCAN) injection 0.2 mg (has no administration in time range)   naloxone (NARCAN) injection 0.4 mg (has no administration in time range)   HYDROmorphone (PF) (DILAUDID) injection 0.5 mg (has no administration in time range)   DOPamine 400 mg in 250 mL D5W PERIPHERAL infusion (0 mcg/kg/min × 56.6 kg Intravenous Stopped 4/23/24 1627)   heparin infusion 25,000 units in 0.45% NaCl 250 mL ANTICOAGULANT (0 Units/hr Intravenous ED/Periop/Clinic Infusing on Admission/transfer 4/23/24 1634)   aspirin (ASA) chewable tablet 324 mg (324 mg Oral $Given 4/23/24 1558)   heparin ANTICOAGULANT injection 3,400 Units (3,400 Units Intravenous $Given 4/23/24 1633)   sodium chloride 0.9% BOLUS 1,000 mL (0 mLs Intravenous ED/Periop/Clinic Infusing on Admission/transfer 4/23/24 1634)   atropine injection 0.5 mg (0.5 mg Intravenous $Given 4/23/24 1601)   ondansetron (ZOFRAN) injection 4 mg (4 mg Intravenous $Given 4/23/24 1608)   iopamidol (ISOVUE-370) solution 100 mL (100 mLs Intravenous $Given 4/23/24 1623)   sodium chloride (PF) 0.9% PF flush 100 mL (100 mLs Intravenous $Given 4/23/24 1623)   ticagrelor (BRILINTA) tablet 180 mg  (180 mg Oral $Given 4/23/24 4979)       Assessments & Plan (with Medical Decision Making)     I have reviewed the nursing notes.    STEMI on arrival. Hx concerning for dissection. Plan on CT, consultation with cardiology. Will not wait for creatinine given emergent nature of situation. Plan on emergent transfer for intervention. Discussed code status with patient and she wishes to be full code at this time. Starting dopamine. Will pace as needed. No response to atropine.    Transferred out in unstable condition given need for intervention not available here.    I have reviewed the findings, diagnosis, plan and need for follow up with the patient.       Critical Care Addendum  My initial assessment, based on my review of prehospital provider report, review of nursing observations, review of vital signs, focused history, physical exam, review of cardiac rhythm monitor, 12 lead ECG analysis, and discussion with cardiology , established a high suspicion that Bianka Whitmore has cardiogenic shock with hypotension from STEMI and concern for possible aortic dissection, which requires immediate intervention, and therefore she is critically ill.     After the initial assessment, the care team initiated multiple lab tests, initiated IV fluid administration, initiated medication therapy with heparin/aspirin/brilinta, and consulted with radiology and cardiology  to provide stabilization care. Due to the critical nature of this patient, I reassessed nursing observations, vital signs, and 12 lead ECG analysis multiple times prior to her disposition.     Time also spent performing documentation, discussion with family to obtain medical information for decision making, reviewing test results, discussion with consultants, and coordination of care.     Critical care time (excluding teaching time and procedures): 60 minutes.         New Prescriptions    No medications on file       Final diagnoses:   ST elevation myocardial infarction  (STEMI), unspecified artery (H)       4/23/2024   [facility]       Hima Tong MD  04/23/24 3628

## 2024-04-24 ENCOUNTER — TRANSFERRED RECORDS (OUTPATIENT)
Dept: HEALTH INFORMATION MANAGEMENT | Facility: CLINIC | Age: 89
End: 2024-04-24

## 2024-04-24 LAB
ATRIAL RATE - MUSE: NORMAL BPM
DIASTOLIC BLOOD PRESSURE - MUSE: NORMAL MMHG
EJECTION FRACTION: NORMAL %
INTERPRETATION ECG - MUSE: NORMAL
P AXIS - MUSE: NORMAL DEGREES
PR INTERVAL - MUSE: NORMAL MS
QRS DURATION - MUSE: 110 MS
QT - MUSE: 528 MS
QTC - MUSE: 430 MS
R AXIS - MUSE: 96 DEGREES
SYSTOLIC BLOOD PRESSURE - MUSE: NORMAL MMHG
T AXIS - MUSE: 103 DEGREES
VENTRICULAR RATE- MUSE: 40 BPM

## 2024-05-03 ENCOUNTER — OFFICE VISIT (OUTPATIENT)
Dept: FAMILY MEDICINE | Facility: OTHER | Age: 89
End: 2024-05-03
Attending: PHYSICIAN ASSISTANT
Payer: MEDICARE

## 2024-05-03 VITALS
HEIGHT: 62 IN | BODY MASS INDEX: 23.9 KG/M2 | SYSTOLIC BLOOD PRESSURE: 102 MMHG | OXYGEN SATURATION: 96 % | TEMPERATURE: 98.7 F | WEIGHT: 129.9 LBS | HEART RATE: 84 BPM | RESPIRATION RATE: 15 BRPM | DIASTOLIC BLOOD PRESSURE: 68 MMHG

## 2024-05-03 DIAGNOSIS — K80.20 GALLSTONES: ICD-10-CM

## 2024-05-03 DIAGNOSIS — Z09 HOSPITAL DISCHARGE FOLLOW-UP: Primary | ICD-10-CM

## 2024-05-03 DIAGNOSIS — Z95.5 PRESENCE OF STENT IN CORONARY ARTERY IN PATIENT WITH CORONARY ARTERY DISEASE: ICD-10-CM

## 2024-05-03 DIAGNOSIS — I25.10 PRESENCE OF STENT IN CORONARY ARTERY IN PATIENT WITH CORONARY ARTERY DISEASE: ICD-10-CM

## 2024-05-03 DIAGNOSIS — K30 UPSET STOMACH: ICD-10-CM

## 2024-05-03 PROBLEM — I48.20 CHRONIC ATRIAL FIBRILLATION (H): Status: ACTIVE | Noted: 2024-04-24

## 2024-05-03 PROBLEM — I21.11 STEMI INVOLVING RIGHT CORONARY ARTERY (H): Status: ACTIVE | Noted: 2024-04-23

## 2024-05-03 LAB
ALBUMIN SERPL BCG-MCNC: 3.8 G/DL (ref 3.5–5.2)
ALP SERPL-CCNC: 107 U/L (ref 40–150)
ALT SERPL W P-5'-P-CCNC: 42 U/L (ref 0–50)
AMYLASE SERPL-CCNC: 43 U/L (ref 28–100)
AST SERPL W P-5'-P-CCNC: 56 U/L (ref 0–45)
BASOPHILS # BLD AUTO: 0 10E3/UL (ref 0–0.2)
BASOPHILS NFR BLD AUTO: 0 %
BILIRUB DIRECT SERPL-MCNC: 0.43 MG/DL (ref 0–0.3)
BILIRUB SERPL-MCNC: 1.4 MG/DL
EOSINOPHIL # BLD AUTO: 0 10E3/UL (ref 0–0.7)
EOSINOPHIL NFR BLD AUTO: 1 %
ERYTHROCYTE [DISTWIDTH] IN BLOOD BY AUTOMATED COUNT: 14.4 % (ref 10–15)
HCT VFR BLD AUTO: 42 % (ref 35–47)
HGB BLD-MCNC: 14.6 G/DL (ref 11.7–15.7)
IMM GRANULOCYTES # BLD: 0 10E3/UL
IMM GRANULOCYTES NFR BLD: 0 %
LIPASE SERPL-CCNC: 29 U/L (ref 13–60)
LYMPHOCYTES # BLD AUTO: 1.1 10E3/UL (ref 0.8–5.3)
LYMPHOCYTES NFR BLD AUTO: 16 %
MCH RBC QN AUTO: 33.4 PG (ref 26.5–33)
MCHC RBC AUTO-ENTMCNC: 34.8 G/DL (ref 31.5–36.5)
MCV RBC AUTO: 96 FL (ref 78–100)
MONOCYTES # BLD AUTO: 0.7 10E3/UL (ref 0–1.3)
MONOCYTES NFR BLD AUTO: 10 %
NEUTROPHILS # BLD AUTO: 4.8 10E3/UL (ref 1.6–8.3)
NEUTROPHILS NFR BLD AUTO: 73 %
NRBC # BLD AUTO: 0 10E3/UL
NRBC BLD AUTO-RTO: 0 /100
PLATELET # BLD AUTO: 207 10E3/UL (ref 150–450)
PROT SERPL-MCNC: 7 G/DL (ref 6.4–8.3)
RBC # BLD AUTO: 4.37 10E6/UL (ref 3.8–5.2)
WBC # BLD AUTO: 6.6 10E3/UL (ref 4–11)

## 2024-05-03 PROCEDURE — 85025 COMPLETE CBC W/AUTO DIFF WBC: CPT | Mod: ZL | Performed by: PHYSICIAN ASSISTANT

## 2024-05-03 PROCEDURE — 80076 HEPATIC FUNCTION PANEL: CPT | Mod: ZL | Performed by: PHYSICIAN ASSISTANT

## 2024-05-03 PROCEDURE — 99214 OFFICE O/P EST MOD 30 MIN: CPT | Performed by: PHYSICIAN ASSISTANT

## 2024-05-03 PROCEDURE — G0463 HOSPITAL OUTPT CLINIC VISIT: HCPCS | Performed by: PHYSICIAN ASSISTANT

## 2024-05-03 PROCEDURE — 82150 ASSAY OF AMYLASE: CPT | Mod: ZL | Performed by: PHYSICIAN ASSISTANT

## 2024-05-03 PROCEDURE — 83690 ASSAY OF LIPASE: CPT | Mod: ZL | Performed by: PHYSICIAN ASSISTANT

## 2024-05-03 PROCEDURE — 36415 COLL VENOUS BLD VENIPUNCTURE: CPT | Mod: ZL | Performed by: PHYSICIAN ASSISTANT

## 2024-05-03 PROCEDURE — 99495 TRANSJ CARE MGMT MOD F2F 14D: CPT | Performed by: PHYSICIAN ASSISTANT

## 2024-05-03 PROCEDURE — G0463 HOSPITAL OUTPT CLINIC VISIT: HCPCS

## 2024-05-03 RX ORDER — CLOPIDOGREL BISULFATE 75 MG/1
75 TABLET ORAL
COMMUNITY
Start: 2024-04-26 | End: 2024-05-09

## 2024-05-03 RX ORDER — METOPROLOL SUCCINATE 25 MG/1
25 TABLET, EXTENDED RELEASE ORAL
COMMUNITY
Start: 2024-04-25 | End: 2024-05-09

## 2024-05-03 RX ORDER — URSODIOL 250 MG/1
1 TABLET, FILM COATED ORAL 2 TIMES DAILY
COMMUNITY
Start: 2024-04-25 | End: 2024-05-10

## 2024-05-03 RX ORDER — SIMETHICONE 80 MG
80 TABLET,CHEWABLE ORAL
COMMUNITY
Start: 2024-04-25

## 2024-05-03 RX ORDER — ATORVASTATIN CALCIUM 40 MG/1
40 TABLET, FILM COATED ORAL
COMMUNITY
Start: 2024-04-25 | End: 2024-05-09

## 2024-05-03 RX ORDER — NITROGLYCERIN 0.4 MG/1
0.4 TABLET SUBLINGUAL
COMMUNITY
Start: 2024-04-25

## 2024-05-03 RX ORDER — PANTOPRAZOLE SODIUM 40 MG/1
40 TABLET, DELAYED RELEASE ORAL DAILY
Qty: 30 TABLET | Refills: 1 | Status: SHIPPED | OUTPATIENT
Start: 2024-05-03 | End: 2024-06-03

## 2024-05-03 ASSESSMENT — PAIN SCALES - GENERAL: PAINLEVEL: NO PAIN (0)

## 2024-05-03 NOTE — PROGRESS NOTES
Assessment & Plan     Hospital discharge follow-up  She is tired and weak. Reviewed her Hosptilization.  GI upset from her medications.  She thinks it is the Plavix.  Looked to see if Brilinta could be give but was too expensive and her Eliquis was also too expensive.  We are going to try Protonix and then see if this is better in 3 weeks. Not eating much.  No appetite.  Eliquis doesn't give her any side effects. On 2.5 mg bid. Will continue that.  EF is at 40% has 60-70% stenosis on LAD and MidLAD respectively.     Upset stomach  As above. On medication to dissolve sx or gallstones.  Not sure if this is an issue or not.  Her alk phos was up on intial labs so we will recheck today.  With Ursodiol recheck is suppose to be after 6 month therapy but making sure no pancreatitis as she is really uncomfortable and not eating.   - pantoprazole (PROTONIX) 40 MG EC tablet; Take 1 tablet (40 mg) by mouth daily  - Hepatic panel (Albumin, ALT, AST, Bili, Alk Phos, TP); Future  - Lipase; Future  - Amylase; Future  - CBC with platelets and differential; Future    Gallstones  As above. Recheck in clinic 1 month   - Hepatic panel (Albumin, ALT, AST, Bili, Alk Phos, TP); Future  - Lipase; Future  - Amylase; Future  - CBC with platelets and differential; Future    Presence of stent in coronary artery in patient with coronary artery disease  As above. Discuss with Cardiology in clinic.     Review of external notes as documented elsewhere in note  Ordering of each unique test  Prescription drug management  20 minutes spent by me on the date of the encounter doing chart review, history and exam, documentation and further activities per the note    MED REC REQUIRED  Post Medication Reconciliation Status:       FURTHER TESTING:       - labs,   Delay Cardiac Rehab. Doesn't feel she wants to go this month.   See Patient Instructions    No follow-ups on file.    Fei Carlton is a 88 year old, presenting for the following health  "issues:  Hospital F/U        5/3/2024     8:10 AM   Additional Questions   Roomed by Apoorva Ayon   Accompanied by self         5/3/2024     8:10 AM   Patient Reported Additional Medications   Patient reports taking the following new medications none     History of Present Illness       Reason for visit:  Hospital follow up    She eats 4 or more servings of fruits and vegetables daily.She consumes 0 sweetened beverage(s) daily.She exercises with enough effort to increase her heart rate 30 to 60 minutes per day.  She exercises with enough effort to increase her heart rate 7 days per week.   She is taking medications regularly.         Hospital Follow-up Visit:    Hospital/Nursing Home/IP Rehab Facility:    Date of Admission: 04/23/2024  Date of Discharge: 04/25/2024  Reason(s) for Admission: Inferior Wall Stemi  Was the patient in the ICU or did the patient experience delirium during hospitalization?  Yes         5/3/2024     8:24 AM   Mini-Cog Total Score   Mini Cog Total Score 4     Do you have any other stressors you would like to discuss with your provider? Health Concerns-medication (Plavix)     Problems taking medications regularly:  None  Medication changes since discharge: Eliquis 2.5 mg BID, Lipitor 40 mg daily, Plavix 75 mg for one year, metorprolol 25 mg daily, nitros PRN, simethicone 80 mg one tablet QID  Problems adhering to non-medication therapy:  cardiac rehab suggested, pt said \"we'll see\"    Summary of hospitalization:  CareEverywhere information obtained and reviewed  Diagnostic Tests/Treatments reviewed.  Follow up needed: none  Other Healthcare Providers Involved in Patient s Care:         Physical Therapy and cardiac rehab and Cardiology   Update since discharge: stable.         Plan of care communicated with patient                   Review of Systems  Constitutional, HEENT, cardiovascular, pulmonary, GI, , musculoskeletal, neuro, skin, endocrine and psych systems are negative, " "except as otherwise noted.      Objective    /68 (BP Location: Left arm, Patient Position: Sitting, Cuff Size: Adult Regular)   Pulse 84   Temp 98.7  F (37.1  C) (Tympanic)   Resp 15   Ht 1.575 m (5' 2\")   Wt 58.9 kg (129 lb 14.4 oz)   SpO2 96%   BMI 23.76 kg/m    Body mass index is 23.76 kg/m .  Physical Exam   GENERAL: alert and no distress  NECK: no adenopathy, no asymmetry, masses, or scars  RESP: lungs clear to auscultation - no rales, rhonchi or wheezes  CV: irregular rate and rhythm, normal S1 S2, no S3 or S4, 2/6  murmur, click or rub, no peripheral edema. End of the day she says her legs are swollen.   ABDOMEN: soft, nontender, no hepatosplenomegaly, no masses and bowel sounds normal.   MS: no gross musculoskeletal defects noted, no edema  BACK: no CVA tenderness, no paralumbar tenderness  PSYCH: she is tired and worried.  Tells me she is a \"drag\"  due to recent heart attack.  Was related to her A fib.  Realizes now she should have been on blood thinner.  Will stay on this now but having a fair amount of GI upset with her Plavix. Will speak to Cardiology. She looks very sad today.     No results found for any visits on 05/03/24.  No results found for this or any previous visit (from the past 24 hour(s)).        Signed Electronically by: ORTEGA Caruso    "

## 2024-05-06 NOTE — RESULT ENCOUNTER NOTE
Not happy with her hepatic Direct Bili is elevating and AST is elevating. Not so sure her gallbladder is not plugging things up. Or something else. Need to see her back.

## 2024-05-09 ENCOUNTER — ANCILLARY PROCEDURE (OUTPATIENT)
Dept: GENERAL RADIOLOGY | Facility: OTHER | Age: 89
End: 2024-05-09
Attending: NURSE PRACTITIONER
Payer: MEDICARE

## 2024-05-09 ENCOUNTER — OFFICE VISIT (OUTPATIENT)
Dept: CARDIOLOGY | Facility: OTHER | Age: 89
End: 2024-05-09
Attending: NURSE PRACTITIONER
Payer: MEDICARE

## 2024-05-09 VITALS
SYSTOLIC BLOOD PRESSURE: 124 MMHG | RESPIRATION RATE: 16 BRPM | BODY MASS INDEX: 23.19 KG/M2 | WEIGHT: 126 LBS | HEIGHT: 62 IN | HEART RATE: 88 BPM | OXYGEN SATURATION: 97 % | DIASTOLIC BLOOD PRESSURE: 79 MMHG

## 2024-05-09 DIAGNOSIS — I25.10 CORONARY ARTERY DISEASE INVOLVING NATIVE CORONARY ARTERY OF NATIVE HEART WITHOUT ANGINA PECTORIS: ICD-10-CM

## 2024-05-09 DIAGNOSIS — I34.1 MITRAL VALVE PROLAPSE: ICD-10-CM

## 2024-05-09 DIAGNOSIS — E78.5 HYPERLIPIDEMIA WITH TARGET LDL LESS THAN 70: ICD-10-CM

## 2024-05-09 DIAGNOSIS — I21.11 ST ELEVATION MYOCARDIAL INFARCTION INVOLVING RIGHT CORONARY ARTERY (H): ICD-10-CM

## 2024-05-09 DIAGNOSIS — I35.0 NONRHEUMATIC AORTIC VALVE STENOSIS: ICD-10-CM

## 2024-05-09 DIAGNOSIS — I48.91 ATRIAL FIBRILLATION BY ELECTROCARDIOGRAM (H): ICD-10-CM

## 2024-05-09 DIAGNOSIS — E78.5 HYPERLIPIDEMIA LDL GOAL <70: ICD-10-CM

## 2024-05-09 DIAGNOSIS — R09.89 RESPIRATORY CRACKLES AT LEFT LUNG BASE: ICD-10-CM

## 2024-05-09 DIAGNOSIS — I50.41 ACUTE COMBINED SYSTOLIC AND DIASTOLIC HEART FAILURE (H): Primary | ICD-10-CM

## 2024-05-09 DIAGNOSIS — Z79.01 CHRONIC ANTICOAGULATION: ICD-10-CM

## 2024-05-09 DIAGNOSIS — R05.1 ACUTE COUGH: ICD-10-CM

## 2024-05-09 PROCEDURE — 99215 OFFICE O/P EST HI 40 MIN: CPT | Performed by: NURSE PRACTITIONER

## 2024-05-09 PROCEDURE — G0463 HOSPITAL OUTPT CLINIC VISIT: HCPCS

## 2024-05-09 PROCEDURE — 71046 X-RAY EXAM CHEST 2 VIEWS: CPT | Mod: TC

## 2024-05-09 PROCEDURE — G0463 HOSPITAL OUTPT CLINIC VISIT: HCPCS | Mod: 25

## 2024-05-09 RX ORDER — CLOPIDOGREL BISULFATE 75 MG/1
75 TABLET ORAL DAILY
Qty: 90 TABLET | Refills: 3 | Status: SHIPPED | OUTPATIENT
Start: 2024-05-09 | End: 2024-08-15

## 2024-05-09 RX ORDER — MAGNESIUM OXIDE 400 MG/1
400 TABLET ORAL DAILY
Qty: 90 TABLET | Refills: 3 | Status: SHIPPED | OUTPATIENT
Start: 2024-05-09

## 2024-05-09 RX ORDER — ATORVASTATIN CALCIUM 40 MG/1
40 TABLET, FILM COATED ORAL DAILY
Qty: 90 TABLET | Refills: 3 | Status: SHIPPED | OUTPATIENT
Start: 2024-05-09

## 2024-05-09 RX ORDER — FUROSEMIDE 20 MG
20 TABLET ORAL 2 TIMES DAILY
Qty: 180 TABLET | Refills: 3 | Status: SHIPPED | OUTPATIENT
Start: 2024-05-09 | End: 2024-05-10

## 2024-05-09 RX ORDER — METOPROLOL SUCCINATE 25 MG/1
25 TABLET, EXTENDED RELEASE ORAL DAILY
Qty: 90 TABLET | Refills: 3 | Status: SHIPPED | OUTPATIENT
Start: 2024-05-09

## 2024-05-09 RX ORDER — LISINOPRIL 2.5 MG/1
2.5 TABLET ORAL DAILY
Qty: 90 TABLET | Refills: 3 | Status: SHIPPED | OUTPATIENT
Start: 2024-05-09

## 2024-05-09 ASSESSMENT — PAIN SCALES - GENERAL: PAINLEVEL: NO PAIN (0)

## 2024-05-09 NOTE — PATIENT INSTRUCTIONS
Thank you for allowing Norma Garcia CNP and our  team to participate in your care. Please call our office at [phone] with scheduling questions or if you need to cancel or change your appointment. With any other questions or concerns you may call cardiology nurse at  [phone].       If you experience chest pain, chest pressure, chest tightness, shortness of breath, fainting, lightheadedness, nausea, vomiting, or other concerning symptoms, please report to the Emergency Department or call 911. These symptoms may be emergent, and best treated in the Emergency Department.      Continue current medications  START lisinopril 2.5 mg once daily for HFrEF, coronary artery disease  Low sodium diet  Continue staying active  Daily weights- update us with weight gain of 3 pounds overnight or 5 pounds in a week      Follow-up in 3 months, certainly sooner with acute concerns.     Norma Garcia CNP

## 2024-05-09 NOTE — PROGRESS NOTES
[facility] HEART CARE   CARDIOLOGY PROGRESS NOTE     Chief Complaint   Patient presents with    Follow Up    Heart Problem          Diagnosis:    ICD-10-CM    1. Acute combined systolic and diastolic heart failure (H)  I50.41 magnesium oxide 400 MG tablet     metoprolol succinate ER (TOPROL XL) 25 MG 24 hr tablet     lisinopril (ZESTRIL) 2.5 MG tablet      2. Atrial fibrillation by electrocardiogram (H)  I48.91 DISCONTINUED: furosemide (LASIX) 20 MG tablet      3. Chronic anticoagulation  Z79.01       4. Hyperlipidemia LDL goal <70  E78.5 atorvastatin (LIPITOR) 40 MG tablet      5. ST elevation myocardial infarction involving right coronary artery (H)  I21.11 atorvastatin (LIPITOR) 40 MG tablet     clopidogrel (PLAVIX) 75 MG tablet     magnesium oxide 400 MG tablet     metoprolol succinate ER (TOPROL XL) 25 MG 24 hr tablet     lisinopril (ZESTRIL) 2.5 MG tablet      6. Coronary artery disease involving native coronary artery of native heart without angina pectoris  I25.10 atorvastatin (LIPITOR) 40 MG tablet     clopidogrel (PLAVIX) 75 MG tablet      7. Hyperlipidemia with target LDL less than 70  E78.5       8. Acute cough  R05.1 X-ray Chest 2 vws*      9. Respiratory crackles at left lung base  R09.89 X-ray Chest 2 vws*      10. Nonrheumatic aortic valve stenosis  I35.0       11. Mitral valve prolapse  I34.1               Assessment/Plan:    Longstanding persistent atrial fibrillation  Diagnosed with this years ago  Overall has been asymptomatic but has been getting some racing in her chest recently.       CHADs-VASC >=2  She did start DOAC in the hospital. Continue Eliquis 2.5 mg twice daily for stroke prophylaxis with history of atrial fibrillation  No bleeding concerns today      Heart Failure with mid-range ejection fraction   - LVEF 45% on TTE 11/2023  - LVEF 35-40% on TTE 4/24/2024 at [facility]   NYHA Symptom Class III- LE edema, bibasilar rales, dyspnea, dyspnea on exertion, orthopnea  Stage  C    ACE-I/ARB/ARNi:   START lisinopril 2.5 mg once daily  BB:   Continue metoprolol succinate 25 mg once daily  SGLT-2 Inhibitor:   Consider at follow-up  Aldosterone antagonist:   Consider at follow-up  SCD prophylaxis:  Does not meet criteria for implant with LVEF of 35-40%  Fluid status Hypervolemic:  Hypervolemic_: dyspnea, dyspnea on exertion, LE edema, LLL rales. Plan for CXR and labs today.   Continue furosemide 40mg once daily  Sodium restrictions, fluid restrictions and daily weights reviewed.   Cardiac Rehab:  Not indicated (EF>35%)  Sleep Apnea Evaluation:   Consider    Mitral valve prolapse  Asymptomatic  Mild mitral insufficiency on TTE 12/2023 and 4/2024  TTE in 1 year for routine surveillance    Aortic valve stenosis  Asymptomatic  Moderate AV calcification.  Bicuspid aortic valve could not be excluded.  Moderate aortic stenosis on TTE 4/2023    Ascending aortic aneurysm  3.5 cm which is mildly dilated when compared to body surface area.  Plan for TTE 1 year for routine surveillance    STEMI 4/23/2024- [facility]  Coronary artery disease  S/p PCI proximal RCA   PTCA, ANGEL x 2, overlapping, excellent angiographic results  IVL x 49 shocks  Residual LAD disease: Prox LAD lesion is 60% stenosed. Prox LAD to Mid LAD lesion is 70% stenosed   Hyperlipidemia with LDL goal less than 70, uncontrolled  Continue atorvastatin 40 mg once daily. Titrate to achieve LDL goal.   Continue DAPT:  Clopidogrel 75 mg once daily  Eliquis 2.5 mg twice daily  If clopidogrel is stopped in the future she should be on aspirin 81 mg once daily for history of CAD  Continue beta-blockage: metoprolol for secondary prevention  Starting ACEi: Lisinopril   Continue heart healthy lifestyle    Recent Labs   Lab Test 01/15/24  1002 10/24/22  0727   CHOL 216* 220*   HDL 79 71   * 136*   TRIG 54 64           Follow-up with cardiology in 3 months, certainly sooner with acute concerns.      Interval history:  Bianka Whitmore  is a pleasant 87-year old female who presents for cardiology follow-up accompanied by her son. Recall, she has a cardiac history including atrial fibrillation, newly identified HFrEF, mitral valve prolapse with mild mitral insufficiency, mild pulmonary hypertension.     April 23, 2024 Ms. Whitmore recalls that she was having intrascapular discomfort for a couple days. Went to sit on the toilet and had a syncopal event. She woke up and got herself up. She told her  she was not feeling well so they called her son who brought her to the ED. In the ED she was found to have had a STEMI. She was transferred to [facility] for further care. She underwent coronary angiogram showing Prox RCA lesion is 100% stenosed. Culprit lesion. The lesion is type C and thrombotic. Lesion length: 52 mm. The lesion is severely calcified. She also had Prox LAD lesion is 60% stenosed. Prox LAD to Mid LAD lesion is 70% stenosed. First Diagonal Branch: 1st Diag lesion is 70% stenosed. She underwent intervention to culprit lesion of proximal RCA with PCI ANGEL x 2 overlapping which showed excellent angiographic results. TTE showed depressed systolic LV function with LVEF 35-40%,  inferoseptal base to apical akinesis, and inferior base to apical akinesis.     She has previously been resistant to medical management with pharmacology therapy. Today, She does endorse compliance with medications started in the hospital. She is walking 10-15 minutes twice daily around her house and overall feeling well doing this. No chest discomfort with this. She denies dyspnea. She does feel fatigued with doing this. She continues with bilateral LE edema. She does feel sensation of racing in her chest a couple times per day. Lasts for a couple seconds and resolves. No episodes of lightheadedness, near-syncope or syncopal events.     Sleeping in bed but will go sleep in her chair.         HPI:    Bianka Whitmore is a pleasant 78-year old female who  presents for cardiology consult. She has cardiac history including atrial fibrillation, newly identified HFrEF, mitral valve prolapse.. She has a non-cardiac history including osteoporosis with history of compression fracture thoracic/lumbar spine, diverticulitis. She was referred to cardiology by her primary care provider for recent transthoracic echocardiogram showing decreased LVEF of 45-50%, severe bi-atrial enlargement, mitral valve prolapse with mild mitral insufficiency, mild pulmonary hypertension with RVSP 40mmHg above the right atrial pressure.    No chest pain or pressure. No dyspnea, dyspnea on exertion. No LE edema.     She tells me that she spends the majority of her day on her feet being active doing housework, cooking, cleaning, doing laundry. She is able to carry her laundry basket up and down her basement stairs- she estimates 10-12 stairs- and she tolerates this without chest pain, dyspnea. She denies any racing/skipping/fluttering sensations in the chest. No lightheaded, dizziness. No exertional lightheadedness, dizziness. No near-syncope or syncopal episodes.     Mrs. Whitmore tells me that she overall feels well. She is able to do the activities she wishes to do in the day and does not have any bothersome or limiting symptoms.  She did see her young grand-daughter over José [place] who had upper respiratory illness. A few days after [place] she started with sore throat, rhinorrhea, congestion and cough. She denies dyspnea, wheezing, fever, chills, body aches.     Smoking History: Started smoking in late teens, early twenties. Smoked maybe a couple cigarettes per day until her mid-twenties.     Alcohol History: None    Substance Abuse History: None    Sleep History: Sleeps in bed with 1 pillow. No orthopnea, no PND. No snoring. No witnessed apnea. Wakes up feeling well rested 6-7 mornings out of the week. No long daytime naps- sometimes falls asleep in chair for about 10 minutes after lunch.      Family History: No known significant cardiac issues. She has five siblings (she is the second oldest) and none with any cardiac issues. Parents did not have cardiac issues.        RELEVANT TESTING:    Transthoracic Echocardiogram 12/2023  Interpretation Summary  Left ventricular function is decreased. The ejection fraction is 45-50%  (mildly reduced).  Global right ventricular function is normal.  Severe biatrial enlargement is present.     Cannot exclude a bicuspid aortic valve.  Moderate aortic stenosis is present.Moderate aortic valve calcification is  present. Low flow low gradient aortic valve stenosis (SVI is 24), MG is not  well assessed in this study. JOSE LUIS is 1.3 cm2.  Mild to moderate mitral insufficiency is present.  Mild to moderate tricuspid insufficiency is present.  IVC diameter and respiratory changes fall into an intermediate range  suggesting an RA pressure of 8 mmHg.  This study was compared with the study from 11/17/2022 . No significant  changes noted. Aortic valve stenosis appear similar in the previous.     Recommend a limited study to assess AV gradients from multiple windows.        Transthoracic Echocardiogram 11/17/2022   Interpretation Summary  Left ventricular function is decreased. The ejection fraction is 45-50%  (mildly reduced).  Global right ventricular function is normal.  Severe left atrial enlargement is present.  Severe right atrial enlargement is present.  Mitral valve prolapse is present.  Mild mitral insufficiency is present.  Mild aortic valve calcification is present.  Trace aortic insufficiency is present.  Mild to moderate tricuspid insufficiency is present.  Right ventricular systolic pressure is 40mmHg above the right atrial pressure.  Mild pulmonic insufficiency is present.      Past Medical History:   Diagnosis Date    Basal cell carcinoma     Cardiomegaly 01/29/2007    Compression fracture of thoracic vertebra (H) 10/30/2014    Compression fx, lumbar spine, with  routine healing, subsequent encounter 6/22/2016    Hyperlipidemia     Osteoporosis     Typical atrial flutter (H) 7/25/2016    Varicose vein of leg 6/22/2016       Past Surgical History:   Procedure Laterality Date    APPENDECTOMY      COLONOSCOPY N/A 12/4/2017    Procedure: COLONOSCOPY;  COLONOSCOPY WITH POLYPECTOMY AND SCLEROTHERAPY;  Surgeon: Matt Gonzalez MD;  Location: [facility] OR    ENDOSCOPIC STRIPPING VEIN(S)      HYSTERECTOMY      SALPINGO OOPHORECTOMY,R/L/BRIAN         Allergies   Allergen Reactions    Amoxicillin      Intolerant      Codeine     Hydrocodone Nausea and Vomiting    Morphine      Sensitive to Codeine.       Current Outpatient Medications   Medication Sig Dispense Refill    apixaban ANTICOAGULANT (ELIQUIS) 2.5 MG tablet Take 1 tablet by mouth 2 times daily      Ascorbic Acid (VITAMIN C PO) Take 500 mg by mouth 2 times daily      atorvastatin (LIPITOR) 40 MG tablet Take 1 tablet (40 mg) by mouth daily 90 tablet 3    B Complex-Biotin-FA (VITAMIN B50 COMPLEX PO) Take 1 tablet by mouth daily      clopidogrel (PLAVIX) 75 MG tablet Take 1 tablet (75 mg) by mouth daily 90 tablet 3    lisinopril (ZESTRIL) 2.5 MG tablet Take 1 tablet (2.5 mg) by mouth daily 90 tablet 3    magnesium oxide 400 MG tablet Take 1 tablet (400 mg) by mouth daily 90 tablet 3    metoprolol succinate ER (TOPROL XL) 25 MG 24 hr tablet Take 1 tablet (25 mg) by mouth daily 90 tablet 3    nitroGLYcerin (NITROSTAT) 0.4 MG sublingual tablet Place 0.4 mg under the tongue      pantoprazole (PROTONIX) 40 MG EC tablet Take 1 tablet (40 mg) by mouth daily 30 tablet 1    simethicone (MYLICON) 80 MG chewable tablet 80 mg      VITAMIN E COMPLEX PO Take 400 Units by mouth daily      furosemide (LASIX) 20 MG tablet Take 2 tablets (40 mg) by mouth 2 times daily         Social History     Socioeconomic History    Marital status:      Spouse name: Not on file    Number of children: Not on file    Years of education: Not on file    Highest  education level: Not on file   Occupational History    Occupation: Housewife     Comment: Retired   Tobacco Use    Smoking status: Former     Current packs/day: 0.00     Average packs/day: 0.5 packs/day for 3.2 years (1.6 ttl pk-yrs)     Types: Cigarettes     Start date: 1950     Quit date: 3/26/1953     Years since quittin.2    Smokeless tobacco: Never    Tobacco comments:     Quit in ; no passive smoke exposure   Vaping Use    Vaping status: Never Used   Substance and Sexual Activity    Alcohol use: No    Drug use: No    Sexual activity: Not Currently   Other Topics Concern     Service Not Asked    Blood Transfusions Yes    Caffeine Concern No    Occupational Exposure Not Asked    Hobby Hazards Not Asked    Sleep Concern Not Asked    Stress Concern Not Asked    Weight Concern Not Asked    Special Diet Not Asked    Back Care Not Asked    Exercise Not Asked    Bike Helmet Not Asked    Seat Belt Not Asked    Self-Exams Not Asked    Parent/sibling w/ CABG, MI or angioplasty before 65F 55M? No   Social History Narrative    Not on file     Social Determinants of Health     Financial Resource Strain: Unknown (1/15/2024)    Financial Resource Strain     Within the past 12 months, have you or your family members you live with been unable to get utilities (heat, electricity) when it was really needed?: Patient declined   Food Insecurity: Low Risk  (1/15/2024)    Food Insecurity     Within the past 12 months, did you worry that your food would run out before you got money to buy more?: No     Within the past 12 months, did the food you bought just not last and you didn t have money to get more?: Patient declined   Transportation Needs: Unknown (1/15/2024)    Transportation Needs     Within the past 12 months, has lack of transportation kept you from medical appointments, getting your medicines, non-medical meetings or appointments, work, or from getting things that you need?: Patient declined   Physical  "Activity: Not on file   Stress: Not on file   Social Connections: Not on file   Interpersonal Safety: Not on file   Housing Stability: Unknown (1/15/2024)    Housing Stability     Do you have housing? : Patient declined     Are you worried about losing your housing?: Patient declined       TEST RESULTS:   Results for orders placed or performed in visit on 05/09/24   X-ray Chest 2 vws*     Status: None    Narrative    Procedure:XR CHEST 2 VIEWS    Clinical history:Female, 88 years, cough, left lower rales; Acute  cough; Respiratory crackles at left lung base    Technique: Two views are submitted.    Comparison: 4/23/2024    Findings: The cardiac silhouette is markedly enlarged, unchanged. The  pulmonary vasculature is within normal limits.    The lungs demonstrate compressive atelectasis the right lung base.  Small a moderate sized effusion has developed on the right. Bony  structures are unremarkable.      Impression    Impression:   New small to moderate size right-sided pleural effusion with  compressive atelectasis of the right lung base.    Unchanged cardiomegaly and diffuse interstitial thickening.     KOBI LANDRY MD         SYSTEM ID:  [id number]           Review of systems: Negative except that which was noted in the HPI.    Physical examination:    Vitals: /79   Pulse 88   Resp 16   Ht 1.575 m (5' 2\")   Wt 57.2 kg (126 lb)   SpO2 97%   BMI 23.05 kg/m    BMI= Body mass index is 23.05 kg/m .      GENERAL APPEARANCE:  alert and no distress but dyspneic at rest which is new for her  CHEST: rales- left base, no use of accessory muscles, no retractions, respirations are unlabored, normal respiratory rate.   CARDIOVASCULAR: Irregularly, irregular rhythm. Normal rate. 3/6 systolic murmur auscultated loudest at RUSB.  EXTREMITIES: +2 bilateral lower extremity edema  NEURO: alert and oriented normal speech, and affect  VASC: No carotid bruits heard.  SKIN: no visible skin lesions        Thank you for " allowing me to participate in the care of your patient. Please do not hesitate to contact me if you have any questions.       Norma Garcia CNP    Total time spent on day of visit, including review of tests, obtaining/reviewing separately obtained history, ordering medications/tests/procedures, communicating with PCP/consultants, and documenting in electronic medical record: 45 minutes.

## 2024-05-10 ENCOUNTER — TELEPHONE (OUTPATIENT)
Dept: CARE COORDINATION | Facility: OTHER | Age: 89
End: 2024-05-10

## 2024-05-10 ENCOUNTER — OFFICE VISIT (OUTPATIENT)
Dept: FAMILY MEDICINE | Facility: OTHER | Age: 89
End: 2024-05-10
Attending: PHYSICIAN ASSISTANT
Payer: MEDICARE

## 2024-05-10 VITALS
DIASTOLIC BLOOD PRESSURE: 79 MMHG | HEART RATE: 75 BPM | HEIGHT: 62 IN | TEMPERATURE: 98 F | BODY MASS INDEX: 23.39 KG/M2 | RESPIRATION RATE: 16 BRPM | WEIGHT: 127.1 LBS | OXYGEN SATURATION: 94 % | SYSTOLIC BLOOD PRESSURE: 116 MMHG

## 2024-05-10 DIAGNOSIS — I48.91 ATRIAL FIBRILLATION BY ELECTROCARDIOGRAM (H): ICD-10-CM

## 2024-05-10 DIAGNOSIS — I50.9 CHRONIC CONGESTIVE HEART FAILURE, UNSPECIFIED HEART FAILURE TYPE (H): Primary | ICD-10-CM

## 2024-05-10 DIAGNOSIS — I50.41 ACUTE COMBINED SYSTOLIC AND DIASTOLIC HEART FAILURE (H): ICD-10-CM

## 2024-05-10 DIAGNOSIS — I51.7 ATRIAL ENLARGEMENT, BILATERAL: ICD-10-CM

## 2024-05-10 DIAGNOSIS — I50.9 CHRONIC CONGESTIVE HEART FAILURE, UNSPECIFIED HEART FAILURE TYPE (H): ICD-10-CM

## 2024-05-10 PROCEDURE — G0463 HOSPITAL OUTPT CLINIC VISIT: HCPCS

## 2024-05-10 PROCEDURE — 99214 OFFICE O/P EST MOD 30 MIN: CPT | Performed by: PHYSICIAN ASSISTANT

## 2024-05-10 RX ORDER — FUROSEMIDE 20 MG
40 TABLET ORAL 2 TIMES DAILY
COMMUNITY
Start: 2024-05-10 | End: 2024-07-12

## 2024-05-10 NOTE — TELEPHONE ENCOUNTER
Telephone call to patient.  Discussed the message from Norma Garcia CNP and her visit with PCP today.  Patient stated she did understand why she was having her lasix increased, and that she had to take daily weights.  Patient [place] 2 [place] [facility] [place] not have lab or xray scheduled prior to appt with Norma Garcia. Patient verbalized undertstanding and agreement with the plan.   Orders pended to provider

## 2024-05-10 NOTE — PROGRESS NOTES
Visit was done with her Son in office with her.  This was quiet helpful. He was very clear on course of her care.   has poor memory.   Assessment & Plan     Atrial enlargement, bilateral  She has a effussion right now and saw cardiology we will be having her increase dose. She was of the understanding she only took this if she gained weight. Her son was in here today with her. So it was well understood when she left here and will continue to watch her weight every day. Made an appointment with Cardiology on the way out and then with me in a month. Has more strength today. I highly encouraged her to go to Cardiac Rehab so we can see how she does.   - furosemide (LASIX) 20 MG tablet; Take 2 tablets (40 mg) by mouth 2 times daily    Atrial fibrillation by electrocardiogram (H)  As above.   - furosemide (LASIX) 20 MG tablet; Take 2 tablets (40 mg) by mouth 2 times daily    Chronic congestive heart failure, unspecified heart failure type (H)  See xray from yesterday.   - furosemide (LASIX) 20 MG tablet; Take 2 tablets (40 mg) by mouth 2 times daily    Review of external notes as documented elsewhere in note  Ordering of each unique test  Prescription drug management  10 minutes spent by me on the date of the encounter doing chart review, review of test results, and documentation         See Patient Instructions    No follow-ups on file.    Fei Carlton is a 88 year old, presenting for the following health issues:  Results        5/10/2024     7:54 AM   Additional Questions   Roomed by Melissa Crisostomo   Accompanied by son         5/10/2024     7:54 AM   Patient Reported Additional Medications   Patient reports taking the following new medications none     History of Present Illness       Reason for visit:  Hospital follow up    She eats 4 or more servings of fruits and vegetables daily.She consumes 0 sweetened beverage(s) daily.She exercises with enough effort to increase her heart rate 30 to 60 minutes per day.  " She exercises with enough effort to increase her heart rate 7 days per week.   She is taking medications regularly.       pt here to go over lab results from 5/3    Concern - follow up after heart attack and stent. Wants to be seen more often as she is worried.   Onset: 3 weeks ago.   Description: MI with stenting. Underlying A fib with heart Failure and did not want any anticoagulation in the past. Is taking Eliquis. Also had gallstones and increase inLFT Hasn't start URSODIL but she told me last week she was and it made her sick.  She is now however doing much better with Nausea on Protonix.  LFT is better.   Intensity: mild  Progression of Symptoms:  improving  Accompanying Signs & Symptoms: short of breath and weak but feels each day she is stronger.   Previous history of similar problem: yes   Precipitating factors:        Worsened by: moving around.   Alleviating factors:        Improved by: rest.   Therapies tried and outcome: see notes.         Review of Systems  Constitutional, neuro, ENT, endocrine, pulmonary, cardiac, gastrointestinal, genitourinary, musculoskeletal, integument and psychiatric systems are negative, except as otherwise noted.      Objective    /79 (BP Location: Left arm, Patient Position: Sitting, Cuff Size: Adult Regular)   Pulse 75   Temp 98  F (36.7  C) (Tympanic)   Resp 16   Ht 1.575 m (5' 2\")   Wt 57.7 kg (127 lb 1.6 oz)   SpO2 94%   BMI 23.25 kg/m    Body mass index is 23.25 kg/m .  Physical Exam   GENERAL: alert and no distress  EYES: Eyes grossly normal to inspection, PERRL and conjunctivae and sclerae normal  HENT: ear canals and TM's normal, nose and mouth without ulcers or lesions  NECK: no adenopathy, no asymmetry, masses, or scars  RESP: lungs clear to auscultation - no rales, rhonchi or wheezes  CV: regular rate and rhythm, normal S1 S2, no S3 or S4, no murmur, click or rub, no peripheral edema  ABDOMEN: soft, nontender, no hepatosplenomegaly, no masses and " bowel sounds normal  MS: no gross musculoskeletal defects noted, no edema  SKIN: no suspicious lesions or rashes  NEURO: Normal strength and tone, mentation intact and speech normal  PSYCH: mentation appears normal, affect normal/bright    Office Visit on 05/03/2024   Component Date Value Ref Range Status    Protein Total 05/03/2024 7.0  6.4 - 8.3 g/dL Final    Albumin 05/03/2024 3.8  3.5 - 5.2 g/dL Final    Bilirubin Total 05/03/2024 1.4 (H)  <=1.2 mg/dL Final    Alkaline Phosphatase 05/03/2024 107  40 - 150 U/L Final    Reference intervals for this test were updated on 11/14/2023 to more accurately reflect our healthy population. There may be differences in the flagging of prior results with similar values performed with this method. Interpretation of those prior results can be made in the context of the updated reference intervals.    AST 05/03/2024 56 (H)  0 - 45 U/L Final    Reference intervals for this test were updated on 6/12/2023 to more accurately reflect our healthy population. There may be differences in the flagging of prior results with similar values performed with this method. Interpretation of those prior results can be made in the context of the updated reference intervals.    ALT 05/03/2024 42  0 - 50 U/L Final    Reference intervals for this test were updated on 6/12/2023 to more accurately reflect our healthy population. There may be differences in the flagging of prior results with similar values performed with this method. Interpretation of those prior results can be made in the context of the updated reference intervals.      Bilirubin Direct 05/03/2024 0.43 (H)  0.00 - 0.30 mg/dL Final    Lipase 05/03/2024 29  13 - 60 U/L Final    Amylase 05/03/2024 43  28 - 100 U/L Final    WBC Count 05/03/2024 6.6  4.0 - 11.0 10e3/uL Final    RBC Count 05/03/2024 4.37  3.80 - 5.20 10e6/uL Final    Hemoglobin 05/03/2024 14.6  11.7 - 15.7 g/dL Final    Hematocrit 05/03/2024 42.0  35.0 - 47.0 % Final    MCV  05/03/2024 96  78 - 100 fL Final    MCH 05/03/2024 33.4 (H)  26.5 - 33.0 pg Final    MCHC 05/03/2024 34.8  31.5 - 36.5 g/dL Final    RDW 05/03/2024 14.4  10.0 - 15.0 % Final    Platelet Count 05/03/2024 207  150 - 450 10e3/uL Final    % Neutrophils 05/03/2024 73  % Final    % Lymphocytes 05/03/2024 16  % Final    % Monocytes 05/03/2024 10  % Final    % Eosinophils 05/03/2024 1  % Final    % Basophils 05/03/2024 0  % Final    % Immature Granulocytes 05/03/2024 0  % Final    NRBCs per 100 WBC 05/03/2024 0  <1 /100 Final    Absolute Neutrophils 05/03/2024 4.8  1.6 - 8.3 10e3/uL Final    Absolute Lymphocytes 05/03/2024 1.1  0.8 - 5.3 10e3/uL Final    Absolute Monocytes 05/03/2024 0.7  0.0 - 1.3 10e3/uL Final    Absolute Eosinophils 05/03/2024 0.0  0.0 - 0.7 10e3/uL Final    Absolute Basophils 05/03/2024 0.0  0.0 - 0.2 10e3/uL Final    Absolute Immature Granulocytes 05/03/2024 0.0  <=0.4 10e3/uL Final    Absolute NRBCs 05/03/2024 0.0  10e3/uL Final     No results found for any visits on 05/10/24.      CXR shows effussion.   Signed Electronically by: ORTEGA Caruso

## 2024-05-21 ENCOUNTER — HOSPITAL ENCOUNTER (OUTPATIENT)
Dept: CARDIAC REHAB | Facility: HOSPITAL | Age: 89
Discharge: HOME | End: 2024-05-21
Attending: INTERNAL MEDICINE
Payer: MEDICARE

## 2024-05-21 PROCEDURE — 93798 PHYS/QHP OP CAR RHAB W/ECG: CPT

## 2024-05-21 PROCEDURE — 999N000109 HC STATISTIC OP CR VISIT

## 2024-05-22 ENCOUNTER — HOSPITAL ENCOUNTER (OUTPATIENT)
Dept: CARDIAC REHAB | Facility: HOSPITAL | Age: 89
Discharge: HOME | End: 2024-05-22
Attending: INTERNAL MEDICINE
Payer: MEDICARE

## 2024-05-22 PROCEDURE — 93798 PHYS/QHP OP CAR RHAB W/ECG: CPT

## 2024-05-22 PROCEDURE — 999N000109 HC STATISTIC OP CR VISIT

## 2024-05-23 ENCOUNTER — ANCILLARY PROCEDURE (OUTPATIENT)
Dept: GENERAL RADIOLOGY | Facility: OTHER | Age: 89
End: 2024-05-23
Attending: NURSE PRACTITIONER
Payer: MEDICARE

## 2024-05-23 ENCOUNTER — OFFICE VISIT (OUTPATIENT)
Dept: CARDIOLOGY | Facility: OTHER | Age: 89
End: 2024-05-23
Attending: NURSE PRACTITIONER
Payer: MEDICARE

## 2024-05-23 ENCOUNTER — LAB (OUTPATIENT)
Dept: LAB | Facility: OTHER | Age: 89
End: 2024-05-23
Payer: MEDICARE

## 2024-05-23 VITALS
SYSTOLIC BLOOD PRESSURE: 112 MMHG | DIASTOLIC BLOOD PRESSURE: 78 MMHG | WEIGHT: 116 LBS | OXYGEN SATURATION: 95 % | BODY MASS INDEX: 21.35 KG/M2 | RESPIRATION RATE: 16 BRPM | HEART RATE: 92 BPM | HEIGHT: 62 IN

## 2024-05-23 DIAGNOSIS — I50.41 ACUTE COMBINED SYSTOLIC AND DIASTOLIC HEART FAILURE (H): ICD-10-CM

## 2024-05-23 DIAGNOSIS — I50.9 CHRONIC CONGESTIVE HEART FAILURE, UNSPECIFIED HEART FAILURE TYPE (H): ICD-10-CM

## 2024-05-23 DIAGNOSIS — I48.91 ATRIAL FIBRILLATION BY ELECTROCARDIOGRAM (H): Primary | ICD-10-CM

## 2024-05-23 LAB
ANION GAP SERPL CALCULATED.3IONS-SCNC: 10 MMOL/L (ref 7–15)
BUN SERPL-MCNC: 20.4 MG/DL (ref 8–23)
CALCIUM SERPL-MCNC: 9.9 MG/DL (ref 8.8–10.2)
CHLORIDE SERPL-SCNC: 97 MMOL/L (ref 98–107)
CREAT SERPL-MCNC: 0.76 MG/DL (ref 0.51–0.95)
DEPRECATED HCO3 PLAS-SCNC: 26 MMOL/L (ref 22–29)
EGFRCR SERPLBLD CKD-EPI 2021: 75 ML/MIN/1.73M2
GLUCOSE SERPL-MCNC: 125 MG/DL (ref 70–99)
NT-PROBNP SERPL-MCNC: 5528 PG/ML (ref 0–1800)
POTASSIUM SERPL-SCNC: 3.5 MMOL/L (ref 3.4–5.3)
SODIUM SERPL-SCNC: 133 MMOL/L (ref 135–145)

## 2024-05-23 PROCEDURE — 99214 OFFICE O/P EST MOD 30 MIN: CPT | Performed by: NURSE PRACTITIONER

## 2024-05-23 PROCEDURE — 36415 COLL VENOUS BLD VENIPUNCTURE: CPT | Mod: ZL

## 2024-05-23 PROCEDURE — G0463 HOSPITAL OUTPT CLINIC VISIT: HCPCS

## 2024-05-23 PROCEDURE — 80048 BASIC METABOLIC PNL TOTAL CA: CPT | Mod: ZL

## 2024-05-23 PROCEDURE — 71046 X-RAY EXAM CHEST 2 VIEWS: CPT | Mod: TC

## 2024-05-23 PROCEDURE — 83880 ASSAY OF NATRIURETIC PEPTIDE: CPT | Mod: ZL

## 2024-05-23 PROCEDURE — G0463 HOSPITAL OUTPT CLINIC VISIT: HCPCS | Mod: 25

## 2024-05-23 ASSESSMENT — PAIN SCALES - GENERAL: PAINLEVEL: NO PAIN (0)

## 2024-05-23 NOTE — PROGRESS NOTES
[facility] HEART CARE   CARDIOLOGY PROGRESS NOTE     Chief Complaint   Patient presents with    Follow Up    Heart Failure          Diagnosis:    ICD-10-CM    1. Atrial fibrillation by electrocardiogram (H)  I48.91 apixaban ANTICOAGULANT (ELIQUIS ANTICOAGULANT) 5 MG tablet     Echocardiogram Complete      2. Acute combined systolic and diastolic heart failure (H)  I50.41 Echocardiogram Complete                Assessment/Plan:    Longstanding persistent atrial fibrillation  Diagnosed with this years ago  Overall has been asymptomatic but has been getting some racing in her chest recently.       CHADs-VASC >=2  She did start DOAC in the hospital. Continue Eliquis 2.5 mg twice daily for stroke prophylaxis with history of atrial fibrillation  No bleeding concerns today      Heart Failure with mid-range ejection fraction   - LVEF 45% on TTE 11/2023  - LVEF 35-40% on TTE 4/24/2024 at [facility]   NYHA Symptom Class III- LE edema, bibasilar rales, dyspnea, dyspnea on exertion, orthopnea  Stage C    ACE-I/ARB/ARNi:   Continue lisinopril 2.5 mg once daily  BB:   Continue metoprolol succinate 25 mg once daily  SGLT-2 Inhibitor:   Consider at follow-up  Aldosterone antagonist:   Consider at follow-up  SCD prophylaxis:  Does not meet criteria for implant with LVEF of 35-40%  Fluid status Hypervolemic:  Hypervolemic: bilateral rales but improved dyspnea and LE edema and weight  Decrease furosemide 40mg BID to 40 mg in AM and 20 mg in PM  Sodium restrictions, fluid restrictions and daily weights reviewed.   Cardiac Rehab:  Not indicated (EF>35%)  Sleep Apnea Evaluation:   Consider    Wt Readings from Last 5 Encounters:   05/23/24 52.6 kg (116 lb)   05/10/24 57.7 kg (127 lb 1.6 oz)   05/09/24 57.2 kg (126 lb)   05/03/24 58.9 kg (129 lb 14.4 oz)   01/15/24 56.6 kg (124 lb 11.2 oz)       Mitral valve prolapse  Asymptomatic  Mild mitral insufficiency on TTE 12/2023 and 4/2024    Aortic valve stenosis  Asymptomatic  Moderate AV  calcification.  Bicuspid aortic valve could not be excluded.  Moderate aortic stenosis on TTE 4/2023    Ascending aortic aneurysm  3.5 cm which is mildly dilated when compared to body surface area.  Plan for TTE 1 year for routine surveillance    STEMI 4/23/2024- [facility]  Coronary artery disease  S/p PCI proximal RCA   PTCA, ANGEL x 2, overlapping, excellent angiographic results  IVL x 49 shocks  Residual LAD disease: Prox LAD lesion is 60% stenosed. Prox LAD to Mid LAD lesion is 70% stenosed   Hyperlipidemia with LDL goal less than 70, uncontrolled  Continue atorvastatin 40 mg once daily. Titrate to achieve LDL goal.   Continue DAPT:  Clopidogrel 75 mg once daily  Eliquis 2.5 mg twice daily  If clopidogrel is stopped in the future she should be on aspirin 81 mg once daily for history of CAD  Continue beta-blockage: metoprolol for secondary prevention  Starting ACEi: Lisinopril   Continue heart healthy lifestyle    Recent Labs   Lab Test 01/15/24  1002 10/24/22  0727   CHOL 216* 220*   HDL 79 71   * 136*   TRIG 54 64         Follow-up with cardiology in 3 months after TTE, certainly sooner with acute concerns.      Interval history:  Bianka Whitmore is a pleasant 87-year old female who presents for cardiology follow-up accompanied by her son. Recall, she has a cardiac history including atrial fibrillation, newly identified HFrEF, mitral valve prolapse with mild mitral insufficiency, mild pulmonary hypertension.     April 23, 2024 Ms. Whitmore recalls that she was having intrascapular discomfort for a couple days. Went to sit on the toilet and had a syncopal event. She woke up and got herself up. She told her  she was not feeling well so they called her son who brought her to the ED. In the ED she was found to have had a STEMI. She was transferred to [facility] for further care. She underwent coronary angiogram showing Prox RCA lesion is 100% stenosed. Culprit lesion. The lesion is  type C and thrombotic. Lesion length: 52 mm. The lesion is severely calcified. She also had Prox LAD lesion is 60% stenosed. Prox LAD to Mid LAD lesion is 70% stenosed. First Diagonal Branch: 1st Diag lesion is 70% stenosed. She underwent intervention to culprit lesion of proximal RCA with PCI ANGEL x 2 overlapping which showed excellent angiographic results. TTE showed depressed systolic LV function with LVEF 35-40%,  inferoseptal base to apical akinesis, and inferior base to apical akinesis.     She has previously been resistant to medical management with pharmacology therapy. Today, She continues to endorse compliance with medications. We started lisinopril at prior visit and she has been doing well with this. She continues with her walking 10-15 minutes twice daily around her house and overall feeling well doing this. She just started cardiac rehab. No chest discomfort with this. She denies dyspnea. Bilateral LE edema and weight improved since prior visit.. She does feel sensation of racing in her chest a couple times per day. Lasts for a couple seconds and resolves. No episodes of lightheadedness, near-syncope or syncopal events.         HPI:    Bianka Whitmore is a pleasant 78-year old female who presents for cardiology consult. She has cardiac history including atrial fibrillation, newly identified HFrEF, mitral valve prolapse.. She has a non-cardiac history including osteoporosis with history of compression fracture thoracic/lumbar spine, diverticulitis. She was referred to cardiology by her primary care provider for recent transthoracic echocardiogram showing decreased LVEF of 45-50%, severe bi-atrial enlargement, mitral valve prolapse with mild mitral insufficiency, mild pulmonary hypertension with RVSP 40mmHg above the right atrial pressure.    No chest pain or pressure. No dyspnea, dyspnea on exertion. No LE edema.     She tells me that she spends the majority of her day on her feet being active doing  housework, cooking, cleaning, doing laundry. She is able to carry her laundry basket up and down her basement stairs- she estimates 10-12 stairs- and she tolerates this without chest pain, dyspnea. She denies any racing/skipping/fluttering sensations in the chest. No lightheaded, dizziness. No exertional lightheadedness, dizziness. No near-syncope or syncopal episodes.     Mrs. Whitmore tells me that she overall feels well. She is able to do the activities she wishes to do in the day and does not have any bothersome or limiting symptoms.  She did see her young grand-daughter over Christmas who had upper respiratory illness. A few days after [place] she started with sore throat, rhinorrhea, congestion and cough. She denies dyspnea, wheezing, fever, chills, body aches.     Smoking History: Started smoking in late teens, early twenties. Smoked maybe a couple cigarettes per day until her mid-twenties.     Alcohol History: None    Substance Abuse History: None    Sleep History: Sleeps in bed with 1 pillow. No orthopnea, no PND. No snoring. No witnessed apnea. Wakes up feeling well rested 6-7 mornings out of the week. No long daytime naps- sometimes falls asleep in chair for about 10 minutes after lunch.     Family History: No known significant cardiac issues. She has five siblings (she is the second oldest) and none with any cardiac issues. Parents did not have cardiac issues.        RELEVANT TESTING:    Transthoracic Echocardiogram 12/2023  Interpretation Summary  Left ventricular function is decreased. The ejection fraction is 45-50%  (mildly reduced).  Global right ventricular function is normal.  Severe biatrial enlargement is present.     Cannot exclude a bicuspid aortic valve.  Moderate aortic stenosis is present.Moderate aortic valve calcification is  present. Low flow low gradient aortic valve stenosis (SVI is 24), MG is not  well assessed in this study. JOSE LUIS is 1.3 cm2.  Mild to moderate mitral insufficiency is  present.  Mild to moderate tricuspid insufficiency is present.  IVC diameter and respiratory changes fall into an intermediate range  suggesting an RA pressure of 8 mmHg.  This study was compared with the study from 11/17/2022 . No significant  changes noted. Aortic valve stenosis appear similar in the previous.     Recommend a limited study to assess AV gradients from multiple windows.        Transthoracic Echocardiogram 11/17/2022   Interpretation Summary  Left ventricular function is decreased. The ejection fraction is 45-50%  (mildly reduced).  Global right ventricular function is normal.  Severe left atrial enlargement is present.  Severe right atrial enlargement is present.  Mitral valve prolapse is present.  Mild mitral insufficiency is present.  Mild aortic valve calcification is present.  Trace aortic insufficiency is present.  Mild to moderate tricuspid insufficiency is present.  Right ventricular systolic pressure is 40mmHg above the right atrial pressure.  Mild pulmonic insufficiency is present.      Past Medical History:   Diagnosis Date    Basal cell carcinoma     Cardiomegaly 01/29/2007    Compression fracture of thoracic vertebra (H) 10/30/2014    Compression fx, lumbar spine, with routine healing, subsequent encounter 6/22/2016    Hyperlipidemia     Osteoporosis     Typical atrial flutter (H) 7/25/2016    Varicose vein of leg 6/22/2016       Past Surgical History:   Procedure Laterality Date    APPENDECTOMY      COLONOSCOPY N/A 12/4/2017    Procedure: COLONOSCOPY;  COLONOSCOPY WITH POLYPECTOMY AND SCLEROTHERAPY;  Surgeon: Matt Gonzalez MD;  Location: [facility] OR    ENDOSCOPIC STRIPPING VEIN(S)      HYSTERECTOMY      SALPINGO OOPHORECTOMY,R/L/BRIAN         Allergies   Allergen Reactions    Amoxicillin      Intolerant      Codeine     Hydrocodone Nausea and Vomiting    Morphine      Sensitive to Codeine.       Current Outpatient Medications   Medication Sig Dispense Refill    apixaban ANTICOAGULANT  (ELIQUIS ANTICOAGULANT) 5 MG tablet Take 1 tablet (5 mg) by mouth 2 times daily 180 tablet 5    Ascorbic Acid (VITAMIN C PO) Take 500 mg by mouth 2 times daily      atorvastatin (LIPITOR) 40 MG tablet Take 1 tablet (40 mg) by mouth daily 90 tablet 3    B Complex-Biotin-FA (VITAMIN B50 COMPLEX PO) Take 1 tablet by mouth daily      clopidogrel (PLAVIX) 75 MG tablet Take 1 tablet (75 mg) by mouth daily 90 tablet 3    furosemide (LASIX) 20 MG tablet Take 2 tablets (40 mg) by mouth 2 times daily      lisinopril (ZESTRIL) 2.5 MG tablet Take 1 tablet (2.5 mg) by mouth daily 90 tablet 3    magnesium oxide 400 MG tablet Take 1 tablet (400 mg) by mouth daily 90 tablet 3    metoprolol succinate ER (TOPROL XL) 25 MG 24 hr tablet Take 1 tablet (25 mg) by mouth daily 90 tablet 3    nitroGLYcerin (NITROSTAT) 0.4 MG sublingual tablet Place 0.4 mg under the tongue      pantoprazole (PROTONIX) 40 MG EC tablet Take 1 tablet (40 mg) by mouth daily 30 tablet 1    simethicone (MYLICON) 80 MG chewable tablet 80 mg      VITAMIN E COMPLEX PO Take 400 Units by mouth daily         Social History     Socioeconomic History    Marital status:      Spouse name: Not on file    Number of children: Not on file    Years of education: Not on file    Highest education level: Not on file   Occupational History    Occupation: Housewife     Comment: Retired   Tobacco Use    Smoking status: Former     Current packs/day: 0.00     Average packs/day: 0.5 packs/day for 3.2 years (1.6 ttl pk-yrs)     Types: Cigarettes     Start date: 1950     Quit date: 3/26/1953     Years since quittin.2    Smokeless tobacco: Never    Tobacco comments:     Quit in ; no passive smoke exposure   Vaping Use    Vaping status: Never Used   Substance and Sexual Activity    Alcohol use: No    Drug use: No    Sexual activity: Not Currently   Other Topics Concern     Service Not Asked    Blood Transfusions Yes    Caffeine Concern No    Occupational Exposure  Not Asked    Hobby Hazards Not Asked    Sleep Concern Not Asked    Stress Concern Not Asked    Weight Concern Not Asked    Special Diet Not Asked    Back Care Not Asked    Exercise Not Asked    Bike Helmet Not Asked    Seat Belt Not Asked    Self-Exams Not Asked    Parent/sibling w/ CABG, MI or angioplasty before 65F 55M? No   Social History Narrative    Not on file     Social Determinants of Health     Financial Resource Strain: Unknown (1/15/2024)    Financial Resource Strain     Within the past 12 months, have you or your family members you live with been unable to get utilities (heat, electricity) when it was really needed?: Patient declined   Food Insecurity: Low Risk  (1/15/2024)    Food Insecurity     Within the past 12 months, did you worry that your food would run out before you got money to buy more?: No     Within the past 12 months, did the food you bought just not last and you didn t have money to get more?: Patient declined   Transportation Needs: Unknown (1/15/2024)    Transportation Needs     Within the past 12 months, has lack of transportation kept you from medical appointments, getting your medicines, non-medical meetings or appointments, work, or from getting things that you need?: Patient declined   Physical Activity: Not on file   Stress: Not on file   Social Connections: Not on file   Interpersonal Safety: Not on file   Housing Stability: Unknown (1/15/2024)    Housing Stability     Do you have housing? : Patient declined     Are you worried about losing your housing?: Patient declined       TEST RESULTS:   Results for orders placed or performed in visit on 05/23/24   XR Chest 2 Views     Status: None    Narrative    Procedure:XR CHEST 2 VIEWS    Clinical history:Female, 88 years, Chronic congestive heart failure,  unspecified heart failure type (H)    Technique: Two views are submitted.    Comparison: No relevant prior imaging.    Findings: The cardiac silhouette is markedly enlarged and  "unchanged.  The pulmonary vasculature is mildly distended    The lungs are again hyperinflated. There is very slight blunting of  the right costophrenic angle suggesting a small effusion which is  decreased significantly compared to prior study. Bony structures are  unremarkable.      Impression    Impression:   No acute abnormality.     Small/minimal right-sided effusion has decreased in volume compared to  the prior study.    Hyperinflation of the lungs and cardiomegaly are similar in  appearance.    KOBI LANDRY MD         SYSTEM ID:  [id number]   Results for orders placed or performed in visit on 05/23/24   BNP-N terminal pro     Status: Abnormal   Result Value Ref Range    N Terminal Pro BNP Outpatient 5,528 (H) 0 - 1,800 pg/mL   Basic metabolic panel     Status: Abnormal   Result Value Ref Range    Sodium 133 (L) 135 - 145 mmol/L    Potassium 3.5 3.4 - 5.3 mmol/L    Chloride 97 (L) 98 - 107 mmol/L    Carbon Dioxide (CO2) 26 22 - 29 mmol/L    Anion Gap 10 7 - 15 mmol/L    Urea Nitrogen 20.4 8.0 - 23.0 mg/dL    Creatinine 0.76 0.51 - 0.95 mg/dL    GFR Estimate 75 >60 mL/min/1.73m2    Calcium 9.9 8.8 - 10.2 mg/dL    Glucose 125 (H) 70 - 99 mg/dL           Review of systems: Negative except that which was noted in the HPI.    Physical examination:    Vitals: /78   Pulse 92   Resp 16   Ht 1.575 m (5' 2\")   Wt 52.6 kg (116 lb)   SpO2 95%   BMI 21.22 kg/m    BMI= Body mass index is 21.22 kg/m .      GENERAL APPEARANCE:  alert and no distress  CHEST: bibasilar rales, easier work of breathing compared to prior visit, no use of accessory muscles, no retractions, respirations are unlabored, normal respiratory rate.   CARDIOVASCULAR: Irregularly, irregular rhythm. Normal rate. 3/6 systolic murmur auscultated loudest at RUSB.  EXTREMITIES: +trace bilateral lower extremity edema  NEURO: alert and oriented normal speech, and affect  VASC: No carotid bruits heard.  SKIN: no visible skin lesions        Thank you " for allowing me to participate in the care of your patient. Please do not hesitate to contact me if you have any questions.       Norma Garcia, CNP

## 2024-05-23 NOTE — PATIENT INSTRUCTIONS
Thank you for allowing Norma Garcia CNP and our  team to participate in your care. Please call our office at [phone] with scheduling questions or if you need to cancel or change your appointment. With any other questions or concerns you may call cardiology nurse at  [phone].       If you experience chest pain, chest pressure, chest tightness, shortness of breath, fainting, lightheadedness, nausea, vomiting, or other concerning symptoms, please report to the Emergency Department or call 911. These symptoms may be emergent, and best treated in the Emergency Department.

## 2024-05-24 ENCOUNTER — PATIENT OUTREACH (OUTPATIENT)
Dept: CARE COORDINATION | Facility: OTHER | Age: 89
End: 2024-05-24

## 2024-05-24 NOTE — PROGRESS NOTES
Telephone call to patient.  Patient did have visit with cardiology the day prior.  Contacted patient to reach out, let them know they are still on my list and to see how she is feeling.  Patient states she is feeling well.  Weight has been maintained at a 116#, she denies any SOB or BLE edema.  Told patient I would call next week and check back in, she was agreeable to that plan.

## 2024-05-28 ENCOUNTER — TELEPHONE (OUTPATIENT)
Dept: CARDIOLOGY | Facility: OTHER | Age: 89
End: 2024-05-28

## 2024-05-28 NOTE — TELEPHONE ENCOUNTER
Patient called questioning the increase in her eliquis from 2.5 mg BID to 5 mg BID. Wondering if this is potential or accidental.   Angy Thompson LPN

## 2024-05-28 NOTE — TELEPHONE ENCOUNTER
BEHAVIORAL NOTE BY AM RN    Pt is alert and orientated, well groomed, smiling and friendly, is getting a long very well with her roommate, attending groups and participating well.  Very bright affect.  States does feel whoozey, and spacey.   Pt is c/o snoring, and will be evaluated for sleep apnea at a later date.   Spoke with Norma Garcia and She intentionally increased dose to 5 mg BID as this is the recommended dosage for A-fib and Patient kidney function is WNL. Call placed to let patient know to take her 5 mg eliquis BID. Patient agreed and had no further questions or concerns.  Angy Thompson LPN

## 2024-05-29 ENCOUNTER — HOSPITAL ENCOUNTER (OUTPATIENT)
Dept: CARDIAC REHAB | Facility: HOSPITAL | Age: 89
Discharge: HOME | End: 2024-05-29
Attending: INTERNAL MEDICINE
Payer: MEDICARE

## 2024-05-29 PROCEDURE — 93798 PHYS/QHP OP CAR RHAB W/ECG: CPT

## 2024-05-29 PROCEDURE — 999N000109 HC STATISTIC OP CR VISIT

## 2024-05-31 ENCOUNTER — TELEPHONE (OUTPATIENT)
Dept: CARE COORDINATION | Facility: OTHER | Age: 89
End: 2024-05-31

## 2024-05-31 NOTE — TELEPHONE ENCOUNTER
Telephone call to patient.  No answer.  Left message letting her know I will try again next week to call her.   Ashley Chapa RN  Cardiology/CHF Care Coordinator  May 31, 2024...4:23 PM

## 2024-06-03 ENCOUNTER — OFFICE VISIT (OUTPATIENT)
Dept: FAMILY MEDICINE | Facility: OTHER | Age: 89
End: 2024-06-03
Attending: PHYSICIAN ASSISTANT
Payer: MEDICARE

## 2024-06-03 VITALS
DIASTOLIC BLOOD PRESSURE: 58 MMHG | RESPIRATION RATE: 16 BRPM | SYSTOLIC BLOOD PRESSURE: 90 MMHG | TEMPERATURE: 98 F | OXYGEN SATURATION: 90 % | WEIGHT: 115.7 LBS | BODY MASS INDEX: 21.16 KG/M2 | HEART RATE: 88 BPM

## 2024-06-03 DIAGNOSIS — E86.1 HYPOTENSION DUE TO HYPOVOLEMIA: ICD-10-CM

## 2024-06-03 DIAGNOSIS — K30 UPSET STOMACH: ICD-10-CM

## 2024-06-03 DIAGNOSIS — I48.91 ATRIAL FIBRILLATION BY ELECTROCARDIOGRAM (H): Primary | ICD-10-CM

## 2024-06-03 LAB
BASOPHILS # BLD AUTO: 0 10E3/UL (ref 0–0.2)
BASOPHILS NFR BLD AUTO: 1 %
EOSINOPHIL # BLD AUTO: 0.2 10E3/UL (ref 0–0.7)
EOSINOPHIL NFR BLD AUTO: 3 %
ERYTHROCYTE [DISTWIDTH] IN BLOOD BY AUTOMATED COUNT: 14.2 % (ref 10–15)
HCT VFR BLD AUTO: 44.4 % (ref 35–47)
HGB BLD-MCNC: 15.3 G/DL (ref 11.7–15.7)
HOLD SPECIMEN: NORMAL
IMM GRANULOCYTES # BLD: 0 10E3/UL
IMM GRANULOCYTES NFR BLD: 0 %
LYMPHOCYTES # BLD AUTO: 1.1 10E3/UL (ref 0.8–5.3)
LYMPHOCYTES NFR BLD AUTO: 19 %
MCH RBC QN AUTO: 33 PG (ref 26.5–33)
MCHC RBC AUTO-ENTMCNC: 34.5 G/DL (ref 31.5–36.5)
MCV RBC AUTO: 96 FL (ref 78–100)
MONOCYTES # BLD AUTO: 0.5 10E3/UL (ref 0–1.3)
MONOCYTES NFR BLD AUTO: 9 %
NEUTROPHILS # BLD AUTO: 3.9 10E3/UL (ref 1.6–8.3)
NEUTROPHILS NFR BLD AUTO: 69 %
NRBC # BLD AUTO: 0 10E3/UL
NRBC BLD AUTO-RTO: 0 /100
PLATELET # BLD AUTO: 159 10E3/UL (ref 150–450)
RBC # BLD AUTO: 4.64 10E6/UL (ref 3.8–5.2)
WBC # BLD AUTO: 5.7 10E3/UL (ref 4–11)

## 2024-06-03 PROCEDURE — 99213 OFFICE O/P EST LOW 20 MIN: CPT | Performed by: PHYSICIAN ASSISTANT

## 2024-06-03 PROCEDURE — 36415 COLL VENOUS BLD VENIPUNCTURE: CPT | Mod: ZL | Performed by: PHYSICIAN ASSISTANT

## 2024-06-03 PROCEDURE — 85025 COMPLETE CBC W/AUTO DIFF WBC: CPT | Mod: ZL | Performed by: PHYSICIAN ASSISTANT

## 2024-06-03 PROCEDURE — G0463 HOSPITAL OUTPT CLINIC VISIT: HCPCS

## 2024-06-03 RX ORDER — PANTOPRAZOLE SODIUM 40 MG/1
40 TABLET, DELAYED RELEASE ORAL DAILY
Qty: 30 TABLET | Refills: 1 | Status: SHIPPED | OUTPATIENT
Start: 2024-06-03 | End: 2024-07-01

## 2024-06-03 ASSESSMENT — PAIN SCALES - GENERAL: PAINLEVEL: NO PAIN (0)

## 2024-06-03 NOTE — PROGRESS NOTES
Assessment & Plan     Atrial fibrillation by electrocardiogram (H)  In Afib rate well controlled blood pressure is low. See us back in a month.     Hypotension due to hypovolemia  Had 4 days of mucous and diarrhea with blood in it. Was  bright red. Occasionally was a little bit darker. Doesn't feel weak.  Likes Cardiac Rehab. I checked all her pressures where are good all hold at about 100.  Drinking and eating well. I checked her blood sugar   - CBC with platelets and differential; Future    Upset stomach  Rechecked on this. Helped her stomach. Refill of this. Just went up to 5 mg bid on Eliquis not taking her Garlic.  Has been eating and her weight is stable at 114 to 115.    - pantoprazole (PROTONIX) 40 MG EC tablet; Take 1 tablet (40 mg) by mouth daily                No follow-ups on file.    Fei Carlton is a 88 year old, presenting for the following health issues:  Follow Up        6/3/2024     8:03 AM   Additional Questions   Roomed by javed mccormack lpn   Accompanied by son         6/3/2024     8:03 AM   Patient Reported Additional Medications   Patient reports taking the following new medications none     History of Present Illness       Reason for visit:  Heart    She eats 4 or more servings of fruits and vegetables daily.She consumes 0 sweetened beverage(s) daily.She exercises with enough effort to increase her heart rate 30 to 60 minutes per day.  She exercises with enough effort to increase her heart rate 7 days per week.   She is taking medications regularly.       Concern - follow up on heart attack and stent  Onset: 2 months  Description: follow up  Intensity: n/a  Progression of Symptoms:  improving  Accompanying Signs & Symptoms: none  Previous history of similar problem: yes  Precipitating factors:        Worsened by: none  Alleviating factors:        Improved by: rest  Therapies tried and outcome: see notes            Objective    BP 90/58 (BP Location: Left arm, Patient Position:  Sitting, Cuff Size: Adult Regular)   Pulse 88   Temp 98  F (36.7  C) (Tympanic)   Resp 16   Wt 52.5 kg (115 lb 11.2 oz)   SpO2 90%   BMI 21.16 kg/m    Body mass index is 21.16 kg/m .  Physical Exam               Signed Electronically by: ORTEGA Caruso

## 2024-06-05 ENCOUNTER — HOSPITAL ENCOUNTER (OUTPATIENT)
Dept: CARDIAC REHAB | Facility: HOSPITAL | Age: 89
Discharge: HOME | End: 2024-06-05
Attending: PHYSICIAN ASSISTANT
Payer: MEDICARE

## 2024-06-05 PROCEDURE — 93798 PHYS/QHP OP CAR RHAB W/ECG: CPT

## 2024-06-05 PROCEDURE — 999N000109 HC STATISTIC OP CR VISIT

## 2024-06-07 ENCOUNTER — PATIENT OUTREACH (OUTPATIENT)
Dept: CARE COORDINATION | Facility: OTHER | Age: 89
End: 2024-06-07

## 2024-06-07 ASSESSMENT — ACTIVITIES OF DAILY LIVING (ADL): DEPENDENT_IADLS:: INDEPENDENT

## 2024-06-07 NOTE — LETTER
Bianka Whitmore  [address]  [place] [address]    Date: June 7, 2024    Dear Bianka,     Thank you for taking the time to speak with me on the phone regarding your health today. I hope you are finding some benefits in your Clinic Care Coordination enrollment. As a care coordinator, I can help you navigate complex healthcare systems and provide accurate information, health education, and extra support.  We can help you understand more about health challenges you might be experiencing and to help you manage them better.      I have enclosed some important health self-management information to help you care for yourself at home.  We'll go over some of these materials in our calls to talk about your health goals and to create a plan to address your needs and how I can support you.    Please feel free to call me if you have any questions about the material.     The care coordination program is voluntary and offered to you at no cost. You may discontinue the program at anytime.  Please call me at [phone] with any questions or concerns you might have. If you reach my voicemail, please leave a message with your name, date of birth and your phone number.     Sincerely,        Natasha SKAGGS RN  Cardiology/Congestive Heart Failure Care Coordinator  [phone]               Enclosed: I have enclosed a copy of a Heart Failure Action Plan and patient centered care plan. Please keep this in an easy to access place to use as needed. Please review and call me with any questions.       If you experience chest pain, chest pressure, chest tightness, shortness of breath, fainting, lightheadedness, nausea, vomiting, or other concerning symptoms, please report to the Emergency Department or call 911. These symptoms may be emergent, and best treated in the Emergency Department.

## 2024-06-07 NOTE — LETTER
[facility] - HEART FAILURE CARE COORDINATION   [address]  [place] [address]    June 7, 2024    Bianka Whitmore  [address]  [place] [address]      Dear Bianka,      Thank you for taking time to speak with me on the phone regarding your health today.  I hope you are finding benefits in your clinic-care coordination enrollment.  As your heart failure care coordinator nurse, I will work with your Cardiology team to support your heart health and help with any needs you may have.  I am also there to help you navigate complex healthcare systems, provide accurate information, health education and extra support.  We can help you understand more about health challenges you might be experiencing and try to help you to manage them better.      I have enclosed some important health self-management information to help you care for yourself at home.  We'll go over some of these materials in our calls and talk about your health goals so we can create a plan to address your needs and how I can support you.     Please feel free to call me if you have any questions about the materials provided.       The care coordination program is voluntary and offerred at no cost to you.  You may discontinue the program at any time.  Please call me at [phone] with any questions or concerns you might have.  If you reach my voicemail, please leave a message with your name, date of birth and your phone number.    Below is your plan of care as a patient in the heart failure care coordination program. Also refer to your  Self Help Guide for Patients with Heart Failure  booklet for additional information.  Please let us know if we can help you with other questions, concerns, or goals.    Sincerely,         Ashley SKAGGS RN  Cardiology/CHF Care Coordinator  [phone]    Enclosed:  Heart failure action plan, Patient centered plan of care,  Self Help Guide for Patient's with Heart Failure.          [facility]  [place]  Patient-Centered Heart Failure Access Plan of Care    About Me:        Patient Name:  Bianka Whitmore    YOB: 1935  Age:         88 year old   [place] MRN:    [id number] Telephone Information:  Home Phone [phone]   Mobile [phone]       Address:  [address]  [place] [address] Email address:  No e-mail address on record      Emergency Contact(s)    Name Relationship Lgl Grd Work Phone Home Phone Mobile Phone   1. CONNIE WHITMORE Spouse No  [phone] NONE   2. MEMO WHITMORE Son   [phone]           Primary language:  English     needed? No   [place] [employer]:  [phone] op. 1  Other communication barriers:None    Preferred Method of Communication:  Mail  Current living arrangement: I live in a private home with spouse    Mobility Status/ Medical Equipment: Independent      My Access Plan  Medical Emergency 911   Cardiology Call Center (available 24/7) [phone]   Primary Clinic Line [facility] [place]  [phone]   Preferred Pharmacy   [facility] - [place], [place] - [address] [facility]  [address] [facility] [facility]  [place] [address]  Phone: [phone] Fax: [phone]     Behavioral Health Crisis Line 988 Suicide and Crisis Lifeline       My Care Team Members  Patient Care Team         Relationship Specialty Notifications Start End    Yael Mckeon PA PCP - General Family Practice  8/23/18     Phone: [phone] Fax: [phone]         [facility] [address] [place] [address]    Yael Mckeon PA Assigned PCP   10/25/20     Phone: [phone] Fax: [phone] [address] [facility] [address] [place] [address]    Norma Gacria CNP Assigned Heart and Vascular Provider   2/11/23     Phone: [phone] Fax: [phone]         [address] [place] [address] [place] [address]    Delta Cratagena MD Assigned Surgical Provider   11/4/23     Phone: [phone] Fax: [phone]         [address] [place] [address]  [place] [address]                My Care Plans   Goals          General    Consistently will continue to take heart failure medication as prescribed     Patient will be able to identify signs and symptoms of fluid retention     Patient will maintain management of sodium consumption

## 2024-06-07 NOTE — LETTER
[facility] C[facility] CARE COORDINATION   [facility]  Patient Centered Plan of Care  About Me:        Patient Name:  Bianka Whitmore    YOB: 1935  Age:         88 year old   [place] MRN:    [id number] Telephone Information:  Home Phone [phone]   Mobile [phone]       Address:  [address] [place] [address] Email address:  No e-mail address on record      Emergency Contact(s)    Name Relationship Lgl Grd Work Phone Home Phone Mobile Phone   1. CONNIE WHITMORE Spouse No  [phone] NONE   2. MEMO WHITMORE Son   [phone]           Primary language:  English     needed? No   [place] [employer]:  [phone] op. 1  Other communication barriers:None    Preferred Method of Communication:  Mail  Current living arrangement: I live in a private home with spouse    Mobility Status/ Medical Equipment: Independent        Health Maintenance  Health Maintenance Reviewed: Up to date      My Access Plan  Medical Emergency 911   Primary Clinic Line [phone] to speak with clinic staff or schedule appointment   24 Hour Nurse Line [phone] (toll-free)   Preferred Urgent Care [facility] [phone]             My Care Team Members  Patient Care Team         Relationship Specialty Notifications Start End    Yael Mckeon PA PCP - General Family Practice  8/23/18     Phone: [phone] Fax: [phone] [address] [place] [address] [address] [place] [address]    Yael Mckeon PA Assigned PCP   10/25/20     Phone: [phone] Fax: [phone]         [address] [place] [address] [address] [place] [address]    Norma Garcia CNP Assigned Heart and Vascular Provider   2/11/23     Phone: [phone] Fax: [phone]         [address] [place] [address] [place] [address]    Delta Cartagena MD Assigned Surgical Provider   11/4/23     Phone: [phone] Fax: [phone] [address] [facility] [place] [address]              My  Care Plans  Self Management and Treatment Plan  Care Plan  Care Plan: Heart Failure       Problem: Heart Failure Management Needs Improvement       Long-Range Goal: Demonstrate improved heart failure management       Start Date: 6/7/2024    This Visit's Progress: Improving                             Action Plans on File:               Heart Failure       Advance Care Plans/Directives Type:   No data recorded    My Medical and Care Information  Problem List   Patient Active Problem List   Diagnosis    Diverticulitis of colon    Osteoarthritis    Age-related osteoporosis with current pathological fracture with routine healing    ACP (advance care planning)    Malignant basal cell neoplasm of skin    Compression fracture of thoracic vertebra (H)    Geographic tongue    Varicose vein of leg    Compression fx, lumbar spine, with routine healing, subsequent encounter    Typical atrial flutter (H)    CHF (congestive heart failure) (H)    Melanoma in situ of scalp (H)    Chronic atrial fibrillation (H)    Gallstones    STEMI involving right coronary artery (H)      Current Medications and Allergies:  See printed Medication Report.    Care Coordination Start Date: No linked episodes   Frequency of Care Coordination: monthly, more frequently as needed     Form Last Updated: 06/07/2024

## 2024-06-07 NOTE — PROGRESS NOTES
Outreach call to patient.  Denies any worsening sx.  States she feels she is doing well.  Reports good appetite with small weight decrease from 114 to 112.  Denies any BLE edema or SOB.  Reports compliance with medications.  Patient states she continues to follow a low sodium diet and take daily weights.   Reports occasional cough, but said that she was told it might be from one of her medications.  (Patient does take Lisinopril)  Denies any recent falls or injuries.  Patient denies any questions or concerns at this time.  Pleasant and talkative throughout telephone visit.

## 2024-06-07 NOTE — LETTER
My Heart Failure Action Plan  Name: Bianka Whitmore   YOB: 1935  Date: 6/7/2024   [facility]   [facility] [place]   [address] [facility] [address] [phone] My Diagnosis: HF-rEF (EF < 40%)  My Ejection Fraction:   Lab Results   Component Value Date    LVEF 45-50% (mildly reduced) 12/01/2023   My Exercise Goal: Start exercise slowly - to begin, do a few minutes of exercise, several times a day. Increase your time and speed [id number] to build tolerance, with a goal of 30 minutes of exercise daily. Steady, slow, and consistent exercise is both safe and healthy. Stop and rest when you feel tired or become short of breath. Do not push yourself on days when you don t feel well.       My Weight Plan:   Wt Readings from Last 2 Encounters:   06/03/24 52.5 kg (115 lb 11.2 oz)   05/23/24 52.6 kg (116 lb)     Weigh yourself daily using the same scale. If you gain more than 2 pounds in 24 hours or 5 pounds in 7 days. call the clinic    My Diet Goal: No added salt    Emergency Room Visits:    Our goal is to improve your quality of life and help you avoid a visit to the emergency room or hospital.  If we work together, we can achieve this goal. But, if you feel you need to call 911 or go to the emergency room, please do so.  If you go to the emergency room, please bring your list of medicines and your daily weight chart with you.    Each day ask yourself:  Is my weight up?  Do I have swelling?  Do I have trouble breathing?  How did I sleep?  Other problems?       GREEN ZONE     Weight gained is no more than 2 pounds a day or 5 pounds a in 7 days.  No swelling in feet, ankles, legs or stomach.  No more swelling than usual.  No more trouble breathing than usual.  No change in my sleep.  No other problems. What should I do?  I am doing fine. I will take my medicine, follow my diet, see my doctor, exercise, and watch for symptoms.           YELLOW ZONE         Weight  gain of more than 2 pounds in one day or 5 pounds in 7 days.  New swelling in ankle, leg, knee or thigh.  Bloating in belly, pants feel tighter.  Swelling in hands or face.  Coughing or trouble breathing while walking or talking.  Harder to breathe last night.  Have trouble sleeping, wake up short of breath.  Unusually tired.  Not eating.  Nausea, vomiting, or diarrhea  Pain in my chest or bad  leg cramps.  Feel weak or dizzy. What should I do?  I need to take action and call my doctor or nurse today.                 RED ZONE         Weight gain of 5 pounds overnight.  Chest pain or pressure that does not go away.  Feel less alert.  Wheezing or have trouble breathing when at rest.  Cannot sleep lying down.  Cannot take my medicines.  Pass out or faint. What should I do?  I need to call my doctor or nurse now!  Call 911 if I have chest pain or cannot breathe.

## 2024-06-10 ENCOUNTER — HOSPITAL ENCOUNTER (OUTPATIENT)
Dept: CARDIAC REHAB | Facility: HOSPITAL | Age: 89
Discharge: HOME | End: 2024-06-10
Attending: PHYSICIAN ASSISTANT
Payer: MEDICARE

## 2024-06-10 PROCEDURE — 999N000109 HC STATISTIC OP CR VISIT

## 2024-06-10 PROCEDURE — 93798 PHYS/QHP OP CAR RHAB W/ECG: CPT

## 2024-06-12 ENCOUNTER — HOSPITAL ENCOUNTER (OUTPATIENT)
Dept: CARDIAC REHAB | Facility: HOSPITAL | Age: 89
Discharge: HOME | End: 2024-06-12
Attending: PHYSICIAN ASSISTANT
Payer: MEDICARE

## 2024-06-12 PROCEDURE — 999N000109 HC STATISTIC OP CR VISIT

## 2024-06-12 PROCEDURE — 93798 PHYS/QHP OP CAR RHAB W/ECG: CPT

## 2024-06-17 ENCOUNTER — TELEPHONE (OUTPATIENT)
Dept: CARDIOLOGY | Facility: OTHER | Age: 89
End: 2024-06-17

## 2024-06-19 ENCOUNTER — HOSPITAL ENCOUNTER (OUTPATIENT)
Dept: CARDIAC REHAB | Facility: HOSPITAL | Age: 89
Discharge: HOME | End: 2024-06-19
Attending: PHYSICIAN ASSISTANT
Payer: MEDICARE

## 2024-06-19 PROCEDURE — 93798 PHYS/QHP OP CAR RHAB W/ECG: CPT

## 2024-06-19 PROCEDURE — 999N000109 HC STATISTIC OP CR VISIT

## 2024-06-21 ENCOUNTER — PATIENT OUTREACH (OUTPATIENT)
Dept: CARE COORDINATION | Facility: OTHER | Age: 89
End: 2024-06-21

## 2024-06-21 NOTE — TELEPHONE ENCOUNTER
Last HF Care Coordination Contact Date:  24     Self-Assessment:     Home scale available:  Yes     Home scale weight this mornin (with clothes)     Heart Failure Zones sheet on refrigerator or available:  Patient states she hasn't paid attention because she hasn't noticed any sx and feels ok.      Current Heart Failure Zone: No Zone Color Given     Any increased shortness of breath (SOB) since last contact: No     Any increased edema/swelling since last contact: No     Any chest pain since last contact: No     What number to call for YELLOW Zones: Patient staes she has the tool, does not use it daily.       What number to call for RED Zones:       Medications:     How many of your current medicines changed (dose, timing, name, etc) since last contacted: 0 - 1     Having problems obtaining or taking  medications: No     Any missed medications this week:  No     Using medication reminders ( [place], Pill box, other):  No     Any questions about medications: Patient did question dosage of Furosemide.  Dosage of 40 mg am and 20 pm confirmed for her per the providers notes.  She stated that she feels this regimen is working well for her.         Healthy Lifestyle:     Follows a low sodium diet: Yes     Daily activity routine:  States she will walk around her home during the day and still attends cardiac rehab    Activity more limited since last contact: No     Fluid intake less than 2 liters  per day: Yes     Questions about low sodium diet or fluid restriction: No     Patient Centered Goals:     The patient will: Continue to weigh daily and keep weight stable     The patient will: Continue to follow a heart healthy diet     Appointments:     Next PCP appointment:  24     Next Cardiology appointment: 24      Care Coordinator followin x monthly, more if needed

## 2024-06-26 ENCOUNTER — HOSPITAL ENCOUNTER (OUTPATIENT)
Dept: CARDIAC REHAB | Facility: HOSPITAL | Age: 89
Discharge: HOME | End: 2024-06-26
Attending: PHYSICIAN ASSISTANT
Payer: MEDICARE

## 2024-06-26 PROCEDURE — 93798 PHYS/QHP OP CAR RHAB W/ECG: CPT

## 2024-06-26 PROCEDURE — 999N000109 HC STATISTIC OP CR VISIT

## 2024-06-29 DIAGNOSIS — K30 UPSET STOMACH: ICD-10-CM

## 2024-07-01 RX ORDER — PANTOPRAZOLE SODIUM 40 MG/1
40 TABLET, DELAYED RELEASE ORAL DAILY
Qty: 30 TABLET | Refills: 5 | Status: SHIPPED | OUTPATIENT
Start: 2024-07-01

## 2024-07-01 NOTE — TELEPHONE ENCOUNTER
Protonix  Last filled 6/3/24 #30, 1 R  Last office visit 6/24  Lolly Urrutia, RN  Care Coordination

## 2024-07-09 DIAGNOSIS — I51.7 ATRIAL ENLARGEMENT, BILATERAL: ICD-10-CM

## 2024-07-09 DIAGNOSIS — I48.91 ATRIAL FIBRILLATION BY ELECTROCARDIOGRAM (H): ICD-10-CM

## 2024-07-09 DIAGNOSIS — I50.9 CHRONIC CONGESTIVE HEART FAILURE, UNSPECIFIED HEART FAILURE TYPE (H): ICD-10-CM

## 2024-07-09 RX ORDER — FUROSEMIDE 20 MG
40 TABLET ORAL 2 TIMES DAILY
Qty: 360 TABLET | Refills: 0 | OUTPATIENT
Start: 2024-07-09

## 2024-07-12 RX ORDER — FUROSEMIDE 20 MG
TABLET ORAL
Qty: 90 TABLET | Refills: 3 | Status: SHIPPED | OUTPATIENT
Start: 2024-07-12

## 2024-07-12 NOTE — TELEPHONE ENCOUNTER
[facility] calling to f/up on refill request  Chart review completed  Script updated with decreased dosing (see snippet below)  Teed up for Provider to review              _________________________________________________________________________________________________________________________________________________________________  See OV note 5/23/24 with Provider Overbye  Fluid status Hypervolemic:  Hypervolemic: bilateral rales but improved dyspnea and LE edema and weight  Decrease furosemide 40mg BID to 40 mg in AM and 20 mg in PM  Sodium restrictions, fluid restrictions and daily weights reviewed.   Cardiac Rehab:

## 2024-07-16 ENCOUNTER — TELEPHONE (OUTPATIENT)
Dept: ANTICOAGULATION | Facility: CLINIC | Age: 89
End: 2024-07-16

## 2024-07-16 DIAGNOSIS — I48.91 ATRIAL FIBRILLATION BY ELECTROCARDIOGRAM (H): ICD-10-CM

## 2024-07-16 NOTE — TELEPHONE ENCOUNTER
ANTICOAGULATION  STEWARDSHIP    Spoke with Bianka to review advised dosage change for Apixaban (Eliquis).    Education provided: how to take apixaban (Eilquis) safely: take doses as close to every 12 hours as possible; at the same time each day, may split tablets in order to start new dose & finish current supply of medication., and not discontinue therapy without talking to their provider first    They verbalized understanding of new Apixaban (Eliquis) dose/tablet strength.    Danielle Vidal RN  [facility] Anticoagulation Clinic

## 2024-07-16 NOTE — TELEPHONE ENCOUNTER
"ANTICOAGULATION DIRECT ORAL ANTICOAGULANT MONITORING    SUBJECTIVE     [facility] is evaluating Bianka Whitmore's Apixaban (Eliquis) as part of its Anticoagulation Monitoring Program.    Indication:Atrial Fibrillation  Current dose per medication list: Apixaban 5 mg BID  Recent hospitalizations/ED/Office Visits for bleeding/clotting concerns: Yes: had red blood in diarrhea according to office visit with PCP on 6/3/24. No other sign or symptom of bleeding.  Other bleeding or side effect concerns: No  Additional findings: Was originally discharged form hospital on Eliquis 2.5 mg bid. At cardio visit 5/23/24 the visit note states \"Continue Eliquis 2.5 mg twice daily for stroke prophylaxis with history of atrial fibrillation\" but a script was sent in for 5 mg bid.    OBJECTIVE     Age: 88 year old    Wt Readings from Last 2 Encounters:   06/03/24 52.5 kg (115 lb 11.2 oz)   05/23/24 52.6 kg (116 lb)      Lab Results   Component Value Date    CR 0.76 05/23/2024    CR 0.59 01/15/2024    CR 0.68 01/13/2023     Creatinine Clearance (using actual bodyweight, mL/min):     Lab Results   Component Value Date    HGB 15.3 06/03/2024    HGB 13.4 05/14/2021     06/03/2024     05/14/2021     ASSESSMENT/PLAN     A chart review for Direct Oral Anticoagulant (DOAC) Stewardship has been completed for:     Dosing: recommend adjustment to Apixaban 2.5 mg BID for age >= 80 years and weight <= 60 kg (consistent with package insert dosing)    Plan made per [facility] anticoagulation protocol    Danielle Vidal RN  Anticoagulation Clinic  "

## 2024-07-19 ENCOUNTER — PATIENT OUTREACH (OUTPATIENT)
Dept: CARE COORDINATION | Facility: OTHER | Age: 89
End: 2024-07-19

## 2024-07-19 NOTE — LETTER
"July 19, 2024      Bianka Whitmore  [address]  [place] [address]        Dear Bianka,     Thank you for taking the time to talk to me this afternoon.  I was happy to hear that you are continuing to do so well.   Enclosed you will see the application for the patient  program for Eliquis.  If you read through the eligibility portion on the first page and  think you may qualify to get assistance with this medication, please complete all of the requests on the first page  (towards the bottom) that are listed under \"Patient and Prescriber Information check list\"  (Maybe the out of pocket expenses for prescription  clause will benefit you since you have already had to put so much money towards this medication.)  I have kept the prescriber portion of the paperwork for Norma to sign if you choose to try to see if we can get this medication covered for you.  If you decide you would like to try, please return this form, along with the requested copies to the clinic,  and I will fax all of it to  the [employer] patient assistance program for you.   (Please write Attention:  Natasha SKAGGS Cardiology on the envelope and they'll make sure to get it to me. )  I look forward to speaking with you again in August.        Sincerely,        Ashley Chapa RN          "

## 2024-07-19 NOTE — LETTER
"July 19, 2024      Bianka Whitmore  [address]  [place] [address]        Dear Bianka,     Thank you for taking the time to talk to me this afternoon.  I was happy to hear that you are continuing to do so well.   Enclosed you will see the application for the patient  program for Eliquis.  If you read through the eligibility portion on the first page and  think you may qualify to get assistance with this medication, please complete all of the requests on the first page  (towards the bottom) that are listed under \"Patient and Prescriber Information check list\"  (Maybe the out of pocket expenses for prescription  clause will benefit you since you have already had to put so much money towards this medication.)  I have kept the prescriber portion of the paperwork for Norma to sign if you choose to try to see if we can get this medication covered for you.  If you decide you would like to try, please return this form, along with the requested copies to the clinic,  and I will fax all of it to  the [employer] patient assistance program for you.   (Please write Attention:  Natasha SKAGGS Cardiology on the envelope and they'll make sure to get it to me. )  I look forward to speaking with you again in August.            Sincerely,        Ashley Chapa, RN  Cardiology/CHF Care Coordinator  [phone]          "

## 2024-07-19 NOTE — TELEPHONE ENCOUNTER
Patient 30 day outreach call.  Patient states she's doing well.  Denies any SOB, edema, chest pressure/pain/discomfort.  Reports that following a low sodium diet is ongoing for her, and she is doing well. Reports weight at 111.  She stated that her dosage of Eliquis had recently changed, and she is back on 2.5 mg twice daily.  She denies any missed medications/issues or concerns. PAP Paperwork sent to patient to look over to see if she could qualify to have Eliquis paid for.  Instructions included in the letter sent with the paperwork.  Patient pleasant and cooperative.

## 2024-08-06 ENCOUNTER — TELEPHONE (OUTPATIENT)
Dept: FAMILY MEDICINE | Facility: OTHER | Age: 89
End: 2024-08-06

## 2024-08-06 NOTE — TELEPHONE ENCOUNTER
9:52 AM    Reason for Call: OVERBOOK    Patient is having the following symptoms: patient needs to be seen  for follow up. Patient is very upset that she did not get a call to remind her so she missed it. Patient states she needs to be seen sooner than October, or she just wont come to the dr. days.    The patient is requesting an appointment for Overbook with Yael Mckeon.    Was an appointment offered for this call? No  If yes : Appointment type              Date    Preferred method for responding to this message: Telephone Call  What is your phone number ?  [phone]    If we cannot reach you directly, may we leave a detailed response at the number you provided? Yes    Can this message wait until your PCP/provider returns, if unavailable today? Yes provider is out    Clementina Bergeron

## 2024-08-14 NOTE — PROGRESS NOTES
Assessment & Plan     Chronic congestive heart failure, unspecified heart failure type (H)  She is going to be on Lasix. Continue on 60 mg.  She is breathing well and also doing well on her other medications. Has  been compliant. Her BMP is elevated but she states her weight is lower today than usual. Cxr is clear.  Lungs are clear. Her kidneys are tolerating the lasix.  We will see her back in 3 months.    Basic metabolic panel; Future  - CBC with platelets and differential; Future  - BNP-N terminal pro; Future  - XR CHEST 2 VW (Clinic Performed); Future  - Magnesium; Future    STEMI involving right coronary artery (H)  Hx of this. Went to [facility]hab. Stopped after 6 or 8 sessions.  She hated when people had to bring her.    - Lipid Profile (Chol, Trig, HDL, LDL calc); Future  - Magnesium; Future            See Patient Instructions    No follow-ups on file.    Subjective   Bianka is a 88 year old, presenting for the following health issues:  Lipids, Hypertension, and Atrial Fib        8/15/2024     9:52 AM   Additional Questions   Roomed by fior dewitt   Accompanied by son         8/15/2024     9:52 AM   Patient Reported Additional Medications   Patient reports taking the following new medications none     HPI     Hyperlipidemia Follow-Up    Are you regularly taking any medication or supplement to lower your cholesterol?   Yes-atorvastatin  Are you having muscle aches or other side effects that you think could be caused by your cholesterol lowering medication?  No    Hypertension Follow-up    Do you check your blood pressure regularly outside of the clinic? Yes   Are you following a low salt diet? No  Are your blood pressures ever more than 140 on the top number (systolic) OR more   than 90 on the bottom number (diastolic), for example 140/90? No    Atrial Fibrillation Follow-up    Symptoms: no recent chest pain, significant palpitations, dizziness/lightheadedness, dyspnea, or increased peripheral  "edema.  Stroke prevention: DOAC (Eliquis, Xarelto, Pradaxa)        5/9/2024    11:14 AM 5/10/2024     7:57 AM 5/23/2024     1:03 PM 6/3/2024     8:09 AM 8/15/2024     9:57 AM   Date   Pulse 88 75 92 88 80     Current [id number] Score: 5 points - A score of 5 or greater represents a 7.2 - 12.2% annual risk of major embolic event, without anti-coagulation or an LAAO device. {Provider  [id number] Calculator to review specific risk factors :1509        Review of Systems  Constitutional, neuro, ENT, endocrine, pulmonary, cardiac, gastrointestinal, genitourinary, musculoskeletal, integument and psychiatric systems are negative, except as otherwise noted.      Objective    /79 (BP Location: Right arm, Patient Position: Sitting, Cuff Size: Adult Regular)   Pulse 80   Temp 97.1  F (36.2  C) (Tympanic)   Resp 16   Ht 1.575 m (5' 2\")   Wt 51.1 kg (112 lb 11.2 oz)   SpO2 95%   BMI 20.61 kg/m    Body mass index is 20.61 kg/m .  Physical Exam   GENERAL: alert and no distress  EYES: Eyes grossly normal to inspection, PERRL and conjunctivae and sclerae normal  NECK: no adenopathy, no asymmetry, masses, or scars  RESP: lungs clear to auscultation - no rales, rhonchi or wheezes  CV: Irregular rate and rhythm, normal S1 S2, no S3 or S4, 2/6 heard right side of chest and radiates across chest. No click or rub, no peripheral edema.has pain in lower legs when checking for pitting. There is none. Mild JVD.   MS: no gross musculoskeletal defects noted, no edema  SKIN: dark mole in center of forehead has had bx in past and even if removed has come back each time it was removed.   NEURO: Normal strength and tone, mentation intact and speech normal  PSYCH: mentation appears normal, affect normal/bright, son is accompanying her .  He son today is more vocal.   LYMPH: no cervical, supraclavicular, axillary, or inguinal adenopathy    No results found for any visits on 08/15/24.      Results for orders placed or performed in " visit on 08/15/24   XR CHEST 2 VW (Clinic Performed)     Status: None    Narrative    PROCEDURE:  XR CHEST 2 VIEWS    HISTORY:  Chronic congestive heart failure, unspecified heart failure  type (H).     COMPARISON:  5/23/2024    FINDINGS:   Is enlarged The pulmonary vasculature is normal.  The lungs are clear.  No pleural effusion or pneumothorax.      Impression    IMPRESSION:  Cardiomegaly      MEMO DIAZ MD         SYSTEM ID:  [id number]   Results for orders placed or performed in visit on 08/15/24   Basic metabolic panel     Status: Abnormal   Result Value Ref Range    Sodium 137 135 - 145 mmol/L    Potassium 4.0 3.4 - 5.3 mmol/L    Chloride 99 98 - 107 mmol/L    Carbon Dioxide (CO2) 27 22 - 29 mmol/L    Anion Gap 11 7 - 15 mmol/L    Urea Nitrogen 29.5 (H) 8.0 - 23.0 mg/dL    Creatinine 0.71 0.51 - 0.95 mg/dL    GFR Estimate 81 >60 mL/min/1.73m2    Calcium 9.5 8.8 - 10.4 mg/dL    Glucose 95 70 - 99 mg/dL   BNP-N terminal pro     Status: Abnormal   Result Value Ref Range    N Terminal Pro BNP Outpatient 3,012 (H) 0 - 1,800 pg/mL   Magnesium     Status: Normal   Result Value Ref Range    Magnesium 2.0 1.7 - 2.3 mg/dL   CBC with platelets and differential     Status: Abnormal   Result Value Ref Range    WBC Count 6.0 4.0 - 11.0 10e3/uL    RBC Count 4.30 3.80 - 5.20 10e6/uL    Hemoglobin 14.5 11.7 - 15.7 g/dL    Hematocrit 41.5 35.0 - 47.0 %    MCV 97 78 - 100 fL    MCH 33.7 (H) 26.5 - 33.0 pg    MCHC 34.9 31.5 - 36.5 g/dL    RDW 14.6 10.0 - 15.0 %    Platelet Count 144 (L) 150 - 450 10e3/uL    % Neutrophils 58 %    % Lymphocytes 31 %    % Monocytes 10 %    % Eosinophils 1 %    % Basophils 0 %    % Immature Granulocytes 0 %    NRBCs per 100 WBC 0 <1 /100    Absolute Neutrophils 3.5 1.6 - 8.3 10e3/uL    Absolute Lymphocytes 1.8 0.8 - 5.3 10e3/uL    Absolute Monocytes 0.6 0.0 - 1.3 10e3/uL    Absolute Eosinophils 0.1 0.0 - 0.7 10e3/uL    Absolute Basophils 0.0 0.0 - 0.2 10e3/uL    Absolute Immature Granulocytes  0.0 <=0.4 10e3/uL    Absolute NRBCs 0.0 10e3/uL   CBC with platelets and differential     Status: Abnormal    Narrative    The following orders were created for panel order CBC with platelets and differential.  Procedure                               Abnormality         Status                     ---------                               -----------         ------                     CBC with platelets and d...[[id number]]  Abnormal            Final result                 Please view results for these tests on the individual orders.         Signed Electronically by: ORTEGA Caruso

## 2024-08-15 ENCOUNTER — ANCILLARY PROCEDURE (OUTPATIENT)
Dept: GENERAL RADIOLOGY | Facility: OTHER | Age: 89
End: 2024-08-15
Attending: PHYSICIAN ASSISTANT
Payer: MEDICARE

## 2024-08-15 ENCOUNTER — OFFICE VISIT (OUTPATIENT)
Dept: FAMILY MEDICINE | Facility: OTHER | Age: 89
End: 2024-08-15
Attending: PHYSICIAN ASSISTANT
Payer: MEDICARE

## 2024-08-15 VITALS
OXYGEN SATURATION: 95 % | TEMPERATURE: 97.1 F | HEIGHT: 62 IN | DIASTOLIC BLOOD PRESSURE: 79 MMHG | RESPIRATION RATE: 16 BRPM | WEIGHT: 112.7 LBS | HEART RATE: 80 BPM | SYSTOLIC BLOOD PRESSURE: 113 MMHG | BODY MASS INDEX: 20.74 KG/M2

## 2024-08-15 DIAGNOSIS — I21.11 STEMI INVOLVING RIGHT CORONARY ARTERY (H): ICD-10-CM

## 2024-08-15 DIAGNOSIS — I50.9 CHRONIC CONGESTIVE HEART FAILURE, UNSPECIFIED HEART FAILURE TYPE (H): Primary | ICD-10-CM

## 2024-08-15 DIAGNOSIS — I50.9 CHRONIC CONGESTIVE HEART FAILURE, UNSPECIFIED HEART FAILURE TYPE (H): ICD-10-CM

## 2024-08-15 LAB
ANION GAP SERPL CALCULATED.3IONS-SCNC: 11 MMOL/L (ref 7–15)
BASOPHILS # BLD AUTO: 0 10E3/UL (ref 0–0.2)
BASOPHILS NFR BLD AUTO: 0 %
BUN SERPL-MCNC: 29.5 MG/DL (ref 8–23)
CALCIUM SERPL-MCNC: 9.5 MG/DL (ref 8.8–10.4)
CHLORIDE SERPL-SCNC: 99 MMOL/L (ref 98–107)
CREAT SERPL-MCNC: 0.71 MG/DL (ref 0.51–0.95)
EGFRCR SERPLBLD CKD-EPI 2021: 81 ML/MIN/1.73M2
EOSINOPHIL # BLD AUTO: 0.1 10E3/UL (ref 0–0.7)
EOSINOPHIL NFR BLD AUTO: 1 %
ERYTHROCYTE [DISTWIDTH] IN BLOOD BY AUTOMATED COUNT: 14.6 % (ref 10–15)
GLUCOSE SERPL-MCNC: 95 MG/DL (ref 70–99)
HCO3 SERPL-SCNC: 27 MMOL/L (ref 22–29)
HCT VFR BLD AUTO: 41.5 % (ref 35–47)
HGB BLD-MCNC: 14.5 G/DL (ref 11.7–15.7)
IMM GRANULOCYTES # BLD: 0 10E3/UL
IMM GRANULOCYTES NFR BLD: 0 %
LYMPHOCYTES # BLD AUTO: 1.8 10E3/UL (ref 0.8–5.3)
LYMPHOCYTES NFR BLD AUTO: 31 %
MAGNESIUM SERPL-MCNC: 2 MG/DL (ref 1.7–2.3)
MCH RBC QN AUTO: 33.7 PG (ref 26.5–33)
MCHC RBC AUTO-ENTMCNC: 34.9 G/DL (ref 31.5–36.5)
MCV RBC AUTO: 97 FL (ref 78–100)
MONOCYTES # BLD AUTO: 0.6 10E3/UL (ref 0–1.3)
MONOCYTES NFR BLD AUTO: 10 %
NEUTROPHILS # BLD AUTO: 3.5 10E3/UL (ref 1.6–8.3)
NEUTROPHILS NFR BLD AUTO: 58 %
NRBC # BLD AUTO: 0 10E3/UL
NRBC BLD AUTO-RTO: 0 /100
NT-PROBNP SERPL-MCNC: 3012 PG/ML (ref 0–1800)
PLATELET # BLD AUTO: 144 10E3/UL (ref 150–450)
POTASSIUM SERPL-SCNC: 4 MMOL/L (ref 3.4–5.3)
RBC # BLD AUTO: 4.3 10E6/UL (ref 3.8–5.2)
SODIUM SERPL-SCNC: 137 MMOL/L (ref 135–145)
WBC # BLD AUTO: 6 10E3/UL (ref 4–11)

## 2024-08-15 PROCEDURE — 71046 X-RAY EXAM CHEST 2 VIEWS: CPT | Mod: TC

## 2024-08-15 PROCEDURE — 99214 OFFICE O/P EST MOD 30 MIN: CPT | Performed by: PHYSICIAN ASSISTANT

## 2024-08-15 PROCEDURE — 83880 ASSAY OF NATRIURETIC PEPTIDE: CPT | Mod: ZL | Performed by: PHYSICIAN ASSISTANT

## 2024-08-15 PROCEDURE — 36415 COLL VENOUS BLD VENIPUNCTURE: CPT | Mod: ZL | Performed by: PHYSICIAN ASSISTANT

## 2024-08-15 PROCEDURE — 85025 COMPLETE CBC W/AUTO DIFF WBC: CPT | Mod: ZL | Performed by: PHYSICIAN ASSISTANT

## 2024-08-15 PROCEDURE — G0463 HOSPITAL OUTPT CLINIC VISIT: HCPCS | Mod: 25

## 2024-08-15 PROCEDURE — 83735 ASSAY OF MAGNESIUM: CPT | Mod: ZL | Performed by: PHYSICIAN ASSISTANT

## 2024-08-15 PROCEDURE — 80048 BASIC METABOLIC PNL TOTAL CA: CPT | Mod: ZL | Performed by: PHYSICIAN ASSISTANT

## 2024-08-15 NOTE — RESULT ENCOUNTER NOTE
Heart enlarged on xray no fluid in lungs. Her kidneys are great her other labs I am happy with.  The heart is functioning ok. But we need to stay on lasix.  Shows some chronic failure of her heart (BMP)but very well managed. Keeping her lungs clear.

## 2024-08-20 ENCOUNTER — PATIENT OUTREACH (OUTPATIENT)
Dept: CARE COORDINATION | Facility: OTHER | Age: 89
End: 2024-08-20

## 2024-08-20 NOTE — PROGRESS NOTES
[facility]: Heart Failure Care Coordination   Follow-Up Outreach     Situation/Background:  Hx of CHF, Chronic Afib       Current diuretic: Furosemide   PRN diuretic plan: N/a   Current potassium chloride dose: N/a   Other pertinent information: N/a    Assessment:      Recent home weights:     Baseline weight:         Remote monitoring: No    CHF assessment:Patient states she is doing well.  Denies cough, chest pain/pressure, edema or SOB.  Reports adherence with medications.  Patient states she continues to follow a no/low salt diet.  Denies any unusual bleeding or bruising.    Intervention/Plan:   Patient remain adherent to diet/medications.  Will try to have a quick face to face meeting after her cardiology appt on the 29th and get weight and vitals at that time.          Future Appointments   Date Time Provider Department Center   8/29/2024  1:00 PM Overbye, Norma, CNP [facility]   11/15/2024 10:15 AM Yael Mckeon PA [facility] Marcelino Daniels   1/8/2025  9:00 AM Overbye, Norma, CNP [facility]

## 2024-08-29 ENCOUNTER — OFFICE VISIT (OUTPATIENT)
Dept: CARDIOLOGY | Facility: OTHER | Age: 89
End: 2024-08-29
Attending: NURSE PRACTITIONER
Payer: MEDICARE

## 2024-08-29 VITALS
OXYGEN SATURATION: 98 % | TEMPERATURE: 97.5 F | RESPIRATION RATE: 15 BRPM | WEIGHT: 113.6 LBS | SYSTOLIC BLOOD PRESSURE: 112 MMHG | DIASTOLIC BLOOD PRESSURE: 73 MMHG | BODY MASS INDEX: 20.91 KG/M2 | HEIGHT: 62 IN | HEART RATE: 96 BPM

## 2024-08-29 DIAGNOSIS — I48.11 LONGSTANDING PERSISTENT ATRIAL FIBRILLATION (H): ICD-10-CM

## 2024-08-29 DIAGNOSIS — I21.11 ST ELEVATION MYOCARDIAL INFARCTION INVOLVING RIGHT CORONARY ARTERY (H): ICD-10-CM

## 2024-08-29 DIAGNOSIS — I50.20 HFREF (HEART FAILURE WITH REDUCED EJECTION FRACTION) (H): ICD-10-CM

## 2024-08-29 DIAGNOSIS — E78.5 HYPERLIPIDEMIA LDL GOAL <70: ICD-10-CM

## 2024-08-29 DIAGNOSIS — Z79.01 CHRONIC ANTICOAGULATION: ICD-10-CM

## 2024-08-29 DIAGNOSIS — I25.10 CORONARY ARTERY DISEASE INVOLVING NATIVE CORONARY ARTERY OF NATIVE HEART WITHOUT ANGINA PECTORIS: ICD-10-CM

## 2024-08-29 DIAGNOSIS — I50.22 CHRONIC SYSTOLIC CONGESTIVE HEART FAILURE (H): Primary | ICD-10-CM

## 2024-08-29 PROCEDURE — 99214 OFFICE O/P EST MOD 30 MIN: CPT | Performed by: NURSE PRACTITIONER

## 2024-08-29 PROCEDURE — G0463 HOSPITAL OUTPT CLINIC VISIT: HCPCS

## 2024-08-29 RX ORDER — CLOPIDOGREL BISULFATE 75 MG/1
75 TABLET ORAL DAILY
Qty: 90 TABLET | Refills: 3 | Status: SHIPPED | OUTPATIENT
Start: 2024-08-29 | End: 2025-04-01

## 2024-08-29 ASSESSMENT — PAIN SCALES - GENERAL: PAINLEVEL: NO PAIN (0)

## 2024-08-29 NOTE — PROGRESS NOTES
[facility] HEART CARE   CARDIOLOGY PROGRESS NOTE     Chief Complaint   Patient presents with    Follow Up      3 month follow up          Diagnosis:    ICD-10-CM    1. Chronic systolic congestive heart failure (H)  I50.22 Echocardiogram Complete      2. Longstanding persistent atrial fibrillation (H)  I48.11       3. Chronic anticoagulation  Z79.01       4. HFrEF (heart failure with reduced ejection fraction) (H)  I50.20       5. Coronary artery disease involving native coronary artery of native heart without angina pectoris  I25.10 clopidogrel (PLAVIX) 75 MG tablet     Lipid Profile (Chol, Trig, HDL, LDL calc)      6. ST elevation myocardial infarction involving right coronary artery (H)  I21.11 clopidogrel (PLAVIX) 75 MG tablet     Lipid Profile (Chol, Trig, HDL, LDL calc)      7. Hyperlipidemia LDL goal <70  E78.5 Lipid Profile (Chol, Trig, HDL, LDL calc)                  Assessment/Plan:    Longstanding persistent atrial fibrillation  Diagnosed with this years ago  Overall has been asymptomatic - sometimes feels skipping when she lays down at not- not bothersome.       CHADs-VASC >=2  She did start DOAC in the hospital. Continue Eliquis 2.5 mg twice daily for stroke prophylaxis with history of atrial fibrillation  No bleeding concerns today      Heart Failure with mid-range ejection fraction   - LVEF 45% on TTE 11/2023  - LVEF 35-40% on TTE 4/24/2024 at [facility]     NYHA Symptom Class III- LE edema, bibasilar rales, dyspnea, dyspnea on exertion, orthopnea  Stage C    ACE-I/ARB/ARNi:   Continue lisinopril 2.5 mg once daily  BB:   Continue metoprolol succinate 25 mg once daily  SGLT-2 Inhibitor:   Consider at follow-up  Aldosterone antagonist:   Consider at follow-up  SCD prophylaxis:  Does not meet criteria for implant with LVEF of 35-40%  Fluid status Hypervolemic:  Euvolemic on exam: lung sounds clear, no LE edema  Continue furosemide 40 mg in AM and 20 mg in PM  Sodium restrictions, fluid restrictions and  daily weights reviewed.   Cardiac Rehab:  Not indicated (EF>35%)  Sleep Apnea Evaluation:   Consider    Wt Readings from Last 5 Encounters:   08/29/24 51.5 kg (113 lb 9.6 oz)   08/15/24 51.1 kg (112 lb 11.2 oz)   06/03/24 52.5 kg (115 lb 11.2 oz)   05/23/24 52.6 kg (116 lb)   05/10/24 57.7 kg (127 lb 1.6 oz)       Mitral valve prolapse  Asymptomatic  Mild mitral insufficiency on TTE 12/2023 and 4/2024    Aortic valve stenosis  Asymptomatic  Moderate AV calcification.  Bicuspid aortic valve could not be excluded.  Moderate aortic stenosis on TTE 4/2023    Ascending aortic aneurysm  3.5 cm which is mildly dilated when compared to body surface area.  Plan for TTE 1 year for routine surveillance    STEMI 4/23/2024- [facility]  Coronary artery disease  S/p PCI proximal RCA   PTCA, ANGEL x 2, overlapping, excellent angiographic results  IVL x 49 shocks  Residual LAD disease: Prox LAD lesion is 60% stenosed. Prox LAD to Mid LAD lesion is 70% stenosed   Hyperlipidemia with LDL goal less than 70, uncontrolled  Continue atorvastatin 40 mg once daily. Titrate to achieve LDL goal. Future lipid panel ordered  Continue DAPT:  Clopidogrel 75 mg once daily  Eliquis 2.5 mg twice daily  If clopidogrel is stopped in the future she should be on aspirin 81 mg once daily for history of CAD  Continue beta-blockage: metoprolol for secondary prevention  Starting ACEi: Lisinopril   Continue heart healthy lifestyle    Recent Labs   Lab Test 01/15/24  1002 10/24/22  0727   CHOL 216* 220*   HDL 79 71   * 136*   TRIG 54 64       Plan for transthoracic echocardiogram to follow-up on heart failure, keep scheduled follow-up, certainly sooner with acute concerns.      Interval history:  Bianka Whitmore is a pleasant 88-year old female who presents for cardiology follow-up accompanied by her son. Recall, she has a cardiac history including atrial fibrillation, newly identified HFrEF, mitral valve prolapse with mild mitral  "insufficiency, mild pulmonary hypertension.     April 23, 2024 Ms. Whitmore recalls that she was having intrascapular discomfort for a couple days. Went to sit on the toilet and had a syncopal event. She woke up and got herself up. She told her  she was not feeling well so they called her son who brought her to the ED. In the ED she was found to have had a STEMI. She was transferred to [facility] for further care. She underwent coronary angiogram showing Prox RCA lesion is 100% stenosed. Culprit lesion. The lesion is type C and thrombotic. Lesion length: 52 mm. The lesion is severely calcified. She also had Prox LAD lesion is 60% stenosed. Prox LAD to Mid LAD lesion is 70% stenosed. First Diagonal Branch: 1st Diag lesion is 70% stenosed. She underwent intervention to culprit lesion of proximal RCA with PCI ANGEL x 2 overlapping which showed excellent angiographic results. TTE showed depressed systolic LV function with LVEF 35-40%,  inferoseptal base to apical akinesis, and inferior base to apical akinesis.     Today, Ms. Whitmore endorses that she has been feeling well. She denies any recurrent chest pain or pressure. No dyspnea, dyspnea on exertion. LE edema resolved. No orthopnea or PND. She occasionally feels fluttering sensation in her chest at night when she lays down but this is not overly bothersome. No episodes of lightheadedness, near-syncope or syncopal events. She continues to stay active and busy. Lives at home with . Does cooking, housework, shopping. Does some yardwork- hoeing in the garden. This morning she washed clothes, mopped the kitchen floor. She denies any limiting symptoms while doing her daily activities. Per son \"her color just looks better.\"        HPI:    Bianka Whitmore is a pleasant 78-year old female who presents for cardiology consult. She has cardiac history including atrial fibrillation, newly identified HFrEF, mitral valve prolapse.. She has a non-cardiac history " including osteoporosis with history of compression fracture thoracic/lumbar spine, diverticulitis. She was referred to cardiology by her primary care provider for recent transthoracic echocardiogram showing decreased LVEF of 45-50%, severe bi-atrial enlargement, mitral valve prolapse with mild mitral insufficiency, mild pulmonary hypertension with RVSP 40mmHg above the right atrial pressure.    No chest pain or pressure. No dyspnea, dyspnea on exertion. No LE edema.     She tells me that she spends the majority of her day on her feet being active doing housework, cooking, cleaning, doing laundry. She is able to carry her laundry basket up and down her basement stairs- she estimates 10-12 stairs- and she tolerates this without chest pain, dyspnea. She denies any racing/skipping/fluttering sensations in the chest. No lightheaded, dizziness. No exertional lightheadedness, dizziness. No near-syncope or syncopal episodes.     Mrs. Whitmore tells me that she overall feels well. She is able to do the activities she wishes to do in the day and does not have any bothersome or limiting symptoms.  She did see her young grand-daughter over Christmas who had upper respiratory illness. A few days after José Miguel she started with sore throat, rhinorrhea, congestion and cough. She denies dyspnea, wheezing, fever, chills, body aches.     Smoking History: Started smoking in late teens, early twenties. Smoked maybe a couple cigarettes per day until her mid-twenties.     Alcohol History: None    Substance Abuse History: None    Sleep History: Sleeps in bed with 1 pillow. No orthopnea, no PND. No snoring. No witnessed apnea. Wakes up feeling well rested 6-7 mornings out of the week. No long daytime naps- sometimes falls asleep in chair for about 10 minutes after lunch.     Family History: No known significant cardiac issues. She has five siblings (she is the second oldest) and none with any cardiac issues. Parents did not have cardiac  issues.        RELEVANT TESTING:    Transthoracic Echocardiogram 12/2023  Interpretation Summary  Left ventricular function is decreased. The ejection fraction is 45-50%  (mildly reduced).  Global right ventricular function is normal.  Severe biatrial enlargement is present.     Cannot exclude a bicuspid aortic valve.  Moderate aortic stenosis is present.Moderate aortic valve calcification is  present. Low flow low gradient aortic valve stenosis (SVI is 24), MG is not  well assessed in this study. JOSE LUIS is 1.3 cm2.  Mild to moderate mitral insufficiency is present.  Mild to moderate tricuspid insufficiency is present.  IVC diameter and respiratory changes fall into an intermediate range  suggesting an RA pressure of 8 mmHg.  This study was compared with the study from 11/17/2022 . No significant  changes noted. Aortic valve stenosis appear similar in the previous.     Recommend a limited study to assess AV gradients from multiple windows.        Transthoracic Echocardiogram 11/17/2022   Interpretation Summary  Left ventricular function is decreased. The ejection fraction is 45-50%  (mildly reduced).  Global right ventricular function is normal.  Severe left atrial enlargement is present.  Severe right atrial enlargement is present.  Mitral valve prolapse is present.  Mild mitral insufficiency is present.  Mild aortic valve calcification is present.  Trace aortic insufficiency is present.  Mild to moderate tricuspid insufficiency is present.  Right ventricular systolic pressure is 40mmHg above the right atrial pressure.  Mild pulmonic insufficiency is present.      Past Medical History:   Diagnosis Date    Basal cell carcinoma     Cardiomegaly 01/29/2007    Compression fracture of thoracic vertebra (H) 10/30/2014    Compression fx, lumbar spine, with routine healing, subsequent encounter 6/22/2016    Hyperlipidemia     Osteoporosis     Typical atrial flutter (H) 7/25/2016    Varicose vein of leg 6/22/2016       Past  Surgical History:   Procedure Laterality Date    APPENDECTOMY      COLONOSCOPY N/A 12/4/2017    Procedure: COLONOSCOPY;  COLONOSCOPY WITH POLYPECTOMY AND SCLEROTHERAPY;  Surgeon: Matt Gonzalez MD;  Location: [facility] OR    ENDOSCOPIC STRIPPING VEIN(S)      HYSTERECTOMY      SALPINGO OOPHORECTOMY,R/L/BRIAN         Allergies   Allergen Reactions    Amoxicillin      Intolerant      Codeine     Hydrocodone Nausea and Vomiting    Morphine      Sensitive to Codeine.       Current Outpatient Medications   Medication Sig Dispense Refill    apixaban ANTICOAGULANT (ELIQUIS ANTICOAGULANT) 2.5 MG tablet Take 1 tablet (2.5 mg) by mouth 2 times daily 90 tablet 3    Ascorbic Acid (VITAMIN C PO) Take 500 mg by mouth 2 times daily      atorvastatin (LIPITOR) 40 MG tablet Take 1 tablet (40 mg) by mouth daily 90 tablet 3    B Complex-Biotin-FA (VITAMIN B50 COMPLEX PO) Take 1 tablet by mouth daily      clopidogrel (PLAVIX) 75 MG tablet Take 1 tablet (75 mg) by mouth daily. 90 tablet 3    furosemide (LASIX) 20 MG tablet Take 2 tablets (40 mg) in the a.m. Then take one tab (20 mg) with dinner. 90 tablet 3    lisinopril (ZESTRIL) 2.5 MG tablet Take 1 tablet (2.5 mg) by mouth daily 90 tablet 3    magnesium oxide 400 MG tablet Take 1 tablet (400 mg) by mouth daily 90 tablet 3    metoprolol succinate ER (TOPROL XL) 25 MG 24 hr tablet Take 1 tablet (25 mg) by mouth daily 90 tablet 3    nitroGLYcerin (NITROSTAT) 0.4 MG sublingual tablet Place 0.4 mg under the tongue      pantoprazole (PROTONIX) 40 MG EC tablet Take 1 tablet (40 mg) by mouth daily 30 tablet 5    simethicone (MYLICON) 80 MG chewable tablet 80 mg      VITAMIN E COMPLEX PO Take 400 Units by mouth daily         Social History     Socioeconomic History    Marital status:      Spouse name: Not on file    Number of children: Not on file    Years of education: Not on file    Highest education level: Not on file   Occupational History    Occupation: Housewife     Comment: Retired    Tobacco Use    Smoking status: Former     Current packs/day: 0.00     Average packs/day: 0.5 packs/day for 3.2 years (1.6 ttl pk-yrs)     Types: Cigarettes     Start date: 1950     Quit date: 3/26/1953     Years since quittin.4    Smokeless tobacco: Never    Tobacco comments:     Quit in ; no passive smoke exposure   Vaping Use    Vaping status: Never Used   Substance and Sexual Activity    Alcohol use: No    Drug use: No    Sexual activity: Not Currently   Other Topics Concern     Service Not Asked    Blood Transfusions Yes    Caffeine Concern No    Occupational Exposure Not Asked    Hobby Hazards Not Asked    Sleep Concern Not Asked    Stress Concern Not Asked    Weight Concern Not Asked    Special Diet Not Asked    Back Care Not Asked    Exercise Not Asked    Bike Helmet Not Asked    Seat Belt Not Asked    Self-Exams Not Asked    Parent/sibling w/ CABG, MI or angioplasty before 65F 55M? No   Social History Narrative    Not on file     Social Determinants of Health     Financial Resource Strain: Unknown (1/15/2024)    Financial Resource Strain     Within the past 12 months, have you or your family members you live with been unable to get utilities (heat, electricity) when it was really needed?: Patient declined   Food Insecurity: Low Risk  (1/15/2024)    Food Insecurity     Within the past 12 months, did you worry that your food would run out before you got money to buy more?: No     Within the past 12 months, did the food you bought just not last and you didn t have money to get more?: Patient declined   Transportation Needs: Unknown (1/15/2024)    Transportation Needs     Within the past 12 months, has lack of transportation kept you from medical appointments, getting your medicines, non-medical meetings or appointments, work, or from getting things that you need?: Patient declined   Physical Activity: Not on file   Stress: Not on file   Social Connections: Not on file   Interpersonal  "Safety: Not on file   Housing Stability: Unknown (1/15/2024)    Housing Stability     Do you have housing? : Patient declined     Are you worried about losing your housing?: Patient declined       TEST RESULTS:   No results found for any visits on 08/29/24.          Review of systems: Negative except that which was noted in the HPI.    Physical examination:    Vitals: /73 (BP Location: Left arm, Patient Position: Sitting, Cuff Size: Adult Regular)   Pulse 96   Temp 97.5  F (36.4  C) (Tympanic)   Resp 15   Ht 1.575 m (5' 2\")   Wt 51.5 kg (113 lb 9.6 oz)   SpO2 98%   BMI 20.78 kg/m    BMI= Body mass index is 20.78 kg/m .      GENERAL APPEARANCE:  alert and no distress  CHEST: bibasilar rales, easier work of breathing compared to prior visit, no use of accessory muscles, no retractions, respirations are unlabored, normal respiratory rate.   CARDIOVASCULAR: Irregularly, irregular rhythm. Normal rate. 3/6 systolic murmur auscultated loudest at RUSB.  EXTREMITIES: +trace bilateral lower extremity edema  NEURO: alert and oriented normal speech, and affect  VASC: No carotid bruits heard.  SKIN: no visible skin lesions        Thank you for allowing me to participate in the care of your patient. Please do not hesitate to contact me if you have any questions.       Norma Garcia, CNP        "

## 2024-08-29 NOTE — PATIENT INSTRUCTIONS
Thank you for allowing Norma Garcia CNP and our  team to participate in your care. Please call our office at [phone] with scheduling questions or if you need to cancel or change your appointment. With any other questions or concerns you may call cardiology nurse at  [phone].       If you experience chest pain, chest pressure, chest tightness, shortness of breath, fainting, lightheadedness, nausea, vomiting, or other concerning symptoms, please report to the Emergency Department or call 911. These symptoms may be emergent, and best treated in the Emergency Department.    No changes today    Plan for transthoracic echocardiogram to follow-up on heart failure, keep scheduled follow-up    Norma Garcia CNP

## 2024-08-29 NOTE — NURSING NOTE
"Chief Complaint   Patient presents with    Follow Up      3 month follow up       Initial /73 (BP Location: Left arm, Patient Position: Sitting, Cuff Size: Adult Regular)   Pulse 96   Temp 97.5  F (36.4  C) (Tympanic)   Resp 15   Ht 1.575 m (5' 2\")   Wt 51.5 kg (113 lb 9.6 oz)   SpO2 98%   BMI 20.78 kg/m   Estimated body mass index is 20.78 kg/m  as calculated from the following:    Height as of this encounter: 1.575 m (5' 2\").    Weight as of this encounter: 51.5 kg (113 lb 9.6 oz).  Medication Reconciliation: complete    Ashley Chapa RN    "

## 2024-09-12 ENCOUNTER — PATIENT OUTREACH (OUTPATIENT)
Dept: CARE COORDINATION | Facility: OTHER | Age: 89
End: 2024-09-12

## 2024-09-16 ENCOUNTER — PATIENT OUTREACH (OUTPATIENT)
Dept: CARE COORDINATION | Facility: OTHER | Age: 89
End: 2024-09-16

## 2024-09-24 ENCOUNTER — OFFICE VISIT (OUTPATIENT)
Dept: OTOLARYNGOLOGY | Facility: OTHER | Age: 89
End: 2024-09-24
Attending: PHYSICIAN ASSISTANT
Payer: MEDICARE

## 2024-09-24 VITALS
SYSTOLIC BLOOD PRESSURE: 109 MMHG | DIASTOLIC BLOOD PRESSURE: 74 MMHG | OXYGEN SATURATION: 96 % | WEIGHT: 113.54 LBS | TEMPERATURE: 97.7 F | HEIGHT: 62 IN | BODY MASS INDEX: 20.89 KG/M2 | HEART RATE: 84 BPM

## 2024-09-24 DIAGNOSIS — H61.22 IMPACTED CERUMEN, LEFT EAR: Primary | ICD-10-CM

## 2024-09-24 DIAGNOSIS — H61.21 CERUMINOSIS, RIGHT: ICD-10-CM

## 2024-09-24 PROCEDURE — 69210 REMOVE IMPACTED EAR WAX UNI: CPT | Performed by: PHYSICIAN ASSISTANT

## 2024-09-24 PROCEDURE — G0463 HOSPITAL OUTPT CLINIC VISIT: HCPCS

## 2024-09-24 PROCEDURE — 92504 EAR MICROSCOPY EXAMINATION: CPT | Performed by: PHYSICIAN ASSISTANT

## 2024-09-24 ASSESSMENT — PAIN SCALES - GENERAL: PAINLEVEL: NO PAIN (0)

## 2024-09-24 NOTE — PROGRESS NOTES
Chief Complaint   Patient presents with    Cerumen Impaction    Procedure     Ear cleaning     Patient returns to ENT for ear cleaning.   She has felt her left ear is plugged/ feeling.     She concerns with hearing.  She has constant tinnitus, non-pulsatile  She has some mild vertigo when she bends in a certain direction for longer periods of time.  The symptoms last a few seconds happens occassionally.    No facial numbness or weakness.      No otalgia, otorrhea.    No hx of COM or otologic surgeries.          Past Medical History:   Diagnosis Date    Basal cell carcinoma     Cardiomegaly 01/29/2007    Compression fracture of thoracic vertebra (H) 10/30/2014    Compression fx, lumbar spine, with routine healing, subsequent encounter 6/22/2016    Hyperlipidemia     Osteoporosis     Typical atrial flutter (H) 7/25/2016    Varicose vein of leg 6/22/2016        Allergies   Allergen Reactions    Amoxicillin      Intolerant      Codeine     Hydrocodone Nausea and Vomiting    Morphine      Sensitive to Codeine.     Current Outpatient Medications   Medication Sig Dispense Refill    apixaban ANTICOAGULANT (ELIQUIS ANTICOAGULANT) 2.5 MG tablet Take 1 tablet (2.5 mg) by mouth 2 times daily 90 tablet 3    Ascorbic Acid (VITAMIN C PO) Take 500 mg by mouth 2 times daily      atorvastatin (LIPITOR) 40 MG tablet Take 1 tablet (40 mg) by mouth daily 90 tablet 3    B Complex-Biotin-FA (VITAMIN B50 COMPLEX PO) Take 1 tablet by mouth daily      clopidogrel (PLAVIX) 75 MG tablet Take 1 tablet (75 mg) by mouth daily. 90 tablet 3    furosemide (LASIX) 20 MG tablet Take 2 tablets (40 mg) in the a.m. Then take one tab (20 mg) with dinner. 90 tablet 3    lisinopril (ZESTRIL) 2.5 MG tablet Take 1 tablet (2.5 mg) by mouth daily 90 tablet 3    magnesium oxide 400 MG tablet Take 1 tablet (400 mg) by mouth daily 90 tablet 3    metoprolol succinate ER (TOPROL XL) 25 MG 24 hr tablet Take 1 tablet (25 mg) by mouth daily 90 tablet 3     "nitroGLYcerin (NITROSTAT) 0.4 MG sublingual tablet Place 0.4 mg under the tongue      pantoprazole (PROTONIX) 40 MG EC tablet Take 1 tablet (40 mg) by mouth daily 30 tablet 5    simethicone (MYLICON) 80 MG chewable tablet 80 mg      VITAMIN E COMPLEX PO Take 400 Units by mouth daily       No current facility-administered medications for this visit.     ROS- SEE HPI  /74 (BP Location: Left arm, Patient Position: Sitting, Cuff Size: Adult Regular)   Pulse 84   Temp 97.7  F (36.5  C) (Tympanic)   Ht 1.575 m (5' 2.01\")   Wt 51.5 kg (113 lb 8.6 oz)   SpO2 96%   BMI 20.76 kg/m    General - The patient is well nourished and well developed, and appears to have good nutritional status.  Alert and oriented to person and place, answers questions and cooperates with examination appropriately.   Head and Face - Normocephalic and atraumatic, with no gross asymmetry noted.  The facial nerve is intact, with strong symmetric movements.  Voice and Breathing - The patient was breathing comfortably without the use of accessory muscles. There was no wheezing, stridor. The patients voice was clear and strong, and had appropriate pitch and quality.  Ears - The ears were examined with binocular microscopy and with otoscope.  Right pinna with minimally raised, mildly erythematous, lesion that is tender to palpation.  It has a central area with healing scab.  NO active drainage.  It is normothermic.  Left external ear normal. Right canal has minimal ceruminosis  Left canal cerumen impacted.  The right ear was cleaned with cupped forceps.   Right tympanic membrane is intact without effusion, retraction or mass. The left ear was cleaned with #7 Mcgovern, cupped forceps.  Left tympanic membrane is intact without effusion, retraction or mass.  Eyes - Extraocular movements intact, sclera were not icteric or injected, conjunctiva were pink and moist.  Mouth - Examination of the oral cavity showed pink, healthy oral mucosa. Dentition in " good condition. No lesions or ulcerations noted. The tongue was mobile and midline.   Throat - The walls of the oropharynx were smooth, pink, moist, symmetric, and had no lesions or ulcerations.  The tonsillar pillars and soft palate were symmetric. The uvula was midline on elevation.    Neck - Full range of motion on passive movement.  Palpation of the occipital, submental, submandibular, internal jugular chain, and supraclavicular nodes did not demonstrate any abnormal lymph nodes or masses.  Palpation of the thyroid was soft and smooth, with no nodules or goiter appreciated.  The trachea was mobile and midline.  Nose - External contour is symmetric, no gross deflection or scars.  Nasal mucosa is pink and moist with no abnormal mucus.  The septum and turbinates were evaluated with nasal speculum, no polyps, masses, or purulence noted on examination.       ASSESSMENT/ PLAN:    ICD-10-CM    1. Impacted cerumen, left ear  H61.22       2. Ceruminosis, right  H61.21           Left ear was impacted. Ears were cleaned  Normal ear exam- No fluid or infection  Return in 1 year or sooner as needed for ear cleaning    Danielle Barney PA-C  ENT  [facility], [place]

## 2024-09-24 NOTE — PATIENT INSTRUCTIONS
Left ear was impacted. Ears were cleaned  Normal ear exam- No fluid or infection  Return in 1 year or sooner as needed for ear cleaning    Thank you for allowing ORTEGA Salguero and our ENT team to participate in your care.  If your medications are too expensive, please give the nurse a call.  We can possibly change this medication.  If you have a scheduling or an appointment question please contact our Health Unit Coordinator at their direct line [phone].   ALL nursing questions or concerns can be directed to your ENT nurse, Neema at: [phone].

## 2024-09-24 NOTE — LETTER
9/24/2024      Bianka Whitmore  [address]  [place] [address]      Dear Colleague,    Thank you for referring your patient, Bianka Whitmore, to the [facility]. Please see a copy of my visit note below.      Chief Complaint   Patient presents with     Cerumen Impaction     Procedure     Ear cleaning     Patient returns to ENT for ear cleaning.   She has felt her left ear is plugged/ feeling.     She concerns with hearing.  She has constant tinnitus, non-pulsatile  She has some mild vertigo when she bends in a certain direction for longer periods of time.  The symptoms last a few seconds happens occassionally.    No facial numbness or weakness.      No otalgia, otorrhea.    No hx of COM or otologic surgeries.          Past Medical History:   Diagnosis Date     Basal cell carcinoma      Cardiomegaly 01/29/2007     Compression fracture of thoracic vertebra (H) 10/30/2014     Compression fx, lumbar spine, with routine healing, subsequent encounter 6/22/2016     Hyperlipidemia      Osteoporosis      Typical atrial flutter (H) 7/25/2016     Varicose vein of leg 6/22/2016        Allergies   Allergen Reactions     Amoxicillin      Intolerant       Codeine      Hydrocodone Nausea and Vomiting     Morphine      Sensitive to Codeine.     Current Outpatient Medications   Medication Sig Dispense Refill     apixaban ANTICOAGULANT (ELIQUIS ANTICOAGULANT) 2.5 MG tablet Take 1 tablet (2.5 mg) by mouth 2 times daily 90 tablet 3     Ascorbic Acid (VITAMIN C PO) Take 500 mg by mouth 2 times daily       atorvastatin (LIPITOR) 40 MG tablet Take 1 tablet (40 mg) by mouth daily 90 tablet 3     B Complex-Biotin-FA (VITAMIN B50 COMPLEX PO) Take 1 tablet by mouth daily       clopidogrel (PLAVIX) 75 MG tablet Take 1 tablet (75 mg) by mouth daily. 90 tablet 3     furosemide (LASIX) 20 MG tablet Take 2 tablets (40 mg) in the a.m. Then take one tab (20 mg) with dinner. 90 tablet 3     lisinopril (ZESTRIL) 2.5 MG  "tablet Take 1 tablet (2.5 mg) by mouth daily 90 tablet 3     magnesium oxide 400 MG tablet Take 1 tablet (400 mg) by mouth daily 90 tablet 3     metoprolol succinate ER (TOPROL XL) 25 MG 24 hr tablet Take 1 tablet (25 mg) by mouth daily 90 tablet 3     nitroGLYcerin (NITROSTAT) 0.4 MG sublingual tablet Place 0.4 mg under the tongue       pantoprazole (PROTONIX) 40 MG EC tablet Take 1 tablet (40 mg) by mouth daily 30 tablet 5     simethicone (MYLICON) 80 MG chewable tablet 80 mg       VITAMIN E COMPLEX PO Take 400 Units by mouth daily       No current facility-administered medications for this visit.     ROS- SEE HPI  /74 (BP Location: Left arm, Patient Position: Sitting, Cuff Size: Adult Regular)   Pulse 84   Temp 97.7  F (36.5  C) (Tympanic)   Ht 1.575 m (5' 2.01\")   Wt 51.5 kg (113 lb 8.6 oz)   SpO2 96%   BMI 20.76 kg/m    General - The patient is well nourished and well developed, and appears to have good nutritional status.  Alert and oriented to person and place, answers questions and cooperates with examination appropriately.   Head and Face - Normocephalic and atraumatic, with no gross asymmetry noted.  The facial nerve is intact, with strong symmetric movements.  Voice and Breathing - The patient was breathing comfortably without the use of accessory muscles. There was no wheezing, stridor. The patients voice was clear and strong, and had appropriate pitch and quality.  Ears - The ears were examined with binocular microscopy and with otoscope.  Right pinna with minimally raised, mildly erythematous, lesion that is tender to palpation.  It has a central area with healing scab.  NO active drainage.  It is normothermic.  Left external ear normal. Right canal has minimal ceruminosis  Left canal cerumen impacted.  The right ear was cleaned with cupped forceps.   Right tympanic membrane is intact without effusion, retraction or mass. The left ear was cleaned with #7 Mcgovern, cupped forceps.  Left " tympanic membrane is intact without effusion, retraction or mass.  Eyes - Extraocular movements intact, sclera were not icteric or injected, conjunctiva were pink and moist.  Mouth - Examination of the oral cavity showed pink, healthy oral mucosa. Dentition in good condition. No lesions or ulcerations noted. The tongue was mobile and midline.   Throat - The walls of the oropharynx were smooth, pink, moist, symmetric, and had no lesions or ulcerations.  The tonsillar pillars and soft palate were symmetric. The uvula was midline on elevation.    Neck - Full range of motion on passive movement.  Palpation of the occipital, submental, submandibular, internal jugular chain, and supraclavicular nodes did not demonstrate any abnormal lymph nodes or masses.  Palpation of the thyroid was soft and smooth, with no nodules or goiter appreciated.  The trachea was mobile and midline.  Nose - External contour is symmetric, no gross deflection or scars.  Nasal mucosa is pink and moist with no abnormal mucus.  The septum and turbinates were evaluated with nasal speculum, no polyps, masses, or purulence noted on examination.       ASSESSMENT/ PLAN:    ICD-10-CM    1. Impacted cerumen, left ear  H61.22       2. Ceruminosis, right  H61.21           Left ear was impacted. Ears were cleaned  Normal ear exam- No fluid or infection  Return in 1 year or sooner as needed for ear cleaning    Danielle Barney PA-C  ENT  [facility] [place]        Again, thank you for allowing me to participate in the care of your patient.        Sincerely,        Danielle Barney PA-C

## 2024-10-10 DIAGNOSIS — I50.9 CHRONIC CONGESTIVE HEART FAILURE, UNSPECIFIED HEART FAILURE TYPE (H): ICD-10-CM

## 2024-10-10 DIAGNOSIS — I48.91 ATRIAL FIBRILLATION BY ELECTROCARDIOGRAM (H): ICD-10-CM

## 2024-10-10 DIAGNOSIS — I51.7 ATRIAL ENLARGEMENT, BILATERAL: ICD-10-CM

## 2024-10-10 RX ORDER — FUROSEMIDE 20 MG/1
TABLET ORAL
Qty: 90 TABLET | Refills: 3 | Status: SHIPPED | OUTPATIENT
Start: 2024-10-10

## 2024-10-10 NOTE — TELEPHONE ENCOUNTER
Lasix      Last Written Prescription Date:  07/12/24  Last Fill Quantity: 90,   # refills: 3  Last Office Visit: 08/29/24  Future Office visit:    Next 5 appointments (look out 90 days)      Nov 15, 2024 10:15 AM  (Arrive by 10:00 AM)  Provider Visit with ORTEGA Rivas  [facility] [place] ([facility] [place] ) [address]  [place] [address]  [phone]     Jan 08, 2025 9:00 AM  (Arrive by 8:45 AM)  Return Visit with Norma Garcia CNP  [facility] [place] ([facility] [place] ) [address]  [place] [address]  [phone]

## 2024-10-10 NOTE — TELEPHONE ENCOUNTER
Requested Prescriptions   Pending Prescriptions Disp Refills    furosemide (LASIX) 20 MG tablet 90 tablet 3     Sig: Take 2 tablets (40 mg) in the a.m. Then take one tab (20 mg) with dinner.       Diuretics (Including Combos) Protocol Passed - 10/10/2024 10:18 AM        Passed - Most recent blood pressure under 140/90 in past 12 months     BP Readings from Last 3 Encounters:   09/24/24 109/74   08/29/24 112/73   08/15/24 113/79       No data recorded            Passed - Medication is active on med list        Passed - Has GFR on file in past 12 months and most recent value is normal        Passed - Medication indicated for associated diagnosis     Medication is associated with one or more of the following diagnoses:     Edema   Hypertension   Hypercalcemia   Heart Failure   Chronic Kidney Disease (CKD)   Cardiomyopathy   Dyspnea   Chronic Thromboembolic Pulmonary Hypertension   Pulmonary Hypertension          Passed - Recent (12 mo) or future (90 days) visit within the authorizing provider's specialty     The patient must have completed an in-person or virtual visit within the past 12 months or has a future visit scheduled within the next 90 days with the authorizing provider s specialty.  Urgent care and e-visits do not quality as an office visit for this protocol.          Passed - Patient is age 18 or older        Passed - No active pregancy on record        Passed - No positive pregnancy test in past 12 months           Prescription approved per [facility] Refill Protocol.

## 2024-11-01 ENCOUNTER — PATIENT OUTREACH (OUTPATIENT)
Dept: CARE COORDINATION | Facility: OTHER | Age: 89
End: 2024-11-01

## 2024-11-01 NOTE — PROGRESS NOTES
Last HF Care Coordination Contact Date:  24 last conversation with patient.      Self-Assessment:     Home scale available:  Yes     Home scale weight this mornin     Heart Failure Zones sheet on refrigerator or available: No     Current Heart Failure Zone: Green     Any increased shortness of breath (SOB) since last contact: No     Any increased edema/swelling since last contact: No     Any chest pain since last contact: No     What number to call for YELLOW Zones:        What number to call for RED Zones:       Medications:     How many of your current medicines changed (dose, timing, name, etc) since last contacted: 0 - 1     Having problems obtaining or taking  medications: No     Any missed medications this week:  No     Using medication reminders ( [place], Pill box, other):  Yes     Any questions about medications: No       Healthy Lifestyle:     Follows a low sodium diet: Yes     Daily activity routine:  Walking in home.  States she feels she has worked  up to 30 min of daily activity     Activity more limited since last contact: No     Fluid intake less than 2 liters  per day: N/A     Questions about low sodium diet or fluid restriction: No     Patient Centered Goals:     The patient will: Continue with 30 min of daily activity     The patient will: Continue low sodium restrictions.      Appointments:     Next PCP appointment:  11/15/24     Next Cardiology appointment: 24      Care Coordinator following:  Natasha Chapa RN CC    Patient reports feeling well. Denies SOB, swelling, chest pain/pressure.  Denies any issues or concerns.  Will contact patient again in 30 days.

## 2024-11-14 NOTE — PROGRESS NOTES
Assessment & Plan     Chronic congestive heart failure, unspecified heart failure type (H)  Checking her kidney and hr liver . Minor elevation on her LFT . Will watch. Breathing is good exercise tolerance is very good. She has been in cardiac rehab.  Son is not with her today.   - Basic metabolic panel; Future    Bleeding from the nose  Had this before she came today. DOAC is used. Check INR and CBC   - CBC with platelets and differential; Future  - Reflex INR; Future    Age-related osteoporosis with current pathological fracture with routine healing  Taking medication for this. Check her kidneys and calcium.   - Basic metabolic panel; Future    Atrial fibrillation by electrocardiogram (H)  Is in A fib again today. Some days she thinks she is others she doesn't . No weakness. Both Atria are enlarged.  Seeing cardiology every 6 months.     Atrial enlargement, bilateral  Due to her A fib. Just finished cardiac rehab.             See Patient Instructions    No follow-ups on file.    Fei Carlton is a 89 year old, presenting for the following health issues:  Heart Failure (Follow up)        11/15/2024     9:59 AM   Additional Questions   Roomed by Franco Joyce   Accompanied by          11/15/2024     9:59 AM   Patient Reported Additional Medications   Patient reports taking the following new medications None     HPI     Heart Failure Follow-up   Are you experiencing any shortness of breath? No  Are you experiencing any swelling in your legs or feet?  No  Are you using more pillows than usual? No  Do you cough at night?  No  Do you check your weight daily?  Yes  Have you had a weight change recently?  No  Are you having any of the following side effects from your medications? (Select all that apply)  The patient does not report symptoms of dizziness, fatigue, cough, swelling, or slow heart beat.  Since your last visit, how many times have you gone to the cardiologist, urgent care, emergency room, or  hospital because of your heart failure?   None  Last Echo:   Echo result w/o MOPS: Interpretation SummaryLeft ventricular function is decreased. The ejection fraction is 45-50%(mildly reduced).Global right ventricular function is normal.Severe biatrial enlargement is present. Cannot exclude a bicuspid aortic valve.Moderate aortic stenosis is present.Moderate aortic valve calcification ispresent. Low flow low gradient aortic valve stenosis (SVI is 24), MG is notwell assessed in this study. JOSE LUIS is 1.3 cm2.Mild to moderate mitral insufficiency is present.Mild to moderate tricuspid insufficiency is present.IVC diameter and respiratory changes fall into an intermediate rangesuggesting an RA pressure of 8 mmHg.This study was compared with the study from 11/17/2022 . No significantchanges noted. Aortic valve stenosis appear similar in the previous. Recommend a limited study to assess AV gradients from multiple windows.     Atrial Fibrillation Follow-up    Symptoms: no recent chest pain, significant palpitations, dizziness/lightheadedness, dyspnea, or increased peripheral edema.  Stroke prevention: DOAC (Eliquis, Xarelto, Pradaxa)        6/3/2024     8:09 AM 8/15/2024     9:57 AM 8/29/2024    12:47 PM 9/24/2024     8:27 AM 11/15/2024    10:03 AM   Date   Pulse 88 80 96 84 81     Current [id number] Score: 5 points - A score of 5 or greater represents a 7.2 - 12.2% annual risk of major embolic event, without anti-coagulation or an LAAO device.           Review of Systems  Constitutional, neuro, ENT, endocrine, pulmonary, cardiac, gastrointestinal, genitourinary, musculoskeletal, integument and psychiatric systems are negative, except as otherwise noted.      Objective    /69 (BP Location: Left arm, Patient Position: Sitting, Cuff Size: Adult Small)   Pulse 81   Temp 98.1  F (36.7  C) (Tympanic)   Resp 16   Wt 51.8 kg (114 lb 4.8 oz)   SpO2 94%   BMI 20.90 kg/m    Body mass index is 20.9 kg/m .  Physical Exam    GENERAL: alert and no distress  EYES: Eyes grossly normal to inspection, PERRL and conjunctivae and sclerae normal  HENT: ear canals and TM's normal, nose and mouth without ulcers or lesions  NECK: no adenopathy, no asymmetry, masses, or scars  RESP: lungs clear to auscultation - no rales, rhonchi or wheezes  CV: regular rate and rhythm, normal S1 S2, no S3 or S4, no murmur, click or rub, no peripheral edema  ABDOMEN: soft, nontender, no hepatosplenomegaly, no masses and bowel sounds normal  MS: no gross musculoskeletal defects noted, no edema  SKIN: no suspicious lesions or rashes  NEURO: Normal strength and tone, mentation intact and speech normal  PSYCH: mentation appears normal, affect normal/bright  LYMPH: no cervical, supraclavicular, axillary, or inguinal adenopathy    No results found for any visits on 11/15/24.        Signed Electronically by: ORTEGA Caruso

## 2024-11-15 ENCOUNTER — OFFICE VISIT (OUTPATIENT)
Dept: FAMILY MEDICINE | Facility: OTHER | Age: 89
End: 2024-11-15
Attending: PHYSICIAN ASSISTANT
Payer: MEDICARE

## 2024-11-15 VITALS
DIASTOLIC BLOOD PRESSURE: 69 MMHG | RESPIRATION RATE: 16 BRPM | SYSTOLIC BLOOD PRESSURE: 102 MMHG | TEMPERATURE: 98.1 F | WEIGHT: 114.3 LBS | HEART RATE: 81 BPM | BODY MASS INDEX: 20.9 KG/M2 | OXYGEN SATURATION: 94 %

## 2024-11-15 DIAGNOSIS — I50.9 CHRONIC CONGESTIVE HEART FAILURE, UNSPECIFIED HEART FAILURE TYPE (H): Primary | ICD-10-CM

## 2024-11-15 DIAGNOSIS — M80.00XD AGE-RELATED OSTEOPOROSIS WITH CURRENT PATHOLOGICAL FRACTURE WITH ROUTINE HEALING: ICD-10-CM

## 2024-11-15 DIAGNOSIS — R04.0 BLEEDING FROM THE NOSE: ICD-10-CM

## 2024-11-15 DIAGNOSIS — I25.10 CORONARY ARTERY DISEASE INVOLVING NATIVE CORONARY ARTERY OF NATIVE HEART WITHOUT ANGINA PECTORIS: ICD-10-CM

## 2024-11-15 DIAGNOSIS — E78.5 HYPERLIPIDEMIA LDL GOAL <70: ICD-10-CM

## 2024-11-15 DIAGNOSIS — I48.91 ATRIAL FIBRILLATION BY ELECTROCARDIOGRAM (H): ICD-10-CM

## 2024-11-15 DIAGNOSIS — I51.7 ATRIAL ENLARGEMENT, BILATERAL: ICD-10-CM

## 2024-11-15 DIAGNOSIS — I21.11 ST ELEVATION MYOCARDIAL INFARCTION INVOLVING RIGHT CORONARY ARTERY (H): ICD-10-CM

## 2024-11-15 LAB
ALBUMIN SERPL BCG-MCNC: 4.4 G/DL (ref 3.5–5.2)
ALP SERPL-CCNC: 171 U/L (ref 40–150)
ALT SERPL W P-5'-P-CCNC: 40 U/L (ref 0–50)
ANION GAP SERPL CALCULATED.3IONS-SCNC: 13 MMOL/L (ref 7–15)
AST SERPL W P-5'-P-CCNC: 47 U/L (ref 0–45)
BASOPHILS # BLD AUTO: 0 10E3/UL (ref 0–0.2)
BASOPHILS NFR BLD AUTO: 1 %
BILIRUB SERPL-MCNC: 1.3 MG/DL
BUN SERPL-MCNC: 28.1 MG/DL (ref 8–23)
CALCIUM SERPL-MCNC: 9.5 MG/DL (ref 8.8–10.4)
CHLORIDE SERPL-SCNC: 99 MMOL/L (ref 98–107)
CHOLEST SERPL-MCNC: 198 MG/DL
CREAT SERPL-MCNC: 0.78 MG/DL (ref 0.51–0.95)
EGFRCR SERPLBLD CKD-EPI 2021: 72 ML/MIN/1.73M2
EOSINOPHIL # BLD AUTO: 0 10E3/UL (ref 0–0.7)
EOSINOPHIL NFR BLD AUTO: 1 %
ERYTHROCYTE [DISTWIDTH] IN BLOOD BY AUTOMATED COUNT: 13.9 % (ref 10–15)
FASTING STATUS PATIENT QL REPORTED: NO
FASTING STATUS PATIENT QL REPORTED: NO
GLUCOSE SERPL-MCNC: 81 MG/DL (ref 70–99)
HCO3 SERPL-SCNC: 24 MMOL/L (ref 22–29)
HCT VFR BLD AUTO: 42.7 % (ref 35–47)
HDLC SERPL-MCNC: 77 MG/DL
HGB BLD-MCNC: 14.8 G/DL (ref 11.7–15.7)
IMM GRANULOCYTES # BLD: 0 10E3/UL
IMM GRANULOCYTES NFR BLD: 0 %
INR PPP: 1.32 (ref 0.85–1.15)
LDLC SERPL CALC-MCNC: 108 MG/DL
LYMPHOCYTES # BLD AUTO: 1.6 10E3/UL (ref 0.8–5.3)
LYMPHOCYTES NFR BLD AUTO: 31 %
MCH RBC QN AUTO: 33.5 PG (ref 26.5–33)
MCHC RBC AUTO-ENTMCNC: 34.7 G/DL (ref 31.5–36.5)
MCV RBC AUTO: 97 FL (ref 78–100)
MONOCYTES # BLD AUTO: 0.5 10E3/UL (ref 0–1.3)
MONOCYTES NFR BLD AUTO: 9 %
NEUTROPHILS # BLD AUTO: 3.1 10E3/UL (ref 1.6–8.3)
NEUTROPHILS NFR BLD AUTO: 59 %
NONHDLC SERPL-MCNC: 121 MG/DL
NRBC # BLD AUTO: 0 10E3/UL
NRBC BLD AUTO-RTO: 0 /100
PLATELET # BLD AUTO: 141 10E3/UL (ref 150–450)
POTASSIUM SERPL-SCNC: 3.9 MMOL/L (ref 3.4–5.3)
PROT SERPL-MCNC: 7.2 G/DL (ref 6.4–8.3)
RBC # BLD AUTO: 4.42 10E6/UL (ref 3.8–5.2)
SODIUM SERPL-SCNC: 136 MMOL/L (ref 135–145)
TRIGL SERPL-MCNC: 64 MG/DL
TSH SERPL DL<=0.005 MIU/L-ACNC: 3.71 UIU/ML (ref 0.3–4.2)
WBC # BLD AUTO: 5.3 10E3/UL (ref 4–11)

## 2024-11-15 PROCEDURE — 84155 ASSAY OF PROTEIN SERUM: CPT | Mod: ZL | Performed by: PHYSICIAN ASSISTANT

## 2024-11-15 PROCEDURE — 84443 ASSAY THYROID STIM HORMONE: CPT | Mod: ZL | Performed by: PHYSICIAN ASSISTANT

## 2024-11-15 PROCEDURE — 84460 ALANINE AMINO (ALT) (SGPT): CPT | Mod: ZL | Performed by: PHYSICIAN ASSISTANT

## 2024-11-15 PROCEDURE — 85004 AUTOMATED DIFF WBC COUNT: CPT | Mod: ZL | Performed by: PHYSICIAN ASSISTANT

## 2024-11-15 PROCEDURE — 85610 PROTHROMBIN TIME: CPT | Mod: ZL | Performed by: PHYSICIAN ASSISTANT

## 2024-11-15 PROCEDURE — 80061 LIPID PANEL: CPT | Mod: ZL | Performed by: PHYSICIAN ASSISTANT

## 2024-11-15 PROCEDURE — 36415 COLL VENOUS BLD VENIPUNCTURE: CPT | Mod: ZL | Performed by: PHYSICIAN ASSISTANT

## 2024-11-15 PROCEDURE — 85014 HEMATOCRIT: CPT | Mod: ZL | Performed by: PHYSICIAN ASSISTANT

## 2024-11-15 PROCEDURE — 99214 OFFICE O/P EST MOD 30 MIN: CPT | Performed by: PHYSICIAN ASSISTANT

## 2024-11-15 PROCEDURE — 82465 ASSAY BLD/SERUM CHOLESTEROL: CPT | Mod: ZL | Performed by: PHYSICIAN ASSISTANT

## 2024-11-15 PROCEDURE — G0463 HOSPITAL OUTPT CLINIC VISIT: HCPCS

## 2024-11-15 ASSESSMENT — PAIN SCALES - GENERAL: PAINLEVEL_OUTOF10: NO PAIN (0)

## 2024-11-18 NOTE — RESULT ENCOUNTER NOTE
We should increase her cholesterol medications.  Ldl is too high. Her liver number AST minor elevation. And total bili is just above normal. Alk phos also up.

## 2024-11-20 DIAGNOSIS — I25.10 CORONARY ARTERY DISEASE INVOLVING NATIVE CORONARY ARTERY OF NATIVE HEART WITHOUT ANGINA PECTORIS: ICD-10-CM

## 2024-11-20 DIAGNOSIS — E78.5 HYPERLIPIDEMIA LDL GOAL <70: ICD-10-CM

## 2024-11-20 DIAGNOSIS — I21.11 ST ELEVATION MYOCARDIAL INFARCTION INVOLVING RIGHT CORONARY ARTERY (H): ICD-10-CM

## 2024-11-20 RX ORDER — ATORVASTATIN CALCIUM 80 MG/1
40 TABLET, FILM COATED ORAL DAILY
Qty: 90 TABLET | Refills: 3 | Status: SHIPPED | OUTPATIENT
Start: 2024-11-20 | End: 2024-11-20

## 2024-11-20 RX ORDER — ATORVASTATIN CALCIUM 80 MG/1
80 TABLET, FILM COATED ORAL DAILY
Qty: 90 TABLET | Refills: 3 | Status: SHIPPED | OUTPATIENT
Start: 2024-11-20

## 2024-11-22 ENCOUNTER — HOSPITAL ENCOUNTER (OUTPATIENT)
Dept: CARDIOLOGY | Facility: HOSPITAL | Age: 89
Discharge: HOME | End: 2024-11-22
Attending: NURSE PRACTITIONER
Payer: MEDICARE

## 2024-11-22 PROCEDURE — 93306 TTE W/DOPPLER COMPLETE: CPT

## 2024-11-29 ENCOUNTER — TELEPHONE (OUTPATIENT)
Dept: CARDIOLOGY | Facility: OTHER | Age: 89
End: 2024-11-29

## 2024-11-29 NOTE — TELEPHONE ENCOUNTER
Patient called stating a blood vessel popped in her left eye about a week ago and it's not getting better. Patient states the sclera is almost entirely red now and can't see much white. She states this has happened many times before but it doesn't usually take this long to resolve. She thinks it's because she is on eliquis and would like to know what she should do. Please give the patient a call back on her cell phone at [phone]

## 2024-12-02 NOTE — TELEPHONE ENCOUNTER
The fact that the eye hemorrhage did nto improve as quick was related to the eliquis. This does not mean she is on too much. This is typical when pts are on blood thinners

## 2024-12-02 NOTE — TELEPHONE ENCOUNTER
Writer spoke with pt, states her left eye is better, vision is good. Patient concerned if she is on too much eliquis or if doubling her Lipitor recently could contribute to this. Has had this before but not this extreme. She will discuss with Cardio and provider when she sees them. PCP currently out of clinic.Any advise at this time.  Norma Marcus LPN

## 2024-12-16 ENCOUNTER — PATIENT OUTREACH (OUTPATIENT)
Dept: CARE COORDINATION | Facility: OTHER | Age: 89
End: 2024-12-16

## 2024-12-16 NOTE — LETTER
[facility]          Cardiology Department  [address] [facility] [address]    December 16, 2024    Bianka Whitmore  [address] [place] [address]    Dear Bianka,     I hope you are finding some benefits in your Clinic Care Coordination enrollment. As we had discussed during our prior conversations, as a care coordinator, I can help you navigate complex healthcare systems and provide accurate information, health education, and extra support.  I can help you understand more about health challenges you might be experiencing and to help you manage them better.         If at anytime you feel that you are not benefiting from the care coordination program, it is voluntary and offered to you at no cost. You are not under any obligation, and  may discontinue the program at anytime.  Please call me at [phone] with any questions or concerns you might have. If you reach my voicemail, please leave a message with your name, date of birth and your phone number.            Sincerely,                 Natasha SKAGGS RN   Cardiology/Congestive Heart Failure Care Coordinator   [phone]                                                                           Enclosure:      Below is your updated plan of care as a patient in the heart failure care coordination program. These are updated every 90 days.   Please let me know if I can help you with other questions, concerns, or goals.                                 [facility]  Patient-Centered Heart Failure Access Plan of Care                     About Me:        Patient Name:  Bianka Whitmore    YOB: 1935  Age:         89 year old   [place] MRN:    [id number] Telephone Information:  Home Phone [phone]   Mobile [phone]       Address:  [address] [place] [address] Email address:  No e-mail address on record      Emergency Contact(s)    Name Relationship Lgl Grd Work Phone Home Phone Mobile Phone   1.  CONNIE PETERSON Spouse No  [phone] NONE   2. MEMO PETERSON Son   [phone]             Primary language:  English     needed? No   [facility] [employer]:  [phone] op. 1  Other communication barriers:  None  Preferred Method of Communication:  Mail  Current living arrangement:   I live in a private home with spouse  Mobility Status/ Medical Equipment:   Independent      My Access Plan  Medical Emergency 911   Cardiology Call Center (available 24/7) [phone]   Primary Clinic Line [facility]  [phone]   Preferred Pharmacy   [facility] - [place], [place] - [address] [facility]  [address] [facility] [address]  Phone: [phone] Fax: [phone]     Behavioral Health Crisis Line 988 Suicide and Crisis Lifeline       My Care Team Members  Patient Care Team         Relationship Specialty Notifications Start End    Yael Mckeon PA PCP - General Family Practice  8/23/18     Phone: [phone] Fax: [phone]         [facility] [facility] [address] [place] [address]    Yael Mckeon PA Assigned PCP   10/25/20     Phone: [phone] Fax: [phone] [address] [facility] [address] [place] [address]    Norma Garcia CNP Assigned Heart and Vascular Provider   2/11/23     Phone: [phone] Fax: [phone] [address] [facility] [place] [address]    Ashley Chapa, RN Registered Nurse Cardiology Admissions 9/16/24     Danielle Barney PA-C Assigned Surgical Provider   10/23/24     Phone: [phone] Fax: [phone]         [address] [facility] [place] [address]                My Care Plans   Goals          General    Consistently take heart failure medication as prescribed     Patient will not experience any symptoms of shortness of breath or body swelling over the next 3 months     Patient will verbalize understanding of how other conditions affect the heart

## 2024-12-16 NOTE — PROGRESS NOTES
Patient states she is doing well.  Denies any new/worsening cough, SOB, edema.  States she follows sodium/fluid restrictions, denies missed medications.  No reports of any issues or concerns.  Will be moving to maintenance for CC.        Last HF Care Coordination Contact Date:  24     Self-Assessment:     Home scale available:  Yes     Home scale weight this mornin.5     Heart Failure Zones sheet on refrigerator or available: No     Current Heart Failure Zone: Green     Any increased shortness of breath (SOB) since last contact: No     Any increased edema/swelling since last contact: No     Any chest pain since last contact: No     What number to call for YELLOW Zones:        What number to call for RED Zones:       Medications:     How many of your current medicines changed (dose, timing, name, etc) since last contacted: 0 - 1     Having problems obtaining or taking  medications: No     Any missed medications this week:  No     Using medication reminders ( [place], Pill box, other):  No     Any questions about medications: No       Healthy Lifestyle:     Follows a low sodium diet: Yes     Daily activity routine:  walking indoors    Activity more limited since last contact: No     Fluid intake less than 2 liters  per day: Yes     Questions about low sodium diet or fluid restriction: Yes     Patient Centered Goals:     The patient will: Remain free of edema     The patient will: Continue to walk 30 minutes each day     Appointments:     Next PCP appointment:  25     Next Cardiology appointment: 25      Care Coordinator following:  Natasha Chapa RN CC

## 2024-12-16 NOTE — LETTER
[facility]  Cardiology Department  [address] [facility] [address]    December 16, 2024    Bianka Peterson  [address] [place] [address]      Dear Bianka,    I hope you are finding some benefits in your Clinic Care Coordination enrollment. As we had discussed during our prior conversations, as a care coordinator, I can help you navigate complex healthcare systems and provide accurate information, health education, and extra support.  I can help you understand more about health challenges you might be experiencing and to help you manage them better.       If you feel at any time that you are not benefiting from the care coordination program, it is voluntary and offered to you at no cost. You are not under any obligation, and  may discontinue the program at anytime.  Please call me at [phone] with any questions or concerns you might have. If you reach my voicemail, please leave a message with your name, date of birth and your phone number.            Sincerely,             Natasha SKAGGS RN   Cardiology/Congestive Heart Failure Care Coordinator   [phone]                                                                           Enclosure:              Below is your updated plan of care as a patient in the heart failure care coordination program. These are updated every 90 days.   Please let me know if I can help you with other questions, concerns, or goals.                           [facility]  Patient-Centered Heart Failure Access Plan of Care  About Me:        Patient Name:  Bianka Peterson    YOB: 1935  Age:         89 year old   Wren MRN:    [id number] Telephone Information:  Home Phone [phone]   Mobile [phone]       Address:  [address] [place] [address] Email address:  No e-mail address on record      Emergency Contact(s)    Name Relationship Lgl Grd Work Phone Home Phone Mobile Phone   1. CONNIE PETERSON Spouse No   [phone] NONE   2. MEMO PETERSON Son   [phone]             Primary language:  English     needed? No   [facility] [employer]:  [phone] op. 1  Other communication barriers:  None  Preferred Method of Communication:  Mail  Current living arrangement:   I live in a private home with spouse  Mobility Status/ Medical Equipment:   Independent    My Access Plan  Medical Emergency 911   Cardiology Call Center (available 24/7) [phone]   Primary Clinic Line [facility]  [phone]   Preferred Pharmacy   [facility] - [place], [place] - [address]  [address]  [place] [address]  Phone: [phone] Fax: [phone]     Behavioral Health Crisis Line 988 Suicide and Crisis Lifeline       My Care Team Members  Patient Care Team         Relationship Specialty Notifications Start End    Yael Mckeon PA PCP - General Family Practice  8/23/18     Phone: [phone] Fax: [phone] [address] [facility] [address] [place] [address]    Yael Mckeon PA Assigned PCP   10/25/20     Phone: [phone] Fax: [phone] [address] [facility] [address] [place] [address]    Norma Garcia CNP Assigned Heart and Vascular Provider   2/11/23     Phone: [phone] Fax: [phone] [address] [facility] [place] [address]    Ashley Chapa, RN Registered Nurse Cardiology Admissions 9/16/24     Danielle Barney PA-C Assigned Surgical Provider   10/23/24     Phone: [phone] Fax: [phone] [address] [facility] [place] [address]                My Care Plans   Goals          General    Consistently take heart failure medication as prescribed     Consistently will continue to take heart failure medication as prescribed     Patient will be able to identify signs and symptoms of fluid retention     Patient will maintain management of sodium consumption     Patient will not experience any symptoms of shortness of breath or body  swelling over the next 3 months     Patient will verbalize understanding of how other conditions affect the heart

## 2024-12-16 NOTE — LETTER
[facility] - HEART FAILURE CARE COORDINATION   [address] [facility] [address]    December 16, 2024    Bianka Peterson  [address]  [place] [address]      Dear Bianka,    I hope you are finding some benefits in your Clinic Care Coordination enrollment. As we had discussed during our prior conversations, as a care coordinator, I can help you navigate complex healthcare systems and provide accurate information, health education, and extra support.  I can help you understand more about health challenges you might be experiencing and to help you manage them better.        If at any time you feel that you are not benefiting from the care coordination program, it is a voluntary program and offered to you at no cost. You are not under any obligation, and  may discontinue the program at anytime.  Please call me at [phone] with any questions or concerns you might have. If you reach my voicemail, please leave a message with your name, date of birth and your phone number.            Sincerely,              Natasha SKAGGS RN   Cardiology/Congestive Heart Failure Care Coordinator   [phone]                                                                           Enclosure:        Below is your updated plan of care as a patient in the heart failure care coordination program. These are updated every 90 days.   Please let me know if I can help you with other questions, concerns, or goals.                             [facility]  Patient-Centered Heart Failure Access Plan of Care    About Me:        Patient Name:  Bianka Peterson    YOB: 1935  Age:         89 year old   Ida MRN:    [id number] Telephone Information:  Home Phone [phone]   Mobile [phone]       Address:  [address] [place] [address] Email address:  No e-mail address on record      Emergency Contact(s)    Name Relationship Lgl Grd Work Phone Home Phone Mobile Phone   1. CONNIE PETERSON  Spouse No  [phone] NONE   2. MEMO PETERSON Son   [phone]             Primary language:  English     needed? No   [facility] [employer]:  [phone] op. 1  Other communication barriers:  None    Preferred Method of Communication:  Mail  Current living arrangement:   I live in a private home with spouse    Mobility Status/ Medical Equipment:   Independent                                            My Access Plan  Medical Emergency 911   Cardiology Call Center (available 24/7) [phone]   Primary Clinic Line [facility]  [phone]   Preferred Pharmacy   [facility] - [place], [place] - [address]  [address] [address]  Phone: [phone] Fax: [phone]     Behavioral Health Crisis Line 988 Suicide and Crisis Lifeline       My Care Team Members  Patient Care Team         Relationship Specialty Notifications Start End    Yael Mckeon PA PCP - General Family Practice  8/23/18     Phone: [phone] Fax: [phone] [address] [facility] [address] [place] [address]    Yael Mckeon PA Assigned PCP   10/25/20     Phone: [phone] Fax: [phone] [address] [address] [place] [address]    Norma Garcia CNP Assigned Heart and Vascular Provider   2/11/23     Phone: [phone] Fax: [phone] [address] [facility] [place] [address]    Ashley Chapa RN Registered Nurse Cardiology Admissions 9/16/24     Danielle Barney PA-C Assigned Surgical Provider   10/23/24     Phone: [phone] Fax: [phone] [address] [facility] [place] [address]                My Care Plans   Goals          General    Consistently will continue to take heart failure medication as prescribed     Patient will be able to identify signs and symptoms of fluid retention     Patient will maintain management of sodium consumption

## 2025-01-08 ENCOUNTER — OFFICE VISIT (OUTPATIENT)
Dept: CARDIOLOGY | Facility: OTHER | Age: OVER 89
End: 2025-01-08
Attending: NURSE PRACTITIONER
Payer: MEDICARE

## 2025-01-08 VITALS
RESPIRATION RATE: 16 BRPM | OXYGEN SATURATION: 98 % | HEART RATE: 73 BPM | WEIGHT: 112 LBS | HEIGHT: 62 IN | SYSTOLIC BLOOD PRESSURE: 104 MMHG | BODY MASS INDEX: 20.61 KG/M2 | DIASTOLIC BLOOD PRESSURE: 64 MMHG

## 2025-01-08 DIAGNOSIS — Z79.01 CHRONIC ANTICOAGULATION: ICD-10-CM

## 2025-01-08 DIAGNOSIS — I27.20 PULMONARY HYPERTENSION (H): ICD-10-CM

## 2025-01-08 DIAGNOSIS — I35.0 NONRHEUMATIC AORTIC VALVE STENOSIS: ICD-10-CM

## 2025-01-08 DIAGNOSIS — I21.11 ST ELEVATION MYOCARDIAL INFARCTION INVOLVING RIGHT CORONARY ARTERY (H): ICD-10-CM

## 2025-01-08 DIAGNOSIS — I25.10 CORONARY ARTERY DISEASE INVOLVING NATIVE CORONARY ARTERY OF NATIVE HEART WITHOUT ANGINA PECTORIS: ICD-10-CM

## 2025-01-08 DIAGNOSIS — I51.7 ATRIAL ENLARGEMENT, BILATERAL: ICD-10-CM

## 2025-01-08 DIAGNOSIS — I34.1 MITRAL VALVE PROLAPSE: ICD-10-CM

## 2025-01-08 DIAGNOSIS — I50.22 CHRONIC SYSTOLIC CONGESTIVE HEART FAILURE (H): Primary | ICD-10-CM

## 2025-01-08 DIAGNOSIS — I48.11 LONGSTANDING PERSISTENT ATRIAL FIBRILLATION (H): ICD-10-CM

## 2025-01-08 DIAGNOSIS — E78.5 HYPERLIPIDEMIA LDL GOAL <70: ICD-10-CM

## 2025-01-08 PROCEDURE — G0463 HOSPITAL OUTPT CLINIC VISIT: HCPCS

## 2025-01-08 RX ORDER — FUROSEMIDE 20 MG/1
20 TABLET ORAL DAILY
COMMUNITY
Start: 2025-01-08 | End: 2025-01-08

## 2025-01-08 RX ORDER — FUROSEMIDE 20 MG/1
20 TABLET ORAL 2 TIMES DAILY
Qty: 180 TABLET | Refills: 3 | Status: SHIPPED | OUTPATIENT
Start: 2025-01-08

## 2025-01-08 RX ORDER — FUROSEMIDE 20 MG/1
20 TABLET ORAL DAILY
Qty: 90 TABLET | Refills: 3 | Status: SHIPPED | OUTPATIENT
Start: 2025-01-08 | End: 2025-01-08

## 2025-01-08 ASSESSMENT — PAIN SCALES - GENERAL: PAINLEVEL_OUTOF10: NO PAIN (0)

## 2025-01-08 NOTE — PROGRESS NOTES
[facility] HEART CARE   CARDIOLOGY PROGRESS NOTE     Chief Complaint   Patient presents with    Follow Up          Diagnosis:    ICD-10-CM    1. Chronic systolic congestive heart failure (H)  I50.22 Echocardiogram Complete      2. Pulmonary hypertension (H)  I27.20       3. Longstanding persistent atrial fibrillation (H)  I48.11 Echocardiogram Complete      4. Chronic anticoagulation  Z79.01       5. Atrial enlargement, bilateral  I51.7 furosemide (LASIX) 20 MG tablet     DISCONTINUED: furosemide (LASIX) 20 MG tablet     DISCONTINUED: furosemide (LASIX) 20 MG tablet      6. Mitral valve prolapse  I34.1 Echocardiogram Complete      7. Nonrheumatic aortic valve stenosis  I35.0 Echocardiogram Complete      8. Coronary artery disease involving native coronary artery of native heart without angina pectoris  I25.10       9. ST elevation myocardial infarction involving right coronary artery (H)  I21.11       10. Hyperlipidemia LDL goal <70  E78.5                     Assessment/Plan:    Longstanding persistent atrial fibrillation  Diagnosed with this years ago  Overall has been asymptomatic - occasional sensation of fluttering for very short duration      CHADs-VASC >=2  Continue Eliquis 2.5 mg twice daily for stroke prophylaxis with history of atrial fibrillation  She is on reduced dose for fraility and age  No bleeding concerns today- occasional bloody nose. Reviewed humidification, moisturizer.       Heart Failure with mid-range ejection fraction   - LVEF 45% on TTE 11/2023  - LVEF 35-40% on TTE 4/24/2024 at [facility]   - LV systolic function improved with LVEF 50-55% on TTE 11/2024    NYHA Symptom Class III- LE edema, bibasilar rales, dyspnea, dyspnea on exertion, orthopnea  Stage C    ACE-I/ARB/ARNi:   Continue lisinopril 2.5 mg once daily  BB:   Continue metoprolol succinate 25 mg once daily  SGLT-2 Inhibitor:   Consider at follow-up- she does not want to add any medications. Consider again if decline in  LVEF  Aldosterone antagonist:   Consider at follow-up- she does not want to add any medications. Consider again if decline in LVEF  SCD prophylaxis:  Does not meet criteria for implant with LVEF of 35-40%  Fluid status Hypervolemic:  Hypervolemic on exam with bibasilar rales. Weight has been stable, no increased LE edema, no orthopnea or PND.   Continue furosemide 20 mg in AM and 20 mg in PM  Sodium restrictions, fluid restrictions and daily weights reviewed.   Cardiac Rehab:  Not indicated (EF>35%)  Sleep Apnea Evaluation:   Consider    Wt Readings from Last 5 Encounters:   01/08/25 50.8 kg (112 lb)   11/15/24 51.8 kg (114 lb 4.8 oz)   09/24/24 51.5 kg (113 lb 8.6 oz)   08/29/24 51.5 kg (113 lb 9.6 oz)   08/15/24 51.1 kg (112 lb 11.2 oz)       Mitral valve prolapse  Asymptomatic  Mild mitral insufficiency on TTE 12/2023 and 4/2024    Aortic valve stenosis  Asymptomatic  Moderate AV calcification.  Bicuspid aortic valve could not be excluded.  Moderate aortic stenosis on TTE 4/2023, 11/2024    STEMI 4/23/2024- [facility]  Coronary artery disease  S/p PCI proximal RCA   PTCA, ANGEL x 2, overlapping, excellent angiographic results  IVL x 49 shocks  Residual LAD disease: Prox LAD lesion is 60% stenosed. Prox LAD to Mid LAD lesion is 70% stenosed   Hyperlipidemia with LDL goal less than 70, uncontrolled  Continue atorvastatin 40 mg once daily. Titrate to achieve LDL goal. Future lipid panel ordered  Continue DAPT:  Clopidogrel 75 mg once daily  Eliquis 2.5 mg twice daily  If clopidogrel is stopped in the future she should be on aspirin 81 mg once daily for history of CAD  Continue beta-blockage: metoprolol for secondary prevention  Starting ACEi: Lisinopril   Continue heart healthy lifestyle    Recent Labs   Lab Test 11/15/24  1221 01/15/24  1002   CHOL 198 216*   HDL 77 79   * 126*   TRIG 64 54       Repeat echocardiogram in June 2025 and follow-up afterwards. certainly sooner with acute  "concerns.      Interval history:  Bianka Whitmore is a pleasant 89-year old female who presents for cardiology follow-up. Recall, she has a cardiac history including atrial fibrillation, HFrEF, mitral valve prolapse with mild mitral insufficiency, mild pulmonary hypertension.     April 23, 2024 Ms. Whitmore recalls that she was having intrascapular discomfort for a couple days. Went to sit on the toilet and had a syncopal event. She woke up and got herself up. She told her  she was not feeling well so they called her son who brought her to the ED. In the ED she was found to have had a STEMI. She was transferred to [facility] for further care. She underwent coronary angiogram showing Prox RCA lesion is 100% stenosed. Culprit lesion. The lesion is type C and thrombotic. Lesion length: 52 mm. The lesion is severely calcified. She also had Prox LAD lesion is 60% stenosed. Prox LAD to Mid LAD lesion is 70% stenosed. First Diagonal Branch: 1st Diag lesion is 70% stenosed. She underwent intervention to culprit lesion of proximal RCA with PCI ANGEL x 2 overlapping which showed excellent angiographic results. TTE showed depressed systolic LV function with LVEF 35-40%,  inferoseptal base to apical akinesis, and inferior base to apical akinesis.     Today, Ms. Whitmore endorses that she continues to feel well. She has no specific concerns today.   Continues to do \"usual chores around the house- cooking, cleaning, washing clothes, sweep/mop floors, vacuum.\" Laundry is downstairs and she carefully carries laundry up and down the stairs. She has not been having any chest pain or pressure, no dyspnea or dyspnea on exertion.   Occasional episodes of racing, fluttering in her chest. Short lived and resolves spontaneously.    No episodes of lightheadedness, near-syncope or syncopal events.   No concerns for LE edema.   She has been having occasional nose bleeds.       Smoking History: Started smoking in late teens, early " twenties. Smoked maybe a couple cigarettes per day until her mid-twenties.     Alcohol History: None    Substance Abuse History: None    Sleep History: Sleeps in bed with 1 pillow. No orthopnea, no PND. No snoring. No witnessed apnea. Wakes up feeling well rested 6-7 mornings out of the week. No long daytime naps- sometimes falls asleep in chair for about 10 minutes after lunch.     Family History: No known significant cardiac issues. She has five siblings (she is the second oldest) and none with any cardiac issues. Parents did not have cardiac issues.          HPI:    Bianka Whitmore is a pleasant 78-year old female who presents for cardiology consult. She has cardiac history including atrial fibrillation, newly identified HFrEF, mitral valve prolapse.. She has a non-cardiac history including osteoporosis with history of compression fracture thoracic/lumbar spine, diverticulitis. She was referred to cardiology by her primary care provider for recent transthoracic echocardiogram showing decreased LVEF of 45-50%, severe bi-atrial enlargement, mitral valve prolapse with mild mitral insufficiency, mild pulmonary hypertension with RVSP 40mmHg above the right atrial pressure.    No chest pain or pressure. No dyspnea, dyspnea on exertion. No LE edema.     She tells me that she spends the majority of her day on her feet being active doing housework, cooking, cleaning, doing laundry. She is able to carry her laundry basket up and down her basement stairs- she estimates 10-12 stairs- and she tolerates this without chest pain, dyspnea. She denies any racing/skipping/fluttering sensations in the chest. No lightheaded, dizziness. No exertional lightheadedness, dizziness. No near-syncope or syncopal episodes.     Mrs. Whitmore tells me that she overall feels well. She is able to do the activities she wishes to do in the day and does not have any bothersome or limiting symptoms.  She did see her young grand-daughter over José Miguel  who had upper respiratory illness. A few days after José Miguel she started with sore throat, rhinorrhea, congestion and cough. She denies dyspnea, wheezing, fever, chills, body aches.         RELEVANT TESTING:  Transthoracic Echocardiogram 11/2024  Interpretation Summary  The visual ejection fraction is 50-55%.  The right ventricle is normal size.  Global right ventricular function is normal.  Severe biatrial enlargement is present.  Moderate aortic valve calcification is present.  Moderate aortic stenosis is present (low flow low gradient)  The aortic valve area is 1.2 cm^2, by the continuity equation.  Moderate tricuspid insufficiency is present.  Dilation of the inferior vena cava is present with abnormal respiratory  variation in diameter.  This study was compared with the study from 12/2023 .  LVEF better assessed today; overall no significant change side by side  comparison.      Transthoracic Echocardiogram 12/2023  Interpretation Summary  Left ventricular function is decreased. The ejection fraction is 45-50%  (mildly reduced).  Global right ventricular function is normal.  Severe biatrial enlargement is present.     Cannot exclude a bicuspid aortic valve.  Moderate aortic stenosis is present.Moderate aortic valve calcification is  present. Low flow low gradient aortic valve stenosis (SVI is 24), MG is not  well assessed in this study. JOSE LUIS is 1.3 cm2.  Mild to moderate mitral insufficiency is present.  Mild to moderate tricuspid insufficiency is present.  IVC diameter and respiratory changes fall into an intermediate range  suggesting an RA pressure of 8 mmHg.  This study was compared with the study from 11/17/2022 . No significant  changes noted. Aortic valve stenosis appear similar in the previous.     Recommend a limited study to assess AV gradients from multiple windows.        Transthoracic Echocardiogram 11/17/2022   Interpretation Summary  Left ventricular function is decreased. The ejection fraction  is 45-50%  (mildly reduced).  Global right ventricular function is normal.  Severe left atrial enlargement is present.  Severe right atrial enlargement is present.  Mitral valve prolapse is present.  Mild mitral insufficiency is present.  Mild aortic valve calcification is present.  Trace aortic insufficiency is present.  Mild to moderate tricuspid insufficiency is present.  Right ventricular systolic pressure is 40mmHg above the right atrial pressure.  Mild pulmonic insufficiency is present.      Past Medical History:   Diagnosis Date    Basal cell carcinoma     Cardiomegaly 01/29/2007    Compression fracture of thoracic vertebra (H) 10/30/2014    Compression fx, lumbar spine, with routine healing, subsequent encounter 6/22/2016    Hyperlipidemia     Osteoporosis     Typical atrial flutter (H) 7/25/2016    Varicose vein of leg 6/22/2016       Past Surgical History:   Procedure Laterality Date    APPENDECTOMY      COLONOSCOPY N/A 12/4/2017    Procedure: COLONOSCOPY;  COLONOSCOPY WITH POLYPECTOMY AND SCLEROTHERAPY;  Surgeon: Matt Gonzalez MD;  Location: [facility] OR    ENDOSCOPIC STRIPPING VEIN(S)      HYSTERECTOMY      SALPINGO OOPHORECTOMY,R/L/BRIAN         Allergies   Allergen Reactions    Amoxicillin      Intolerant      Codeine     Hydrocodone Nausea and Vomiting    Morphine      Sensitive to Codeine.    Omeprazole Diarrhea       Current Outpatient Medications   Medication Sig Dispense Refill    apixaban ANTICOAGULANT (ELIQUIS ANTICOAGULANT) 2.5 MG tablet Take 1 tablet (2.5 mg) by mouth 2 times daily 90 tablet 3    Ascorbic Acid (VITAMIN C PO) Take 500 mg by mouth 2 times daily      atorvastatin (LIPITOR) 80 MG tablet Take 1 tablet (80 mg) by mouth daily. 90 tablet 3    B Complex-Biotin-FA (VITAMIN B50 COMPLEX PO) Take 1 tablet by mouth daily      clopidogrel (PLAVIX) 75 MG tablet Take 1 tablet (75 mg) by mouth daily. 90 tablet 3    furosemide (LASIX) 20 MG tablet Take 1 tablet (20 mg) by mouth 2 times daily. 180  tablet 3    lisinopril (ZESTRIL) 2.5 MG tablet Take 1 tablet (2.5 mg) by mouth daily 90 tablet 3    metoprolol succinate ER (TOPROL XL) 25 MG 24 hr tablet Take 1 tablet (25 mg) by mouth daily 90 tablet 3    pantoprazole (PROTONIX) 40 MG EC tablet Take 1 tablet (40 mg) by mouth daily 30 tablet 5       Social History     Socioeconomic History    Marital status:      Spouse name: Not on file    Number of children: Not on file    Years of education: Not on file    Highest education level: Not on file   Occupational History    Occupation: Housewife     Comment: Retired   Tobacco Use    Smoking status: Former     Current packs/day: 0.00     Average packs/day: 0.5 packs/day for 3.2 years (1.6 ttl pk-yrs)     Types: Cigarettes     Start date: 1950     Quit date: 3/26/1953     Years since quittin.8     Passive exposure: Past    Smokeless tobacco: Never    Tobacco comments:     Quit in ; no passive smoke exposure   Vaping Use    Vaping status: Never Used   Substance and Sexual Activity    Alcohol use: No    Drug use: No    Sexual activity: Not Currently   Other Topics Concern     Service Not Asked    Blood Transfusions Yes    Caffeine Concern No    Occupational Exposure Not Asked    Hobby Hazards Not Asked    Sleep Concern Not Asked    Stress Concern Not Asked    Weight Concern Not Asked    Special Diet Not Asked    Back Care Not Asked    Exercise Not Asked    Bike Helmet Not Asked    Seat Belt Not Asked    Self-Exams Not Asked    Parent/sibling w/ CABG, MI or angioplasty before 65F 55M? No   Social History Narrative    Not on file     Social Drivers of Health     Financial Resource Strain: Unknown (1/15/2024)    Financial Resource Strain     Within the past 12 months, have you or your family members you live with been unable to get utilities (heat, electricity) when it was really needed?: Patient declined   Food Insecurity: Low Risk  (1/15/2024)    Food Insecurity     Within the past 12 months,  "did you worry that your food would run out before you got money to buy more?: No     Within the past 12 months, did the food you bought just not last and you didn t have money to get more?: Patient declined   Transportation Needs: Unknown (1/15/2024)    Transportation Needs     Within the past 12 months, has lack of transportation kept you from medical appointments, getting your medicines, non-medical meetings or appointments, work, or from getting things that you need?: Patient declined   Physical Activity: Not on file   Stress: Not on file   Social Connections: Not on file   Interpersonal Safety: Not on file   Housing Stability: Unknown (1/15/2024)    Housing Stability     Do you have housing? : Patient declined     Are you worried about losing your housing?: Patient declined       TEST RESULTS:   No results found for any visits on 01/08/25.          Review of systems: Negative except that which was noted in the HPI.    Physical examination:    Vitals: /64   Pulse 73   Resp 16   Ht 1.575 m (5' 2\")   Wt 50.8 kg (112 lb)   SpO2 98%   BMI 20.49 kg/m    BMI= Body mass index is 20.49 kg/m .      GENERAL APPEARANCE:  alert and no distress  CHEST: bibasilar rales, easier work of breathing compared to prior visit, no use of accessory muscles, no retractions, respirations are unlabored, normal respiratory rate.   CARDIOVASCULAR: Irregularly, irregular rhythm. Normal rate. 3/6 systolic murmur auscultated loudest at RUSB, left lateral 6th ICS  EXTREMITIES: +trace bilateral lower extremity edema  NEURO: alert and oriented normal speech, and affect  VASC: No carotid bruits heard.  SKIN: no visible skin lesions        Thank you for allowing me to participate in the care of your patient. Please do not hesitate to contact me if you have any questions.       Norma Garcia, KRISTIAN        "

## 2025-01-08 NOTE — PATIENT INSTRUCTIONS
Thank you for allowing Norma Garcia CNP and our  team to participate in your care. Please call our office at [phone] with scheduling questions or if you need to cancel or change your appointment. With any other questions or concerns you may call cardiology nurse at  [phone].       If you experience chest pain, chest pressure, chest tightness, shortness of breath, fainting, lightheadedness, nausea, vomiting, or other concerning symptoms, please report to the Emergency Department or call 911. These symptoms may be emergent, and best treated in the Emergency Department.    Continue furosemide 20 mg twice daily.     Continue low sodium diet. Daily weights. If you gain 3 pounds over night or 5 pounds in a week- call and update us. If you have increased swelling in the legs, shortness of breath with exertion or shortness of breath when lying flat- call and update us.     STOP magnesium oxide     Repeat echocardiogram in June 2025 and follow-up afterwards.     Norma Garcia CNP
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
## 2025-01-13 ENCOUNTER — OFFICE VISIT (OUTPATIENT)
Dept: FAMILY MEDICINE | Facility: OTHER | Age: OVER 89
End: 2025-01-13
Attending: FAMILY MEDICINE
Payer: MEDICARE

## 2025-01-13 VITALS
TEMPERATURE: 98.1 F | RESPIRATION RATE: 17 BRPM | HEIGHT: 62 IN | BODY MASS INDEX: 20.24 KG/M2 | DIASTOLIC BLOOD PRESSURE: 56 MMHG | SYSTOLIC BLOOD PRESSURE: 90 MMHG | WEIGHT: 110 LBS | OXYGEN SATURATION: 90 % | HEART RATE: 62 BPM

## 2025-01-13 DIAGNOSIS — M80.00XD AGE-RELATED OSTEOPOROSIS WITH CURRENT PATHOLOGICAL FRACTURE WITH ROUTINE HEALING: ICD-10-CM

## 2025-01-13 DIAGNOSIS — I21.11 STEMI INVOLVING RIGHT CORONARY ARTERY (H): ICD-10-CM

## 2025-01-13 DIAGNOSIS — Z76.89 ENCOUNTER TO ESTABLISH CARE: Primary | ICD-10-CM

## 2025-01-13 DIAGNOSIS — I50.9 CHRONIC CONGESTIVE HEART FAILURE, UNSPECIFIED HEART FAILURE TYPE (H): ICD-10-CM

## 2025-01-13 DIAGNOSIS — I48.20 CHRONIC ATRIAL FIBRILLATION (H): ICD-10-CM

## 2025-01-13 PROCEDURE — G0463 HOSPITAL OUTPT CLINIC VISIT: HCPCS | Mod: 25

## 2025-01-13 ASSESSMENT — PAIN SCALES - GENERAL: PAINLEVEL_OUTOF10: NO PAIN (0)

## 2025-01-17 ENCOUNTER — PATIENT OUTREACH (OUTPATIENT)
Dept: CARDIOLOGY | Facility: OTHER | Age: OVER 89
End: 2025-01-17

## 2025-01-17 NOTE — PROGRESS NOTES
Patient reports weight at 110.  States she continues on furosemide 20 mg BID due to some fluid that her provider heard in her lungs during her visit.  Reports she did discontinue the magnesium oxide.  Denies any missed medications, chest pain, BLE edema or new/increased SOB.   Patient stated she  is feeling well and did not have any current issues or concerns.          Last HF Care Coordination Contact Date:  24     Self-Assessment:     Home scale available:  Yes     Home scale weight this mornin     Heart Failure Zones sheet on refrigerator or available:        Current Heart Failure Zone: No Zone Color Given     Any increased shortness of breath (SOB) since last contact: No     Any increased edema/swelling since last contact: No     Any chest pain since last contact: No     What number to call for YELLOW Zones:        What number to call for RED Zones:       Medications:     How many of your current medicines changed (dose, timing, name, etc) since last contacted: 0 - 1     Having problems obtaining or taking  medications: No     Any missed medications this week:  No     Using medication reminders ( [place], Pill box, other):  Yes     Any questions about medications: No       Healthy Lifestyle:     Follows a low sodium diet: Yes     Daily activity routine:  walking within her home    Activity more limited since last contact: No     Fluid intake less than 2 liters  per day: Yes     Questions about low sodium diet or fluid restriction: Yes     Patient Centered Goals:     The patient will: Continue to follow sodium and fluid restrictions     The patient will: Continue with her daily exercise of 30 min daily     Appointments:     Next PCP appointment:  25     Next Cardiology appointment: 25      Care Coordinator following:  Natasha Chapa RN CC

## 2025-01-22 DIAGNOSIS — K30 UPSET STOMACH: ICD-10-CM

## 2025-01-22 RX ORDER — PANTOPRAZOLE SODIUM 40 MG/1
40 TABLET, DELAYED RELEASE ORAL DAILY
Qty: 30 TABLET | Refills: 5 | Status: SHIPPED | OUTPATIENT
Start: 2025-01-22

## 2025-01-22 NOTE — TELEPHONE ENCOUNTER
Pantoprazole (Protonix) 40 mg EC tablet  Take 1 tablet (40 mg) by mouth daily  Last Written Prescription Date:  7-1-24  Last Fill Quantity: 30 tablet,   # refills: 5  Last Office Visit: 1-13-25  Future Office visit:    Next 5 appointments (look out 90 days)      Apr 14, 2025 9:30 AM  (Arrive by 9:15 AM)  Adult Preventative Visit with Shellie Vargas MD  [facility] [place] ([facility] [place] ) [address]  [place] [address]  [phone]

## 2025-01-22 NOTE — TELEPHONE ENCOUNTER
PPI Protocol [id number]/22/2025 10:12 AM   Protocol Details Medication indicated for associated diagnosis

## 2025-02-03 ENCOUNTER — TELEPHONE (OUTPATIENT)
Dept: CARDIOLOGY | Facility: OTHER | Age: OVER 89
End: 2025-02-03

## 2025-02-03 NOTE — TELEPHONE ENCOUNTER
"States she has had a dry cough x 4 days.  Denies new/worsening SOB, states she is able to side sleep/lie flat without issue, does not wake up in the night SOB.  Denies chest pain/pressure.  Reports a dry \"nagging\" cough.   Patient wonders if it could be from the Lisinopril.  Made an appt to follow up with you this coming Friday, Feb 7th.   "

## 2025-02-03 NOTE — TELEPHONE ENCOUNTER
10:11 AM    Reason for Call: Phone Call    Description: Patient called and states she has been trying to reach the cardiology nurse regarding some coughing spells she has been experiencing for the last 3-4 days. She is requesting that the nurse reach out to her to discuss further.     Was an appointment offered for this call? No    Preferred method for responding to this message: Telephone Call  What is your phone number ?[phone]     If we cannot reach you directly, may we leave a detailed response at the number you provided? Yes    Can this message wait until your PCP/provider returns, if available today? Ángela Peña

## 2025-02-07 ENCOUNTER — ANCILLARY PROCEDURE (OUTPATIENT)
Dept: GENERAL RADIOLOGY | Facility: OTHER | Age: OVER 89
End: 2025-02-07
Attending: NURSE PRACTITIONER
Payer: MEDICARE

## 2025-02-07 DIAGNOSIS — R05.1 ACUTE COUGH: ICD-10-CM

## 2025-02-07 DIAGNOSIS — R09.89 BILATERAL RALES: ICD-10-CM

## 2025-02-07 PROCEDURE — 71046 X-RAY EXAM CHEST 2 VIEWS: CPT | Mod: TC

## 2025-02-10 ENCOUNTER — TELEPHONE (OUTPATIENT)
Dept: CARE COORDINATION | Facility: OTHER | Age: OVER 89
End: 2025-02-10

## 2025-02-10 NOTE — TELEPHONE ENCOUNTER
Telephone call to patient to follow up on last Friday's visit.  Patient states she is still coughing a lot, but denies any new/worsening SOB.  States occasional sputum is that she does expectorate is white/clear.    Denies swelling.

## 2025-02-10 NOTE — TELEPHONE ENCOUNTER
----- Message -----   From: Norma Garcia CNP   Sent: 2/10/2025  12:00 AM CST   To:  Cardiology   Subject: follow-up                                        Can we call and follow-up from Fridays visit--   Improvement in symptoms with change in furosemide?       Thanks,     Norma Garcia CNP on 2/7/2025 at 4:03 PM

## 2025-02-12 ENCOUNTER — TELEPHONE (OUTPATIENT)
Dept: CARE COORDINATION | Facility: OTHER | Age: OVER 89
End: 2025-02-12

## 2025-02-12 DIAGNOSIS — I50.41 ACUTE COMBINED SYSTOLIC AND DIASTOLIC HEART FAILURE (H): ICD-10-CM

## 2025-02-12 DIAGNOSIS — I48.91 ATRIAL FIBRILLATION BY ELECTROCARDIOGRAM (H): ICD-10-CM

## 2025-02-12 DIAGNOSIS — I21.11 ST ELEVATION MYOCARDIAL INFARCTION INVOLVING RIGHT CORONARY ARTERY (H): ICD-10-CM

## 2025-02-12 NOTE — TELEPHONE ENCOUNTER
Return call to patient to let her know the rx has been pended to the provider for her to refill tomorrow.   No answer.  Left detailed message with number provided to return call if needed.

## 2025-02-12 NOTE — TELEPHONE ENCOUNTER
Patient called and said she needs her Eliquis.  States she called [facility], and they told her they have tried to reach out to us several times fo refill and have not gotten a response.

## 2025-02-12 NOTE — TELEPHONE ENCOUNTER
Lisinopril      Last Written Prescription Date:  05/09/24  Last Fill Quantity: 90,   # refills: 3  Last Office Visit: 02/07/25  Future Office visit:    Next 5 appointments (look out 90 days)      Mar 07, 2025 8:30 AM  (Arrive by 8:15 AM)  Return Visit with Norma Garcia CNP  [facility] [place] ([facility] [place] ) [address]  [place] [address]  [phone]     Apr 14, 2025 9:30 AM  (Arrive by 9:15 AM)  Adult Preventative Visit with Shellie Vargas MD  [facility] [place] ([facility] [place] ) [address]  [place] [address]  [phone]             Toprol      Last Written Prescription Date:  05/09/24  Last Fill Quantity: 90,   # refills: 3  Last Office Visit: 02/07/25  Future Office visit:    Next 5 appointments (look out 90 days)      Mar 07, 2025 8:30 AM  (Arrive by 8:15 AM)  Return Visit with Norma Garcia CNP  [facility] [place] ([facility] [place] ) [address]  [place] [address]  [phone]     Apr 14, 2025 9:30 AM  (Arrive by 9:15 AM)  Adult Preventative Visit with Shellie Vargas MD  [facility] [place] ([facility] [place] ) [address]  [place] [address]  [phone]

## 2025-02-13 RX ORDER — METOPROLOL SUCCINATE 25 MG/1
25 TABLET, EXTENDED RELEASE ORAL DAILY
Qty: 90 TABLET | Refills: 3 | Status: SHIPPED | OUTPATIENT
Start: 2025-02-13

## 2025-02-13 NOTE — TELEPHONE ENCOUNTER
Requested Prescriptions   Pending Prescriptions Disp Refills    metoprolol succinate ER (TOPROL XL) 25 MG 24 hr tablet 90 tablet 3     Sig: Take 1 tablet (25 mg) by mouth daily.       Beta-Blockers Protocol Passed - 2/13/2025  8:24 AM        Passed - Most recent blood pressure under 140/90 in past 12 months     BP Readings from Last 3 Encounters:   02/07/25 109/73   01/13/25 90/56   01/08/25 104/64       No data recorded            Passed - Patient is age 6 or older        Passed - Medication is active on med list and the sig matches. RN to manually verify dose and sig if red X/fail.     If the protocol passes (green check), you do not need to verify med dose and sig.    A prescription matches if they are the same clinical intention.    For Example: once daily and every morning are the same.    For all fails (red x), verify dose and sig.    If the refill does match what is on file, the RN can still proceed to approve the refill request.     If they do not match, route to the appropriate provider.             Passed - Medication indicated for associated diagnosis     Medication is associated with one or more of the following diagnoses:     Hypertension (HTN)   Atrial fibrillation/flutter   Angina   ASCVD   Migraine   Heart Failure   Tremor   Anxiety   Ocular hypertension   Glaucoma   PTSD   Obstructive hypertrophic cardiomyopathy   History of myocarditis   Palpitations   POTS (postural orthostatic tachycardia syndrome)   SVT (supraventricular tachycardia)   Hyperthyroidism   Tachycardia   Acute non-st segment elevation myocardial infarction   Subsequent non-st segment elevation myocardial infarction   Ischemic myocardial dysfunction          Passed - Recent (12 mo) or future (90 days) visit within the authorizing provider's specialty     The patient must have completed an in-person or virtual visit within the past 12 months or has a future visit scheduled within the next 90 days with the authorizing provider s  specialty.  Urgent care and e-visits do not qualify as an office visit for this protocol.             Prescription approved per [facility] Refill Protocol.

## 2025-04-14 ENCOUNTER — OFFICE VISIT (OUTPATIENT)
Dept: FAMILY MEDICINE | Facility: OTHER | Age: OVER 89
End: 2025-04-14
Attending: FAMILY MEDICINE
Payer: MEDICARE

## 2025-04-14 ENCOUNTER — PATIENT OUTREACH (OUTPATIENT)
Dept: CARE COORDINATION | Facility: OTHER | Age: OVER 89
End: 2025-04-14

## 2025-04-14 VITALS
TEMPERATURE: 97.8 F | SYSTOLIC BLOOD PRESSURE: 92 MMHG | BODY MASS INDEX: 20.33 KG/M2 | HEIGHT: 62 IN | WEIGHT: 110.5 LBS | HEART RATE: 87 BPM | DIASTOLIC BLOOD PRESSURE: 60 MMHG | OXYGEN SATURATION: 92 % | RESPIRATION RATE: 16 BRPM

## 2025-04-14 DIAGNOSIS — Z00.00 ENCOUNTER FOR MEDICARE ANNUAL WELLNESS EXAM: Primary | ICD-10-CM

## 2025-04-14 DIAGNOSIS — M81.0 AGE RELATED OSTEOPOROSIS, UNSPECIFIED PATHOLOGICAL FRACTURE PRESENCE: ICD-10-CM

## 2025-04-14 DIAGNOSIS — I48.20 CHRONIC ATRIAL FIBRILLATION (H): ICD-10-CM

## 2025-04-14 DIAGNOSIS — I50.9 CHRONIC CONGESTIVE HEART FAILURE, UNSPECIFIED HEART FAILURE TYPE (H): ICD-10-CM

## 2025-04-14 PROCEDURE — G0463 HOSPITAL OUTPT CLINIC VISIT: HCPCS

## 2025-04-14 PROCEDURE — 3078F DIAST BP <80 MM HG: CPT | Performed by: FAMILY MEDICINE

## 2025-04-14 PROCEDURE — 1126F AMNT PAIN NOTED NONE PRSNT: CPT | Performed by: FAMILY MEDICINE

## 2025-04-14 PROCEDURE — G2211 COMPLEX E/M VISIT ADD ON: HCPCS | Performed by: FAMILY MEDICINE

## 2025-04-14 PROCEDURE — G0439 PPPS, SUBSEQ VISIT: HCPCS | Performed by: FAMILY MEDICINE

## 2025-04-14 PROCEDURE — 3074F SYST BP LT 130 MM HG: CPT | Performed by: FAMILY MEDICINE

## 2025-04-14 PROCEDURE — 99213 OFFICE O/P EST LOW 20 MIN: CPT | Mod: 25 | Performed by: FAMILY MEDICINE

## 2025-04-14 SDOH — HEALTH STABILITY: PHYSICAL HEALTH: ON AVERAGE, HOW MANY DAYS PER WEEK DO YOU ENGAGE IN MODERATE TO STRENUOUS EXERCISE (LIKE A BRISK WALK)?: 7 DAYS

## 2025-04-14 SDOH — HEALTH STABILITY: PHYSICAL HEALTH: ON AVERAGE, HOW MANY MINUTES DO YOU ENGAGE IN EXERCISE AT THIS LEVEL?: 90 MIN

## 2025-04-14 ASSESSMENT — SOCIAL DETERMINANTS OF HEALTH (SDOH): HOW OFTEN DO YOU GET TOGETHER WITH FRIENDS OR RELATIVES?: TWICE A WEEK

## 2025-04-14 ASSESSMENT — PAIN SCALES - GENERAL: PAINLEVEL_OUTOF10: NO PAIN (0)

## 2025-04-14 NOTE — PROGRESS NOTES
30 day outreach call to patient.   Patient reports daily weight 110 lbs.  Denies any issue with SOB, BLE edema,, missed medications or new/worsening chest pain.  Reports continuing to follow a low sodium diet,  is getting 150 min or more of activity weekly  (house work, walking around [employer] etc) and during the nicer weather, she also does yard work.  Patient states she feels she is doing well.   No issues or concerns reported.

## 2025-04-14 NOTE — PROGRESS NOTES
{PROVIDER CHARTING PREFERENCE:083917}    Fei Carlton is a 89 year old, presenting for the following health issues:  Physical, Atrial Fib, and Heart Failure        4/14/2025     9:07 AM   Additional Questions   Roomed by Usha Guajrado   Accompanied by self     HPI      Atrial Fibrillation Follow-up    Symptoms: no recent chest pain, significant palpitations, dizziness/lightheadedness, dyspnea, or increased peripheral edema.  Stroke prevention: DOAC (Eliquis, Xarelto, Pradaxa)        1/8/2025     8:39 AM 1/13/2025     7:53 AM 2/7/2025     8:06 AM 3/7/2025     8:03 AM 4/14/2025     9:07 AM   Date   Pulse 73 62 84 81 87     Current [id number] Score: 5 points - A score of 5 or greater represents a 7.2 - 12.2% annual risk of major embolic event, without anti-coagulation or an LAAO device. {Provider  [id number] Calculator to review specific risk factors :15  2942}  Heart Failure Follow-up {Provider  Link to Heart Failure SmartSet :490504}  Are you experiencing any shortness of breath? No  Are you experiencing any swelling in your legs or feet?  No  Are you using more pillows than usual? No  Do you cough at night?  No  Do you check your weight daily?  Yes  Have you had a weight change recently?  No  Are you having any of the following side effects from your medications? (Select all that apply)  The patient does not report symptoms of dizziness, fatigue, cough, swelling, or slow heart beat.  Since your last visit, how many times have you gone to the cardiologist, urgent care, emergency room, or hospital because of your heart failure?   None  Last Echo:   Echo result w/o MOPS: Interpretation SummaryThe visual ejection fraction is 50-55%.The right ventricle is normal size.Global right ventricular function is normal.Severe biatrial enlargement is present.Moderate aortic valve calcification is present.Moderate aortic stenosis is present (low flow low gradient)The aortic valve area is 1.2 cm^2, by the  "continuity equation.Moderate tricuspid insufficiency is present.Dilation of the inferior vena cava is present with abnormal respiratoryvariation in diameter.This study was compared with the study from 12/2023 .LVEF better assessed today; overall no significant change side by sidecomparison.    {ROS Picklists (Optional):839765}      Objective    BP 92/60   Pulse 87   Temp 97.8  F (36.6  C) (Tympanic)   Resp 16   Ht 1.575 m (5' 2\")   Wt 50.1 kg (110 lb 8 oz)   SpO2 92%   BMI 20.21 kg/m    Body mass index is 20.21 kg/m .  Physical Exam   {Exam List (Optional):003302}    {Diagnostic Test Results (Optional):326738}        Signed Electronically by: Shellie Vargas MD  {Email feedback regarding this note to [email or web address]   :295897}  "

## 2025-04-14 NOTE — PROGRESS NOTES
Preventive Care Visit  [facility]  Shellie Vargas MD, Family Medicine  Apr 14, 2025      Assessment & Plan     Encounter for Medicare annual wellness exam  Discussed and updated preventive cares  Repeat wellness visit in one year   Declines medications for osteoporosis d/t concerns for side effects  Declines vaccines     Chronic congestive heart failure, unspecified heart failure type (H) / Chronic atrial fibrillation (H)  Follows with Norma Garcia, with last visit 3/7/2025. Note reviewed.  Follow up in June with repeat echo  - lasix 40mg in the AM and 20mg  in the afternoon with good results, cough resolved  - c/w metoprolol  - c/w plavix (STEMI 4/23/2024 with stenting)   - c/w atorvastatin 80  - c/w elquis 2.5mg bid d/t weight and age    Osteoporosis   Discussed risk of fractures are higher than the risk of side effects. Bianka declines bone health medications     The longitudinal plan of care for the diagnosis(es)/condition(s) as documented were addressed during this visit. Due to the added complexity in care, I will continue to support Bianka in the subsequent management and with ongoing continuity of care.        Counseling  Appropriate preventive services were addressed with this patient via screening, questionnaire, or discussion as appropriate for fall prevention, nutrition, physical activity, Tobacco-use cessation, social engagement, weight loss and cognition.  Checklist reviewing preventive services available has been given to the patient.  Reviewed patient's diet, addressing concerns and/or questions.   Updated plan of care.  Patient reported difficulty with activities of daily living were addressed today.The patient was provided with written information regarding signs of hearing loss.       See Patient Instructions    Return in about 6 months (around 10/14/2025) for CDM.    Fei Carlton is a 89 year old, presenting for the following:  Physical, Atrial Fib, and Heart  Failure        4/14/2025     9:07 AM   Additional Questions   Roomed by Usha Guajardo   Accompanied by self       HPI  # cardiology  Follows with Norma Jose, with last visit 3/7/2025. Note reviewed.  Follow up in June with repeat echo  - continue with lasix and holding lisinopril d/t DAMON/cough    - lasix 2 in the AM and 1 in the afternoon with good results     BP Readings from Last 6 Encounters:   04/14/25 92/60   03/07/25 103/68   02/07/25 109/73   01/13/25 90/56   01/08/25 104/64   11/15/24 102/69            Atrial Fibrillation Follow-up    Symptoms: no recent chest pain, significant palpitations, dizziness/lightheadedness, dyspnea, or increased peripheral edema.  Stroke prevention: DOAC (Eliquis, Xarelto, Pradaxa)        1/8/2025     8:39 AM 1/13/2025     7:53 AM 2/7/2025     8:06 AM 3/7/2025     8:03 AM 4/14/2025     9:07 AM   Date   Pulse 73 62 84 81 87     956}      Heart Failure Follow-up   Are you experiencing any shortness of breath? No  Are you experiencing any swelling in your legs or feet?  No  Are you using more pillows than usual? No  Do you cough at night?  No  Do you check your weight daily?  Yes  Have you had a weight change recently?  No  Are you having any of the following side effects from your medications? (Select all that apply)  The patient does not report symptoms of dizziness, fatigue, cough, swelling, or slow heart beat.  Since your last visit, how many times have you gone to the cardiologist, urgent care, emergency room, or hospital because of your heart failure?   None  Last Echo:   Echo result w/o MOPS: Interpretation SummaryThe visual ejection fraction is 50-55%.The right ventricle is normal size.Global right ventricular function is normal.Severe biatrial enlargement is present.Moderate aortic valve calcification is present.Moderate aortic stenosis is present (low flow low gradient)The aortic valve area is 1.2 cm^2, by the continuity equation.Moderate tricuspid insufficiency is  present.Dilation of the inferior vena cava is present with abnormal respiratoryvariation in diameter.This study was compared with the study from 12/2023 .LVEF better assessed today; overall no significant change side by sidecomparison.      Advance Care Planning  Patient does not have a Health Care Directive: Patient states has Advance Directive and will bring in a copy to clinic.      4/14/2025   General Health   How would you rate your overall physical health? Good   Feel stress (tense, anxious, or unable to sleep) Not at all         4/14/2025   Nutrition   Diet: Regular (no restrictions)         4/14/2025   Exercise   Days per week of moderate/strenous exercise 7 days   Average minutes spent exercising at this level 90 min         4/14/2025   Social Factors   Frequency of gathering with friends or relatives Twice a week   Worry food won't last until get money to buy more No   Food not last or not have enough money for food? No   Do you have housing? (Housing is defined as stable permanent housing and does not include staying ouside in a car, in a tent, in an abandoned building, in an overnight shelter, or couch-surfing.) Yes   Are you worried about losing your housing? No   Lack of transportation? No   Unable to get utilities (heat,electricity)? No         4/14/2025   Fall Risk   Fallen 2 or more times in the past year? No    Trouble with walking or balance? No        Proxy-reported          4/14/2025   Activities of Daily Living- Home Safety   Needs help with the following daily activites Transportation   Safety concerns in the home None of the above         4/14/2025   Dental   Dentist two times every year? Yes         4/14/2025   Hearing Screening   Hearing concerns? (!) TROUBLE UNDERSTANDING SOFT OR WHISPERED SPEECH.         4/14/2025   Driving Risk Screening   Patient/family members have concerns about driving No         4/14/2025   General Alertness/Fatigue Screening   Have you been more tired than usual  lately? No         2025   Urinary Incontinence Screening   Bothered by leaking urine in past 6 months No           Today's PHQ-2 Score:       2025     9:10 AM   PHQ-2 (  [employer])   PHQ-2 Score Incomplete           2025   Substance Use   Alcohol more than 3/day or more than 7/wk Not Applicable   Do you have a current opioid prescription? No   How severe/bad is pain from 1 to 10? 0/10 (No Pain)   Do you use any other substances recreationally? No     Social History     Tobacco Use    Smoking status: Former     Current packs/day: 0.00     Average packs/day: 0.5 packs/day for 3.2 years (1.6 ttl pk-yrs)     Types: Cigarettes     Start date: 1950     Quit date: 3/26/1953     Years since quittin.1     Passive exposure: Past    Smokeless tobacco: Never    Tobacco comments:     Quit in ; no passive smoke exposure   Vaping Use    Vaping status: Never Used   Substance Use Topics    Alcohol use: No    Drug use: No     # Wellness:  - Immunizations: declines vaccines   - Lipids: LDL (3/7/2025) 77  - Dexa scan (2019): lowest T score of -4.3. h/o declining medications d/t side effects .  Declines   - Lung cancer screening: quit   - AAA screening:     # Labs: UTD      Reviewed and updated as needed this visit by Provider   Tobacco  Allergies  Meds  Problems  Med Hx  Surg Hx  Fam Hx              Current providers sharing in care for this patient include:  Patient Care Team:  Shellie Vargas MD as PCP - General (Family Medicine)  Yael Mckeon PA as Assigned PCP  Norma Garcia CNP as Assigned Heart and Vascular Provider  Ashley Chapa RN as Registered Nurse (Cardiology)  Danielle Barney PA-C as Assigned Surgical Provider    The following health maintenance items are reviewed in Epic and correct as of today:  Health Maintenance   Topic Date Due    DTAP/TDAP/TD IMMUNIZATION (1 - Tdap) 1999    RSV VACCINE (1 - 1-dose 75+ series) Never done    ZOSTER IMMUNIZATION (2  "of 3) 01/14/2013    MEDICARE ANNUAL WELLNESS VISIT  10/17/2023    INFLUENZA VACCINE (1) 09/01/2024    COVID-19 Vaccine (3 - 2024-25 season) 09/01/2024    BMP  09/07/2025    ALT  11/15/2025    CBC  11/15/2025    LIPID  03/07/2026    FALL RISK ASSESSMENT  04/14/2026    HF ACTION PLAN  06/07/2027    ADVANCE CARE PLANNING  10/17/2027    DEXA  06/27/2034    TSH W/FREE T4 REFLEX  Completed    PHQ-2 (once per calendar year)  Completed    Pneumococcal Vaccine: 50+ Years  Completed    HPV IMMUNIZATION  Aged Out    MENINGITIS IMMUNIZATION  Aged Out         Review of Systems  Constitutional, HEENT, cardiovascular, pulmonary, gi and gu systems are negative, except as otherwise noted.     Objective    Exam  BP 92/60   Pulse 87   Temp 97.8  F (36.6  C) (Tympanic)   Resp 16   Ht 1.575 m (5' 2\")   Wt 50.1 kg (110 lb 8 oz)   SpO2 92%   BMI 20.21 kg/m     Estimated body mass index is 20.21 kg/m  as calculated from the following:    Height as of this encounter: 1.575 m (5' 2\").    Weight as of this encounter: 50.1 kg (110 lb 8 oz).    Physical Exam  Constitutional:       General: She is not in acute distress.     Appearance: She is not ill-appearing.   Cardiovascular:      Rate and Rhythm: Normal rate. Rhythm irregularly irregular.      Heart sounds: No murmur heard.  Pulmonary:      Effort: Pulmonary effort is normal. No respiratory distress.      Breath sounds: No wheezing or rales.   Musculoskeletal:      Right lower leg: No edema.      Left lower leg: No edema.   Neurological:      Mental Status: She is alert.   Psychiatric:         Mood and Affect: Mood normal.       Labs reviewed         4/14/2025   Mini Cog   Clock Draw Score 2 Normal   3 Item Recall 3 objects recalled   Mini Cog Total Score 5              Signed Electronically by: Shellie Vargas MD    "

## 2025-04-14 NOTE — PATIENT INSTRUCTIONS
Patient Education   Preventive Care Advice   This is general advice given by our system to help you stay healthy. However, your care team may have specific advice just for you. Please talk to your care team about your preventive care needs.  Nutrition  Eat 5 or more servings of fruits and vegetables each day.  Try wheat bread, brown rice and whole grain pasta (instead of white bread, rice, and pasta).  Get enough calcium and vitamin D. Check the label on foods and aim for 100% of the RDA (recommended daily allowance).  Lifestyle  Exercise at least 150 minutes each week  (30 minutes a day, 5 days a week).  Do muscle strengthening activities 2 days a week. These help control your weight and prevent disease.  No smoking.  Wear sunscreen to prevent skin cancer.  Have a dental exam and cleaning every 6 months.  Yearly exams  See your health care team every year to talk about:  Any changes in your health.  Any medicines your care team has prescribed.  Preventive care, family planning, and ways to prevent chronic diseases.  Shots (vaccines)   HPV shots (up to age 26), if you've never had them before.  Hepatitis B shots (up to age 59), if you've never had them before.  COVID-19 shot: Get this shot when it's due.  Flu shot: Get a flu shot every year.  Tetanus shot: Get a tetanus shot every 10 years.  Pneumococcal, hepatitis A, and RSV shots: Ask your care team if you need these based on your risk.  Shingles shot (for age 50 and up)  General health tests  Diabetes screening:  Starting at age 35, Get screened for diabetes at least every 3 years.  If you are younger than age 35, ask your care team if you should be screened for diabetes.  Cholesterol test: At age 39, start having a cholesterol test every 5 years, or more often if advised.  Bone density scan (DEXA): At age 50, ask your care team if you should have this scan for osteoporosis (brittle bones).  Hepatitis C: Get tested at least once in your life.  STIs (sexually  transmitted infections)  Before age 24: Ask your care team if you should be screened for STIs.  After age 24: Get screened for STIs if you're at risk. You are at risk for STIs (including HIV) if:  You are sexually active with more than one person.  You don't use condoms every time.  You or a partner was diagnosed with a sexually transmitted infection.  If you are at risk for HIV, ask about PrEP medicine to prevent HIV.  Get tested for HIV at least once in your life, whether you are at risk for HIV or not.  Cancer screening tests  Cervical cancer screening: If you have a cervix, begin getting regular cervical cancer screening tests starting at age 21.  Breast cancer scan (mammogram): If you've ever had breasts, begin having regular mammograms starting at age 40. This is a scan to check for breast cancer.  Colon cancer screening: It is important to start screening for colon cancer at age 45.  Have a colonoscopy test every 10 years (or more often if you're at risk) Or, ask your provider about stool tests like a FIT test every year or Cologuard test every 3 years.  To learn more about your testing options, visit:   .  For help making a decision, visit:   https://bit.ly/ig70810.  Prostate cancer screening test: If you have a prostate, ask your care team if a prostate cancer screening test (PSA) at age 55 is right for you.  Lung cancer screening: If you are a current or former smoker ages 50 to 80, ask your care team if ongoing lung cancer screenings are right for you.  For informational purposes only. Not to replace the advice of your health care provider. Copyright   2023 [employer]. All rights reserved. Clinically reviewed by the [facility] Transitions Program. [employer] [id number] - REV 01/24.

## 2025-04-21 DIAGNOSIS — K30 UPSET STOMACH: ICD-10-CM

## 2025-04-22 RX ORDER — PANTOPRAZOLE SODIUM 40 MG/1
40 TABLET, DELAYED RELEASE ORAL DAILY
Qty: 30 TABLET | Refills: 11 | Status: SHIPPED | OUTPATIENT
Start: 2025-04-22

## 2025-04-22 NOTE — TELEPHONE ENCOUNTER
Protonix  Last Written Prescription Date: 1/22/25  Last Fill Quantity: 30 # of Refills: 5  Last Office Visit: 4/14/25

## 2025-06-11 ENCOUNTER — HOSPITAL ENCOUNTER (OUTPATIENT)
Dept: CARDIOLOGY | Facility: HOSPITAL | Age: OVER 89
Discharge: HOME | End: 2025-06-11
Attending: NURSE PRACTITIONER
Payer: MEDICARE

## 2025-06-11 DIAGNOSIS — I50.22 CHRONIC SYSTOLIC CONGESTIVE HEART FAILURE (H): ICD-10-CM

## 2025-06-11 DIAGNOSIS — I34.1 MITRAL VALVE PROLAPSE: ICD-10-CM

## 2025-06-11 DIAGNOSIS — I48.11 LONGSTANDING PERSISTENT ATRIAL FIBRILLATION (H): ICD-10-CM

## 2025-06-11 DIAGNOSIS — I35.0 NONRHEUMATIC AORTIC VALVE STENOSIS: ICD-10-CM

## 2025-06-11 LAB — LVEF ECHO: NORMAL

## 2025-06-11 PROCEDURE — 93306 TTE W/DOPPLER COMPLETE: CPT

## 2025-06-18 ENCOUNTER — OFFICE VISIT (OUTPATIENT)
Dept: CARDIOLOGY | Facility: OTHER | Age: OVER 89
End: 2025-06-18
Attending: NURSE PRACTITIONER
Payer: MEDICARE

## 2025-06-18 VITALS
RESPIRATION RATE: 16 BRPM | BODY MASS INDEX: 20.43 KG/M2 | HEIGHT: 62 IN | WEIGHT: 111 LBS | OXYGEN SATURATION: 97 % | SYSTOLIC BLOOD PRESSURE: 102 MMHG | DIASTOLIC BLOOD PRESSURE: 70 MMHG | HEART RATE: 83 BPM

## 2025-06-18 DIAGNOSIS — I50.22 CHRONIC SYSTOLIC CONGESTIVE HEART FAILURE (H): Primary | ICD-10-CM

## 2025-06-18 DIAGNOSIS — Z79.01 CHRONIC ANTICOAGULATION: ICD-10-CM

## 2025-06-18 DIAGNOSIS — E78.5 HYPERLIPIDEMIA LDL GOAL <70: ICD-10-CM

## 2025-06-18 DIAGNOSIS — I34.1 MITRAL VALVE PROLAPSE: ICD-10-CM

## 2025-06-18 DIAGNOSIS — I35.0 NONRHEUMATIC AORTIC VALVE STENOSIS: ICD-10-CM

## 2025-06-18 DIAGNOSIS — I27.20 PULMONARY HYPERTENSION (H): ICD-10-CM

## 2025-06-18 DIAGNOSIS — I21.11 ST ELEVATION MYOCARDIAL INFARCTION INVOLVING RIGHT CORONARY ARTERY (H): ICD-10-CM

## 2025-06-18 DIAGNOSIS — I25.10 CORONARY ARTERY DISEASE INVOLVING NATIVE CORONARY ARTERY OF NATIVE HEART WITHOUT ANGINA PECTORIS: ICD-10-CM

## 2025-06-18 DIAGNOSIS — I48.11 LONGSTANDING PERSISTENT ATRIAL FIBRILLATION (H): ICD-10-CM

## 2025-06-18 DIAGNOSIS — I50.20 HFREF (HEART FAILURE WITH REDUCED EJECTION FRACTION) (H): ICD-10-CM

## 2025-06-18 PROCEDURE — G0463 HOSPITAL OUTPT CLINIC VISIT: HCPCS

## 2025-06-18 RX ORDER — CLOPIDOGREL BISULFATE 75 MG/1
75 TABLET ORAL DAILY
Qty: 90 TABLET | Refills: 3 | Status: SHIPPED | OUTPATIENT
Start: 2025-06-18

## 2025-06-18 ASSESSMENT — PAIN SCALES - GENERAL: PAINLEVEL_OUTOF10: NO PAIN (0)

## 2025-06-18 NOTE — PROGRESS NOTES
[facility] HEART CARE   CARDIOLOGY PROGRESS NOTE     Chief Complaint   Patient presents with    Follow Up          Diagnosis:    ICD-10-CM    1. Chronic systolic congestive heart failure (H)  I50.22       2. HFrEF (heart failure with reduced ejection fraction) (H)  I50.20 Echocardiogram Complete      3. Pulmonary hypertension (H)  I27.20 Echocardiogram Complete      4. Longstanding persistent atrial fibrillation (H)  I48.11       5. Chronic anticoagulation  Z79.01       6. Coronary artery disease involving native coronary artery of native heart without angina pectoris  I25.10 clopidogrel (PLAVIX) 75 MG tablet      7. Hyperlipidemia LDL goal <70  E78.5       8. History of ST elevation myocardial infarction involving right coronary artery (H)  I21.11 clopidogrel (PLAVIX) 75 MG tablet      9. Mitral valve prolapse  I34.1 Echocardiogram Complete      10. Nonrheumatic aortic valve stenosis  I35.0 Echocardiogram Complete              Assessment/Plan:    Longstanding persistent atrial fibrillation  Diagnosed years ago  Overall has been asymptomatic - occasional sensation of fluttering for very short duration. Rate controlled      CHADs-VASC >=2  Continue Eliquis 2.5 mg twice daily for stroke prophylaxis with history of atrial fibrillation  She is on reduced dose for fraility and age  No bleeding concerns today      Heart Failure with mid-range ejection fraction   Pulmonary hypertension  - LVEF 45% on TTE 11/2023  - LVEF 35-40% on TTE 4/24/2024 at [facility]   - LV systolic function improved with LVEF 50-55% on TTE 11/2024  - LV systolic function     NYHA Symptom Class II  Stage C    ACE-I/ARB/ARNi:   Continue holding lisinopril 2.5 mg once daily with soft blood pressures  BB:   Continue metoprolol succinate 25 mg once daily  SGLT-2 Inhibitor:   Consider at follow-up- she again does not want to add in any medications.   Aldosterone antagonist:   Consider at follow-up- she does not want to add any medications  SCD  prophylaxis:  Does not meet criteria for implant with LVEF greater than 30%  Fluid status Hypervolemic:  Hypervolemic on exam today- no LE edema, very few faint rales on exam. Much improved from previously. She prefers to not adjust furosemide. She does follow low sodium diet.   Sodium restrictions, fluid restrictions and daily weights reviewed.   Cardiac Rehab:  Not indicated (EF>35%)  Sleep Apnea Evaluation:   Consider    Wt Readings from Last 5 Encounters:   06/18/25 50.3 kg (111 lb)   04/14/25 50.1 kg (110 lb 8 oz)   03/21/25 49.9 kg (110 lb)   03/07/25 49.9 kg (110 lb)   02/07/25 50.3 kg (111 lb)       Mitral valve prolapse  Asymptomatic  Mild mitral insufficiency on TTE 12/2023 and 4/2024    Aortic valve stenosis  Asymptomatic  Moderate AV calcification.  Bicuspid aortic valve could not be excluded.  Moderate aortic stenosis on TTE 4/2023, 11/2024, 6/2025    STEMI 4/23/2024- [facility]  Coronary artery disease  S/p PCI proximal RCA   PTCA, ANEGL x 2, overlapping, excellent angiographic results  IVL x 49 shocks  Residual LAD disease: Prox LAD lesion is 60% stenosed. Prox LAD to Mid LAD lesion is 70% stenosed   Hyperlipidemia with LDL goal less than 70, uncontrolled  Continue atorvastatin 80 mg once daily.  Discussed option of stopping clopidogrel and starting aspirin versus continuing with clopidogrel. She has a history of stomach upset with NSAIDs and would prefer to continue with clopidogrel. She is also on Eliquis with history of atrial fibrillation.   If clopidogrel is stopped in the future she should be on aspirin 81 mg once daily for history of CAD  Continue beta-blockage: metoprolol for secondary prevention  ACEi: has not tolerated due to hypotension   Continue heart healthy lifestyle    Recent Labs   Lab Test 03/07/25  0848 11/15/24  1221   CHOL 154 198   HDL 61 77   LDL 77 108*   TRIG 82 64     Repeat echocardiogram in June 2026  Follow-up with cardiology in 3 months, certainly sooner with  acute concerns.      Interval history:  Bianka Whitmore is a pleasant 89-year old female who presents for cardiology follow-up. Recall, she has a cardiac history including atrial fibrillation, HFrEF, mitral valve prolapse with mild mitral insufficiency, mild pulmonary hypertension.     April 23, 2024 Ms. Whitmore recalls that she was having intrascapular discomfort for a couple days. Went to sit on the toilet and had a syncopal event. She woke up and got herself up. She told her  she was not feeling well so they called her son who brought her to the ED. In the ED she was found to have had a STEMI. She was transferred to [facility] for further care. She underwent coronary angiogram showing Prox RCA lesion is 100% stenosed. Culprit lesion. The lesion is type C and thrombotic. Lesion length: 52 mm. The lesion is severely calcified. She also had Prox LAD lesion is 60% stenosed. Prox LAD to Mid LAD lesion is 70% stenosed. First Diagonal Branch: 1st Diag lesion is 70% stenosed. She underwent intervention to culprit lesion of proximal RCA with PCI ANGEL x 2 overlapping which showed excellent angiographic results. TTE showed depressed systolic LV function with LVEF 35-40%,  inferoseptal base to apical akinesis, and inferior base to apical akinesis.     Today, Ms. Whitmore endorses that she has overall been feeling well.  She has not had recurrence of cough, dyspnea.   She continues to feel very well.   Staying busy-- walking, out gardening, housework- cooking and cleaning and laundry, up and down stairs.   No limiting dyspnea, dyspnea on exertion.  No chest pain or pressure.  She is sleeping good. Falls asleep almost as soon as her head hits the pillow. She wakes up a couple times during the night to void. Falls back to sleep easily. Wakes up most mornings feeling good. Occasionally will take a short midday nap. No orthopnea, no PND.   Weights have been stable.   No chest pain or pressure  No lightheadedness,  near-syncope or syncopal events.        Smoking History: Started smoking in late teens, early twenties. Smoked maybe a couple cigarettes per day until her mid-twenties.     Alcohol History: None    Substance Abuse History: None    Sleep History: Sleeps in bed with 1 pillow. No snoring. No witnessed apnea. Wakes up feeling well rested 6-7 mornings out of the week. No long daytime naps- sometimes falls asleep in chair for about 10 minutes after lunch.     Family History: No known significant cardiac issues. She has five siblings (she is the second oldest) and none with any cardiac issues. Parents did not have cardiac issues.          HPI:    Bianka Whitmore is a pleasant 78-year old female who presents for cardiology consult. She has cardiac history including atrial fibrillation, newly identified HFrEF, mitral valve prolapse.. She has a non-cardiac history including osteoporosis with history of compression fracture thoracic/lumbar spine, diverticulitis. She was referred to cardiology by her primary care provider for recent transthoracic echocardiogram showing decreased LVEF of 45-50%, severe bi-atrial enlargement, mitral valve prolapse with mild mitral insufficiency, mild pulmonary hypertension with RVSP 40mmHg above the right atrial pressure.    No chest pain or pressure. No dyspnea, dyspnea on exertion. No LE edema.     She tells me that she spends the majority of her day on her feet being active doing housework, cooking, cleaning, doing laundry. She is able to carry her laundry basket up and down her basement stairs- she estimates 10-12 stairs- and she tolerates this without chest pain, dyspnea. She denies any racing/skipping/fluttering sensations in the chest. No lightheaded, dizziness. No exertional lightheadedness, dizziness. No near-syncope or syncopal episodes.     Mrs. Whitmore tells me that she overall feels well. She is able to do the activities she wishes to do in the day and does not have any bothersome or  limiting symptoms.  She did see her young grand-daughter over Christmas who had upper respiratory illness. A few days after José Miguel she started with sore throat, rhinorrhea, congestion and cough. She denies dyspnea, wheezing, fever, chills, body aches.         RELEVANT TESTING:  Transthoracic Echocardiogram 6/11/2025  Interpretation Summary  Left ventricular size is normal.  Left ventricular function is decreased. The ejection fraction is 45-50%  (mildly reduced).  Right ventricular function, chamber size, wall motion, and thickness are  normal.  Mild to moderate low flow low gradient aortic stenosis. SVi 31.5ml/m2.  Mild to moderate tricuspid insufficiency is present.  Mild (pulmonary artery systolic pressure<50mmHg) pulmonary hypertension is  present.  IVC diameter >2.1 cm collapsing <50% with sniff suggests a high RA pressure  estimated at 15 mmHg or greater.  No pericardial effusion is present.  No significant changes noted.    Transthoracic Echocardiogram 11/2024  Interpretation Summary  The visual ejection fraction is 50-55%.  The right ventricle is normal size.  Global right ventricular function is normal.  Severe biatrial enlargement is present.  Moderate aortic valve calcification is present.  Moderate aortic stenosis is present (low flow low gradient)  The aortic valve area is 1.2 cm^2, by the continuity equation.  Moderate tricuspid insufficiency is present.  Dilation of the inferior vena cava is present with abnormal respiratory  variation in diameter.  This study was compared with the study from 12/2023 .  LVEF better assessed today; overall no significant change side by side  comparison.      Transthoracic Echocardiogram 12/2023  Interpretation Summary  Left ventricular function is decreased. The ejection fraction is 45-50%  (mildly reduced).  Global right ventricular function is normal.  Severe biatrial enlargement is present.     Cannot exclude a bicuspid aortic valve.  Moderate aortic stenosis is  present.Moderate aortic valve calcification is  present. Low flow low gradient aortic valve stenosis (SVI is 24), MG is not  well assessed in this study. JOSE LUIS is 1.3 cm2.  Mild to moderate mitral insufficiency is present.  Mild to moderate tricuspid insufficiency is present.  IVC diameter and respiratory changes fall into an intermediate range  suggesting an RA pressure of 8 mmHg.  This study was compared with the study from 11/17/2022 . No significant  changes noted. Aortic valve stenosis appear similar in the previous.     Recommend a limited study to assess AV gradients from multiple windows.        Transthoracic Echocardiogram 11/17/2022   Interpretation Summary  Left ventricular function is decreased. The ejection fraction is 45-50%  (mildly reduced).  Global right ventricular function is normal.  Severe left atrial enlargement is present.  Severe right atrial enlargement is present.  Mitral valve prolapse is present.  Mild mitral insufficiency is present.  Mild aortic valve calcification is present.  Trace aortic insufficiency is present.  Mild to moderate tricuspid insufficiency is present.  Right ventricular systolic pressure is 40mmHg above the right atrial pressure.  Mild pulmonic insufficiency is present.      Past Medical History:   Diagnosis Date    Basal cell carcinoma     Cardiomegaly 01/29/2007    Compression fracture of thoracic vertebra (H) 10/30/2014    Compression fx, lumbar spine, with routine healing, subsequent encounter 6/22/2016    Hyperlipidemia     Osteoporosis     Typical atrial flutter (H) 7/25/2016    Varicose vein of leg 6/22/2016       Past Surgical History:   Procedure Laterality Date    APPENDECTOMY      COLONOSCOPY N/A 12/4/2017    Procedure: COLONOSCOPY;  COLONOSCOPY WITH POLYPECTOMY AND SCLEROTHERAPY;  Surgeon: Matt Gonzalez MD;  Location: [facility]    ENDOSCOPIC STRIPPING VEIN(S)      HYSTERECTOMY      SALPINGO OOPHORECTOMY,R/L/BRIAN         Allergies   Allergen Reactions     Amoxicillin      Intolerant      Codeine     Hydrocodone Nausea and Vomiting    Morphine      Sensitive to Codeine.    Omeprazole Diarrhea       Current Outpatient Medications   Medication Sig Dispense Refill    apixaban ANTICOAGULANT (ELIQUIS ANTICOAGULANT) 2.5 MG tablet Take 1 tablet (2.5 mg) by mouth 2 times daily. 90 tablet 3    atorvastatin (LIPITOR) 80 MG tablet Take 1 tablet (80 mg) by mouth daily. 90 tablet 3    B Complex-Biotin-FA (VITAMIN B50 COMPLEX PO) Take 1 tablet by mouth daily      clopidogrel (PLAVIX) 75 MG tablet Take 1 tablet (75 mg) by mouth daily. 90 tablet 3    furosemide (LASIX) 20 MG tablet Take 2 tabs in the AM, Take 1 tabs at noon 270 tablet 3    MAGNESIUM GLUCONATE PO Take 400 mg by mouth daily.      metoprolol succinate ER (TOPROL XL) 25 MG 24 hr tablet Take 1 tablet (25 mg) by mouth daily. 90 tablet 3    pantoprazole (PROTONIX) 40 MG EC tablet TAKE ONE TABLET BY MOUTH DAILY 30 tablet 11    Ascorbic Acid (VITAMIN C PO) Take 500 mg by mouth 2 times daily (Patient not taking: Reported on 2025)         Social History     Socioeconomic History    Marital status:      Spouse name: Not on file    Number of children: Not on file    Years of education: Not on file    Highest education level: Not on file   Occupational History    Occupation: Housewife     Comment: Retired   Tobacco Use    Smoking status: Former     Current packs/day: 0.00     Average packs/day: 0.5 packs/day for 3.2 years (1.6 ttl pk-yrs)     Types: Cigarettes     Start date: 1950     Quit date: 3/26/1953     Years since quittin.2     Passive exposure: Past    Smokeless tobacco: Never    Tobacco comments:     Quit in ; no passive smoke exposure   Vaping Use    Vaping status: Never Used   Substance and Sexual Activity    Alcohol use: No    Drug use: No    Sexual activity: Not Currently   Other Topics Concern     Service Not Asked    Blood Transfusions Yes    Caffeine Concern No    Occupational  Exposure Not Asked    Hobby Hazards Not Asked    Sleep Concern Not Asked    Stress Concern Not Asked    Weight Concern Not Asked    Special Diet Not Asked    Back Care Not Asked    Exercise Not Asked    Bike Helmet Not Asked    Seat Belt Not Asked    Self-Exams Not Asked    Parent/sibling w/ CABG, MI or angioplasty before 65F 55M? No   Social History Narrative    Not on file     Social Drivers of Health     Financial Resource Strain: Low Risk  (4/14/2025)    Financial Resource Strain     Within the past 12 months, have you or your family members you live with been unable to get utilities (heat, electricity) when it was really needed?: No   Food Insecurity: Low Risk  (4/14/2025)    Food Insecurity     Within the past 12 months, did you worry that your food would run out before you got money to buy more?: No     Within the past 12 months, did the food you bought just not last and you didn t have money to get more?: No   Transportation Needs: Low Risk  (4/14/2025)    Transportation Needs     Within the past 12 months, has lack of transportation kept you from medical appointments, getting your medicines, non-medical meetings or appointments, work, or from getting things that you need?: No   Physical Activity: Sufficiently Active (4/14/2025)    Exercise Vital Sign     Days of Exercise per Week: 7 days     Minutes of Exercise per Session: 90 min   Stress: No Stress Concern Present (4/14/2025)    [employer] [employer] - Occupational Stress Questionnaire     Feeling of Stress : Not at all   Social Connections: Unknown (4/14/2025)    Social Connection and Isolation Panel [NHANES]     Frequency of Communication with Friends and Family: Not on file     Frequency of Social Gatherings with Friends and Family: Twice a week     Attends Temple Services: Not on file     Active Member of Clubs or Organizations: Not on file     Attends Club or Organization Meetings: Not on file     Marital Status: Not on file    Interpersonal Safety: Low Risk  (2025)    Interpersonal Safety     Do you feel physically and emotionally safe where you currently live?: Yes     Within the past 12 months, have you been hit, slapped, kicked or otherwise physically hurt by someone?: No     Within the past 12 months, have you been humiliated or emotionally abused in other ways by your partner or ex-partner?: No   Housing Stability: Low Risk  (2025)    Housing Stability     Do you have housing? : Yes     Are you worried about losing your housing?: No       TEST RESULTS:   Results for orders placed or performed during the hospital encounter of 25   Echocardiogram Complete     Status: None   Result Value Ref Range    LVEF  45-50% (mildly reduced)     Narrative    [id number]  [id number]  [id number]  [id number]^TOM^     [facility]  Echocardiography Laboratory  [address] [place] [address]     Name: MARISEL PETERSON  MRN: [id number]  : 1935  Study Date: 2025 10:03 AM  Age: 89 yrs  Gender: Female  Patient Location: [facility]  Reason For Study: Chronic systolic congestive heart failure (H), Longstanding  pers  Ordering Physician: TOM CHAO  Referring Physician: TOM CHAO  Performed By: Elvira Trinh RDCS     BSA: 1.5 m2  Height: 62 in  Weight: 110 lb  ______________________________________________________________________________  Procedure  Echocardiogram with two-dimensional, color and spectral Doppler.  ______________________________________________________________________________  Interpretation Summary  Left ventricular size is normal.  Left ventricular function is decreased. The ejection fraction is 45-50%  (mildly reduced).  Right ventricular function, chamber size, wall motion, and thickness are  normal.  Mild to moderate low flow low gradient aortic stenosis. SVi 31.5ml/m2.  Mild to moderate tricuspid insufficiency is present.  Mild (pulmonary artery systolic  pressure<50mmHg) pulmonary hypertension is  present.  IVC diameter >2.1 cm collapsing <50% with sniff suggests a high RA pressure  estimated at 15 mmHg or greater.  No pericardial effusion is present.  No significant changes noted.  ______________________________________________________________________________  Left Ventricle  Left ventricular size is normal. Left ventricular wall thickness is normal.  Left ventricular diastolic function is indeterminate. Left ventricular  function is decreased. The ejection fraction is 45-50% (mildly reduced). Mild  diffuse hypokinesis is present.     Right Ventricle  Right ventricular function, chamber size, wall motion, and thickness are  normal.     Atria  Severe biatrial enlargement is present. The atrial septum is intact as  assessed by color Doppler .     Mitral Valve  Mild mitral insufficiency is present.     Aortic Valve  Moderate aortic valve calcification is present. Trace aortic insufficiency is  present. The peak aortic velocity is 1.9 m/sec. The mean AoV pressure gradient  is 7.8 mmHg. The calculated aortic valve are is 1.2 cm^2. Mild to moderate  low  flow low gradient aortic stenosis. SVi 31.5ml/m2.     Tricuspid Valve  Mild to moderate tricuspid insufficiency is present. The right ventricular  systolic pressure is approximated at 38.7 mmHg plus the right atrial pressure.  Mild (pulmonary artery systolic pressure<50mmHg) pulmonary hypertension is  present.     Pulmonic Valve  The pulmonic valve is normal. Mild pulmonic insufficiency is present.     Vessels  The thoracic aorta is normal. The pulmonary artery is normal. IVC diameter  >2.1 cm collapsing <50% with sniff suggests a high RA pressure estimated at 15  mmHg or greater.     Pericardium  No pericardial effusion is present.     Compared to Previous Study  No significant changes noted.     ______________________________________________________________________________  MMode/2D Measurements & Calculations  IVSd:  "0.89 cm  LVIDd: 4.2 cm  LVIDs: 3.4 cm  LVPWd: 1.0 cm  FS: 18.8 %     LV mass(C)d: 127.4 grams  LV mass(C)dI: 85.9 grams/m2  Ao root diam: 3.0 cm  LA dimension: 4.5 cm  asc Aorta Diam: 3.6 cm  LA/Ao: 1.5  LVOT diam: 2.1 cm  LVOT area: 3.6 cm2  Ao root diam index Ht(cm/m): 1.9  Ao root diam index BSA (cm/m2): 2.1  Asc Ao diam index BSA (cm/m2): 2.4  Asc Ao diam index Ht(cm/m): 2.3  EF Biplane: 47.4 %  LA Volume Indexed (AL/bp): 74.8 ml/m2     RWT: 0.48  TAPSE: 1.6 cm     Doppler Measurements & Calculations  Ao V2 max: 192.0 cm/sec  Ao max P.7 mmHg  Ao V2 mean: 132.0 cm/sec  Ao mean P.8 mmHg  Ao V2 VTI: 37.4 cm  JOSE LUIS(I,D): 1.2 cm2  JOSE LUIS(V,D): 1.3 cm2  LV V1 max P.8 mmHg  LV V1 max: 68.0 cm/sec  LV V1 VTI: 13.1 cm  SV(LVOT): 46.6 ml  SI(LVOT): 31.5 ml/m2  TR max alessio: 311.0 cm/sec  TR max P.7 mmHg  RVSP(TR): 53.7 mmHg  AV Alessio Ratio (DI): 0.35  JOSE LUIS Index (cm2/m2): 0.84     ______________________________________________________________________________  Report approved by: JACKLYN Haque MD on 2025 10:49 AM                     Review of systems: Negative except that which was noted in the HPI.    Physical examination:    Vitals: /70 (BP Location: Right arm, Patient Position: Sitting, Cuff Size: Adult Regular)   Pulse 83   Resp 16   Ht 1.575 m (5' 2\")   Wt 50.3 kg (111 lb)   SpO2 97%   BMI 20.30 kg/m    BMI= Body mass index is 20.3 kg/m .      GENERAL APPEARANCE:  alert and no distress  CHEST: easy, even, unlabored respirations, able to speak sentences without dyspnea, bibasilar rales but improved from prior visit.   CARDIOVASCULAR: Irregularly, irregular rhythm. Normal rate. 3/6 systolic murmur auscultated loudest at RUSB, left lateral 6th ICS  EXTREMITIES: +trace bilateral lower extremity edema  NEURO: alert and oriented normal speech, and affect  VASC: No carotid bruits heard.  SKIN: no visible skin lesions        Thank you for allowing me to participate in the care of your patient. Please " do not hesitate to contact me if you have any questions.       Norma Garcia, KRISTIAN    The longitudinal plan of care for the diagnosis(es)/condition(s) as documented were addressed during this visit. Due to the added complexity in care, I will continue to support Bianka in the subsequent management and with ongoing continuity of care.

## 2025-06-18 NOTE — PATIENT INSTRUCTIONS
Thank you for allowing Norma Garcia CNP and our  team to participate in your care. Please call our office at [phone] with scheduling questions or if you need to cancel or change your appointment. With any other questions or concerns you may call cardiology nurse at  [phone].       If you experience chest pain, chest pressure, chest tightness, shortness of breath, fainting, lightheadedness, nausea, vomiting, or other concerning symptoms, please report to the Emergency Department or call 911. These symptoms may be emergent, and best treated in the Emergency Department.    Continue with plavix with history of stomach upset with NSAIDs and she has been tolerating plavix    Echocardiogram stable - we will continue holding lisinopril with softer BP    Continue all other medications as directed    Plan for echocardiogram to re-evaluate aortic valve stenosis, certainly sooner with change in clinical status.       Follow-up in 3 months, certainly sooner with acute concerns.    Norma Garcia CNP       BP Readings from Last 6 Encounters:   06/18/25 102/70   04/14/25 92/60   03/07/25 103/68   02/07/25 109/73   01/13/25 90/56   01/08/25 104/64     Wt Readings from Last 5 Encounters:   06/18/25 50.3 kg (111 lb)   04/14/25 50.1 kg (110 lb 8 oz)   03/21/25 49.9 kg (110 lb)   03/07/25 49.9 kg (110 lb)   02/07/25 50.3 kg (111 lb)

## 2025-06-26 ENCOUNTER — PATIENT OUTREACH (OUTPATIENT)
Dept: CARE COORDINATION | Facility: OTHER | Age: OVER 89
End: 2025-06-26

## 2025-06-26 ASSESSMENT — ACTIVITIES OF DAILY LIVING (ADL): DEPENDENT_IADLS:: INDEPENDENT

## 2025-06-26 NOTE — PROGRESS NOTES
Outreach call to patient.  States she is doing well.  Denies SOB or edema.  Reports compliance with medications and increasing activity.  States she has also been tending to her garden on nice days when weather allows.  Said that she continues to follow a low salt/heart healthy diet.  No questions or concerns reported.

## 2025-08-11 ENCOUNTER — HOSPITAL ENCOUNTER (EMERGENCY)
Facility: HOSPITAL | Age: OVER 89
Discharge: HOME | End: 2025-08-11
Attending: PHYSICIAN ASSISTANT
Payer: MEDICARE

## 2025-08-11 ENCOUNTER — APPOINTMENT (OUTPATIENT)
Dept: CT IMAGING | Facility: HOSPITAL | Age: OVER 89
End: 2025-08-11
Attending: PHYSICIAN ASSISTANT
Payer: MEDICARE

## 2025-08-11 VITALS
WEIGHT: 113.76 LBS | OXYGEN SATURATION: 93 % | BODY MASS INDEX: 20.81 KG/M2 | DIASTOLIC BLOOD PRESSURE: 84 MMHG | TEMPERATURE: 98 F | HEART RATE: 77 BPM | SYSTOLIC BLOOD PRESSURE: 129 MMHG | RESPIRATION RATE: 18 BRPM

## 2025-08-11 DIAGNOSIS — M54.50 ACUTE LOW BACK PAIN, UNSPECIFIED BACK PAIN LATERALITY, UNSPECIFIED WHETHER SCIATICA PRESENT: Primary | ICD-10-CM

## 2025-08-11 PROCEDURE — 99284 EMERGENCY DEPT VISIT MOD MDM: CPT | Mod: 25 | Performed by: PHYSICIAN ASSISTANT

## 2025-08-11 PROCEDURE — 99283 EMERGENCY DEPT VISIT LOW MDM: CPT | Performed by: PHYSICIAN ASSISTANT

## 2025-08-11 PROCEDURE — 72131 CT LUMBAR SPINE W/O DYE: CPT | Mod: 26 | Performed by: STUDENT IN AN ORGANIZED HEALTH CARE EDUCATION/TRAINING PROGRAM

## 2025-08-11 PROCEDURE — 72131 CT LUMBAR SPINE W/O DYE: CPT

## 2025-08-11 ASSESSMENT — COLUMBIA-SUICIDE SEVERITY RATING SCALE - C-SSRS
2. HAVE YOU ACTUALLY HAD ANY THOUGHTS OF KILLING YOURSELF IN THE PAST MONTH?: NO
1. IN THE PAST MONTH, HAVE YOU WISHED YOU WERE DEAD OR WISHED YOU COULD GO TO SLEEP AND NOT WAKE UP?: NO
6. HAVE YOU EVER DONE ANYTHING, STARTED TO DO ANYTHING, OR PREPARED TO DO ANYTHING TO END YOUR LIFE?: NO

## 2025-08-11 ASSESSMENT — ACTIVITIES OF DAILY LIVING (ADL)
ADLS_ACUITY_SCORE: 41

## 2025-08-12 ENCOUNTER — TELEPHONE (OUTPATIENT)
Dept: FAMILY MEDICINE | Facility: OTHER | Age: OVER 89
End: 2025-08-12

## 2025-08-12 ENCOUNTER — TELEPHONE (OUTPATIENT)
Dept: CARE COORDINATION | Facility: OTHER | Age: OVER 89
End: 2025-08-12

## 2025-08-13 ENCOUNTER — OFFICE VISIT (OUTPATIENT)
Dept: FAMILY MEDICINE | Facility: OTHER | Age: OVER 89
End: 2025-08-13
Attending: FAMILY MEDICINE
Payer: MEDICARE

## 2025-08-13 ENCOUNTER — TELEPHONE (OUTPATIENT)
Dept: CARE COORDINATION | Facility: OTHER | Age: OVER 89
End: 2025-08-13

## 2025-08-13 VITALS
HEIGHT: 62 IN | DIASTOLIC BLOOD PRESSURE: 78 MMHG | OXYGEN SATURATION: 98 % | SYSTOLIC BLOOD PRESSURE: 118 MMHG | RESPIRATION RATE: 18 BRPM | HEART RATE: 87 BPM | BODY MASS INDEX: 20.8 KG/M2 | WEIGHT: 113 LBS | TEMPERATURE: 97 F

## 2025-08-13 DIAGNOSIS — G89.29 CHRONIC BILATERAL LOW BACK PAIN WITH RIGHT-SIDED SCIATICA: Primary | ICD-10-CM

## 2025-08-13 DIAGNOSIS — M54.41 CHRONIC BILATERAL LOW BACK PAIN WITH RIGHT-SIDED SCIATICA: Primary | ICD-10-CM

## 2025-08-13 PROCEDURE — G0463 HOSPITAL OUTPT CLINIC VISIT: HCPCS

## 2025-08-13 ASSESSMENT — PAIN SCALES - GENERAL: PAINLEVEL_OUTOF10: MODERATE PAIN (6)

## (undated) DEVICE — TUBING-SUCTION 20FT

## (undated) DEVICE — IRRIGATION-H2O 1000ML

## (undated) DEVICE — TRAP-POLYP E-TRAP

## (undated) DEVICE — SNARE-ROTATABLE 20MM MINI OVAL

## (undated) DEVICE — CAUTERY PAD-POLYHESIVE II ADULT

## (undated) DEVICE — FORCEP-COLON BIOPSY LARGE W/NEEDLE 240CM

## (undated) DEVICE — CANISTER-SUCTION 2000CC

## (undated) DEVICE — NDL-SCLEROTHERAPY INTERJECT

## (undated) RX ORDER — PROPOFOL 10 MG/ML
INJECTION, EMULSION INTRAVENOUS
Status: DISPENSED
Start: 2017-12-04